# Patient Record
Sex: MALE | Race: WHITE | NOT HISPANIC OR LATINO | Employment: OTHER | URBAN - METROPOLITAN AREA
[De-identification: names, ages, dates, MRNs, and addresses within clinical notes are randomized per-mention and may not be internally consistent; named-entity substitution may affect disease eponyms.]

---

## 2017-01-01 ENCOUNTER — HOSPITAL ENCOUNTER (OUTPATIENT)
Facility: MEDICAL CENTER | Age: 77
End: 2017-11-29
Payer: MEDICARE

## 2017-01-01 ENCOUNTER — NON-PROVIDER VISIT (OUTPATIENT)
Dept: PULMONOLOGY | Facility: HOSPICE | Age: 77
End: 2017-01-01
Payer: MEDICARE

## 2017-01-01 ENCOUNTER — APPOINTMENT (OUTPATIENT)
Dept: RADIOLOGY | Facility: MEDICAL CENTER | Age: 77
DRG: 308 | End: 2017-01-01
Attending: INTERNAL MEDICINE
Payer: MEDICARE

## 2017-01-01 ENCOUNTER — OFFICE VISIT (OUTPATIENT)
Dept: CARDIOLOGY | Facility: MEDICAL CENTER | Age: 77
End: 2017-01-01
Payer: MEDICARE

## 2017-01-01 ENCOUNTER — PATIENT OUTREACH (OUTPATIENT)
Dept: HEALTH INFORMATION MANAGEMENT | Facility: OTHER | Age: 77
End: 2017-01-01

## 2017-01-01 ENCOUNTER — HOSPITAL ENCOUNTER (INPATIENT)
Facility: MEDICAL CENTER | Age: 77
LOS: 1 days | DRG: 308 | End: 2017-11-30
Attending: INTERNAL MEDICINE | Admitting: INTERNAL MEDICINE
Payer: MEDICARE

## 2017-01-01 ENCOUNTER — TELEPHONE (OUTPATIENT)
Dept: CARDIOLOGY | Facility: MEDICAL CENTER | Age: 77
End: 2017-01-01

## 2017-01-01 VITALS
BODY MASS INDEX: 29.07 KG/M2 | OXYGEN SATURATION: 93 % | TEMPERATURE: 97.5 F | SYSTOLIC BLOOD PRESSURE: 116 MMHG | RESPIRATION RATE: 18 BRPM | HEART RATE: 96 BPM | DIASTOLIC BLOOD PRESSURE: 81 MMHG | HEIGHT: 70 IN | WEIGHT: 203.04 LBS

## 2017-01-01 VITALS
HEART RATE: 102 BPM | OXYGEN SATURATION: 97 % | BODY MASS INDEX: 28.15 KG/M2 | WEIGHT: 196.6 LBS | DIASTOLIC BLOOD PRESSURE: 90 MMHG | HEIGHT: 70 IN | SYSTOLIC BLOOD PRESSURE: 130 MMHG

## 2017-01-01 VITALS
OXYGEN SATURATION: 97 % | DIASTOLIC BLOOD PRESSURE: 70 MMHG | SYSTOLIC BLOOD PRESSURE: 130 MMHG | WEIGHT: 189 LBS | BODY MASS INDEX: 27.06 KG/M2 | HEIGHT: 70 IN | HEART RATE: 103 BPM

## 2017-01-01 VITALS
DIASTOLIC BLOOD PRESSURE: 78 MMHG | HEART RATE: 96 BPM | BODY MASS INDEX: 27.2 KG/M2 | SYSTOLIC BLOOD PRESSURE: 120 MMHG | HEIGHT: 70 IN | WEIGHT: 190 LBS | OXYGEN SATURATION: 96 %

## 2017-01-01 DIAGNOSIS — I50.21 SYSTOLIC CHF, ACUTE (HCC): ICD-10-CM

## 2017-01-01 DIAGNOSIS — R06.02 SOB (SHORTNESS OF BREATH): ICD-10-CM

## 2017-01-01 DIAGNOSIS — E78.00 HYPERCHOLESTEREMIA: ICD-10-CM

## 2017-01-01 DIAGNOSIS — I10 ESSENTIAL HYPERTENSION, BENIGN: ICD-10-CM

## 2017-01-01 DIAGNOSIS — I49.9 IRREGULAR HEART BEATS: ICD-10-CM

## 2017-01-01 DIAGNOSIS — I48.91 ATRIAL FIBRILLATION, UNSPECIFIED TYPE (HCC): ICD-10-CM

## 2017-01-01 DIAGNOSIS — R60.0 LOCALIZED EDEMA: ICD-10-CM

## 2017-01-01 DIAGNOSIS — E78.2 MIXED HYPERLIPIDEMIA: ICD-10-CM

## 2017-01-01 DIAGNOSIS — I48.0 PAROXYSMAL ATRIAL FIBRILLATION (HCC): ICD-10-CM

## 2017-01-01 LAB
ALBUMIN SERPL BCP-MCNC: 3.3 G/DL (ref 3.2–4.9)
ALBUMIN SERPL BCP-MCNC: 3.6 G/DL (ref 3.2–4.9)
ALBUMIN/GLOB SERPL: 1.5 G/DL
ALBUMIN/GLOB SERPL: 1.6 G/DL
ALP SERPL-CCNC: 207 U/L (ref 30–99)
ALP SERPL-CCNC: 216 U/L (ref 30–99)
ALT SERPL-CCNC: 10 U/L (ref 2–50)
ALT SERPL-CCNC: 13 U/L (ref 2–50)
ANION GAP SERPL CALC-SCNC: 12 MMOL/L (ref 0–11.9)
ANION GAP SERPL CALC-SCNC: 9 MMOL/L (ref 0–11.9)
AST SERPL-CCNC: 17 U/L (ref 12–45)
AST SERPL-CCNC: 25 U/L (ref 12–45)
BASOPHILS # BLD AUTO: 0.8 % (ref 0–1.8)
BASOPHILS # BLD: 0.06 K/UL (ref 0–0.12)
BILIRUB SERPL-MCNC: 1.2 MG/DL (ref 0.1–1.5)
BILIRUB SERPL-MCNC: 1.2 MG/DL (ref 0.1–1.5)
BNP SERPL-MCNC: 969 PG/ML (ref 0–100)
BUN SERPL-MCNC: 25 MG/DL (ref 8–22)
BUN SERPL-MCNC: 27 MG/DL (ref 8–22)
CALCIUM SERPL-MCNC: 8.2 MG/DL (ref 8.5–10.5)
CALCIUM SERPL-MCNC: 8.5 MG/DL (ref 8.5–10.5)
CHLORIDE SERPL-SCNC: 108 MMOL/L (ref 96–112)
CHLORIDE SERPL-SCNC: 110 MMOL/L (ref 96–112)
CO2 SERPL-SCNC: 20 MMOL/L (ref 20–33)
CO2 SERPL-SCNC: 23 MMOL/L (ref 20–33)
CREAT SERPL-MCNC: 1.16 MG/DL (ref 0.5–1.4)
CREAT SERPL-MCNC: 1.22 MG/DL (ref 0.5–1.4)
EKG IMPRESSION: NORMAL
EKG IMPRESSION: NORMAL
EOSINOPHIL # BLD AUTO: 0.1 K/UL (ref 0–0.51)
EOSINOPHIL NFR BLD: 1.4 % (ref 0–6.9)
ERYTHROCYTE [DISTWIDTH] IN BLOOD BY AUTOMATED COUNT: 59.7 FL (ref 35.9–50)
ERYTHROCYTE [DISTWIDTH] IN BLOOD BY AUTOMATED COUNT: 61 FL (ref 35.9–50)
GFR SERPL CREATININE-BSD FRML MDRD: 58 ML/MIN/1.73 M 2
GFR SERPL CREATININE-BSD FRML MDRD: >60 ML/MIN/1.73 M 2
GLOBULIN SER CALC-MCNC: 2.1 G/DL (ref 1.9–3.5)
GLOBULIN SER CALC-MCNC: 2.4 G/DL (ref 1.9–3.5)
GLUCOSE SERPL-MCNC: 113 MG/DL (ref 65–99)
GLUCOSE SERPL-MCNC: 150 MG/DL (ref 65–99)
HCT VFR BLD AUTO: 35.6 % (ref 42–52)
HCT VFR BLD AUTO: 38.1 % (ref 42–52)
HGB BLD-MCNC: 11.4 G/DL (ref 14–18)
HGB BLD-MCNC: 12.3 G/DL (ref 14–18)
IMM GRANULOCYTES # BLD AUTO: 0.01 K/UL (ref 0–0.11)
IMM GRANULOCYTES NFR BLD AUTO: 0.1 % (ref 0–0.9)
INR PPP: 1.19 (ref 0.87–1.13)
LV EJECT FRACT  99904: 55
LV EJECT FRACT MOD 2C 99903: 55.02
LV EJECT FRACT MOD 4C 99902: 44.21
LV EJECT FRACT MOD BP 99901: 48.25
LYMPHOCYTES # BLD AUTO: 1.04 K/UL (ref 1–4.8)
LYMPHOCYTES NFR BLD: 14.4 % (ref 22–41)
MCH RBC QN AUTO: 33.7 PG (ref 27–33)
MCH RBC QN AUTO: 33.9 PG (ref 27–33)
MCHC RBC AUTO-ENTMCNC: 32 G/DL (ref 33.7–35.3)
MCHC RBC AUTO-ENTMCNC: 32.3 G/DL (ref 33.7–35.3)
MCV RBC AUTO: 105 FL (ref 81.4–97.8)
MCV RBC AUTO: 105.3 FL (ref 81.4–97.8)
MONOCYTES # BLD AUTO: 0.7 K/UL (ref 0–0.85)
MONOCYTES NFR BLD AUTO: 9.7 % (ref 0–13.4)
NEUTROPHILS # BLD AUTO: 5.31 K/UL (ref 1.82–7.42)
NEUTROPHILS NFR BLD: 73.6 % (ref 44–72)
NRBC # BLD AUTO: 0 K/UL
NRBC BLD AUTO-RTO: 0 /100 WBC
PLATELET # BLD AUTO: 185 K/UL (ref 164–446)
PLATELET # BLD AUTO: 226 K/UL (ref 164–446)
PMV BLD AUTO: 11.2 FL (ref 9–12.9)
PMV BLD AUTO: 11.2 FL (ref 9–12.9)
POTASSIUM SERPL-SCNC: 4.1 MMOL/L (ref 3.6–5.5)
POTASSIUM SERPL-SCNC: 4.2 MMOL/L (ref 3.6–5.5)
PROT SERPL-MCNC: 5.4 G/DL (ref 6–8.2)
PROT SERPL-MCNC: 6 G/DL (ref 6–8.2)
PROTHROMBIN TIME: 14.8 SEC (ref 12–14.6)
RBC # BLD AUTO: 3.38 M/UL (ref 4.7–6.1)
RBC # BLD AUTO: 3.63 M/UL (ref 4.7–6.1)
SODIUM SERPL-SCNC: 140 MMOL/L (ref 135–145)
SODIUM SERPL-SCNC: 142 MMOL/L (ref 135–145)
WBC # BLD AUTO: 5.1 K/UL (ref 4.8–10.8)
WBC # BLD AUTO: 7.2 K/UL (ref 4.8–10.8)

## 2017-01-01 PROCEDURE — A9270 NON-COVERED ITEM OR SERVICE: HCPCS | Performed by: INTERNAL MEDICINE

## 2017-01-01 PROCEDURE — 36415 COLL VENOUS BLD VENIPUNCTURE: CPT

## 2017-01-01 PROCEDURE — 93306 TTE W/DOPPLER COMPLETE: CPT

## 2017-01-01 PROCEDURE — 80053 COMPREHEN METABOLIC PANEL: CPT

## 2017-01-01 PROCEDURE — 700102 HCHG RX REV CODE 250 W/ 637 OVERRIDE(OP): Performed by: INTERNAL MEDICINE

## 2017-01-01 PROCEDURE — 85027 COMPLETE CBC AUTOMATED: CPT

## 2017-01-01 PROCEDURE — 700111 HCHG RX REV CODE 636 W/ 250 OVERRIDE (IP): Performed by: INTERNAL MEDICINE

## 2017-01-01 PROCEDURE — 85025 COMPLETE CBC W/AUTO DIFF WBC: CPT

## 2017-01-01 PROCEDURE — 93005 ELECTROCARDIOGRAM TRACING: CPT | Performed by: NURSE PRACTITIONER

## 2017-01-01 PROCEDURE — 700111 HCHG RX REV CODE 636 W/ 250 OVERRIDE (IP): Performed by: NURSE PRACTITIONER

## 2017-01-01 PROCEDURE — 93010 ELECTROCARDIOGRAM REPORT: CPT | Performed by: INTERNAL MEDICINE

## 2017-01-01 PROCEDURE — 93000 ELECTROCARDIOGRAM COMPLETE: CPT | Performed by: INTERNAL MEDICINE

## 2017-01-01 PROCEDURE — 85610 PROTHROMBIN TIME: CPT

## 2017-01-01 PROCEDURE — 94060 EVALUATION OF WHEEZING: CPT | Performed by: INTERNAL MEDICINE

## 2017-01-01 PROCEDURE — 99214 OFFICE O/P EST MOD 30 MIN: CPT | Performed by: NURSE PRACTITIONER

## 2017-01-01 PROCEDURE — 93306 TTE W/DOPPLER COMPLETE: CPT | Mod: 26 | Performed by: INTERNAL MEDICINE

## 2017-01-01 PROCEDURE — 770020 HCHG ROOM/CARE - TELE (206)

## 2017-01-01 PROCEDURE — 83880 ASSAY OF NATRIURETIC PEPTIDE: CPT

## 2017-01-01 PROCEDURE — 99205 OFFICE O/P NEW HI 60 MIN: CPT | Mod: 25 | Performed by: INTERNAL MEDICINE

## 2017-01-01 PROCEDURE — 94726 PLETHYSMOGRAPHY LUNG VOLUMES: CPT | Performed by: INTERNAL MEDICINE

## 2017-01-01 PROCEDURE — 94729 DIFFUSING CAPACITY: CPT | Performed by: INTERNAL MEDICINE

## 2017-01-01 PROCEDURE — 71020 DX-CHEST-2 VIEWS: CPT

## 2017-01-01 RX ORDER — LORAZEPAM 2 MG/ML
1.5 INJECTION INTRAMUSCULAR
Status: DISCONTINUED | OUTPATIENT
Start: 2017-01-01 | End: 2017-01-01 | Stop reason: HOSPADM

## 2017-01-01 RX ORDER — LISINOPRIL 5 MG/1
5 TABLET ORAL DAILY
Qty: 30 TAB | Refills: 4 | Status: SHIPPED | OUTPATIENT
Start: 2017-01-01 | End: 2017-01-01 | Stop reason: SDUPTHER

## 2017-01-01 RX ORDER — METOPROLOL SUCCINATE 50 MG/1
75 TABLET, EXTENDED RELEASE ORAL DAILY
Qty: 135 TAB | Refills: 3 | Status: SHIPPED | OUTPATIENT
Start: 2017-01-01 | End: 2017-01-01 | Stop reason: SDUPTHER

## 2017-01-01 RX ORDER — LORAZEPAM 1 MG/1
3 TABLET ORAL
Status: DISCONTINUED | OUTPATIENT
Start: 2017-01-01 | End: 2017-01-01 | Stop reason: HOSPADM

## 2017-01-01 RX ORDER — LORAZEPAM 1 MG/1
1 TABLET ORAL EVERY 4 HOURS PRN
Status: DISCONTINUED | OUTPATIENT
Start: 2017-01-01 | End: 2017-01-01 | Stop reason: HOSPADM

## 2017-01-01 RX ORDER — LORAZEPAM 0.5 MG/1
0.5 TABLET ORAL EVERY 4 HOURS PRN
Status: DISCONTINUED | OUTPATIENT
Start: 2017-01-01 | End: 2017-01-01 | Stop reason: HOSPADM

## 2017-01-01 RX ORDER — FUROSEMIDE 40 MG/1
40 TABLET ORAL DAILY
Qty: 30 TAB | Refills: 4 | Status: SHIPPED | OUTPATIENT
Start: 2017-01-01 | End: 2017-01-01 | Stop reason: SDUPTHER

## 2017-01-01 RX ORDER — POTASSIUM CHLORIDE 20 MEQ/1
10 TABLET, EXTENDED RELEASE ORAL DAILY
Qty: 100 TAB | Refills: 3
Start: 2017-01-01 | End: 2018-01-01 | Stop reason: SDUPTHER

## 2017-01-01 RX ORDER — POTASSIUM CHLORIDE 20 MEQ/1
20 TABLET, EXTENDED RELEASE ORAL DAILY
Status: DISCONTINUED | OUTPATIENT
Start: 2017-01-01 | End: 2017-01-01 | Stop reason: HOSPADM

## 2017-01-01 RX ORDER — METOPROLOL SUCCINATE 50 MG/1
50 TABLET, EXTENDED RELEASE ORAL
Status: DISCONTINUED | OUTPATIENT
Start: 2017-01-01 | End: 2017-01-01 | Stop reason: HOSPADM

## 2017-01-01 RX ORDER — LORAZEPAM 2 MG/ML
2 INJECTION INTRAMUSCULAR
Status: DISCONTINUED | OUTPATIENT
Start: 2017-01-01 | End: 2017-01-01 | Stop reason: HOSPADM

## 2017-01-01 RX ORDER — POTASSIUM CHLORIDE 20 MEQ/1
20 TABLET, EXTENDED RELEASE ORAL DAILY
Qty: 60 TAB | Refills: 2 | Status: SHIPPED | OUTPATIENT
Start: 2017-01-01 | End: 2017-01-01 | Stop reason: SDUPTHER

## 2017-01-01 RX ORDER — DOXYCYCLINE HYCLATE 50 MG/1
50 CAPSULE ORAL 2 TIMES DAILY
COMMUNITY
Start: 2017-01-01 | End: 2017-01-01

## 2017-01-01 RX ORDER — POTASSIUM CHLORIDE 20 MEQ/1
20 TABLET, EXTENDED RELEASE ORAL DAILY
Qty: 100 TAB | Refills: 3 | Status: SHIPPED | OUTPATIENT
Start: 2017-01-01 | End: 2017-01-01 | Stop reason: SDUPTHER

## 2017-01-01 RX ORDER — ATORVASTATIN CALCIUM 20 MG/1
20 TABLET, FILM COATED ORAL EVERY EVENING
Qty: 90 TAB | Refills: 3 | Status: SHIPPED | OUTPATIENT
Start: 2017-01-01

## 2017-01-01 RX ORDER — LORAZEPAM 2 MG/ML
0.5 INJECTION INTRAMUSCULAR EVERY 4 HOURS PRN
Status: DISCONTINUED | OUTPATIENT
Start: 2017-01-01 | End: 2017-01-01 | Stop reason: HOSPADM

## 2017-01-01 RX ORDER — LORAZEPAM 1 MG/1
4 TABLET ORAL
Status: DISCONTINUED | OUTPATIENT
Start: 2017-01-01 | End: 2017-01-01 | Stop reason: HOSPADM

## 2017-01-01 RX ORDER — FUROSEMIDE 10 MG/ML
20 INJECTION INTRAMUSCULAR; INTRAVENOUS
Status: DISCONTINUED | OUTPATIENT
Start: 2017-01-01 | End: 2017-01-01 | Stop reason: HOSPADM

## 2017-01-01 RX ORDER — LISINOPRIL 5 MG/1
5 TABLET ORAL DAILY
Qty: 90 TAB | Refills: 3 | Status: SHIPPED | OUTPATIENT
Start: 2017-01-01 | End: 2018-01-01

## 2017-01-01 RX ORDER — FUROSEMIDE 40 MG/1
20 TABLET ORAL DAILY
Qty: 100 TAB | Refills: 3
Start: 2017-01-01 | End: 2018-01-01

## 2017-01-01 RX ORDER — LORAZEPAM 1 MG/1
2 TABLET ORAL
Status: DISCONTINUED | OUTPATIENT
Start: 2017-01-01 | End: 2017-01-01 | Stop reason: HOSPADM

## 2017-01-01 RX ORDER — LORAZEPAM 2 MG/ML
1 INJECTION INTRAMUSCULAR
Status: DISCONTINUED | OUTPATIENT
Start: 2017-01-01 | End: 2017-01-01 | Stop reason: HOSPADM

## 2017-01-01 RX ORDER — METOPROLOL SUCCINATE 50 MG/1
50 TABLET, EXTENDED RELEASE ORAL 2 TIMES DAILY
Qty: 180 TAB | Refills: 3 | Status: SHIPPED | OUTPATIENT
Start: 2017-01-01 | End: 2018-01-01 | Stop reason: SDUPTHER

## 2017-01-01 RX ORDER — LISINOPRIL 5 MG/1
5 TABLET ORAL DAILY
Status: DISCONTINUED | OUTPATIENT
Start: 2017-01-01 | End: 2017-01-01 | Stop reason: HOSPADM

## 2017-01-01 RX ORDER — METOPROLOL SUCCINATE 50 MG/1
50 TABLET, EXTENDED RELEASE ORAL DAILY
Qty: 30 TAB | Refills: 4 | Status: SHIPPED | OUTPATIENT
Start: 2017-01-01 | End: 2017-01-01 | Stop reason: SDUPTHER

## 2017-01-01 RX ORDER — FUROSEMIDE 40 MG/1
40 TABLET ORAL DAILY
Qty: 100 TAB | Refills: 3 | Status: SHIPPED | OUTPATIENT
Start: 2017-01-01 | End: 2017-01-01 | Stop reason: SDUPTHER

## 2017-01-01 RX ORDER — FUROSEMIDE 10 MG/ML
40 INJECTION INTRAMUSCULAR; INTRAVENOUS ONCE
Status: COMPLETED | OUTPATIENT
Start: 2017-01-01 | End: 2017-01-01

## 2017-01-01 RX ADMIN — POTASSIUM CHLORIDE 20 MEQ: 1500 TABLET, EXTENDED RELEASE ORAL at 18:07

## 2017-01-01 RX ADMIN — FUROSEMIDE 20 MG: 10 INJECTION, SOLUTION INTRAMUSCULAR; INTRAVENOUS at 05:26

## 2017-01-01 RX ADMIN — LISINOPRIL 5 MG: 5 TABLET ORAL at 18:07

## 2017-01-01 RX ADMIN — FUROSEMIDE 40 MG: 10 INJECTION, SOLUTION INTRAMUSCULAR; INTRAVENOUS at 09:48

## 2017-01-01 RX ADMIN — METOPROLOL SUCCINATE 50 MG: 50 TABLET, EXTENDED RELEASE ORAL at 18:07

## 2017-01-01 RX ADMIN — METOPROLOL SUCCINATE 50 MG: 50 TABLET, EXTENDED RELEASE ORAL at 09:48

## 2017-01-01 RX ADMIN — POTASSIUM CHLORIDE 20 MEQ: 1500 TABLET, EXTENDED RELEASE ORAL at 09:48

## 2017-01-01 RX ADMIN — LISINOPRIL 5 MG: 5 TABLET ORAL at 09:48

## 2017-01-01 RX ADMIN — FUROSEMIDE 20 MG: 10 INJECTION, SOLUTION INTRAMUSCULAR; INTRAVENOUS at 18:06

## 2017-01-01 ASSESSMENT — ENCOUNTER SYMPTOMS
COUGH: 0
ORTHOPNEA: 0
DIZZINESS: 0
PALPITATIONS: 0
PALPITATIONS: 0
PND: 0
SHORTNESS OF BREATH: 1
ORTHOPNEA: 0
WEIGHT LOSS: 0
SHORTNESS OF BREATH: 1
PND: 0
VOMITING: 0
MYALGIAS: 0
ABDOMINAL PAIN: 0
CLAUDICATION: 0
WEAKNESS: 0
MYALGIAS: 0
NAUSEA: 0
PALPITATIONS: 0
CLAUDICATION: 0
ABDOMINAL PAIN: 0
SHORTNESS OF BREATH: 0
DIARRHEA: 0
COUGH: 0
DIZZINESS: 0
ORTHOPNEA: 0
CLAUDICATION: 0
WEAKNESS: 0
CONSTIPATION: 0
ABDOMINAL PAIN: 0
WEAKNESS: 0
PND: 0
DIZZINESS: 0

## 2017-01-01 ASSESSMENT — LIFESTYLE VARIABLES
ORIENTATION AND CLOUDING OF SENSORIUM: ORIENTED AND CAN DO SERIAL ADDITIONS
ALCOHOL_USE: NO
TOTAL SCORE: 0
TREMOR: NO TREMOR
TREMOR: NO TREMOR
AUDITORY DISTURBANCES: NOT PRESENT
ANXIETY: NO ANXIETY (AT EASE)
DO YOU DRINK ALCOHOL: NO
HEADACHE, FULLNESS IN HEAD: NOT PRESENT
TOTAL SCORE: 0
NAUSEA AND VOMITING: NO NAUSEA AND NO VOMITING
AGITATION: NORMAL ACTIVITY
AUDITORY DISTURBANCES: NOT PRESENT
VISUAL DISTURBANCES: NOT PRESENT
ORIENTATION AND CLOUDING OF SENSORIUM: ORIENTED AND CAN DO SERIAL ADDITIONS
NAUSEA AND VOMITING: NO NAUSEA AND NO VOMITING
EVER_SMOKED: YES
HEADACHE, FULLNESS IN HEAD: NOT PRESENT
PAROXYSMAL SWEATS: NO SWEAT VISIBLE
AGITATION: NORMAL ACTIVITY
VISUAL DISTURBANCES: NOT PRESENT
PAROXYSMAL SWEATS: NO SWEAT VISIBLE
ANXIETY: NO ANXIETY (AT EASE)

## 2017-01-01 ASSESSMENT — PULMONARY FUNCTION TESTS
FVC_PERCENT_PREDICTED: 59
FEV1: 1.87
FEV1/FVC_PERCENT_PREDICTED: 72
FVC_PREDICTED: 3.99
FEV1/FVC: 76.74
FEV1_PERCENT_CHANGE: 5
FEV1: 1.98
FEV1_PERCENT_PREDICTED: 65
FEV1/FVC: 79
FEV1/FVC_PERCENT_PREDICTED: 106
FEV1/FVC_PERCENT_CHANGE: 62
FEV1_PERCENT_PREDICTED: 68
FEV1_PREDICTED: 2.87
FEV1/FVC_PERCENT_PREDICTED: 110
FVC: 2.38
FVC: 2.58
FVC_PERCENT_PREDICTED: 64
FEV1_PERCENT_CHANGE: 8

## 2017-01-01 ASSESSMENT — PAIN SCALES - GENERAL
PAINLEVEL_OUTOF10: 0

## 2017-01-01 ASSESSMENT — PATIENT HEALTH QUESTIONNAIRE - PHQ9
1. LITTLE INTEREST OR PLEASURE IN DOING THINGS: NOT AT ALL
SUM OF ALL RESPONSES TO PHQ QUESTIONS 1-9: 0
2. FEELING DOWN, DEPRESSED, IRRITABLE, OR HOPELESS: NOT AT ALL
SUM OF ALL RESPONSES TO PHQ9 QUESTIONS 1 AND 2: 0

## 2017-11-08 ENCOUNTER — OFFICE VISIT (OUTPATIENT)
Dept: PULMONOLOGY | Facility: HOSPICE | Age: 77
End: 2017-11-08
Payer: MEDICARE

## 2017-11-08 VITALS
OXYGEN SATURATION: 97 % | BODY MASS INDEX: 28.2 KG/M2 | DIASTOLIC BLOOD PRESSURE: 82 MMHG | HEIGHT: 70 IN | SYSTOLIC BLOOD PRESSURE: 124 MMHG | RESPIRATION RATE: 16 BRPM | WEIGHT: 197 LBS | TEMPERATURE: 97.2 F | HEART RATE: 115 BPM

## 2017-11-08 DIAGNOSIS — R06.02 SOB (SHORTNESS OF BREATH): ICD-10-CM

## 2017-11-08 DIAGNOSIS — Z87.891 FORMER SMOKER, STOPPED SMOKING IN DISTANT PAST: ICD-10-CM

## 2017-11-08 PROCEDURE — 99204 OFFICE O/P NEW MOD 45 MIN: CPT | Performed by: INTERNAL MEDICINE

## 2017-11-08 RX ORDER — ALBUTEROL SULFATE 90 UG/1
1 AEROSOL, METERED RESPIRATORY (INHALATION) EVERY 4 HOURS PRN
COMMUNITY
Start: 2017-10-20 | End: 2018-01-01

## 2017-11-08 RX ORDER — VIT A/VIT C/VIT E/ZINC/COPPER 2148-113
2 TABLET ORAL DAILY
COMMUNITY

## 2017-11-08 RX ORDER — MELOXICAM 15 MG/1
15 TABLET ORAL DAILY
Status: ON HOLD | COMMUNITY
End: 2017-01-01

## 2017-11-08 NOTE — PROGRESS NOTES
Chief Complaint   Patient presents with   • Establish Care     Referred by  for Restrictive and Obstructive Lung Disease       HPI: This patient is a 77 y.o. Male who is referred for shortness of breath and cough. Symptoms started following a URI in September 2017. He has no significant past smoking history, with less than 5 pack years estimated remotely. He was treated empirically with Rocephin, Z-Eamon and albuterol inhaler, without significant subjective benefit. Cough is mildly productive and accompanied by periodic wheezing. He feels more short of breath with moderate exertion. Denies palpitations, chest pain, hemoptysis, or edema. He had a chest x-ray performed for results were not forwarded. Spirometry at his PCP was of suboptimal effort. Denies fevers, chills, weight loss.  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    Past Medical History:   Diagnosis Date   • Arthritis    • CATARACT     bilateral IOL   • Chickenpox    • Turkish measles    • Gout    • Gout    • Hypertension    • Indigestion    • Influenza    • Mumps    • Pain     left hip   • Pain     left knee   • Tremor     intermittent       Social History     Social History   • Marital status:      Spouse name: N/A   • Number of children: N/A   • Years of education: N/A     Occupational History   • Not on file.     Social History Main Topics   • Smoking status: Former Smoker     Packs/day: 0.50     Years: 5.00     Types: Cigarettes     Quit date: 1/1/1969   • Smokeless tobacco: Never Used   • Alcohol use Yes      Comment: 8 drinks a week   • Drug use: No   • Sexual activity: Not on file     Other Topics Concern   • Not on file     Social History Narrative   • No narrative on file       Family History   Problem Relation Age of Onset   • Cancer Mother    • Non-contributory Neg Hx        Current Outpatient Prescriptions on File Prior  "to Visit   Medication Sig Dispense Refill   • allopurinol (ZYLOPRIM) 300 MG Tab TAKE 1 TABLET DAILY 90 Tab 2   • omeprazole (PRILOSEC) 20 MG CPDR Take 20 mg by mouth every day.       • atorvastatin (LIPITOR) 20 MG TABS Take 20 mg by mouth every evening.       • ATENOLOL 50 MG PO TABS Take 50 mg by mouth every day.     • aspirin (ASA) 325 MG Tab Take 325 mg by mouth every day.     • celecoxib (CELEBREX) 200 MG Cap Take 200 mg by mouth 2 times a day.     • mupirocin calcium (BACTROBAN) 2 % Cream Apply  to affected area(s).       No current facility-administered medications on file prior to visit.        Allergies: Percocet [oxycodone-acetaminophen] and Other environmental    ROS:   Constitutional: Denies fevers, chills, night sweats, fatigue or weight loss  Eyes: Denies vision loss, pain, drainage, double vision  Ears, Nose, Throat: Denies earache, difficulty hearing, tinnitus, nasal congestion, hoarseness  Cardiovascular: Denies chest pain, tightness, palpitations, orthopnea or edema  Respiratory: As in history of present illness  Sleep: Denies insomnia, morning headaches  GI: Denies heartburn, dysphagia, nausea, abdominal pain, diarrhea or constipation  : Denies frequent urination, hematuria, discharge or painful urination  Musculoskeletal: + back pain, + painful joints, denies sore muscles  Neurological: Denies weakness or headaches  Skin: No rashes    Blood pressure 124/82, pulse (!) 115, temperature 36.2 °C (97.2 °F), resp. rate 16, height 1.778 m (5' 10\"), weight 89.4 kg (197 lb), SpO2 97 %.    Physical Exam:  Appearance: Well-nourished, well-developed, in no acute distress  HEENT: Normocephalic, atraumatic, white sclera, PERRLA, oropharynx clear  Neck: No adenopathy or masses  Respiratory: no intercostal retractions or accessory muscle use  Lungs auscultation: Clear to auscultation bilaterally  Cardiovascular: Irregularly irregular rhythm. No murmurs, rubs or gallops.  No LE edema  Abdomen: soft, " nondistended  Gait: Uses cane  Digits: No clubbing, cyanosis  Motor: No focal deficits  Orientation: Oriented to time, person and place    Diagnosis:  1. SOB (shortness of breath)  AMB PULMONARY FUNCTION TEST/LAB   2. Former smoker, stopped smoking in distant past         Plan:  The patient describes cough and exertional dyspnea over the past 2 months. Spirometry is to be interpreted with caution as it was of suboptimal technique. He was treated empirically for pneumonia without subjective benefit. Symptoms may be secondary to reactive airways disease, pulmonary parenchymal disease, however suspect potential cardiovascular etiology, eg. atrial fibrillation, based on examination.  Recommend full pulmonary function testing with DLCO for clarification.  Request chest x-ray from C.  Return for after PFT.

## 2017-11-27 ENCOUNTER — APPOINTMENT (RX ONLY)
Dept: URBAN - METROPOLITAN AREA CLINIC 4 | Facility: CLINIC | Age: 77
Setting detail: DERMATOLOGY
End: 2017-11-27

## 2017-11-27 DIAGNOSIS — L57.0 ACTINIC KERATOSIS: ICD-10-CM

## 2017-11-27 DIAGNOSIS — L82.1 OTHER SEBORRHEIC KERATOSIS: ICD-10-CM

## 2017-11-27 DIAGNOSIS — L81.4 OTHER MELANIN HYPERPIGMENTATION: ICD-10-CM

## 2017-11-27 DIAGNOSIS — D18.0 HEMANGIOMA: ICD-10-CM

## 2017-11-27 PROBLEM — D48.5 NEOPLASM OF UNCERTAIN BEHAVIOR OF SKIN: Status: ACTIVE | Noted: 2017-11-27

## 2017-11-27 PROBLEM — D18.01 HEMANGIOMA OF SKIN AND SUBCUTANEOUS TISSUE: Status: ACTIVE | Noted: 2017-11-27

## 2017-11-27 PROCEDURE — 99212 OFFICE O/P EST SF 10 MIN: CPT | Mod: 25

## 2017-11-27 PROCEDURE — 11100: CPT | Mod: 59

## 2017-11-27 PROCEDURE — ? BIOPSY BY SHAVE METHOD

## 2017-11-27 PROCEDURE — ? COUNSELING

## 2017-11-27 PROCEDURE — 17000 DESTRUCT PREMALG LESION: CPT

## 2017-11-27 PROCEDURE — 17003 DESTRUCT PREMALG LES 2-14: CPT

## 2017-11-27 PROCEDURE — ? LIQUID NITROGEN

## 2017-11-27 ASSESSMENT — LOCATION ZONE DERM
LOCATION ZONE: FACE
LOCATION ZONE: TRUNK
LOCATION ZONE: EAR
LOCATION ZONE: SCALP
LOCATION ZONE: ARM

## 2017-11-27 ASSESSMENT — LOCATION DETAILED DESCRIPTION DERM
LOCATION DETAILED: RIGHT INFERIOR MEDIAL MIDBACK
LOCATION DETAILED: LEFT SUPERIOR MEDIAL FOREHEAD
LOCATION DETAILED: LEFT SUPERIOR PARIETAL SCALP
LOCATION DETAILED: LEFT DISTAL POSTERIOR UPPER ARM
LOCATION DETAILED: LEFT SUPERIOR HELIX
LOCATION DETAILED: LEFT FOREHEAD
LOCATION DETAILED: LEFT PROXIMAL POSTERIOR UPPER ARM
LOCATION DETAILED: RIGHT MID-UPPER BACK
LOCATION DETAILED: RIGHT SUPERIOR HELIX
LOCATION DETAILED: RIGHT DISTAL POSTERIOR UPPER ARM
LOCATION DETAILED: LEFT PROXIMAL DORSAL FOREARM
LOCATION DETAILED: RIGHT INFERIOR MEDIAL UPPER BACK

## 2017-11-27 ASSESSMENT — LOCATION SIMPLE DESCRIPTION DERM
LOCATION SIMPLE: RIGHT UPPER BACK
LOCATION SIMPLE: LEFT UPPER ARM
LOCATION SIMPLE: LEFT FOREARM
LOCATION SIMPLE: LEFT EAR
LOCATION SIMPLE: RIGHT LOWER BACK
LOCATION SIMPLE: SCALP
LOCATION SIMPLE: LEFT FOREHEAD
LOCATION SIMPLE: RIGHT EAR
LOCATION SIMPLE: RIGHT UPPER ARM

## 2017-11-27 NOTE — PROCEDURE: BIOPSY BY SHAVE METHOD
Anticipated Plan (Based On Presumed Biopsy Results): Recheck in 2 months if +
Anesthesia Volume In Cc: 3
Bill For Surgical Tray: no
Render Post-Care Instructions In Note?: yes
Hemostasis: Drysol and Electrocautery
Anesthesia Type: 1% lidocaine with epinephrine and a 1:10 solution of 8.4% sodium bicarbonate
Type Of Destruction Used: Curettage
Detail Level: Detailed
Post-Care Instructions: I reviewed with the patient in detail post-care instructions. Patient is to keep the biopsy site dry overnight, and then apply vasaline twice daily until healed.
Size Of Lesion In Cm: 0
Lab Facility: 
Notification Instructions: Patient will be notified of biopsy results. However, patient instructed to call the office if not contacted within 2 weeks.
Wound Care: Vaseline
Consent: Written consent was obtained and risks were reviewed including but not limited to scarring, infection, bleeding, scabbing, incomplete removal, nerve damage and allergy to anesthesia.
Dressing: Band-Aid
Lab: [id number]
Curettage Text: The wound bed was treated with curettage after the biopsy was performed.
Electrodesiccation Text: The wound bed was treated with electrodesiccation after the biopsy was performed.
Billing Type: Third-Party Bill
Electrodesiccation And Curettage Text: The wound bed was treated with electrodesiccation and curettage after the biopsy was performed.
Biopsy Type: H and E
Biopsy Method: Personna blade

## 2017-11-27 NOTE — PROCEDURE: LIQUID NITROGEN
Consent: The patient's consent was obtained including but not limited to risks of crusting, scabbing, blistering, scarring, darker or lighter pigmentary change, recurrence, incomplete removal and infection.
Number Of Freeze-Thaw Cycles: 1 freeze-thaw cycle
Detail Level: Detailed
Duration Of Freeze Thaw-Cycle (Seconds): 5
Post-Care Instructions: I reviewed with the patient in detail post-care instructions. Patient is to wear sunprotection, and avoid picking at any of the treated lesions. Pt may apply Vaseline to crusted or scabbing areas.
Render Post-Care Instructions In Note?: no

## 2017-11-29 PROBLEM — I50.21 SYSTOLIC CHF, ACUTE (HCC): Status: ACTIVE | Noted: 2017-01-01

## 2017-11-29 PROBLEM — I48.91 ATRIAL FIBRILLATION (HCC): Status: ACTIVE | Noted: 2017-01-01

## 2017-11-29 NOTE — PROGRESS NOTES
Direct admit from MD office. Dr. Melvin accepting for A-Fib and CHF. ADT to be signed and held by Dr. Melvin and will need to be released upon patient arrival to unit. Patient arriving via POV.

## 2017-11-30 PROBLEM — F10.10 ETOH ABUSE: Status: ACTIVE | Noted: 2017-01-01

## 2017-11-30 NOTE — DISCHARGE PLANNING
Upon utilization review, patient noted to be on the following medications that could potentially require prior authorization if prescribed at discharge: Xarelto.  If it is anticipated that patient will require these medications at discharge, beginning the prescription prior auth process in advance to anticipated discharge could assist in preventing delays when patient is medically cleared to be discharged from the hospital.

## 2017-11-30 NOTE — DISCHARGE SUMMARY
CHIEF COMPLAINT ON ADMISSION  Progressive exertional dyspnea and cough.     CODE STATUS  Prior    HPI & HOSPITAL COURSE  This is a 77 y.o. male direct admit with new onset atrial fibrillation with rapid ventricular response and acute systolic congestive heart failure. Patient was experiencing progressive exertional dyspnea and nonproductive cough for the last seven weeks. He was seen and evaluated by his pulmonary group, who in turn referred him to cardiology. BNP on admission was 969.     The duration of the patient's atrial fibrillation is unknown. Patient was last seen by cardiology in February 2013 for preoperative evaluation because of an abnormal EKG.  At that time, he was documented to be in sinus rhythm.  He had an echocardiogram performed that demonstrated normal segmental wall motion with EF of 60%-65%.      Echo performed (11/30/2017) showed severe TR, moderate to severe MR, severely enlarged right ventricle with reduced right ventricular systolic function, LVEF 55%. RVSP 45 mmHg.     The etiology of his systolic heart failure is uncertain, possibly secondary to alcoholic cardiomyopathy or untreated atrial fibrillation with RVR. Patient does have a significant history of ETOH abuse.     Patient was diuresed with IV lasix with a net loss of -1,400 at discharge and placed on Metoprolol and Lisinopril for rate control. [id number] score is 4. Xarelto anticoagulation has also been prescribed.     Patient reports feeling well and significant improvement in symptoms after medical management of his conditions. He was ambulating in the hallway without any dyspnea. On room air. Patient will be discharged on po lasix 40mg qd.     Therefore, he is discharged in good and stable condition with close outpatient follow-up.    SPECIFIC OUTPATIENT FOLLOW-UP  Patient will follow up with cardiology next week on 12/6/17 with Raymond GOLDEN.     DISCHARGE PROBLEM LIST  Atrial Fibrillation   Acute Systolic Congestive  Heart Failure  Hypertension  ETOH abuse     FOLLOW UP  Future Appointments  Date Time Provider Department Center   12/6/2017 2:40 PM SANDRA Coley [facility] None   12/7/2017 10:00 AM [facility] None   1/2/2018 10:40 AM Courtney Quintero M.D. [facility] None     No follow-up provider specified.    MEDICATIONS ON DISCHARGE   Jaime Tamayo   Home Medication Instructions LUCITA:[id number]    Printed on:11/30/17 0190   Medication Information                      allopurinol (ZYLOPRIM) 300 MG Tab  TAKE 1 TABLET DAILY             atorvastatin (LIPITOR) 20 MG TABS  Take 20 mg by mouth every evening.               furosemide (LASIX) 40 MG Tab  Take 1 Tab by mouth every day.             lisinopril (PRINIVIL) 5 MG Tab  Take 1 Tab by mouth every day.             metoprolol SR (TOPROL XL) 50 MG TABLET SR 24 HR  Take 1 Tab by mouth every day.             Multiple Vitamins-Minerals (PRESERVISION AREDS) Tab  Take 2 tablet by mouth every day.             omeprazole (PRILOSEC) 20 MG CPDR  Take 20 mg by mouth every day.               potassium chloride SA (KDUR) 20 MEQ Tab CR  Take 1 Tab by mouth every day.             rivaroxaban (XARELTO) 20 MG Tab tablet  Take 1 Tab by mouth with dinner.             VENTOLIN  (90 Base) MCG/ACT Aero Soln inhalation aerosol  Inhale 1 Puff by mouth every four hours as needed for Shortness of Breath.                 DIET  Orders Placed This Encounter   Procedures   • DIET ORDER     Standing Status:   Standing     Number of Occurrences:   1     Order Specific Question:   Diet:     Answer:   Cardiac [6]       ACTIVITY  As tolerated.  Weight bearing as tolerated      CONSULTATIONS  None    PROCEDURES  None    LABORATORY  Lab Results   Component Value Date/Time    SODIUM 140 11/30/2017 09:47 AM    POTASSIUM 4.1 11/30/2017 09:47 AM    CHLORIDE 108 11/30/2017 09:47 AM    CO2 23 11/30/2017 09:47 AM    GLUCOSE 150 (H) 11/30/2017 09:47 AM    BUN 25 (H) 11/30/2017 09:47 AM    CREATININE 1.22  11/30/2017 09:47 AM        Lab Results   Component Value Date/Time    WBC 5.1 11/30/2017 09:47 AM    HEMOGLOBIN 11.4 (L) 11/30/2017 09:47 AM    HEMATOCRIT 35.6 (L) 11/30/2017 09:47 AM    PLATELETCT 185 11/30/2017 09:47 AM

## 2017-11-30 NOTE — DISCHARGE INSTRUCTIONS
Discharge Instructions    Discharged to home by car with friend. Discharged via wheelchair, hospital escort: Yes.  Special equipment needed: Not Applicable    Be sure to schedule a follow-up appointment with your primary care doctor or any specialists as instructed.     Discharge Plan:   Diet Plan: Discussed  Confirmed Follow up Appointment: Appointment Scheduled  Confirmed Symptoms Management: Discussed  Medication Reconciliation Updated: Yes  Influenza Vaccine Indication: Not indicated: Previously immunized this influenza season and > 8 years of age    I understand that a diet low in cholesterol, fat, and sodium is recommended for good health. Unless I have been given specific instructions below for another diet, I accept this instruction as my diet prescription.   Other diet: Cardiac/heart healthy    Special Instructions:   HF Patient Discharge Instructions  · Monitor your weight daily, and maintain a weight chart, to track your weight changes.   · Activity as tolerated, unless your Doctor has ordered otherwise.  · Follow a low fat, low cholesterol, low salt diet unless instructed otherwise by your Doctor. Read the labels on the back of food products and track your intake of fat, cholesterol and salt.   · Fluid Restriction No. If a Fluid Restriction has been ordered by your Doctor, measure fluids with a measuring cup to ensure that you are not exceeding the restriction.   · No smoking.  · Oxygen No. If your Doctor has ordered that you wear Oxygen at home, it is important to wear it as ordered.  · Did you receive an explanation from staff on the importance of taking each of your medications and why it is necessary to keep taking them unless your doctor says to stop? Yes  · Were all of your questions answered about how to manage your heart failure and what to do if you have increased signs and symptoms after you go home? Yes  · Do you feel like your heart failure care team involved you in the care treatment plan  and allowed you to make decisions regarding your care while in the hospital and addressed any discharge needs you might have? Yes    See the educational handout provided at discharge for more information on monitoring your daily weight, activity and diet. This also explains more about Heart Failure, symptoms of a flare-up and some of the tests that you have undergone.     Warning Signs of a Flare-Up include:  · Swelling in the ankles or lower legs.  · Shortness of breath, while at rest, or while doing normal activities.   · Shortness of breath at night when in bed, or coughing in bed.   · Requiring more pillows to sleep at night, or needing to sit up at night to sleep.  · Feeling weak, dizzy or fatigued.     When to call your Doctor:  · Call [facility] seven days a week from 8:00 a.m. to 8:00 p.m. for medical questions [phone].  · Call your Primary Care Physician or Cardiologist if:   1. You experience any pain radiating to your jaw or neck.  2. You have any difficulty breathing.  3. You experience weight gain of 3 lbs in a day or 5 lbs in a week.   4. You feel any palpitations or irregular heartbeats.  5. You become dizzy or lose consciousness.   If you have had an angiogram or had a pacemaker or AICD placed, and experience:  1. Bleeding, drainage or swelling at the surgical / puncture site.  2. Fever greater than 100.0 F  3. Shock from internal defibrillator.  4. Cool and / or numb extremities.      · Is patient discharged on Warfarin / Coumadin?   No     · Is patient Post Blood Transfusion?  No              Atrial Fibrillation  Atrial fibrillation is a condition that causes your heart to beat irregularly. It may also cause your heart to beat faster than normal. Atrial fibrillation can prevent your heart from pumping blood normally. It increases your risk of stroke and heart problems.  HOME CARE  · Take medications as told by your doctor.  · Only take medications that your doctor says are safe.  Some medications can make the condition worse or happen again.  · If blood thinners were prescribed by your doctor, take them exactly as told. Too much can cause bleeding. Too little and you will not have the needed protection against stroke and other problems.  · Perform blood tests at home if told by your doctor.  · Perform blood tests exactly as told by your doctor.  · Do not drink alcohol.  · Do not drink beverages with caffeine such as coffee, soda, and some teas.  · Maintain a healthy weight.  · Do not use diet pills unless your doctor says they are safe. They may make heart problems worse.  · Follow diet instructions as told by your doctor.  · Exercise regularly as told by your doctor.  · Keep all follow-up appointments.  GET HELP IF:  · You notice a change in the speed, rhythm, or strength of your heartbeat.  · You suddenly begin peeing (urinating) more often.  · You get tired more easily when moving or exercising.  GET HELP RIGHT AWAY IF:   · You have chest or belly (abdominal) pain.  · You feel sick to your stomach (nauseous).  · You are short of breath.  · You suddenly have swollen feet and ankles.  · You feel dizzy.  · You face, arms, or legs feel numb or weak.  · There is a change in your vision or speech.  MAKE SURE YOU:   · Understand these instructions.  · Will watch your condition.  · Will get help right away if you are not doing well or get worse.     This information is not intended to replace advice given to you by your health care provider. Make sure you discuss any questions you have with your health care provider.     Document Released: 09/26/2009 Document Revised: 01/08/2016 Document Reviewed: 04/13/2016  [employer] Interactive Patient Education ©2016 [employer]    Depression / Suicide Risk    As you are discharged from this [facility] facility, it is important to learn how to keep safe from harming yourself.    Recognize the warning signs:  · Abrupt changes in personality, positive or  negative- including increase in energy   · Giving away possessions  · Change in eating patterns- significant weight changes-  positive or negative  · Change in sleeping patterns- unable to sleep or sleeping all the time   · Unwillingness or inability to communicate  · Depression  · Unusual sadness, discouragement and loneliness  · Talk of wanting to die  · Neglect of personal appearance   · Rebelliousness- reckless behavior  · Withdrawal from people/activities they love  · Confusion- inability to concentrate     If you or a loved one observes any of these behaviors or has concerns about self-harm, here's what you can do:  · Talk about it- your feelings and reasons for harming yourself  · Remove any means that you might use to hurt yourself (examples: pills, rope, extension cords, firearm)  · Get professional help from the community (Mental Health, Substance Abuse, psychological counseling)  · Do not be alone:Call your Safe Contact- someone whom you trust who will be there for you.  · Call your local CRISIS HOTLINE 982-6905 or 427-636-5781  · Call your local Children's Mobile Crisis Response Team [place] (278) 533-1377 or www.[employer]  · Call the toll free National Suicide Prevention Hotlines   · National Suicide Prevention Lifeline 509-676-EGBK (2858)  · National Hope Line Network 800-SUICIDE (475-3996)

## 2017-11-30 NOTE — H&P
Per DR MARROQUIN YESTERDAY - this direct admit:      H&P  Unsigned   Date of Service: 11/29/2017 3:26 PM  Eduardo Marroquin M.D.   Interventional Cardiology      []Hide copied text  []Moisés for attribution information  DATE:  11/29/2017     CHIEF COMPLAINT:  Exertional dyspnea.     HISTORY OF PRESENT ILLNESS:  The patient is a 77-year-old gentleman seen and   examined in our office today.  He is a direct admit from the office because of   heart failure and recent-onset atrial fibrillation.     I initially saw this gentleman in February 2013 for preoperative evaluation of   an abnormal EKG.  At that time, he was documented to be in sinus rhythm.  He   had an echocardiogram performed that demonstrated normal segmental wall motion   with EF of 60%-65%.     For the past several weeks, the patient has noted progressive exertional   dyspnea and nonproductive cough.  He was seen and evaluated by pulmonary   group, who in turn referred him here.     There is no history of known myocardial infarction or chest pain.  He has had   no sense of palpitations.  The patient has also feet swelling and progressive   exertional dyspnea even walking around the house.  Again, he denies chest pain   or sense of palpitations.  Denies any TIA symptoms.  Cardiac risk factor   profile positive for history of hypertension and hyperlipidemia.  He is not   diabetic.  Denies cigarette smoking.  There is no family history of premature   coronary artery disease.     MEDICATIONS ON ADMISSION:  Atenolol 50 mg a day, atorvastatin 20 mg a day,   omeprazole 20 mg a day, Celebrex 200 mg twice daily as needed, aspirin daily,   multiple vitamins, Meloxicam 15 mg daily as needed, allopurinol 300 mg a day,   Ventolin inhaler.     ILLNESS:  Gout, osteoarthritis, hypertension, hyperlipidemia.     SOCIAL HISTORY:  The patient is .  He has not smoked for 49 years.  He   is evasive about his alcohol intake, but has a history of alcohol abuse in the   past and  is currently drinking several drinks per night.     PAST SURGERIES:  Achilles tendon repair on the left, tonsillectomy, right hip   arthroplasty, knee replacement.     REVIEW OF SYSTEMS:  NEUROLOGIC:  No stroke or TIA.  PULMONARY:  Denies asthma or emphysema.  He has seen pulmonary recently as   outlined above and PFTs have been ordered.  GASTROINTESTINAL:  No melena, no peptic ulcer disease.  He notes some   abdominal swelling.  RENAL:  No history of kidney failure or kidney stones.  MUSCULOSKELETAL:  No recent trauma.  He has history of osteoarthritis.  ENDOCRINE:  He has had no perceptible weight loss.  He notes some edema.  No   cold intolerance.  No history of diabetes.  All others are negative.     PHYSICAL EXAMINATION:  GENERAL:  Reveals elderly gentleman, in no distress.  VITAL SIGNS:  Heart rate is 100 and irregularly irregular, blood pressure is   130/70.  HEENT:  There is significant JVD present with patient less than 30 degrees   inclined.  Carotid upstroke intact.  No bruit.  LUNGS:  Reveal no wheezes.  There are few rales at the left base.  HEART:  Irregularly irregular rhythm.  Tones are quite distant.  There is no   audible murmur.  ABDOMEN:  Obese, distended, and appears to be fluid within the abdomen.  EXTREMITIES:  There is edema to just above the knees.  Posterior tibial pulses   cannot be palpated nor can dorsalis pedis pulses.  Femoral pulses are 1+ and   without bruit.  SKIN:  Warm and dry.  NEUROLOGIC:  Patient is alert and cooperative.     DATA:  EKG in office demonstrates atrial fibrillation with ventricular   response rate of 103 per minute and nonspecific ST-T and T changes.     IMPRESSION:  1.  Atrial fibrillation:  The duration of the patient's atrial fibrillation is   unknown.  He does have a rapid ventricular response.  This is mitigated   slightly by the fact that he has been on a chronic beta-blocker for   hypertension.  2.  Acute systolic congestive heart failure:  The patient has  been   progressively sick over the last 7 weeks with progressive dyspnea and now has   significant edema as well as JVD.  The etiology of his systolic heart failure   is uncertain, maybe secondary to alcoholic cardiomyopathy or untreated atrial   fibrillation.  Ischemic etiology also will be considered.  I discussed the   situation with the patient and his wife in the office today.  Although, it   would be feasible to treat him on an outpatient basis, it would be difficult   and the degree of volume overload warrants hospital admission.  He was   admitted to the hospital for treatment of his heart failure and for treatment   of his atrial fibrillation.  I also informed them that he absolutely has to   quit drinking altogether.  He will also be evaluated for ischemia while in the   hospital.  3.  History of hypertension.  5.  History of hyperlipidemia.  6.  History of alcohol abuse.        ____________________________________     MD GYPSY PERALTA / [facility]     DD:  11/29/2017 15:26:58  DT:  11/29/2017 15:59:59     D#:  [id number]  Job#:  [id number]

## 2017-11-30 NOTE — H&P
DATE:  11/29/2017    CHIEF COMPLAINT:  Exertional dyspnea.    HISTORY OF PRESENT ILLNESS:  The patient is a 77-year-old gentleman seen and   examined in our office today.  He is a direct admit from the office because of   heart failure and recent-onset atrial fibrillation.    I initially saw this gentleman in February 2013 for preoperative evaluation of   an abnormal EKG.  At that time, he was documented to be in sinus rhythm.  He   had an echocardiogram performed that demonstrated normal segmental wall motion   with EF of 60%-65%.    For the past several weeks, the patient has noted progressive exertional   dyspnea and nonproductive cough.  He was seen and evaluated by pulmonary   group, who in turn referred him here.    There is no history of known myocardial infarction or chest pain.  He has had   no sense of palpitations.  The patient has also feet swelling and progressive   exertional dyspnea even walking around the house.  Again, he denies chest pain   or sense of palpitations.  Denies any TIA symptoms.  Cardiac risk factor   profile positive for history of hypertension and hyperlipidemia.  He is not   diabetic.  Denies cigarette smoking.  There is no family history of premature   coronary artery disease.    MEDICATIONS ON ADMISSION:  Atenolol 50 mg a day, atorvastatin 20 mg a day,   omeprazole 20 mg a day, Celebrex 200 mg twice daily as needed, aspirin daily,   multiple vitamins, Meloxicam 15 mg daily as needed, allopurinol 300 mg a day,   Ventolin inhaler.    ILLNESS:  Gout, osteoarthritis, hypertension, hyperlipidemia.    SOCIAL HISTORY:  The patient is .  He has not smoked for 49 years.  He   is evasive about his alcohol intake, but has a history of alcohol abuse in the   past and is currently drinking several drinks per night.    PAST SURGERIES:  Achilles tendon repair on the left, tonsillectomy, right hip   arthroplasty, knee replacement.    REVIEW OF SYSTEMS:  NEUROLOGIC:  No stroke or  TIA.  PULMONARY:  Denies asthma or emphysema.  He has seen pulmonary recently as   outlined above and PFTs have been ordered.  GASTROINTESTINAL:  No melena, no peptic ulcer disease.  He notes some   abdominal swelling.  RENAL:  No history of kidney failure or kidney stones.  MUSCULOSKELETAL:  No recent trauma.  He has history of osteoarthritis.  ENDOCRINE:  He has had no perceptible weight loss.  He notes some edema.  No   cold intolerance.  No history of diabetes.  All others are negative.    PHYSICAL EXAMINATION:  GENERAL:  Reveals elderly gentleman, in no distress.  VITAL SIGNS:  Heart rate is 100 and irregularly irregular, blood pressure is   130/70.  HEENT:  There is significant JVD present with patient less than 30 degrees   inclined.  Carotid upstroke intact.  No bruit.  LUNGS:  Reveal no wheezes.  There are few rales at the left base.  HEART:  Irregularly irregular rhythm.  Tones are quite distant.  There is no   audible murmur.  ABDOMEN:  Obese, distended, and appears to be fluid within the abdomen.  EXTREMITIES:  There is edema to just above the knees.  Posterior tibial pulses   cannot be palpated nor can dorsalis pedis pulses.  Femoral pulses are 1+ and   without bruit.  SKIN:  Warm and dry.  NEUROLOGIC:  Patient is alert and cooperative.    DATA:  EKG in office demonstrates atrial fibrillation with ventricular   response rate of 103 per minute and nonspecific ST-T and T changes.    IMPRESSION:  1.  Atrial fibrillation:  The duration of the patient's atrial fibrillation is   unknown.  He does have a rapid ventricular response.  This is mitigated   slightly by the fact that he has been on a chronic beta-blocker for   hypertension.  2.  Acute systolic congestive heart failure:  The patient has been   progressively sick over the last 7 weeks with progressive dyspnea and now has   significant edema as well as JVD.  The etiology of his systolic heart failure   is uncertain, maybe secondary to alcoholic  cardiomyopathy or untreated atrial   fibrillation.  Ischemic etiology also will be considered.  I discussed the   situation with the patient and his wife in the office today.  Although, it   would be feasible to treat him on an outpatient basis, it would be difficult   and the degree of volume overload warrants hospital admission.  He was   admitted to the hospital for treatment of his heart failure and for treatment   of his atrial fibrillation.  I also informed them that he absolutely has to   quit drinking altogether.  He will also be evaluated for ischemia while in the   hospital.  3.  History of hypertension.  5.  History of hyperlipidemia.  6.  History of alcohol abuse.       ____________________________________     MD GYPSY PERALTA / NTS    DD:  11/29/2017 15:26:58  DT:  11/29/2017 15:59:59    D#:  [id number]  Job#:  [id number]

## 2017-11-30 NOTE — PROGRESS NOTES
Med rec complete per pt and a call to pt's wife @ [phone]  Allergies reviewed  No ABX in last month

## 2017-11-30 NOTE — PROGRESS NOTES
Cardiology Progress Note               Author: Chris Arana Date & Time created: 2017  8:24 AM     Interval History:  77 year old male directly admitted by cardiology for atrial fibrillation and congestive heart failure.     Past medical history significant for hypertension, ETOH abuse and gout.     2017   Monitor: Atrial Fibrillation  with rare bigeminy and triplets.     2017        Review of Systems   Constitutional: Negative for malaise/fatigue and weight loss.   Respiratory: Negative for shortness of breath.    Cardiovascular: Negative for chest pain, palpitations, orthopnea, claudication, leg swelling and PND.   Gastrointestinal: Negative for abdominal pain, constipation, diarrhea, nausea and vomiting.   Neurological: Negative for dizziness and weakness.   All other systems reviewed and are negative.      Physical Exam   Constitutional: He is oriented to person, place, and time. He appears well-developed and well-nourished.   HENT:   Head: Normocephalic.   Eyes: EOM are normal.   Neck: JVD present.   Cardiovascular: Normal rate, regular rhythm, normal heart sounds and intact distal pulses.    Pulmonary/Chest: Effort normal. He has rhonchi in the right lower field and the left lower field. He has rales.   Musculoskeletal: Normal range of motion. He exhibits edema (1+ pitting BLE ).   Neurological: He is alert and oriented to person, place, and time.   Skin: Skin is warm and dry.   Dusky lower extremities.    Psychiatric: He has a normal mood and affect. His behavior is normal.   Nursing note and vitals reviewed.      Hemodynamics:  Temp (24hrs), Av.2 °C (97.2 °F), Min:36.1 °C (96.9 °F), Max:36.5 °C (97.7 °F)  Temperature: 36.2 °C (97.2 °F)  Pulse  Av.8  Min: 57  Max: 100   Blood Pressure : 115/88     Respiratory:    Respiration: 19, Pulse Oximetry: 92 %     Work Of Breathing / Effort: Mild  RUL Breath Sounds: Clear, RML Breath Sounds: Diminished, RLL Breath Sounds:  Diminished, HERNANDEZ Breath Sounds: Clear, LLL Breath Sounds: Diminished  Fluids:  Date 11/30/17 0700 - 12/01/17 0659   Shift 0461-3427 9231-2013 8783-8327 24 Hour Total   I  N  T  A  K  E   Shift Total       O  U  T  P  U  T   Urine 300   300    Shift Total 300   300   Weight (kg) 92.1 92.1 92.1 92.1       Weight: 92.1 kg (203 lb 0.7 oz)  GI/Nutrition:  Orders Placed This Encounter   Procedures   • DIET ORDER     Standing Status:   Standing     Number of Occurrences:   1     Order Specific Question:   Diet:     Answer:   Cardiac [6]     Lab Results:  Recent Labs      11/29/17   1727   WBC  7.2   RBC  3.63*   HEMOGLOBIN  12.3*   HEMATOCRIT  38.1*   MCV  105.0*   MCH  33.9*   MCHC  32.3*   RDW  61.0*   PLATELETCT  226   MPV  11.2     Recent Labs      11/29/17   1727   SODIUM  142   POTASSIUM  4.2   CHLORIDE  110   CO2  20   GLUCOSE  113*   BUN  27*   CREATININE  1.16   CALCIUM  8.5     Recent Labs      11/29/17   1727   INR  1.19*     Recent Labs      11/29/17   1727   BNPBTYPENAT  969*     Recent Labs      11/29/17   1727   BNPBTYPENAT  969*             Medical Decision Making, by Problem:  There are no active hospital problems to display for this patient.      Assessment/Plan:  Atrial Fibrillation:  - Duration unknown  - Rate improved with Metoprolol 50 mg Daily.   - Initiate Xarelto 20 mg Daily for anticoagulation   - No significant rhythm issues overnight.     Acute Congestive Heart Failure:  - Etiology unknown: significant history of ETOH use and new onset atrial fibrillation.   - Lasix 20 mg BID  - Net loss -950cc since admit last night.   - Metoprolol initiated despite decompensation for rate control and for possible symptom improvement.  - Lisinopril 5 mg Daily  - Initiate aldactone if echo shows diminished LV function.     Patient states that he is feeling better. Denies shortness of breath, chest pain or palpitations. Additional dose of Lasix 40 mg IV X 1 now. Possibly home today after echo is completed.  Appt  made for follow in clinic next week.     Future Appointments  Date Time Provider Department Center   12/6/2017 2:40 PM SANDRA Coley [facility] None   12/7/2017 10:00 AM PFT-[facility] None   1/2/2018 10:40 AM Courtney Quintero M.D. [facility] None           Reviewed items::  EKG reviewed, Labs reviewed, Medications reviewed and Radiology images reviewed

## 2017-11-30 NOTE — PROGRESS NOTES
Direct admit from Dr. Melvin arrived. Pharm tech called, MD notified. Arm band on, IV in place. Tele monitor on and monitor room notified. Pt asssessed. A&Ox4, No SOB noted. VSS. Call light within reach, bed in lowest position. Will continue to monitor. Hourly rounding in place.

## 2017-11-30 NOTE — PROGRESS NOTES
2 RN skin check: Healing scab on head, and scattered scabbing on feet. Swelling in legs and abdomen. No other areas of concern.

## 2017-12-01 NOTE — DISCHARGE PLANNING
Care Transition Team Assessment    IHD met with patient bedside. He stated he lives with his wife and he is normally able to drive himself for appointments. He uses a cane, and has other DME at home from previous surgeries. Patient does not use any O2 or HHC and does not expect to discharge with any new services.     Information Source  Orientation : Oriented x 4  Information Given By: Patient  Informant's Name: Jaime  Who is responsible for making decisions for patient? : Patient    Readmission Evaluation  Is this a readmission?: No    Elopement Risk  Legal Hold: No  Ambulatory or Self Mobile in Wheelchair: Yes  Disoriented: No  Psychiatric Symptoms: None  History of Wandering: No  Elopement this Admit: No  Vocalizing Wanting to Leave: No  Displays Behaviors, Body Language Wanting to Leave: No-Not at Risk for Elopement  Elopement Risk: Not at Risk for Elopement    Interdisciplinary Discharge Planning  Does Admitting Nurse Feel This Could be a Complex Discharge?: No  Primary Care Physician: Dr. Dat Dominguez  Lives with - Patient's Self Care Capacity: Spouse  Patient or legal guardian wants to designate a caregiver (see row info): No  Support Systems: Spouse / Significant Other  Housing / Facility: [address] [place] [address]  Do You Take your Prescribed Medications Regularly: No  Reasons Why Not Taking Medications : Memory Issues  Able to Return to Previous ADL's: Yes  Mobility Issues: No  Prior Services: None  Patient Expects to be Discharged to:: home  Assistance Needed: Unknown at this Time  Durable Medical Equipment: Walker, Other - Specify (cane, shower chair)    Discharge Preparedness  What is your plan after discharge?: Home with help  What are your discharge supports?: Spouse  Prior Functional Level: Ambulatory, Drives Self, Independent with Activities of Daily Living, Independent with Medication Management, Uses Cane  Difficulity with ADLs: None  Difficulity with IADLs: None    Functional Assesment  Prior Functional  Level: Ambulatory, Drives Self, Independent with Activities of Daily Living, Independent with Medication Management, Uses Cane    Finances  Financial Barriers to Discharge: No  Prescription Coverage: Yes ([facility] [address])    Vision / Hearing Impairment  Vision Impairment : Yes  Right Eye Vision: Impaired, Wears Glasses  Left Eye Vision: Impaired, Wears Glasses  Hearing Impairment : No    Values / Beliefs / Concerns  Values / Beliefs Concerns : No    Advance Directive  Advance Directive?: None    Domestic Abuse  Have you ever been the victim of abuse or violence?: No  Physical Abuse or Sexual Abuse: No  Verbal Abuse or Emotional Abuse: No  Possible Abuse Reported to:: Not Applicable    Psychological Assessment  History of Substance Abuse: None  History of Psychiatric Problems: No  Non-compliant with Treatment: No  Newly Diagnosed Illness: No    Discharge Risks or Barriers  Discharge risks or barriers?: No    Anticipated Discharge Information  Anticipated discharge disposition: Discharge needs currently unknown  Discharge Address: [address] [place] [address]  Discharge Contact Phone Number: [phone]

## 2017-12-01 NOTE — DISCHARGE PLANNING
Medical Social Work    Faxed pt's Xarelto rx to [facility] pharmacy to verify coverage.     Followed up with pharmacy. Pt's co pay is $24.

## 2017-12-06 PROBLEM — R60.0 LOCALIZED EDEMA: Status: ACTIVE | Noted: 2017-01-01

## 2017-12-06 PROBLEM — I48.0 PAROXYSMAL ATRIAL FIBRILLATION (HCC): Status: ACTIVE | Noted: 2017-01-01

## 2017-12-06 NOTE — LETTER
[facility]   [address] [place] [address] [address]  [place] [address]  Phone: [phone]  Fax: [phone]              Jaime Tamayo  1940    Encounter Date: 12/6/2017    SANDRA Coley          PROGRESS NOTE:  Subjective:   Jaime Tamayo is a 77 y.o. male who presents today with his wife, Maribell, for hospital follow up.    He was referred to our office from Dr Quintero, who detected in irregular and rapid heartbeat when she was seeing him in her office for shortness of breath. He was evaluated by Dr. Melvin and sent for direct admit due to new onset rapid atrial fibrillation and congestive heart failure.    He was admitted to the hospital and diuresed. Echocardiogram showed normal ejection fraction of 55% but tricuspid regurgitation and elevated pulmonary pressures with dilated right atrium and ventricle.    He has a history of significant alcohol use in the past. He has reduced to a couple drinks a day. Currently he has a flare of gout in his left elbow.    Since being on the hospital, he continues to complain of shortness of breath with walking on a flat surface. However he does not have any orthopnea or PND. He does continue to have ankle edema which is slightly improved since before his hospitalization.    He denies any chest tightness, heaviness or pressure. No palpitations.      Past Medical History:   Diagnosis Date   • Arthritis    • CATARACT     bilateral IOL   • Chickenpox    • Khmer measles    • Gout    • Gout    • Hypertension    • Indigestion    • Influenza    • Mumps    • Pain     left hip   • Pain     left knee   • Paroxysmal atrial fibrillation (CMS-HCC)    • Tremor     intermittent     Past Surgical History:   Procedure Laterality Date   • KNEE ARTHROPLASTY TOTAL Left 10/17/2016    Procedure: KNEE ARTHROPLASTY TOTAL;  Surgeon: Jaime Carmona M.D.;  Location: SURGERY [facility];  Service:    • IRRIGATION & DEBRIDEMENT ORTHO   3/19/2013    Performed by Jaime Carmona M.D. at SURGERY [facility]   • EVACUATION OF HEMATOMA  3/19/2013    Performed by Jaime Carmona M.D. at SURGERY [facility]   • KNEE ARTHROPLASTY TOTAL  3/18/2013    Performed by Jaime Carmona M.D. at SURGERY [facility]   • HIP ARTHROPLASTY TOTAL  3/5/2012    Performed by JAIME CARMONA at SURGERY [facility]   • LUMBAR FUSION POSTERIOR  2012   • CATARACT EXTRACTION WITH IOL Bilateral 2010   • HIP ARTHROPLASTY TOTAL  6/8/2009    right; Performed by JAIME CARMONA at SURGERY [facility]   • OTHER ORTHOPEDIC SURGERY  2006    right thumb   • ACHILLES TENDON REP Left 1999   • TONSILLECTOMY  1947   • HIP REPLACEMENT, TOTAL     • LAMINOTOMY       Family History   Problem Relation Age of Onset   • Cancer Mother    • Non-contributory Neg Hx      History   Smoking Status   • Former Smoker   • Packs/day: 0.50   • Years: 5.00   • Types: Cigarettes   • Quit date: 1/1/1969   Smokeless Tobacco   • Never Used     Allergies   Allergen Reactions   • Percocet [Oxycodone-Acetaminophen] Itching and Anxiety   • Other Environmental      hayfever     Outpatient Encounter Prescriptions as of 12/6/2017   Medication Sig Dispense Refill   • rivaroxaban (XARELTO) 20 MG Tab tablet Take 1 Tab by mouth with dinner. 90 Tab 3   • lisinopril (PRINIVIL) 5 MG Tab Take 1 Tab by mouth every day. 90 Tab 3   • metoprolol SR (TOPROL XL) 50 MG TABLET SR 24 HR Take 1.5 Tabs by mouth every day. 135 Tab 3   • potassium chloride SA (KDUR) 20 MEQ Tab CR Take 1 Tab by mouth every day. Take additional tablet when increasing  furosemide. 100 Tab 3   • furosemide (LASIX) 40 MG Tab Take 1 Tab by mouth every day. Take additional tablet as needed for ankle swelling. 100 Tab 3   • atorvastatin (LIPITOR) 20 MG Tab Take 1 Tab by mouth every evening. 90 Tab 3   • Multiple Vitamins-Minerals (PRESERVISION AREDS) Tab Take 2 tablet by mouth every day.     • VENTOLIN  (90 Base) MCG/ACT  "Aero Soln inhalation aerosol Inhale 1 Puff by mouth every four hours as needed for Shortness of Breath.     • allopurinol (ZYLOPRIM) 300 MG Tab TAKE 1 TABLET DAILY 90 Tab 2   • omeprazole (PRILOSEC) 20 MG CPDR Take 20 mg by mouth every day.       • [DISCONTINUED] rivaroxaban (XARELTO) 20 MG Tab tablet Take 1 Tab by mouth with dinner. 30 Tab 4   • [DISCONTINUED] lisinopril (PRINIVIL) 5 MG Tab Take 1 Tab by mouth every day. 30 Tab 4   • [DISCONTINUED] metoprolol SR (TOPROL XL) 50 MG TABLET SR 24 HR Take 1 Tab by mouth every day. 30 Tab 4   • [DISCONTINUED] potassium chloride SA (KDUR) 20 MEQ Tab CR Take 1 Tab by mouth every day. 60 Tab 2   • [DISCONTINUED] furosemide (LASIX) 40 MG Tab Take 1 Tab by mouth every day. 30 Tab 4   • [DISCONTINUED] atorvastatin (LIPITOR) 20 MG TABS Take 20 mg by mouth every evening.         No facility-administered encounter medications on file as of 12/6/2017.      Review of Systems   Constitutional: Negative for malaise/fatigue.   Respiratory: Positive for shortness of breath (walking on flat surface.). Negative for cough.    Cardiovascular: Positive for leg swelling. Negative for chest pain, palpitations, orthopnea, claudication and PND.   Gastrointestinal: Negative for abdominal pain.   Musculoskeletal: Negative for myalgias.   Neurological: Negative for dizziness and weakness.        Objective:   /90   Pulse (!) 102   Ht 1.778 m (5' 10\")   Wt 89.2 kg (196 lb 9.6 oz)   SpO2 97%   BMI 28.21 kg/m²      Physical Exam   Constitutional: He is oriented to person, place, and time. He appears well-developed and well-nourished.   HENT:   Head: Normocephalic.   Eyes: Conjunctivae are normal.   Neck: No JVD present. No thyromegaly present.   Cardiovascular: Normal heart sounds.  An irregularly irregular rhythm present. Tachycardia present.    Pulmonary/Chest: Effort normal and breath sounds normal. He has no wheezes. He has no rales.   Abdominal: Soft. Bowel sounds are normal. He " exhibits no distension. There is no tenderness.   Musculoskeletal: He exhibits edema (2+ bilateral pretibial edema.).   Neurological: He is alert and oriented to person, place, and time.   Skin: Skin is warm and dry.   Psychiatric: He has a normal mood and affect.     Results for JOSSE IBRAHIM (MRN [id number]) as of 12/6/2017 16:20   Ref. Range 11/30/2017 09:47   WBC Latest Ref Range: 4.8 - 10.8 K/uL 5.1   RBC Latest Ref Range: 4.70 - 6.10 M/uL 3.38 (L)   Hemoglobin Latest Ref Range: 14.0 - 18.0 g/dL 11.4 (L)   Hematocrit Latest Ref Range: 42.0 - 52.0 % 35.6 (L)   MCV Latest Ref Range: 81.4 - 97.8 fL 105.3 (H)   MCH Latest Ref Range: 27.0 - 33.0 pg 33.7 (H)   MCHC Latest Ref Range: 33.7 - 35.3 g/dL 32.0 (L)   RDW Latest Ref Range: 35.9 - 50.0 fL 59.7 (H)   Platelet Count Latest Ref Range: 164 - 446 K/uL 185   MPV Latest Ref Range: 9.0 - 12.9 fL 11.2   Sodium Latest Ref Range: 135 - 145 mmol/L 140   Potassium Latest Ref Range: 3.6 - 5.5 mmol/L 4.1   Chloride Latest Ref Range: 96 - 112 mmol/L 108   Co2 Latest Ref Range: 20 - 33 mmol/L 23   Anion Gap Latest Ref Range: 0.0 - 11.9  9.0   Glucose Latest Ref Range: 65 - 99 mg/dL 150 (H)   Bun Latest Ref Range: 8 - 22 mg/dL 25 (H)   Creatinine Latest Ref Range: 0.50 - 1.40 mg/dL 1.22   GFR If  Latest Ref Range: >60 mL/min/1.73 m 2 >60   GFR If Non  Latest Ref Range: >60 mL/min/1.73 m 2 58 (A)   Calcium Latest Ref Range: 8.5 - 10.5 mg/dL 8.2 (L)   AST(SGOT) Latest Ref Range: 12 - 45 U/L 25   ALT(SGPT) Latest Ref Range: 2 - 50 U/L 13   Alkaline Phosphatase Latest Ref Range: 30 - 99 U/L 207 (H)   Total Bilirubin Latest Ref Range: 0.1 - 1.5 mg/dL 1.2   Albumin Latest Ref Range: 3.2 - 4.9 g/dL 3.3   Total Protein Latest Ref Range: 6.0 - 8.2 g/dL 5.4 (L)   Globulin Latest Ref Range: 1.9 - 3.5 g/dL 2.1   A-G Ratio Latest Units: g/dL 1.6     November 30, 2017: Transthoracic Echo Report  Prior echo 03/17/13; compared to the report of the study  done - there has been progression to severe tricuspid regurgitation with severely enlarged right ventricle.  Normal left ventricular systolic function.  Left ventricular ejection fraction is visually estimated to be 55%.  Severely dilated right ventricle. Reduced right ventricular systolic function.  Moderate to severe mitral regurgitation.  Mild aortic insufficiency.  Severe tricuspid regurgitation.  Estimated right ventricular systolic pressure is 45 mmHg.      Assessment:     1. Paroxysmal atrial fibrillation (CMS-[facility])     2. Systolic CHF, acute (CMS-[facility])     3. Essential hypertension, benign     4. Hypercholesteremia     5. Localized edema         Medical Decision Making:  Today's Assessment / Status / Plan:     Atrial fibrillation: Patient remains in nature fibrillation with a rapid response. I will have him increase metoprolol succinate to 75 mg daily. I'm hoping this will control his heart rate. If not we will consider upping the dose of metoprolol or adding digoxin.    Systolic heart failure: Patient's lungs are clear but he has ankle edema. It appears as though he has mostly right-sided failure. He has been taking a fair amount of fluid and eating salty foods therefore will have taken extra dose of Lasix and potassium tonight. He will continue on his current dose of diuretic and thereafter but may take additional doses as needed for ankle swelling.    Hypertension: Blood pressure is good in the office today. I'm hoping that his blood pressure does not become too low with the additional metoprolol. They will monitor at home.    Hyperlipidemia: He is on atorvastatin. We will need to get some updated lipids in the chart.    Edema: Probably secondary to right-sided heart failure as well as dependency. Patient taken extra dose of Lasix and potassium tonight and will reduce sodium and fluids in his diet.    I will have the patient follow-up with myself in 2 weeks to monitor his blood pressure, heart rate and  edema. He will follow-up in 3 months with Dr. Melvin. He will follow-up sooner or contact us using my chart if needed.    Collaborating Provider: Dr. Bran.        No Recipients

## 2017-12-06 NOTE — PROGRESS NOTES
Subjective:   Jaime Tamayo is a 77 y.o. male who presents today with his wife, Maribell, for hospital follow up.    He was referred to our office from Dr Quintero, who detected in irregular and rapid heartbeat when she was seeing him in her office for shortness of breath. He was evaluated by Dr. Melvin and sent for direct admit due to new onset rapid atrial fibrillation and congestive heart failure.    He was admitted to the hospital and diuresed. Echocardiogram showed normal ejection fraction of 55% but tricuspid regurgitation and elevated pulmonary pressures with dilated right atrium and ventricle.    He has a history of significant alcohol use in the past. He has reduced to a couple drinks a day. Currently he has a flare of gout in his left elbow.    Since being on the hospital, he continues to complain of shortness of breath with walking on a flat surface. However he does not have any orthopnea or PND. He does continue to have ankle edema which is slightly improved since before his hospitalization.    He denies any chest tightness, heaviness or pressure. No palpitations.      Past Medical History:   Diagnosis Date   • Arthritis    • CATARACT     bilateral IOL   • Chickenpox    • Upper sorbian measles    • Gout    • Gout    • Hypertension    • Indigestion    • Influenza    • Mumps    • Pain     left hip   • Pain     left knee   • Paroxysmal atrial fibrillation (CMS-HCC)    • Tremor     intermittent     Past Surgical History:   Procedure Laterality Date   • KNEE ARTHROPLASTY TOTAL Left 10/17/2016    Procedure: KNEE ARTHROPLASTY TOTAL;  Surgeon: Jaime Carmona M.D.;  Location: [facility];  Service:    • IRRIGATION & DEBRIDEMENT ORTHO  3/19/2013    Performed by Jaime Carmona M.D. at [facility]   • EVACUATION OF HEMATOMA  3/19/2013    Performed by Jaime Carmona M.D. at [facility]   • KNEE ARTHROPLASTY TOTAL  3/18/2013    Performed by Jaime Carmona M.D. at [facility] ORS   • HIP ARTHROPLASTY TOTAL  3/5/2012    Performed by JOSSE KRAUS at SUR[facility]ERY [facility] ORS   • LUMBAR FUSION POSTERIOR  2012   • CATARACT EXTRACTION WITH IOL Bilateral 2010   • HIP ARTHROPLASTY TOTAL  6/8/2009    right; Performed by JOSSE KRAUS at SURGERY [facility] ORS   • OTHER ORTHOPEDIC SURGERY  2006    right thumb   • ACHILLES TENDON REP Left 1999   • TONSILLECTOMY  1947   • HIP REPLACEMENT, TOTAL     • LAMINOTOMY       Family History   Problem Relation Age of Onset   • Cancer Mother    • Non-contributory Neg Hx      History   Smoking Status   • Former Smoker   • Packs/day: 0.50   • Years: 5.00   • Types: Cigarettes   • Quit date: 1/1/1969   Smokeless Tobacco   • Never Used     Allergies   Allergen Reactions   • Percocet [Oxycodone-Acetaminophen] Itching and Anxiety   • Other Environmental      hayfever     Outpatient Encounter Prescriptions as of 12/6/2017   Medication Sig Dispense Refill   • rivaroxaban (XARELTO) 20 MG Tab tablet Take 1 Tab by mouth with dinner. 90 Tab 3   • lisinopril (PRINIVIL) 5 MG Tab Take 1 Tab by mouth every day. 90 Tab 3   • metoprolol SR (TOPROL XL) 50 MG TABLET SR 24 HR Take 1.5 Tabs by mouth every day. 135 Tab 3   • potassium chloride SA (KDUR) 20 MEQ Tab CR Take 1 Tab by mouth every day. Take additional tablet when increasing  furosemide. 100 Tab 3   • furosemide (LASIX) 40 MG Tab Take 1 Tab by mouth every day. Take additional tablet as needed for ankle swelling. 100 Tab 3   • atorvastatin (LIPITOR) 20 MG Tab Take 1 Tab by mouth every evening. 90 Tab 3   • Multiple Vitamins-Minerals (PRESERVISION AREDS) Tab Take 2 tablet by mouth every day.     • VENTOLIN  (90 Base) MCG/ACT Aero Soln inhalation aerosol Inhale 1 Puff by mouth every four hours as needed for Shortness of Breath.     • allopurinol (ZYLOPRIM) 300 MG Tab TAKE 1 TABLET DAILY 90 Tab 2   • omeprazole (PRILOSEC) 20 MG CPDR Take 20 mg by mouth every day.       • [DISCONTINUED] rivaroxaban  "(XARELTO) 20 MG Tab tablet Take 1 Tab by mouth with dinner. 30 Tab 4   • [DISCONTINUED] lisinopril (PRINIVIL) 5 MG Tab Take 1 Tab by mouth every day. 30 Tab 4   • [DISCONTINUED] metoprolol SR (TOPROL XL) 50 MG TABLET SR 24 HR Take 1 Tab by mouth every day. 30 Tab 4   • [DISCONTINUED] potassium chloride SA (KDUR) 20 MEQ Tab CR Take 1 Tab by mouth every day. 60 Tab 2   • [DISCONTINUED] furosemide (LASIX) 40 MG Tab Take 1 Tab by mouth every day. 30 Tab 4   • [DISCONTINUED] atorvastatin (LIPITOR) 20 MG TABS Take 20 mg by mouth every evening.         No facility-administered encounter medications on file as of 12/6/2017.      Review of Systems   Constitutional: Negative for malaise/fatigue.   Respiratory: Positive for shortness of breath (walking on flat surface.). Negative for cough.    Cardiovascular: Positive for leg swelling. Negative for chest pain, palpitations, orthopnea, claudication and PND.   Gastrointestinal: Negative for abdominal pain.   Musculoskeletal: Negative for myalgias.   Neurological: Negative for dizziness and weakness.        Objective:   /90   Pulse (!) 102   Ht 1.778 m (5' 10\")   Wt 89.2 kg (196 lb 9.6 oz)   SpO2 97%   BMI 28.21 kg/m²     Physical Exam   Constitutional: He is oriented to person, place, and time. He appears well-developed and well-nourished.   HENT:   Head: Normocephalic.   Eyes: Conjunctivae are normal.   Neck: No JVD present. No thyromegaly present.   Cardiovascular: Normal heart sounds.  An irregularly irregular rhythm present. Tachycardia present.    Pulmonary/Chest: Effort normal and breath sounds normal. He has no wheezes. He has no rales.   Abdominal: Soft. Bowel sounds are normal. He exhibits no distension. There is no tenderness.   Musculoskeletal: He exhibits edema (2+ bilateral pretibial edema.).   Neurological: He is alert and oriented to person, place, and time.   Skin: Skin is warm and dry.   Psychiatric: He has a normal mood and affect.     Results for " JOSSE IBRAHIM (MRN [id number]) as of 12/6/2017 16:20   Ref. Range 11/30/2017 09:47   WBC Latest Ref Range: 4.8 - 10.8 K/uL 5.1   RBC Latest Ref Range: 4.70 - 6.10 M/uL 3.38 (L)   Hemoglobin Latest Ref Range: 14.0 - 18.0 g/dL 11.4 (L)   Hematocrit Latest Ref Range: 42.0 - 52.0 % 35.6 (L)   MCV Latest Ref Range: 81.4 - 97.8 fL 105.3 (H)   MCH Latest Ref Range: 27.0 - 33.0 pg 33.7 (H)   MCHC Latest Ref Range: 33.7 - 35.3 g/dL 32.0 (L)   RDW Latest Ref Range: 35.9 - 50.0 fL 59.7 (H)   Platelet Count Latest Ref Range: 164 - 446 K/uL 185   MPV Latest Ref Range: 9.0 - 12.9 fL 11.2   Sodium Latest Ref Range: 135 - 145 mmol/L 140   Potassium Latest Ref Range: 3.6 - 5.5 mmol/L 4.1   Chloride Latest Ref Range: 96 - 112 mmol/L 108   Co2 Latest Ref Range: 20 - 33 mmol/L 23   Anion Gap Latest Ref Range: 0.0 - 11.9  9.0   Glucose Latest Ref Range: 65 - 99 mg/dL 150 (H)   Bun Latest Ref Range: 8 - 22 mg/dL 25 (H)   Creatinine Latest Ref Range: 0.50 - 1.40 mg/dL 1.22   GFR If  Latest Ref Range: >60 mL/min/1.73 m 2 >60   GFR If Non  Latest Ref Range: >60 mL/min/1.73 m 2 58 (A)   Calcium Latest Ref Range: 8.5 - 10.5 mg/dL 8.2 (L)   AST(SGOT) Latest Ref Range: 12 - 45 U/L 25   ALT(SGPT) Latest Ref Range: 2 - 50 U/L 13   Alkaline Phosphatase Latest Ref Range: 30 - 99 U/L 207 (H)   Total Bilirubin Latest Ref Range: 0.1 - 1.5 mg/dL 1.2   Albumin Latest Ref Range: 3.2 - 4.9 g/dL 3.3   Total Protein Latest Ref Range: 6.0 - 8.2 g/dL 5.4 (L)   Globulin Latest Ref Range: 1.9 - 3.5 g/dL 2.1   A-G Ratio Latest Units: g/dL 1.6     November 30, 2017: Transthoracic Echo Report  Prior echo 03/17/13; compared to the report of the study done - there has been progression to severe tricuspid regurgitation with severely enlarged right ventricle.  Normal left ventricular systolic function.  Left ventricular ejection fraction is visually estimated to be 55%.  Severely dilated right ventricle. Reduced right ventricular  systolic function.  Moderate to severe mitral regurgitation.  Mild aortic insufficiency.  Severe tricuspid regurgitation.  Estimated right ventricular systolic pressure is 45 mmHg.      Assessment:     1. Paroxysmal atrial fibrillation (CMS-HCC)     2. Systolic CHF, acute (CMS-HCC)     3. Essential hypertension, benign     4. Hypercholesteremia     5. Localized edema         Medical Decision Making:  Today's Assessment / Status / Plan:     Atrial fibrillation: Patient remains in nature fibrillation with a rapid response. I will have him increase metoprolol succinate to 75 mg daily. I'm hoping this will control his heart rate. If not we will consider upping the dose of metoprolol or adding digoxin.    Systolic heart failure: Patient's lungs are clear but he has ankle edema. It appears as though he has mostly right-sided failure. He has been taking a fair amount of fluid and eating salty foods therefore will have taken extra dose of Lasix and potassium tonight. He will continue on his current dose of diuretic and thereafter but may take additional doses as needed for ankle swelling.    Hypertension: Blood pressure is good in the office today. I'm hoping that his blood pressure does not become too low with the additional metoprolol. They will monitor at home.    Hyperlipidemia: He is on atorvastatin. We will need to get some updated lipids in the chart.    Edema: Probably secondary to right-sided heart failure as well as dependency. Patient taken extra dose of Lasix and potassium tonight and will reduce sodium and fluids in his diet.    I will have the patient follow-up with myself in 2 weeks to monitor his blood pressure, heart rate and edema. He will follow-up in 3 months with Dr. Melvin. He will follow-up sooner or contact us using my chart if needed.    Collaborating Provider: Dr. Bran.

## 2017-12-07 NOTE — PROCEDURES
Technician: Jori Plascencia RRT/CPFT  Good patient effort & cooperation.  The results of this test meet the ATS standards for acceptability and repeatability.  The DLCO was uncorrected for Hgb.  A bronchodilator of Ventolin HFA- 2 puffs via spacer  Administered.    The FVC is 2.58 L or 64%, FEV1 is 1.98 L or 68%, FEV1/FVC 77%. TLC 73%, normal RV to TLC ratio. DLCO 97%. No bronchodilator response.    Interpretation:  Mild restrictive ventilatory defect which may be secondary to obesity; the normal diffusion capacity and normal RV/TLC rule against conditions such as interstitial lung disease.

## 2017-12-20 PROBLEM — E78.2 MIXED HYPERLIPIDEMIA: Status: ACTIVE | Noted: 2017-01-01

## 2017-12-20 NOTE — LETTER
[facility]   [address] [place] [address]  [place] [address]  Phone: [phone]  Fax: [phone]              Jaime Tamayo  1940    Encounter Date: 12/20/2017    SANDRA Coley          PROGRESS NOTE:  Subjective:   Jaime Tamayo is a 77 y.o. male who presents today With his wife, Maribell, to follow-up on 8 to fibrillation and congestive heart failure.    At his last visit on December 6, 2017, he was in rapid atrial fibrillation. We increased metoprolol succinate to 75 mg a day for rate control. Patient continues to complain of shortness of breath with walking but feels that it is improved. He does recover rapidly when sitting to rest. He denies any palpitations.    He denies orthopnea or PND. He states his ankle edema is much improved as he has reduced fluid and sodium in his diet. He denies any dizziness or lightheadedness.    Past Medical History:   Diagnosis Date   • Arthritis    • CATARACT     bilateral IOL   • Chickenpox    • Chinese measles    • Gout    • Gout    • Hypertension    • Indigestion    • Influenza    • Mumps    • Pain     left hip   • Pain     left knee   • Paroxysmal atrial fibrillation (CMS-HCC)    • Tremor     intermittent     Past Surgical History:   Procedure Laterality Date   • KNEE ARTHROPLASTY TOTAL Left 10/17/2016    Procedure: KNEE ARTHROPLASTY TOTAL;  Surgeon: Jaime Kraus M.D.;  Location: [facility];  Service:    • IRRIGATION & DEBRIDEMENT ORTHO  3/19/2013    Performed by Jaime Kraus M.D. at [facility]   • EVACUATION OF HEMATOMA  3/19/2013    Performed by Jaime Kraus M.D. at [facility]   • KNEE ARTHROPLASTY TOTAL  3/18/2013    Performed by Jaime Kraus M.D. at [facility]   • HIP ARTHROPLASTY TOTAL  3/5/2012    Performed by JAIME KRAUS at [facility]   • LUMBAR FUSION POSTERIOR  2012   • CATARACT EXTRACTION WITH IOL  Bilateral 2010   • HIP ARTHROPLASTY TOTAL  6/8/2009    right; Performed by JOSSE KRAUS at SUR[facility]ERY [facility] ORS   • OTHER ORTHOPEDIC SURGERY  2006    right thumb   • ACHILLES TENDON REP Left 1999   • TONSILLECTOMY  1947   • HIP REPLACEMENT, TOTAL     • LAMINOTOMY       Family History   Problem Relation Age of Onset   • Cancer Mother    • Non-contributory Neg Hx      History   Smoking Status   • Former Smoker   • Packs/day: 0.50   • Years: 5.00   • Types: Cigarettes   • Quit date: 1/1/1969   Smokeless Tobacco   • Never Used     Allergies   Allergen Reactions   • Percocet [Oxycodone-Acetaminophen] Itching and Anxiety   • Other Environmental      hayfever     Outpatient Encounter Prescriptions as of 12/20/2017   Medication Sig Dispense Refill   • doxycycline (VIBRAMYCIN) 50 MG capsule Take 50 mg by mouth 2 times a day. UNTIL FINISHED     • furosemide (LASIX) 40 MG Tab Take 0.5 Tabs by mouth every day. Take additional tablet as needed for ankle swelling. 100 Tab 3   • metoprolol SR (TOPROL XL) 50 MG TABLET SR 24 HR Take 1 Tab by mouth 2 Times a Day. 180 Tab 3   • potassium chloride SA (KDUR) 20 MEQ Tab CR Take 0.5 Tabs by mouth every day. Take additional tablet when increasing  furosemide. 100 Tab 3   • rivaroxaban (XARELTO) 20 MG Tab tablet Take 1 Tab by mouth with dinner. 90 Tab 3   • lisinopril (PRINIVIL) 5 MG Tab Take 1 Tab by mouth every day. 90 Tab 3   • atorvastatin (LIPITOR) 20 MG Tab Take 1 Tab by mouth every evening. 90 Tab 3   • Multiple Vitamins-Minerals (PRESERVISION AREDS) Tab Take 2 tablet by mouth every day.     • VENTOLIN  (90 Base) MCG/ACT Aero Soln inhalation aerosol Inhale 1 Puff by mouth every four hours as needed for Shortness of Breath.     • allopurinol (ZYLOPRIM) 300 MG Tab TAKE 1 TABLET DAILY 90 Tab 2   • omeprazole (PRILOSEC) 20 MG CPDR Take 20 mg by mouth every day.       • [DISCONTINUED] furosemide (LASIX) 40 MG Tab Take 1 Tab by mouth every day. Take additional tablet as  "needed for ankle swelling. 100 Tab 3   • [DISCONTINUED] metoprolol SR (TOPROL XL) 50 MG TABLET SR 24 HR Take 1.5 Tabs by mouth every day. 135 Tab 3   • [DISCONTINUED] potassium chloride SA (KDUR) 20 MEQ Tab CR Take 1 Tab by mouth every day. Take additional tablet when increasing  furosemide. 100 Tab 3     No facility-administered encounter medications on file as of 12/20/2017.      Review of Systems   Constitutional: Negative for malaise/fatigue.   Respiratory: Positive for shortness of breath (walking on flat surface.). Negative for cough.    Cardiovascular: Positive for leg swelling (improved.). Negative for chest pain, palpitations, orthopnea, claudication and PND.   Gastrointestinal: Negative for abdominal pain.   Musculoskeletal: Negative for myalgias.   Neurological: Negative for dizziness and weakness.        Objective:   /78   Pulse 96   Ht 1.778 m (5' 10\")   Wt 86.2 kg (190 lb)   SpO2 96%   BMI 27.26 kg/m²      Physical Exam   Constitutional: He is oriented to person, place, and time. He appears well-developed and well-nourished.   HENT:   Head: Normocephalic.   Eyes: Conjunctivae are normal.   Neck: No JVD present. No thyromegaly present.   Cardiovascular: Normal rate and normal heart sounds.  An irregularly irregular rhythm present.   Pulmonary/Chest: Effort normal and breath sounds normal. He has no wheezes. He has no rales.   Abdominal: Soft. Bowel sounds are normal. He exhibits no distension. There is no tenderness.   Musculoskeletal: He exhibits edema (1+ bilateral pitting ankle edema.).   Neurological: He is alert and oriented to person, place, and time.   Skin: Skin is warm and dry.   Psychiatric: He has a normal mood and affect.     December 12, 2017: CBC is within normal limits with exception of RBCs 3.9, hemoglobin 12.7, . Comprehensive metabolic panel is within normal limits with the exception of glucose 123, BUN 28, creatinine 1.42, GFR 48, calcium 7.9, alkaline phosphatase " 249.    Lipids: Cholesterol 98, triglycerides 88, HDL 40, LDL 40.    Assessment:     1. Paroxysmal atrial fibrillation (CMS-HCC)     2. Systolic CHF, acute (CMS-HCC)     3. Localized edema     4. Mixed hyperlipidemia         Medical Decision Making:  Today's Assessment / Status / Plan:     Paroxysmal atrial fibrillation: He remains in atrial fibrillation with improved rate control. However he is still short of breath with exertion. We are hoping to get even better rate control and hopefully reduce his shortness of breath therefore we will have him increase metoprolol succinate to 50 mg twice a day.    Congestive heart failure: His lungs are clear. No JVD. We will try a reduce dose of furosemide at 20 mg and potassium 10 mEq daily. Written instructions are given for additional doses of Lasix and potassium if needed.    Edema: Improved. Probably due to reduction in sodium and fluids. If he has a worsening of his edema he can't increase the dose of the Lasix.    Hyperlipidemia: Lipids are excellent. Continue on current statin.    He will follow-up as scheduled on March 6, 2018 with Dr. Melvin. He will follow-up sooner if problems.    Collaborating Provider: Dr. Schofield.        No Recipients

## 2017-12-20 NOTE — PROGRESS NOTES
Subjective:   Jaime Tamayo is a 77 y.o. male who presents today With his wife, Maribell, to follow-up on 8 to fibrillation and congestive heart failure.    At his last visit on December 6, 2017, he was in rapid atrial fibrillation. We increased metoprolol succinate to 75 mg a day for rate control. Patient continues to complain of shortness of breath with walking but feels that it is improved. He does recover rapidly when sitting to rest. He denies any palpitations.    He denies orthopnea or PND. He states his ankle edema is much improved as he has reduced fluid and sodium in his diet. He denies any dizziness or lightheadedness.    Past Medical History:   Diagnosis Date   • Arthritis    • CATARACT     bilateral IOL   • Chickenpox    • Lithuanian measles    • Gout    • Gout    • Hypertension    • Indigestion    • Influenza    • Mumps    • Pain     left hip   • Pain     left knee   • Paroxysmal atrial fibrillation (CMS-HCC)    • Tremor     intermittent     Past Surgical History:   Procedure Laterality Date   • KNEE ARTHROPLASTY TOTAL Left 10/17/2016    Procedure: KNEE ARTHROPLASTY TOTAL;  Surgeon: Jaime Carmona M.D.;  Location: [facility];  Service:    • IRRIGATION & DEBRIDEMENT ORTHO  3/19/2013    Performed by Jaime Carmona M.D. at [facility]   • EVACUATION OF HEMATOMA  3/19/2013    Performed by Jaime Carmona M.D. at [facility]   • KNEE ARTHROPLASTY TOTAL  3/18/2013    Performed by Jaime Carmona M.D. at [facility]   • HIP ARTHROPLASTY TOTAL  3/5/2012    Performed by JAIME CARMONA at [facility]   • LUMBAR FUSION POSTERIOR  2012   • CATARACT EXTRACTION WITH IOL Bilateral 2010   • HIP ARTHROPLASTY TOTAL  6/8/2009    right; Performed by JAIME CARMONA at [facility]   • OTHER ORTHOPEDIC SURGERY  2006    right thumb   • ACHILLES TENDON REP Left 1999   • TONSILLECTOMY  1947   • HIP REPLACEMENT, TOTAL     • LAMINOTOMY        Family History   Problem Relation Age of Onset   • Cancer Mother    • Non-contributory Neg Hx      History   Smoking Status   • Former Smoker   • Packs/day: 0.50   • Years: 5.00   • Types: Cigarettes   • Quit date: 1/1/1969   Smokeless Tobacco   • Never Used     Allergies   Allergen Reactions   • Percocet [Oxycodone-Acetaminophen] Itching and Anxiety   • Other Environmental      hayfever     Outpatient Encounter Prescriptions as of 12/20/2017   Medication Sig Dispense Refill   • doxycycline (VIBRAMYCIN) 50 MG capsule Take 50 mg by mouth 2 times a day. UNTIL FINISHED     • furosemide (LASIX) 40 MG Tab Take 0.5 Tabs by mouth every day. Take additional tablet as needed for ankle swelling. 100 Tab 3   • metoprolol SR (TOPROL XL) 50 MG TABLET SR 24 HR Take 1 Tab by mouth 2 Times a Day. 180 Tab 3   • potassium chloride SA (KDUR) 20 MEQ Tab CR Take 0.5 Tabs by mouth every day. Take additional tablet when increasing  furosemide. 100 Tab 3   • rivaroxaban (XARELTO) 20 MG Tab tablet Take 1 Tab by mouth with dinner. 90 Tab 3   • lisinopril (PRINIVIL) 5 MG Tab Take 1 Tab by mouth every day. 90 Tab 3   • atorvastatin (LIPITOR) 20 MG Tab Take 1 Tab by mouth every evening. 90 Tab 3   • Multiple Vitamins-Minerals (PRESERVISION AREDS) Tab Take 2 tablet by mouth every day.     • VENTOLIN  (90 Base) MCG/ACT Aero Soln inhalation aerosol Inhale 1 Puff by mouth every four hours as needed for Shortness of Breath.     • allopurinol (ZYLOPRIM) 300 MG Tab TAKE 1 TABLET DAILY 90 Tab 2   • omeprazole (PRILOSEC) 20 MG CPDR Take 20 mg by mouth every day.       • [DISCONTINUED] furosemide (LASIX) 40 MG Tab Take 1 Tab by mouth every day. Take additional tablet as needed for ankle swelling. 100 Tab 3   • [DISCONTINUED] metoprolol SR (TOPROL XL) 50 MG TABLET SR 24 HR Take 1.5 Tabs by mouth every day. 135 Tab 3   • [DISCONTINUED] potassium chloride SA (KDUR) 20 MEQ Tab CR Take 1 Tab by mouth every day. Take additional tablet when  "increasing  furosemide. 100 Tab 3     No facility-administered encounter medications on file as of 12/20/2017.      Review of Systems   Constitutional: Negative for malaise/fatigue.   Respiratory: Positive for shortness of breath (walking on flat surface.). Negative for cough.    Cardiovascular: Positive for leg swelling (improved.). Negative for chest pain, palpitations, orthopnea, claudication and PND.   Gastrointestinal: Negative for abdominal pain.   Musculoskeletal: Negative for myalgias.   Neurological: Negative for dizziness and weakness.        Objective:   /78   Pulse 96   Ht 1.778 m (5' 10\")   Wt 86.2 kg (190 lb)   SpO2 96%   BMI 27.26 kg/m²     Physical Exam   Constitutional: He is oriented to person, place, and time. He appears well-developed and well-nourished.   HENT:   Head: Normocephalic.   Eyes: Conjunctivae are normal.   Neck: No JVD present. No thyromegaly present.   Cardiovascular: Normal rate and normal heart sounds.  An irregularly irregular rhythm present.   Pulmonary/Chest: Effort normal and breath sounds normal. He has no wheezes. He has no rales.   Abdominal: Soft. Bowel sounds are normal. He exhibits no distension. There is no tenderness.   Musculoskeletal: He exhibits edema (1+ bilateral pitting ankle edema.).   Neurological: He is alert and oriented to person, place, and time.   Skin: Skin is warm and dry.   Psychiatric: He has a normal mood and affect.     December 12, 2017: CBC is within normal limits with exception of RBCs 3.9, hemoglobin 12.7, . Comprehensive metabolic panel is within normal limits with the exception of glucose 123, BUN 28, creatinine 1.42, GFR 48, calcium 7.9, alkaline phosphatase 249.    Lipids: Cholesterol 98, triglycerides 88, HDL 40, LDL 40.    Assessment:     1. Paroxysmal atrial fibrillation (CMS-HCC)     2. Systolic CHF, acute (CMS-HCC)     3. Localized edema     4. Mixed hyperlipidemia         Medical Decision Making:  Today's Assessment / " Status / Plan:     Paroxysmal atrial fibrillation: He remains in atrial fibrillation with improved rate control. However he is still short of breath with exertion. We are hoping to get even better rate control and hopefully reduce his shortness of breath therefore we will have him increase metoprolol succinate to 50 mg twice a day.    Congestive heart failure: His lungs are clear. No JVD. We will try a reduce dose of furosemide at 20 mg and potassium 10 mEq daily. Written instructions are given for additional doses of Lasix and potassium if needed.    Edema: Improved. Probably due to reduction in sodium and fluids. If he has a worsening of his edema he can't increase the dose of the Lasix.    Hyperlipidemia: Lipids are excellent. Continue on current statin.    He will follow-up as scheduled on March 6, 2018 with Dr. Melvin. He will follow-up sooner if problems.    Collaborating Provider: Dr. Schofield.

## 2018-01-01 ENCOUNTER — APPOINTMENT (OUTPATIENT)
Dept: RADIOLOGY | Facility: MEDICAL CENTER | Age: 78
DRG: 219 | End: 2018-01-01
Attending: HOSPITALIST
Payer: MEDICARE

## 2018-01-01 ENCOUNTER — APPOINTMENT (OUTPATIENT)
Dept: RADIOLOGY | Facility: MEDICAL CENTER | Age: 78
DRG: 219 | End: 2018-01-01
Attending: NEUROLOGICAL SURGERY
Payer: MEDICARE

## 2018-01-01 ENCOUNTER — HOSPITAL ENCOUNTER (OUTPATIENT)
Facility: MEDICAL CENTER | Age: 78
End: 2018-08-27
Attending: INTERNAL MEDICINE | Admitting: INTERNAL MEDICINE
Payer: MEDICARE

## 2018-01-01 ENCOUNTER — APPOINTMENT (OUTPATIENT)
Dept: RADIOLOGY | Facility: IMAGING CENTER | Age: 78
End: 2018-01-01
Attending: INTERNAL MEDICINE
Payer: MEDICARE

## 2018-01-01 ENCOUNTER — OFFICE VISIT (OUTPATIENT)
Dept: CARDIOLOGY | Facility: MEDICAL CENTER | Age: 78
End: 2018-01-01
Payer: MEDICARE

## 2018-01-01 ENCOUNTER — HOSPITAL ENCOUNTER (OUTPATIENT)
Dept: RADIOLOGY | Facility: MEDICAL CENTER | Age: 78
End: 2018-02-20
Attending: INTERNAL MEDICINE | Admitting: INTERNAL MEDICINE
Payer: MEDICARE

## 2018-01-01 ENCOUNTER — APPOINTMENT (OUTPATIENT)
Dept: RADIOLOGY | Facility: MEDICAL CENTER | Age: 78
DRG: 219 | End: 2018-01-01
Attending: INTERNAL MEDICINE
Payer: MEDICARE

## 2018-01-01 ENCOUNTER — APPOINTMENT (OUTPATIENT)
Dept: RADIOLOGY | Facility: MEDICAL CENTER | Age: 78
DRG: 219 | End: 2018-01-01
Attending: NURSE PRACTITIONER
Payer: MEDICARE

## 2018-01-01 ENCOUNTER — HOSPITAL ENCOUNTER (EMERGENCY)
Facility: MEDICAL CENTER | Age: 78
End: 2018-04-21
Attending: EMERGENCY MEDICINE
Payer: MEDICARE

## 2018-01-01 ENCOUNTER — HOSPITAL ENCOUNTER (INPATIENT)
Dept: CARDIOLOGY | Facility: MEDICAL CENTER | Age: 78
DRG: 219 | End: 2018-10-15
Attending: INTERNAL MEDICINE
Payer: MEDICARE

## 2018-01-01 ENCOUNTER — TELEPHONE (OUTPATIENT)
Dept: CARDIOLOGY | Facility: MEDICAL CENTER | Age: 78
End: 2018-01-01

## 2018-01-01 ENCOUNTER — APPOINTMENT (OUTPATIENT)
Dept: RADIOLOGY | Facility: MEDICAL CENTER | Age: 78
DRG: 219 | End: 2018-01-01
Attending: CLINICAL NURSE SPECIALIST
Payer: MEDICARE

## 2018-01-01 ENCOUNTER — PATIENT OUTREACH (OUTPATIENT)
Dept: HEALTH INFORMATION MANAGEMENT | Facility: OTHER | Age: 78
End: 2018-01-01

## 2018-01-01 ENCOUNTER — APPOINTMENT (OUTPATIENT)
Dept: CARDIOLOGY | Facility: MEDICAL CENTER | Age: 78
DRG: 219 | End: 2018-01-01
Attending: HOSPITALIST
Payer: MEDICARE

## 2018-01-01 ENCOUNTER — TELEPHONE (OUTPATIENT)
Dept: PULMONOLOGY | Facility: HOSPICE | Age: 78
End: 2018-01-01

## 2018-01-01 ENCOUNTER — HOSPITAL ENCOUNTER (OUTPATIENT)
Dept: LAB | Facility: MEDICAL CENTER | Age: 78
End: 2018-09-13
Attending: THORACIC SURGERY (CARDIOTHORACIC VASCULAR SURGERY)
Payer: MEDICARE

## 2018-01-01 ENCOUNTER — HOSPITAL ENCOUNTER (OUTPATIENT)
Facility: MEDICAL CENTER | Age: 78
End: 2018-02-21
Attending: INTERNAL MEDICINE | Admitting: INTERNAL MEDICINE
Payer: MEDICARE

## 2018-01-01 ENCOUNTER — OFFICE VISIT (OUTPATIENT)
Dept: PULMONOLOGY | Facility: HOSPICE | Age: 78
End: 2018-01-01
Payer: MEDICARE

## 2018-01-01 ENCOUNTER — APPOINTMENT (OUTPATIENT)
Dept: CARDIOLOGY | Facility: MEDICAL CENTER | Age: 78
DRG: 219 | End: 2018-01-01
Attending: INTERNAL MEDICINE
Payer: MEDICARE

## 2018-01-01 ENCOUNTER — HOSPITAL ENCOUNTER (OUTPATIENT)
Dept: RADIOLOGY | Facility: MEDICAL CENTER | Age: 78
DRG: 219 | End: 2018-10-15
Attending: THORACIC SURGERY (CARDIOTHORACIC VASCULAR SURGERY) | Admitting: THORACIC SURGERY (CARDIOTHORACIC VASCULAR SURGERY)
Payer: MEDICARE

## 2018-01-01 ENCOUNTER — HOSPITAL ENCOUNTER (EMERGENCY)
Facility: MEDICAL CENTER | Age: 78
End: 2018-04-22
Attending: EMERGENCY MEDICINE
Payer: MEDICARE

## 2018-01-01 ENCOUNTER — HOSPITAL ENCOUNTER (INPATIENT)
Facility: MEDICAL CENTER | Age: 78
LOS: 27 days | DRG: 219 | End: 2018-11-12
Attending: THORACIC SURGERY (CARDIOTHORACIC VASCULAR SURGERY) | Admitting: THORACIC SURGERY (CARDIOTHORACIC VASCULAR SURGERY)
Payer: MEDICARE

## 2018-01-01 ENCOUNTER — APPOINTMENT (OUTPATIENT)
Dept: PULMONOLOGY | Facility: HOSPICE | Age: 78
End: 2018-01-01
Payer: MEDICARE

## 2018-01-01 ENCOUNTER — HOSPITAL ENCOUNTER (OUTPATIENT)
Dept: RADIOLOGY | Facility: MEDICAL CENTER | Age: 78
End: 2018-08-24
Attending: INTERNAL MEDICINE | Admitting: INTERNAL MEDICINE
Payer: MEDICARE

## 2018-01-01 ENCOUNTER — APPOINTMENT (OUTPATIENT)
Dept: RADIOLOGY | Facility: IMAGING CENTER | Age: 78
End: 2018-01-01
Attending: NURSE PRACTITIONER
Payer: MEDICARE

## 2018-01-01 ENCOUNTER — APPOINTMENT (OUTPATIENT)
Dept: CARDIOLOGY | Facility: MEDICAL CENTER | Age: 78
DRG: 219 | End: 2018-01-01
Attending: THORACIC SURGERY (CARDIOTHORACIC VASCULAR SURGERY)
Payer: MEDICARE

## 2018-01-01 ENCOUNTER — NON-PROVIDER VISIT (OUTPATIENT)
Dept: PULMONOLOGY | Facility: HOSPICE | Age: 78
End: 2018-01-01
Attending: NURSE PRACTITIONER
Payer: MEDICARE

## 2018-01-01 VITALS
TEMPERATURE: 97.7 F | OXYGEN SATURATION: 74 % | RESPIRATION RATE: 20 BRPM | SYSTOLIC BLOOD PRESSURE: 47 MMHG | BODY MASS INDEX: 29.45 KG/M2 | DIASTOLIC BLOOD PRESSURE: 35 MMHG | WEIGHT: 187.61 LBS | HEART RATE: 49 BPM | HEIGHT: 67 IN

## 2018-01-01 VITALS
HEIGHT: 69 IN | TEMPERATURE: 96.8 F | DIASTOLIC BLOOD PRESSURE: 72 MMHG | RESPIRATION RATE: 16 BRPM | HEART RATE: 98 BPM | WEIGHT: 195.6 LBS | SYSTOLIC BLOOD PRESSURE: 116 MMHG | OXYGEN SATURATION: 99 % | BODY MASS INDEX: 28.97 KG/M2

## 2018-01-01 VITALS
DIASTOLIC BLOOD PRESSURE: 78 MMHG | BODY MASS INDEX: 25.44 KG/M2 | SYSTOLIC BLOOD PRESSURE: 109 MMHG | OXYGEN SATURATION: 98 % | TEMPERATURE: 97.3 F | HEIGHT: 69 IN | HEART RATE: 93 BPM | WEIGHT: 171.74 LBS | RESPIRATION RATE: 15 BRPM

## 2018-01-01 VITALS
DIASTOLIC BLOOD PRESSURE: 70 MMHG | HEIGHT: 69 IN | HEART RATE: 90 BPM | SYSTOLIC BLOOD PRESSURE: 120 MMHG | BODY MASS INDEX: 28.88 KG/M2 | WEIGHT: 195 LBS

## 2018-01-01 VITALS
HEART RATE: 86 BPM | WEIGHT: 180.78 LBS | TEMPERATURE: 98.2 F | OXYGEN SATURATION: 94 % | BODY MASS INDEX: 26.78 KG/M2 | RESPIRATION RATE: 16 BRPM | HEIGHT: 69 IN | SYSTOLIC BLOOD PRESSURE: 98 MMHG | DIASTOLIC BLOOD PRESSURE: 62 MMHG

## 2018-01-01 VITALS
WEIGHT: 178.57 LBS | SYSTOLIC BLOOD PRESSURE: 119 MMHG | RESPIRATION RATE: 18 BRPM | DIASTOLIC BLOOD PRESSURE: 76 MMHG | TEMPERATURE: 98.4 F | OXYGEN SATURATION: 94 % | BODY MASS INDEX: 26.45 KG/M2 | HEART RATE: 85 BPM | HEIGHT: 69 IN

## 2018-01-01 VITALS
BODY MASS INDEX: 26.76 KG/M2 | WEIGHT: 186.95 LBS | SYSTOLIC BLOOD PRESSURE: 127 MMHG | OXYGEN SATURATION: 91 % | HEIGHT: 70 IN | HEART RATE: 90 BPM | RESPIRATION RATE: 16 BRPM | DIASTOLIC BLOOD PRESSURE: 85 MMHG | TEMPERATURE: 98.6 F

## 2018-01-01 VITALS
WEIGHT: 195 LBS | HEART RATE: 96 BPM | HEIGHT: 69 IN | SYSTOLIC BLOOD PRESSURE: 120 MMHG | OXYGEN SATURATION: 98 % | BODY MASS INDEX: 28.88 KG/M2 | DIASTOLIC BLOOD PRESSURE: 70 MMHG

## 2018-01-01 VITALS
TEMPERATURE: 97.9 F | WEIGHT: 181 LBS | SYSTOLIC BLOOD PRESSURE: 104 MMHG | OXYGEN SATURATION: 92 % | BODY MASS INDEX: 26.81 KG/M2 | HEIGHT: 69 IN | HEART RATE: 91 BPM | DIASTOLIC BLOOD PRESSURE: 70 MMHG | RESPIRATION RATE: 16 BRPM

## 2018-01-01 VITALS
OXYGEN SATURATION: 99 % | BODY MASS INDEX: 26.36 KG/M2 | HEART RATE: 88 BPM | SYSTOLIC BLOOD PRESSURE: 102 MMHG | HEIGHT: 69 IN | DIASTOLIC BLOOD PRESSURE: 60 MMHG | TEMPERATURE: 97.9 F | WEIGHT: 178 LBS | RESPIRATION RATE: 16 BRPM

## 2018-01-01 VITALS
WEIGHT: 186 LBS | SYSTOLIC BLOOD PRESSURE: 92 MMHG | HEIGHT: 69 IN | DIASTOLIC BLOOD PRESSURE: 56 MMHG | BODY MASS INDEX: 27.55 KG/M2 | HEART RATE: 80 BPM | OXYGEN SATURATION: 96 %

## 2018-01-01 VITALS
HEIGHT: 69 IN | DIASTOLIC BLOOD PRESSURE: 64 MMHG | OXYGEN SATURATION: 94 % | HEART RATE: 82 BPM | SYSTOLIC BLOOD PRESSURE: 106 MMHG | RESPIRATION RATE: 12 BRPM | WEIGHT: 181 LBS | BODY MASS INDEX: 26.81 KG/M2

## 2018-01-01 VITALS
HEIGHT: 69 IN | HEART RATE: 90 BPM | DIASTOLIC BLOOD PRESSURE: 68 MMHG | WEIGHT: 187.2 LBS | OXYGEN SATURATION: 98 % | BODY MASS INDEX: 27.73 KG/M2 | SYSTOLIC BLOOD PRESSURE: 108 MMHG

## 2018-01-01 DIAGNOSIS — I50.21 SYSTOLIC CHF, ACUTE (HCC): ICD-10-CM

## 2018-01-01 DIAGNOSIS — Z01.812 PRE-PROCEDURAL LABORATORY EXAMINATIONS: ICD-10-CM

## 2018-01-01 DIAGNOSIS — I34.0 NON-RHEUMATIC MITRAL REGURGITATION: ICD-10-CM

## 2018-01-01 DIAGNOSIS — J96.01 ACUTE RESPIRATORY FAILURE WITH HYPOXIA (HCC): ICD-10-CM

## 2018-01-01 DIAGNOSIS — I10 ESSENTIAL HYPERTENSION, BENIGN: ICD-10-CM

## 2018-01-01 DIAGNOSIS — I36.1 NON-RHEUMATIC TRICUSPID VALVE INSUFFICIENCY: ICD-10-CM

## 2018-01-01 DIAGNOSIS — I50.812 CHRONIC RIGHT-SIDED CONGESTIVE HEART FAILURE (HCC): ICD-10-CM

## 2018-01-01 DIAGNOSIS — I48.20 CHRONIC ATRIAL FIBRILLATION (HCC): ICD-10-CM

## 2018-01-01 DIAGNOSIS — S51.812A SKIN TEAR OF LEFT FOREARM WITHOUT COMPLICATION, INITIAL ENCOUNTER: ICD-10-CM

## 2018-01-01 DIAGNOSIS — T14.8XXA BLEEDING FROM WOUND: ICD-10-CM

## 2018-01-01 DIAGNOSIS — R06.02 SOB (SHORTNESS OF BREATH): ICD-10-CM

## 2018-01-01 DIAGNOSIS — I48.0 PAROXYSMAL ATRIAL FIBRILLATION (HCC): ICD-10-CM

## 2018-01-01 DIAGNOSIS — J90 PLEURAL EFFUSION: ICD-10-CM

## 2018-01-01 DIAGNOSIS — I48.0 PAF (PAROXYSMAL ATRIAL FIBRILLATION) (HCC): ICD-10-CM

## 2018-01-01 DIAGNOSIS — I46.9 PEA (PULSELESS ELECTRICAL ACTIVITY) (HCC): ICD-10-CM

## 2018-01-01 DIAGNOSIS — Z01.811 PRE-PROCEDURAL RESPIRATORY EXAMINATION: ICD-10-CM

## 2018-01-01 DIAGNOSIS — R60.0 LOCALIZED EDEMA: ICD-10-CM

## 2018-01-01 DIAGNOSIS — I46.9 CARDIAC ARREST (HCC): ICD-10-CM

## 2018-01-01 DIAGNOSIS — I27.20 PULMONARY HYPERTENSION (HCC): ICD-10-CM

## 2018-01-01 DIAGNOSIS — Z01.811 PRE-OPERATIVE RESPIRATORY EXAMINATION: ICD-10-CM

## 2018-01-01 DIAGNOSIS — Z01.810 PRE-OPERATIVE CARDIOVASCULAR EXAMINATION: ICD-10-CM

## 2018-01-01 DIAGNOSIS — N18.30 CKD (CHRONIC KIDNEY DISEASE) STAGE 3, GFR 30-59 ML/MIN (HCC): ICD-10-CM

## 2018-01-01 DIAGNOSIS — Z01.812 PRE-OPERATIVE LABORATORY EXAMINATION: ICD-10-CM

## 2018-01-01 DIAGNOSIS — I50.812 CHRONIC RIGHT-SIDED CONGESTIVE HEART FAILURE (HCC): Primary | ICD-10-CM

## 2018-01-01 LAB
ABO GROUP BLD: NORMAL
ACT BLD: 125 SEC (ref 74–137)
ACT BLD: 142 SEC (ref 74–137)
ACT BLD: 433 SEC (ref 74–137)
ACT BLD: 461 SEC (ref 74–137)
ACT BLD: 566 SEC (ref 74–137)
ACT BLD: 692 SEC (ref 74–137)
ACT BLD: 819 SEC (ref 74–137)
ACTION RANGE TRIGGERED IACRT: NO
ACTION RANGE TRIGGERED IACRT: YES
ACTION RANGE TRIGGERED IACRT: YES
ALBUMIN SERPL BCP-MCNC: 3.1 G/DL (ref 3.2–4.9)
ALBUMIN SERPL BCP-MCNC: 3.2 G/DL (ref 3.2–4.9)
ALBUMIN SERPL BCP-MCNC: 3.3 G/DL (ref 3.2–4.9)
ALBUMIN SERPL BCP-MCNC: 3.4 G/DL (ref 3.2–4.9)
ALBUMIN SERPL BCP-MCNC: 3.5 G/DL (ref 3.2–4.9)
ALBUMIN SERPL BCP-MCNC: 3.5 G/DL (ref 3.2–4.9)
ALBUMIN SERPL BCP-MCNC: 3.6 G/DL (ref 3.2–4.9)
ALBUMIN SERPL BCP-MCNC: 3.8 G/DL (ref 3.2–4.9)
ALBUMIN SERPL BCP-MCNC: 3.8 G/DL (ref 3.2–4.9)
ALBUMIN SERPL BCP-MCNC: 4.1 G/DL (ref 3.2–4.9)
ALBUMIN/GLOB SERPL: 1.4 G/DL
ALBUMIN/GLOB SERPL: 1.5 G/DL
ALBUMIN/GLOB SERPL: 1.5 G/DL
ALBUMIN/GLOB SERPL: 1.9 G/DL
ALP SERPL-CCNC: 102 U/L (ref 30–99)
ALP SERPL-CCNC: 205 U/L (ref 30–99)
ALP SERPL-CCNC: 253 U/L (ref 30–99)
ALP SERPL-CCNC: 391 U/L (ref 30–99)
ALT SERPL-CCNC: 15 U/L (ref 2–50)
ALT SERPL-CCNC: 22 U/L (ref 2–50)
ALT SERPL-CCNC: 28 U/L (ref 2–50)
ALT SERPL-CCNC: 6 U/L (ref 2–50)
ANION GAP SERPL CALC-SCNC: 10 MMOL/L (ref 0–11.9)
ANION GAP SERPL CALC-SCNC: 10 MMOL/L (ref 0–11.9)
ANION GAP SERPL CALC-SCNC: 11 MMOL/L (ref 0–11.9)
ANION GAP SERPL CALC-SCNC: 12 MMOL/L (ref 0–11.9)
ANION GAP SERPL CALC-SCNC: 13 MMOL/L (ref 0–11.9)
ANION GAP SERPL CALC-SCNC: 14 MMOL/L (ref 0–11.9)
ANION GAP SERPL CALC-SCNC: 15 MMOL/L (ref 0–11.9)
ANION GAP SERPL CALC-SCNC: 17 MMOL/L (ref 0–11.9)
ANION GAP SERPL CALC-SCNC: 18 MMOL/L (ref 0–11.9)
ANION GAP SERPL CALC-SCNC: 7 MMOL/L (ref 0–11.9)
ANION GAP SERPL CALC-SCNC: 7 MMOL/L (ref 0–11.9)
ANION GAP SERPL CALC-SCNC: 9 MMOL/L (ref 0–11.9)
ANISOCYTOSIS BLD QL SMEAR: ABNORMAL
ANISOCYTOSIS BLD QL SMEAR: ABNORMAL
APPEARANCE UR: CLEAR
APTT PPP: 33.8 SEC (ref 24.7–36)
APTT PPP: 35.5 SEC (ref 24.7–36)
APTT PPP: 37.3 SEC (ref 24.7–36)
APTT PPP: 37.8 SEC (ref 24.7–36)
AST SERPL-CCNC: 11 U/L (ref 12–45)
AST SERPL-CCNC: 19 U/L (ref 12–45)
AST SERPL-CCNC: 25 U/L (ref 12–45)
AST SERPL-CCNC: 50 U/L (ref 12–45)
BARCODED ABORH UBTYP: 5100
BARCODED ABORH UBTYP: 7300
BARCODED ABORH UBTYP: 9500
BARCODED ABORH UBTYP: 9500
BARCODED PRD CODE UBPRD: NORMAL
BARCODED UNIT NUM UBUNT: NORMAL
BASE EXCESS BLDA CALC-SCNC: -1 MMOL/L (ref -4–3)
BASE EXCESS BLDA CALC-SCNC: -2 MMOL/L (ref -4–3)
BASE EXCESS BLDA CALC-SCNC: -3 MMOL/L (ref -4–3)
BASE EXCESS BLDA CALC-SCNC: -3 MMOL/L (ref -4–3)
BASE EXCESS BLDA CALC-SCNC: -6 MMOL/L (ref -4–3)
BASE EXCESS BLDA CALC-SCNC: 0 MMOL/L (ref -4–3)
BASE EXCESS BLDA CALC-SCNC: 0 MMOL/L (ref -4–3)
BASE EXCESS BLDA CALC-SCNC: 1 MMOL/L (ref -4–3)
BASE EXCESS BLDA CALC-SCNC: 1 MMOL/L (ref -4–3)
BASE EXCESS BLDA CALC-SCNC: 2 MMOL/L (ref -4–3)
BASE EXCESS BLDA CALC-SCNC: 3 MMOL/L (ref -4–3)
BASE EXCESS BLDA CALC-SCNC: 4 MMOL/L (ref -4–3)
BASE EXCESS BLDV CALC-SCNC: 1 MMOL/L (ref -4–3)
BASOPHILS # BLD AUTO: 0 % (ref 0–1.8)
BASOPHILS # BLD AUTO: 0 X10E3/UL (ref 0–0.2)
BASOPHILS # BLD AUTO: 0.3 % (ref 0–1.8)
BASOPHILS # BLD AUTO: 0.4 % (ref 0–1.8)
BASOPHILS # BLD AUTO: 0.5 % (ref 0–1.8)
BASOPHILS # BLD AUTO: 0.6 % (ref 0–1.8)
BASOPHILS # BLD AUTO: 0.6 % (ref 0–1.8)
BASOPHILS # BLD AUTO: 0.7 % (ref 0–1.8)
BASOPHILS # BLD AUTO: 0.9 % (ref 0–1.8)
BASOPHILS # BLD: 0 K/UL (ref 0–0.12)
BASOPHILS # BLD: 0.02 K/UL (ref 0–0.12)
BASOPHILS # BLD: 0.02 K/UL (ref 0–0.12)
BASOPHILS # BLD: 0.03 K/UL (ref 0–0.12)
BASOPHILS # BLD: 0.04 K/UL (ref 0–0.12)
BASOPHILS # BLD: 0.04 K/UL (ref 0–0.12)
BASOPHILS # BLD: 0.05 K/UL (ref 0–0.12)
BASOPHILS # BLD: 0.08 K/UL (ref 0–0.12)
BASOPHILS NFR BLD AUTO: 1 %
BILIRUB SERPL-MCNC: 1.3 MG/DL (ref 0.1–1.5)
BILIRUB SERPL-MCNC: 1.6 MG/DL (ref 0.1–1.5)
BILIRUB SERPL-MCNC: 1.6 MG/DL (ref 0.1–1.5)
BILIRUB SERPL-MCNC: 1.7 MG/DL (ref 0.1–1.5)
BILIRUB UR QL STRIP.AUTO: NEGATIVE
BLD GP AB SCN SERPL QL: NORMAL
BLD GP AB SCN SERPL QL: NORMAL
BNP SERPL-MCNC: 450.6 PG/ML (ref 0–100)
BNP SERPL-MCNC: 547.8 PG/ML (ref 0–100)
BNP SERPL-MCNC: 566.3 PG/ML (ref 0–100)
BODY TEMPERATURE: ABNORMAL DEGREES
BODY TEMPERATURE: NORMAL DEGREES
BUN SERPL-MCNC: 103 MG/DL (ref 8–22)
BUN SERPL-MCNC: 106 MG/DL (ref 8–22)
BUN SERPL-MCNC: 109 MG/DL (ref 8–22)
BUN SERPL-MCNC: 110 MG/DL (ref 8–22)
BUN SERPL-MCNC: 113 MG/DL (ref 8–22)
BUN SERPL-MCNC: 115 MG/DL (ref 8–22)
BUN SERPL-MCNC: 118 MG/DL (ref 8–22)
BUN SERPL-MCNC: 119 MG/DL (ref 8–22)
BUN SERPL-MCNC: 121 MG/DL (ref 8–22)
BUN SERPL-MCNC: 122 MG/DL (ref 8–22)
BUN SERPL-MCNC: 128 MG/DL (ref 8–22)
BUN SERPL-MCNC: 20 MG/DL (ref 8–22)
BUN SERPL-MCNC: 27 MG/DL (ref 8–22)
BUN SERPL-MCNC: 28 MG/DL (ref 8–22)
BUN SERPL-MCNC: 32 MG/DL (ref 8–22)
BUN SERPL-MCNC: 35 MG/DL (ref 8–22)
BUN SERPL-MCNC: 38 MG/DL (ref 8–22)
BUN SERPL-MCNC: 39 MG/DL (ref 8–22)
BUN SERPL-MCNC: 39 MG/DL (ref 8–22)
BUN SERPL-MCNC: 41 MG/DL (ref 8–27)
BUN SERPL-MCNC: 43 MG/DL (ref 8–22)
BUN SERPL-MCNC: 48 MG/DL (ref 8–22)
BUN SERPL-MCNC: 54 MG/DL (ref 8–22)
BUN SERPL-MCNC: 56 MG/DL (ref 8–27)
BUN SERPL-MCNC: 59 MG/DL (ref 8–27)
BUN SERPL-MCNC: 64 MG/DL (ref 8–22)
BUN SERPL-MCNC: 76 MG/DL (ref 8–22)
BUN SERPL-MCNC: 76 MG/DL (ref 8–22)
BUN SERPL-MCNC: 78 MG/DL (ref 8–22)
BUN SERPL-MCNC: 80 MG/DL (ref 8–22)
BUN SERPL-MCNC: 91 MG/DL (ref 8–22)
BUN SERPL-MCNC: 91 MG/DL (ref 8–22)
BUN SERPL-MCNC: 92 MG/DL (ref 8–22)
BUN SERPL-MCNC: 95 MG/DL (ref 8–22)
BUN SERPL-MCNC: 99 MG/DL (ref 8–22)
BUN/CREAT SERPL: 28 (ref 10–24)
BUN/CREAT SERPL: 31 (ref 10–24)
BUN/CREAT SERPL: 32 (ref 10–24)
BURR CELLS BLD QL SMEAR: NORMAL
CA-I BLD ISE-SCNC: 0.94 MMOL/L (ref 1.1–1.3)
CA-I BLD ISE-SCNC: 0.96 MMOL/L (ref 1.1–1.3)
CA-I BLD ISE-SCNC: 0.99 MMOL/L (ref 1.1–1.3)
CA-I BLD ISE-SCNC: 0.99 MMOL/L (ref 1.1–1.3)
CA-I BLD ISE-SCNC: 1.02 MMOL/L (ref 1.1–1.3)
CA-I BLD ISE-SCNC: 1.06 MMOL/L (ref 1.1–1.3)
CA-I BLD ISE-SCNC: 1.12 MMOL/L (ref 1.1–1.3)
CALCIUM SERPL-MCNC: 7.3 MG/DL (ref 8.5–10.5)
CALCIUM SERPL-MCNC: 7.9 MG/DL (ref 8.5–10.5)
CALCIUM SERPL-MCNC: 7.9 MG/DL (ref 8.5–10.5)
CALCIUM SERPL-MCNC: 8.1 MG/DL (ref 8.5–10.5)
CALCIUM SERPL-MCNC: 8.2 MG/DL (ref 8.6–10.2)
CALCIUM SERPL-MCNC: 8.6 MG/DL (ref 8.4–10.2)
CALCIUM SERPL-MCNC: 8.7 MG/DL (ref 8.5–10.5)
CALCIUM SERPL-MCNC: 8.8 MG/DL (ref 8.5–10.5)
CALCIUM SERPL-MCNC: 8.9 MG/DL (ref 8.5–10.5)
CALCIUM SERPL-MCNC: 8.9 MG/DL (ref 8.5–10.5)
CALCIUM SERPL-MCNC: 9 MG/DL (ref 8.5–10.5)
CALCIUM SERPL-MCNC: 9.1 MG/DL (ref 8.5–10.5)
CALCIUM SERPL-MCNC: 9.1 MG/DL (ref 8.5–10.5)
CALCIUM SERPL-MCNC: 9.2 MG/DL (ref 8.5–10.5)
CALCIUM SERPL-MCNC: 9.3 MG/DL (ref 8.5–10.5)
CALCIUM SERPL-MCNC: 9.4 MG/DL (ref 8.5–10.5)
CALCIUM SERPL-MCNC: 9.5 MG/DL (ref 8.5–10.5)
CALCIUM SERPL-MCNC: 9.5 MG/DL (ref 8.5–10.5)
CALCIUM SERPL-MCNC: 9.7 MG/DL (ref 8.5–10.5)
CHLORIDE SERPL-SCNC: 100 MMOL/L (ref 96–112)
CHLORIDE SERPL-SCNC: 100 MMOL/L (ref 96–112)
CHLORIDE SERPL-SCNC: 101 MMOL/L (ref 96–106)
CHLORIDE SERPL-SCNC: 101 MMOL/L (ref 96–106)
CHLORIDE SERPL-SCNC: 102 MMOL/L (ref 96–106)
CHLORIDE SERPL-SCNC: 102 MMOL/L (ref 96–112)
CHLORIDE SERPL-SCNC: 103 MMOL/L (ref 96–112)
CHLORIDE SERPL-SCNC: 103 MMOL/L (ref 96–112)
CHLORIDE SERPL-SCNC: 104 MMOL/L (ref 96–112)
CHLORIDE SERPL-SCNC: 104 MMOL/L (ref 96–112)
CHLORIDE SERPL-SCNC: 106 MMOL/L (ref 96–112)
CHLORIDE SERPL-SCNC: 108 MMOL/L (ref 96–112)
CHLORIDE SERPL-SCNC: 109 MMOL/L (ref 96–112)
CHLORIDE SERPL-SCNC: 110 MMOL/L (ref 96–112)
CHLORIDE SERPL-SCNC: 94 MMOL/L (ref 96–112)
CHLORIDE SERPL-SCNC: 95 MMOL/L (ref 96–112)
CHLORIDE SERPL-SCNC: 96 MMOL/L (ref 96–112)
CHLORIDE SERPL-SCNC: 97 MMOL/L (ref 96–112)
CHLORIDE SERPL-SCNC: 98 MMOL/L (ref 96–112)
CHLORIDE SERPL-SCNC: 99 MMOL/L (ref 96–112)
CO2 BLDA-SCNC: 20 MMOL/L (ref 20–33)
CO2 BLDA-SCNC: 21 MMOL/L (ref 20–33)
CO2 BLDA-SCNC: 22 MMOL/L (ref 20–33)
CO2 BLDA-SCNC: 23 MMOL/L (ref 20–33)
CO2 BLDA-SCNC: 24 MMOL/L (ref 20–33)
CO2 BLDA-SCNC: 26 MMOL/L (ref 20–33)
CO2 BLDA-SCNC: 26 MMOL/L (ref 20–33)
CO2 BLDA-SCNC: 27 MMOL/L (ref 20–33)
CO2 BLDA-SCNC: 29 MMOL/L (ref 20–33)
CO2 BLDA-SCNC: 29 MMOL/L (ref 20–33)
CO2 BLDA-SCNC: 31 MMOL/L (ref 20–33)
CO2 BLDV-SCNC: 27 MMOL/L (ref 20–33)
CO2 SERPL-SCNC: 17 MMOL/L (ref 20–33)
CO2 SERPL-SCNC: 18 MMOL/L (ref 20–33)
CO2 SERPL-SCNC: 20 MMOL/L (ref 20–29)
CO2 SERPL-SCNC: 20 MMOL/L (ref 20–33)
CO2 SERPL-SCNC: 20 MMOL/L (ref 20–33)
CO2 SERPL-SCNC: 21 MMOL/L (ref 20–33)
CO2 SERPL-SCNC: 22 MMOL/L (ref 20–29)
CO2 SERPL-SCNC: 22 MMOL/L (ref 20–33)
CO2 SERPL-SCNC: 23 MMOL/L (ref 18–29)
CO2 SERPL-SCNC: 23 MMOL/L (ref 20–33)
CO2 SERPL-SCNC: 24 MMOL/L (ref 20–33)
CO2 SERPL-SCNC: 25 MMOL/L (ref 20–33)
CO2 SERPL-SCNC: 26 MMOL/L (ref 20–33)
CO2 SERPL-SCNC: 27 MMOL/L (ref 20–33)
CO2 SERPL-SCNC: 28 MMOL/L (ref 20–33)
CO2 SERPL-SCNC: 28 MMOL/L (ref 20–33)
CO2 SERPL-SCNC: 29 MMOL/L (ref 20–33)
CO2 SERPL-SCNC: 29 MMOL/L (ref 20–33)
CO2 SERPL-SCNC: 30 MMOL/L (ref 20–33)
CO2 SERPL-SCNC: 31 MMOL/L (ref 20–33)
COLOR UR: YELLOW
COMMENT 1642: NORMAL
COMMENT 1642: NORMAL
COMPONENT CT 8504CT: NORMAL
COMPONENT P 8504P: NORMAL
COMPONENT R 8504R: NORMAL
CORTIS SERPL-MCNC: 11.3 UG/DL (ref 0–23)
CREAT 24H UR-MSRATE: 386 MG/24 HR (ref 1000–2000)
CREAT SERPL-MCNC: 1.26 MG/DL (ref 0.5–1.4)
CREAT SERPL-MCNC: 1.45 MG/DL (ref 0.76–1.27)
CREAT SERPL-MCNC: 1.8 MG/DL (ref 0.5–1.4)
CREAT SERPL-MCNC: 1.81 MG/DL (ref 0.5–1.4)
CREAT SERPL-MCNC: 1.82 MG/DL (ref 0.76–1.27)
CREAT SERPL-MCNC: 1.84 MG/DL (ref 0.76–1.27)
CREAT SERPL-MCNC: 1.89 MG/DL (ref 0.5–1.4)
CREAT SERPL-MCNC: 1.99 MG/DL (ref 0.5–1.4)
CREAT SERPL-MCNC: 2.17 MG/DL (ref 0.5–1.4)
CREAT SERPL-MCNC: 2.25 MG/DL (ref 0.5–1.4)
CREAT SERPL-MCNC: 2.28 MG/DL (ref 0.5–1.4)
CREAT SERPL-MCNC: 2.29 MG/DL (ref 0.5–1.4)
CREAT SERPL-MCNC: 2.29 MG/DL (ref 0.5–1.4)
CREAT SERPL-MCNC: 2.33 MG/DL (ref 0.5–1.4)
CREAT SERPL-MCNC: 2.36 MG/DL (ref 0.5–1.4)
CREAT SERPL-MCNC: 2.37 MG/DL (ref 0.5–1.4)
CREAT SERPL-MCNC: 2.38 MG/DL (ref 0.5–1.4)
CREAT SERPL-MCNC: 2.65 MG/DL (ref 0.5–1.4)
CREAT SERPL-MCNC: 2.67 MG/DL (ref 0.5–1.4)
CREAT SERPL-MCNC: 2.76 MG/DL (ref 0.5–1.4)
CREAT SERPL-MCNC: 2.77 MG/DL (ref 0.5–1.4)
CREAT SERPL-MCNC: 2.77 MG/DL (ref 0.5–1.4)
CREAT SERPL-MCNC: 2.78 MG/DL (ref 0.5–1.4)
CREAT SERPL-MCNC: 2.84 MG/DL (ref 0.5–1.4)
CREAT SERPL-MCNC: 2.87 MG/DL (ref 0.5–1.4)
CREAT SERPL-MCNC: 2.87 MG/DL (ref 0.5–1.4)
CREAT SERPL-MCNC: 2.92 MG/DL (ref 0.5–1.4)
CREAT SERPL-MCNC: 2.98 MG/DL (ref 0.5–1.4)
CREAT SERPL-MCNC: 2.98 MG/DL (ref 0.5–1.4)
CREAT SERPL-MCNC: 3.02 MG/DL (ref 0.5–1.4)
CREAT SERPL-MCNC: 3.13 MG/DL (ref 0.5–1.4)
CREAT SERPL-MCNC: 3.14 MG/DL (ref 0.5–1.4)
CREAT SERPL-MCNC: 3.2 MG/DL (ref 0.5–1.4)
CREAT SERPL-MCNC: 3.22 MG/DL (ref 0.5–1.4)
CREAT SERPL-MCNC: 3.35 MG/DL (ref 0.5–1.4)
CREAT UR-MCNC: 38.6 MG/DL
DACRYOCYTES BLD QL SMEAR: NORMAL
EKG IMPRESSION: NORMAL
EOSINOPHIL # BLD AUTO: 0 K/UL (ref 0–0.51)
EOSINOPHIL # BLD AUTO: 0 K/UL (ref 0–0.51)
EOSINOPHIL # BLD AUTO: 0.05 K/UL (ref 0–0.51)
EOSINOPHIL # BLD AUTO: 0.09 K/UL (ref 0–0.51)
EOSINOPHIL # BLD AUTO: 0.09 K/UL (ref 0–0.51)
EOSINOPHIL # BLD AUTO: 0.1 X10E3/UL (ref 0–0.4)
EOSINOPHIL # BLD AUTO: 0.14 K/UL (ref 0–0.51)
EOSINOPHIL NFR BLD AUTO: 1 %
EOSINOPHIL NFR BLD: 0 % (ref 0–6.9)
EOSINOPHIL NFR BLD: 0 % (ref 0–6.9)
EOSINOPHIL NFR BLD: 0.9 % (ref 0–6.9)
EOSINOPHIL NFR BLD: 1.3 % (ref 0–6.9)
EOSINOPHIL NFR BLD: 1.4 % (ref 0–6.9)
EOSINOPHIL NFR BLD: 1.8 % (ref 0–6.9)
EOSINOPHIL NFR BLD: 2 % (ref 0–6.9)
EOSINOPHIL NFR BLD: 2.2 % (ref 0–6.9)
ERYTHROCYTE [DISTWIDTH] IN BLOOD BY AUTOMATED COUNT: 15.7 % (ref 12.3–15.4)
ERYTHROCYTE [DISTWIDTH] IN BLOOD BY AUTOMATED COUNT: 55.8 FL (ref 35.9–50)
ERYTHROCYTE [DISTWIDTH] IN BLOOD BY AUTOMATED COUNT: 61.1 FL (ref 35.9–50)
ERYTHROCYTE [DISTWIDTH] IN BLOOD BY AUTOMATED COUNT: 63.9 FL (ref 35.9–50)
ERYTHROCYTE [DISTWIDTH] IN BLOOD BY AUTOMATED COUNT: 64 FL (ref 35.9–50)
ERYTHROCYTE [DISTWIDTH] IN BLOOD BY AUTOMATED COUNT: 64.4 FL (ref 35.9–50)
ERYTHROCYTE [DISTWIDTH] IN BLOOD BY AUTOMATED COUNT: 64.5 FL (ref 35.9–50)
ERYTHROCYTE [DISTWIDTH] IN BLOOD BY AUTOMATED COUNT: 64.7 FL (ref 35.9–50)
ERYTHROCYTE [DISTWIDTH] IN BLOOD BY AUTOMATED COUNT: 65.6 FL (ref 35.9–50)
ERYTHROCYTE [DISTWIDTH] IN BLOOD BY AUTOMATED COUNT: 65.8 FL (ref 35.9–50)
ERYTHROCYTE [DISTWIDTH] IN BLOOD BY AUTOMATED COUNT: 67.1 FL (ref 35.9–50)
ERYTHROCYTE [DISTWIDTH] IN BLOOD BY AUTOMATED COUNT: 67.2 FL (ref 35.9–50)
ERYTHROCYTE [DISTWIDTH] IN BLOOD BY AUTOMATED COUNT: 67.7 FL (ref 35.9–50)
ERYTHROCYTE [DISTWIDTH] IN BLOOD BY AUTOMATED COUNT: 67.7 FL (ref 35.9–50)
ERYTHROCYTE [DISTWIDTH] IN BLOOD BY AUTOMATED COUNT: 67.9 FL (ref 35.9–50)
ERYTHROCYTE [DISTWIDTH] IN BLOOD BY AUTOMATED COUNT: 69 FL (ref 35.9–50)
ERYTHROCYTE [DISTWIDTH] IN BLOOD BY AUTOMATED COUNT: 69.6 FL (ref 35.9–50)
ERYTHROCYTE [DISTWIDTH] IN BLOOD BY AUTOMATED COUNT: 70.8 FL (ref 35.9–50)
ERYTHROCYTE [DISTWIDTH] IN BLOOD BY AUTOMATED COUNT: 70.8 FL (ref 35.9–50)
ERYTHROCYTE [DISTWIDTH] IN BLOOD BY AUTOMATED COUNT: 70.9 FL (ref 35.9–50)
ERYTHROCYTE [DISTWIDTH] IN BLOOD BY AUTOMATED COUNT: 71.1 FL (ref 35.9–50)
ERYTHROCYTE [DISTWIDTH] IN BLOOD BY AUTOMATED COUNT: 71.5 FL (ref 35.9–50)
ERYTHROCYTE [DISTWIDTH] IN BLOOD BY AUTOMATED COUNT: 71.5 FL (ref 35.9–50)
ERYTHROCYTE [DISTWIDTH] IN BLOOD BY AUTOMATED COUNT: 71.7 FL (ref 35.9–50)
ERYTHROCYTE [DISTWIDTH] IN BLOOD BY AUTOMATED COUNT: 72 FL (ref 35.9–50)
ERYTHROCYTE [DISTWIDTH] IN BLOOD BY AUTOMATED COUNT: 72.2 FL (ref 35.9–50)
ERYTHROCYTE [DISTWIDTH] IN BLOOD BY AUTOMATED COUNT: 72.3 FL (ref 35.9–50)
ERYTHROCYTE [DISTWIDTH] IN BLOOD BY AUTOMATED COUNT: 72.5 FL (ref 35.9–50)
ERYTHROCYTE [DISTWIDTH] IN BLOOD BY AUTOMATED COUNT: 74.8 FL (ref 35.9–50)
ERYTHROCYTE [DISTWIDTH] IN BLOOD BY AUTOMATED COUNT: 74.9 FL (ref 35.9–50)
ERYTHROCYTE [DISTWIDTH] IN BLOOD BY AUTOMATED COUNT: 74.9 FL (ref 35.9–50)
ERYTHROCYTE [DISTWIDTH] IN BLOOD BY AUTOMATED COUNT: 75.4 FL (ref 35.9–50)
ERYTHROCYTE [DISTWIDTH] IN BLOOD BY AUTOMATED COUNT: 75.7 FL (ref 35.9–50)
EST. AVERAGE GLUCOSE BLD GHB EST-MCNC: 143 MG/DL
FERRITIN SERPL-MCNC: 624.8 NG/ML (ref 22–322)
GFR SERPLBLD CREATININE-BSD FMLA CKD-EPI: 35 ML/MIN/1.73
GFR SERPLBLD CREATININE-BSD FMLA CKD-EPI: 40 ML/MIN/1.73
GFR SERPLBLD CREATININE-BSD FMLA CKD-EPI: 46 ML/MIN/1.73
GFR SERPLBLD CREATININE-BSD FMLA CKD-EPI: 53 ML/MIN/1.73
GLOBULIN SER CALC-MCNC: 1.6 G/DL (ref 1.9–3.5)
GLOBULIN SER CALC-MCNC: 2.5 G/DL (ref 1.9–3.5)
GLOBULIN SER CALC-MCNC: 2.6 G/DL (ref 1.9–3.5)
GLOBULIN SER CALC-MCNC: 2.7 G/DL (ref 1.9–3.5)
GLUCOSE BLD-MCNC: 100 MG/DL (ref 65–99)
GLUCOSE BLD-MCNC: 109 MG/DL (ref 65–99)
GLUCOSE BLD-MCNC: 118 MG/DL (ref 65–99)
GLUCOSE BLD-MCNC: 121 MG/DL (ref 65–99)
GLUCOSE BLD-MCNC: 126 MG/DL (ref 65–99)
GLUCOSE BLD-MCNC: 127 MG/DL (ref 65–99)
GLUCOSE BLD-MCNC: 127 MG/DL (ref 65–99)
GLUCOSE BLD-MCNC: 128 MG/DL (ref 65–99)
GLUCOSE BLD-MCNC: 129 MG/DL (ref 65–99)
GLUCOSE BLD-MCNC: 133 MG/DL (ref 65–99)
GLUCOSE BLD-MCNC: 136 MG/DL (ref 65–99)
GLUCOSE BLD-MCNC: 138 MG/DL (ref 65–99)
GLUCOSE BLD-MCNC: 141 MG/DL (ref 65–99)
GLUCOSE BLD-MCNC: 143 MG/DL (ref 65–99)
GLUCOSE BLD-MCNC: 144 MG/DL (ref 65–99)
GLUCOSE BLD-MCNC: 147 MG/DL (ref 65–99)
GLUCOSE BLD-MCNC: 148 MG/DL (ref 65–99)
GLUCOSE BLD-MCNC: 149 MG/DL (ref 65–99)
GLUCOSE BLD-MCNC: 152 MG/DL (ref 65–99)
GLUCOSE BLD-MCNC: 162 MG/DL (ref 65–99)
GLUCOSE BLD-MCNC: 172 MG/DL (ref 65–99)
GLUCOSE BLD-MCNC: 178 MG/DL (ref 65–99)
GLUCOSE BLD-MCNC: 72 MG/DL (ref 65–99)
GLUCOSE BLD-MCNC: 75 MG/DL (ref 65–99)
GLUCOSE BLD-MCNC: 77 MG/DL (ref 65–99)
GLUCOSE BLD-MCNC: 80 MG/DL (ref 65–99)
GLUCOSE BLD-MCNC: 84 MG/DL (ref 65–99)
GLUCOSE BLD-MCNC: 87 MG/DL (ref 65–99)
GLUCOSE BLD-MCNC: 88 MG/DL (ref 65–99)
GLUCOSE BLD-MCNC: 90 MG/DL (ref 65–99)
GLUCOSE BLD-MCNC: 93 MG/DL (ref 65–99)
GLUCOSE BLD-MCNC: 94 MG/DL (ref 65–99)
GLUCOSE BLD-MCNC: 96 MG/DL (ref 65–99)
GLUCOSE SERPL-MCNC: 100 MG/DL (ref 65–99)
GLUCOSE SERPL-MCNC: 103 MG/DL (ref 65–99)
GLUCOSE SERPL-MCNC: 107 MG/DL (ref 65–99)
GLUCOSE SERPL-MCNC: 116 MG/DL (ref 65–99)
GLUCOSE SERPL-MCNC: 117 MG/DL (ref 65–99)
GLUCOSE SERPL-MCNC: 117 MG/DL (ref 65–99)
GLUCOSE SERPL-MCNC: 118 MG/DL (ref 65–99)
GLUCOSE SERPL-MCNC: 118 MG/DL (ref 65–99)
GLUCOSE SERPL-MCNC: 119 MG/DL (ref 65–99)
GLUCOSE SERPL-MCNC: 120 MG/DL (ref 65–99)
GLUCOSE SERPL-MCNC: 120 MG/DL (ref 65–99)
GLUCOSE SERPL-MCNC: 122 MG/DL (ref 65–99)
GLUCOSE SERPL-MCNC: 122 MG/DL (ref 65–99)
GLUCOSE SERPL-MCNC: 124 MG/DL (ref 65–99)
GLUCOSE SERPL-MCNC: 125 MG/DL (ref 65–99)
GLUCOSE SERPL-MCNC: 126 MG/DL (ref 65–99)
GLUCOSE SERPL-MCNC: 127 MG/DL (ref 65–99)
GLUCOSE SERPL-MCNC: 129 MG/DL (ref 65–99)
GLUCOSE SERPL-MCNC: 131 MG/DL (ref 65–99)
GLUCOSE SERPL-MCNC: 132 MG/DL (ref 65–99)
GLUCOSE SERPL-MCNC: 134 MG/DL (ref 65–99)
GLUCOSE SERPL-MCNC: 137 MG/DL (ref 65–99)
GLUCOSE SERPL-MCNC: 137 MG/DL (ref 65–99)
GLUCOSE SERPL-MCNC: 145 MG/DL (ref 65–99)
GLUCOSE SERPL-MCNC: 146 MG/DL (ref 65–99)
GLUCOSE SERPL-MCNC: 148 MG/DL (ref 65–99)
GLUCOSE SERPL-MCNC: 153 MG/DL (ref 65–99)
GLUCOSE SERPL-MCNC: 153 MG/DL (ref 65–99)
GLUCOSE SERPL-MCNC: 156 MG/DL (ref 65–99)
GLUCOSE SERPL-MCNC: 163 MG/DL (ref 65–99)
GLUCOSE SERPL-MCNC: 165 MG/DL (ref 65–99)
GLUCOSE SERPL-MCNC: 169 MG/DL (ref 65–99)
GLUCOSE SERPL-MCNC: 94 MG/DL (ref 65–99)
GLUCOSE SERPL-MCNC: 94 MG/DL (ref 65–99)
GLUCOSE SERPL-MCNC: 98 MG/DL (ref 65–99)
GLUCOSE UR STRIP.AUTO-MCNC: NEGATIVE MG/DL
HAV IGM SERPL QL IA: NEGATIVE
HBA1C MFR BLD: 6.6 % (ref 0–5.6)
HBV CORE IGM SER QL: NEGATIVE
HBV SURFACE AB SERPL IA-ACNC: <3.1 MIU/ML (ref 0–10)
HBV SURFACE AG SER QL: NEGATIVE
HCO3 BLDA-SCNC: 19.3 MMOL/L (ref 17–25)
HCO3 BLDA-SCNC: 20 MMOL/L (ref 17–25)
HCO3 BLDA-SCNC: 21.1 MMOL/L (ref 17–25)
HCO3 BLDA-SCNC: 21.6 MMOL/L (ref 17–25)
HCO3 BLDA-SCNC: 21.7 MMOL/L (ref 17–25)
HCO3 BLDA-SCNC: 21.9 MMOL/L (ref 17–25)
HCO3 BLDA-SCNC: 22 MMOL/L (ref 17–25)
HCO3 BLDA-SCNC: 22 MMOL/L (ref 17–25)
HCO3 BLDA-SCNC: 22.6 MMOL/L (ref 17–25)
HCO3 BLDA-SCNC: 25 MMOL/L (ref 17–25)
HCO3 BLDA-SCNC: 25.2 MMOL/L (ref 17–25)
HCO3 BLDA-SCNC: 26.3 MMOL/L (ref 17–25)
HCO3 BLDA-SCNC: 27.1 MMOL/L (ref 17–25)
HCO3 BLDA-SCNC: 27.6 MMOL/L (ref 17–25)
HCO3 BLDA-SCNC: 29.6 MMOL/L (ref 17–25)
HCO3 BLDV-SCNC: 25.7 MMOL/L (ref 24–28)
HCT VFR BLD AUTO: 20.6 % (ref 42–52)
HCT VFR BLD AUTO: 22.2 % (ref 42–52)
HCT VFR BLD AUTO: 23 % (ref 42–52)
HCT VFR BLD AUTO: 24.8 % (ref 42–52)
HCT VFR BLD AUTO: 24.9 % (ref 42–52)
HCT VFR BLD AUTO: 25 % (ref 42–52)
HCT VFR BLD AUTO: 25.3 % (ref 42–52)
HCT VFR BLD AUTO: 25.5 % (ref 42–52)
HCT VFR BLD AUTO: 25.7 % (ref 42–52)
HCT VFR BLD AUTO: 25.7 % (ref 42–52)
HCT VFR BLD AUTO: 25.9 % (ref 42–52)
HCT VFR BLD AUTO: 26.1 % (ref 42–52)
HCT VFR BLD AUTO: 26.1 % (ref 42–52)
HCT VFR BLD AUTO: 26.6 % (ref 42–52)
HCT VFR BLD AUTO: 26.7 % (ref 42–52)
HCT VFR BLD AUTO: 27.2 % (ref 42–52)
HCT VFR BLD AUTO: 27.6 % (ref 42–52)
HCT VFR BLD AUTO: 27.7 % (ref 42–52)
HCT VFR BLD AUTO: 27.8 % (ref 42–52)
HCT VFR BLD AUTO: 27.8 % (ref 42–52)
HCT VFR BLD AUTO: 28.2 % (ref 42–52)
HCT VFR BLD AUTO: 28.2 % (ref 42–52)
HCT VFR BLD AUTO: 28.4 % (ref 42–52)
HCT VFR BLD AUTO: 28.4 % (ref 42–52)
HCT VFR BLD AUTO: 28.5 % (ref 42–52)
HCT VFR BLD AUTO: 28.7 % (ref 42–52)
HCT VFR BLD AUTO: 29.5 % (ref 42–52)
HCT VFR BLD AUTO: 30.3 % (ref 42–52)
HCT VFR BLD AUTO: 31.1 % (ref 42–52)
HCT VFR BLD AUTO: 31.7 % (ref 42–52)
HCT VFR BLD AUTO: 32.5 % (ref 42–52)
HCT VFR BLD AUTO: 34.5 % (ref 37.5–51)
HCT VFR BLD CALC: 20 % (ref 42–52)
HCT VFR BLD CALC: 21 % (ref 42–52)
HCT VFR BLD CALC: 25 % (ref 42–52)
HCT VFR BLD CALC: 28 % (ref 42–52)
HCT VFR BLD CALC: 30 % (ref 42–52)
HCT VFR BLD CALC: 30 % (ref 42–52)
HCT VFR BLD CALC: 35 % (ref 42–52)
HCV AB SER QL: NEGATIVE
HGB BLD-MCNC: 10.2 G/DL (ref 14–18)
HGB BLD-MCNC: 10.2 G/DL (ref 14–18)
HGB BLD-MCNC: 10.5 G/DL (ref 14–18)
HGB BLD-MCNC: 10.6 G/DL (ref 14–18)
HGB BLD-MCNC: 11.4 G/DL (ref 13–17.7)
HGB BLD-MCNC: 11.9 G/DL (ref 14–18)
HGB BLD-MCNC: 6.8 G/DL (ref 14–18)
HGB BLD-MCNC: 6.9 G/DL (ref 14–18)
HGB BLD-MCNC: 7.1 G/DL (ref 14–18)
HGB BLD-MCNC: 7.1 G/DL (ref 14–18)
HGB BLD-MCNC: 7.6 G/DL (ref 14–18)
HGB BLD-MCNC: 8 G/DL (ref 14–18)
HGB BLD-MCNC: 8.1 G/DL (ref 14–18)
HGB BLD-MCNC: 8.3 G/DL (ref 14–18)
HGB BLD-MCNC: 8.5 G/DL (ref 14–18)
HGB BLD-MCNC: 8.6 G/DL (ref 14–18)
HGB BLD-MCNC: 8.7 G/DL (ref 14–18)
HGB BLD-MCNC: 8.8 G/DL (ref 14–18)
HGB BLD-MCNC: 8.8 G/DL (ref 14–18)
HGB BLD-MCNC: 8.9 G/DL (ref 14–18)
HGB BLD-MCNC: 9 G/DL (ref 14–18)
HGB BLD-MCNC: 9.1 G/DL (ref 14–18)
HGB BLD-MCNC: 9.2 G/DL (ref 14–18)
HGB BLD-MCNC: 9.2 G/DL (ref 14–18)
HGB BLD-MCNC: 9.4 G/DL (ref 14–18)
HGB BLD-MCNC: 9.4 G/DL (ref 14–18)
HGB BLD-MCNC: 9.5 G/DL (ref 14–18)
HGB BLD-MCNC: 9.9 G/DL (ref 14–18)
HOROWITZ INDEX BLDA+IHG-RTO: 152 MM[HG]
HOROWITZ INDEX BLDA+IHG-RTO: 154 MM[HG]
HOROWITZ INDEX BLDA+IHG-RTO: 154 MM[HG]
HOROWITZ INDEX BLDA+IHG-RTO: 172 MM[HG]
HOROWITZ INDEX BLDA+IHG-RTO: 213 MM[HG]
HOROWITZ INDEX BLDA+IHG-RTO: 218 MM[HG]
HOROWITZ INDEX BLDA+IHG-RTO: 218 MM[HG]
HOROWITZ INDEX BLDA+IHG-RTO: 263 MM[HG]
HOROWITZ INDEX BLDA+IHG-RTO: 273 MM[HG]
HOROWITZ INDEX BLDA+IHG-RTO: 293 MM[HG]
HOROWITZ INDEX BLDA+IHG-RTO: 372 MM[HG]
HOROWITZ INDEX BLDA+IHG-RTO: 436 MM[HG]
HOROWITZ INDEX BLDA+IHG-RTO: 464 MM[HG]
HOROWITZ INDEX BLDA+IHG-RTO: 60 MM[HG]
HOROWITZ INDEX BLDV+IHG-RTO: 58 MM[HG]
IF AFRICAN AMERICAN  100797: 41 ML/MIN/1.73
IF NON AFRICAN AMER 100791: 35 ML/MIN/1.73
IMM GRANULOCYTES # BLD AUTO: 0.01 K/UL (ref 0–0.11)
IMM GRANULOCYTES # BLD AUTO: 0.02 K/UL (ref 0–0.11)
IMM GRANULOCYTES # BLD AUTO: 0.03 K/UL (ref 0–0.11)
IMM GRANULOCYTES # BLD AUTO: 0.04 K/UL (ref 0–0.11)
IMM GRANULOCYTES # BLD AUTO: 0.17 K/UL (ref 0–0.11)
IMM GRANULOCYTES # BLD: 0 X10E3/UL (ref 0–0.1)
IMM GRANULOCYTES NFR BLD AUTO: 0.2 % (ref 0–0.9)
IMM GRANULOCYTES NFR BLD AUTO: 0.3 % (ref 0–0.9)
IMM GRANULOCYTES NFR BLD AUTO: 0.6 % (ref 0–0.9)
IMM GRANULOCYTES NFR BLD AUTO: 1 % (ref 0–0.9)
IMM GRANULOCYTES NFR BLD AUTO: 2.2 % (ref 0–0.9)
IMM GRANULOCYTES NFR BLD: 0 %
IMMATURE CELLS  115398: ABNORMAL
INR PPP: 1.22 (ref 0.87–1.13)
INR PPP: 1.29 (ref 0.87–1.13)
INR PPP: 1.31 (ref 0.87–1.13)
INR PPP: 1.34 (ref 0.87–1.13)
INR PPP: 1.34 (ref 0.87–1.13)
INR PPP: 1.38 (ref 0.87–1.13)
INR PPP: 1.45 (ref 0.87–1.13)
INR PPP: 1.48 (ref 0.87–1.13)
INR PPP: 1.55 (ref 0.87–1.13)
INR PPP: 1.71 (ref 0.87–1.13)
INR PPP: 2.03 (ref 0.87–1.13)
INR PPP: 2.16 (ref 0.87–1.13)
INR PPP: 2.2 (ref 0.87–1.13)
INR PPP: 2.26 (ref 0.87–1.13)
INR PPP: 2.38 (ref 0.87–1.13)
INR PPP: 2.62 (ref 0.87–1.13)
INR PPP: 2.66 (ref 0.87–1.13)
INR PPP: 2.75 (ref 0.87–1.13)
INR PPP: 2.84 (ref 0.87–1.13)
INR PPP: 2.94 (ref 0.87–1.13)
INR PPP: 2.98 (ref 0.87–1.13)
INR PPP: 2.99 (ref 0.87–1.13)
INR PPP: 3.06 (ref 0.87–1.13)
INR PPP: 3.08 (ref 0.87–1.13)
INR PPP: 3.1 (ref 0.87–1.13)
INR PPP: 3.28 (ref 0.87–1.13)
INR PPP: 3.42 (ref 0.87–1.13)
INR PPP: 3.46 (ref 0.87–1.13)
INR PPP: 3.57 (ref 0.87–1.13)
INR PPP: 3.69 (ref 0.87–1.13)
INST. QUALIFIED PATIENT IIQPT: YES
IRON SATN MFR SERPL: 17 % (ref 15–55)
IRON SERPL-MCNC: 38 UG/DL (ref 50–180)
KETONES UR STRIP.AUTO-MCNC: NEGATIVE MG/DL
LACTATE BLD-SCNC: 3.4 MMOL/L (ref 0.5–2)
LEUKOCYTE ESTERASE UR QL STRIP.AUTO: NEGATIVE
LG PLATELETS BLD QL SMEAR: NORMAL
LV EJECT FRACT  99904: 45
LV EJECT FRACT  99904: 55
LV EJECT FRACT  99904: 60
LV EJECT FRACT  99904: 60
LV EJECT FRACT MOD 2C 99903: 62.61
LV EJECT FRACT MOD 2C 99903: 62.68
LV EJECT FRACT MOD 2C 99903: 63.74
LV EJECT FRACT MOD 4C 99902: 41.08
LV EJECT FRACT MOD 4C 99902: 63.57
LV EJECT FRACT MOD 4C 99902: 65.47
LV EJECT FRACT MOD BP 99901: 52.06
LV EJECT FRACT MOD BP 99901: 62.8
LV EJECT FRACT MOD BP 99901: 64.52
LYMPHOCYTES # BLD AUTO: 0.24 K/UL (ref 1–4.8)
LYMPHOCYTES # BLD AUTO: 0.44 K/UL (ref 1–4.8)
LYMPHOCYTES # BLD AUTO: 0.45 K/UL (ref 1–4.8)
LYMPHOCYTES # BLD AUTO: 0.53 K/UL (ref 1–4.8)
LYMPHOCYTES # BLD AUTO: 0.54 K/UL (ref 1–4.8)
LYMPHOCYTES # BLD AUTO: 0.55 K/UL (ref 1–4.8)
LYMPHOCYTES # BLD AUTO: 0.58 K/UL (ref 1–4.8)
LYMPHOCYTES # BLD AUTO: 0.7 X10E3/UL (ref 0.7–3.1)
LYMPHOCYTES # BLD AUTO: 1.6 K/UL (ref 1–4.8)
LYMPHOCYTES NFR BLD AUTO: 11 %
LYMPHOCYTES NFR BLD: 10.4 % (ref 22–41)
LYMPHOCYTES NFR BLD: 20.9 % (ref 22–41)
LYMPHOCYTES NFR BLD: 5.2 % (ref 22–41)
LYMPHOCYTES NFR BLD: 6.4 % (ref 22–41)
LYMPHOCYTES NFR BLD: 7.9 % (ref 22–41)
LYMPHOCYTES NFR BLD: 8.1 % (ref 22–41)
LYMPHOCYTES NFR BLD: 8.6 % (ref 22–41)
LYMPHOCYTES NFR BLD: 9.1 % (ref 22–41)
MACROCYTES BLD QL SMEAR: ABNORMAL
MACROCYTES BLD QL SMEAR: ABNORMAL
MAGNESIUM SERPL-MCNC: 1.9 MG/DL (ref 1.5–2.5)
MAGNESIUM SERPL-MCNC: 2 MG/DL (ref 1.5–2.5)
MAGNESIUM SERPL-MCNC: 2.1 MG/DL (ref 1.5–2.5)
MAGNESIUM SERPL-MCNC: 2.2 MG/DL (ref 1.5–2.5)
MAGNESIUM SERPL-MCNC: 2.8 MG/DL (ref 1.5–2.5)
MANUAL DIFF BLD: NORMAL
MCH RBC QN AUTO: 31.5 PG (ref 27–33)
MCH RBC QN AUTO: 31.7 PG (ref 27–33)
MCH RBC QN AUTO: 31.7 PG (ref 27–33)
MCH RBC QN AUTO: 31.9 PG (ref 27–33)
MCH RBC QN AUTO: 31.9 PG (ref 27–33)
MCH RBC QN AUTO: 32 PG (ref 27–33)
MCH RBC QN AUTO: 32.1 PG (ref 27–33)
MCH RBC QN AUTO: 32.2 PG (ref 27–33)
MCH RBC QN AUTO: 32.4 PG (ref 27–33)
MCH RBC QN AUTO: 32.4 PG (ref 27–33)
MCH RBC QN AUTO: 32.5 PG (ref 27–33)
MCH RBC QN AUTO: 32.6 PG (ref 27–33)
MCH RBC QN AUTO: 32.6 PG (ref 27–33)
MCH RBC QN AUTO: 32.7 PG (ref 27–33)
MCH RBC QN AUTO: 32.8 PG (ref 27–33)
MCH RBC QN AUTO: 32.8 PG (ref 27–33)
MCH RBC QN AUTO: 32.9 PG (ref 27–33)
MCH RBC QN AUTO: 33 PG (ref 27–33)
MCH RBC QN AUTO: 33 PG (ref 27–33)
MCH RBC QN AUTO: 33.2 PG (ref 27–33)
MCH RBC QN AUTO: 33.5 PG (ref 27–33)
MCH RBC QN AUTO: 33.5 PG (ref 27–33)
MCH RBC QN AUTO: 33.6 PG (ref 26.6–33)
MCH RBC QN AUTO: 33.9 PG (ref 27–33)
MCH RBC QN AUTO: 34.3 PG (ref 27–33)
MCH RBC QN AUTO: 34.8 PG (ref 27–33)
MCH RBC QN AUTO: 35.1 PG (ref 27–33)
MCH RBC QN AUTO: 35.4 PG (ref 27–33)
MCHC RBC AUTO-ENTMCNC: 30.6 G/DL (ref 33.7–35.3)
MCHC RBC AUTO-ENTMCNC: 30.8 G/DL (ref 33.7–35.3)
MCHC RBC AUTO-ENTMCNC: 31.2 G/DL (ref 33.7–35.3)
MCHC RBC AUTO-ENTMCNC: 31.2 G/DL (ref 33.7–35.3)
MCHC RBC AUTO-ENTMCNC: 31.3 G/DL (ref 33.7–35.3)
MCHC RBC AUTO-ENTMCNC: 31.4 G/DL (ref 33.7–35.3)
MCHC RBC AUTO-ENTMCNC: 31.4 G/DL (ref 33.7–35.3)
MCHC RBC AUTO-ENTMCNC: 31.5 G/DL (ref 33.7–35.3)
MCHC RBC AUTO-ENTMCNC: 32 G/DL (ref 33.7–35.3)
MCHC RBC AUTO-ENTMCNC: 32.1 G/DL (ref 33.7–35.3)
MCHC RBC AUTO-ENTMCNC: 32.1 G/DL (ref 33.7–35.3)
MCHC RBC AUTO-ENTMCNC: 32.3 G/DL (ref 33.7–35.3)
MCHC RBC AUTO-ENTMCNC: 32.5 G/DL (ref 33.7–35.3)
MCHC RBC AUTO-ENTMCNC: 32.5 G/DL (ref 33.7–35.3)
MCHC RBC AUTO-ENTMCNC: 32.6 G/DL (ref 33.7–35.3)
MCHC RBC AUTO-ENTMCNC: 33 G/DL (ref 31.5–35.7)
MCHC RBC AUTO-ENTMCNC: 33 G/DL (ref 33.7–35.3)
MCHC RBC AUTO-ENTMCNC: 33.1 G/DL (ref 33.7–35.3)
MCHC RBC AUTO-ENTMCNC: 33.1 G/DL (ref 33.7–35.3)
MCHC RBC AUTO-ENTMCNC: 33.3 G/DL (ref 33.7–35.3)
MCHC RBC AUTO-ENTMCNC: 33.5 G/DL (ref 33.7–35.3)
MCHC RBC AUTO-ENTMCNC: 33.6 G/DL (ref 33.7–35.3)
MCHC RBC AUTO-ENTMCNC: 33.7 G/DL (ref 33.7–35.3)
MCHC RBC AUTO-ENTMCNC: 33.7 G/DL (ref 33.7–35.3)
MCHC RBC AUTO-ENTMCNC: 33.8 G/DL (ref 33.7–35.3)
MCHC RBC AUTO-ENTMCNC: 33.9 G/DL (ref 33.7–35.3)
MCHC RBC AUTO-ENTMCNC: 34.1 G/DL (ref 33.7–35.3)
MCHC RBC AUTO-ENTMCNC: 34.5 G/DL (ref 33.7–35.3)
MCV RBC AUTO: 100 FL (ref 81.4–97.8)
MCV RBC AUTO: 100.3 FL (ref 81.4–97.8)
MCV RBC AUTO: 100.3 FL (ref 81.4–97.8)
MCV RBC AUTO: 100.4 FL (ref 81.4–97.8)
MCV RBC AUTO: 100.7 FL (ref 81.4–97.8)
MCV RBC AUTO: 101.2 FL (ref 81.4–97.8)
MCV RBC AUTO: 101.5 FL (ref 81.4–97.8)
MCV RBC AUTO: 101.6 FL (ref 81.4–97.8)
MCV RBC AUTO: 101.8 FL (ref 81.4–97.8)
MCV RBC AUTO: 102 FL (ref 79–97)
MCV RBC AUTO: 102 FL (ref 81.4–97.8)
MCV RBC AUTO: 102.5 FL (ref 81.4–97.8)
MCV RBC AUTO: 102.7 FL (ref 81.4–97.8)
MCV RBC AUTO: 102.9 FL (ref 81.4–97.8)
MCV RBC AUTO: 103.3 FL (ref 81.4–97.8)
MCV RBC AUTO: 103.3 FL (ref 81.4–97.8)
MCV RBC AUTO: 103.5 FL (ref 81.4–97.8)
MCV RBC AUTO: 103.8 FL (ref 81.4–97.8)
MCV RBC AUTO: 104 FL (ref 81.4–97.8)
MCV RBC AUTO: 104.4 FL (ref 81.4–97.8)
MCV RBC AUTO: 105.1 FL (ref 81.4–97.8)
MCV RBC AUTO: 106.6 FL (ref 81.4–97.8)
MCV RBC AUTO: 95.9 FL (ref 81.4–97.8)
MCV RBC AUTO: 96.9 FL (ref 81.4–97.8)
MCV RBC AUTO: 96.9 FL (ref 81.4–97.8)
MCV RBC AUTO: 97 FL (ref 81.4–97.8)
MCV RBC AUTO: 97 FL (ref 81.4–97.8)
MCV RBC AUTO: 97.3 FL (ref 81.4–97.8)
MCV RBC AUTO: 97.4 FL (ref 81.4–97.8)
MCV RBC AUTO: 97.5 FL (ref 81.4–97.8)
MCV RBC AUTO: 98.2 FL (ref 81.4–97.8)
MCV RBC AUTO: 98.5 FL (ref 81.4–97.8)
MCV RBC AUTO: 98.9 FL (ref 81.4–97.8)
METAMYELOCYTES NFR BLD MANUAL: 0.9 %
MICRO URNS: NORMAL
MICROCYTES BLD QL SMEAR: ABNORMAL
MICROCYTES BLD QL SMEAR: ABNORMAL
MONOCYTES # BLD AUTO: 0.33 K/UL (ref 0–0.85)
MONOCYTES # BLD AUTO: 0.36 K/UL (ref 0–0.85)
MONOCYTES # BLD AUTO: 0.66 K/UL (ref 0–0.85)
MONOCYTES # BLD AUTO: 0.8 X10E3/UL (ref 0.1–0.9)
MONOCYTES # BLD AUTO: 0.86 K/UL (ref 0–0.85)
MONOCYTES # BLD AUTO: 0.87 K/UL (ref 0–0.85)
MONOCYTES # BLD AUTO: 1.02 K/UL (ref 0–0.85)
MONOCYTES # BLD AUTO: 1.09 K/UL (ref 0–0.85)
MONOCYTES # BLD AUTO: 1.33 K/UL (ref 0–0.85)
MONOCYTES NFR BLD AUTO: 12.1 % (ref 0–13.4)
MONOCYTES NFR BLD AUTO: 12.3 % (ref 0–13.4)
MONOCYTES NFR BLD AUTO: 13 %
MONOCYTES NFR BLD AUTO: 13.7 % (ref 0–13.4)
MONOCYTES NFR BLD AUTO: 15.2 % (ref 0–13.4)
MONOCYTES NFR BLD AUTO: 17.2 % (ref 0–13.4)
MONOCYTES NFR BLD AUTO: 17.3 % (ref 0–13.4)
MONOCYTES NFR BLD AUTO: 6.2 % (ref 0–13.4)
MONOCYTES NFR BLD AUTO: 7.8 % (ref 0–13.4)
MORPHOLOGY BLD-IMP: ABNORMAL
MORPHOLOGY BLD-IMP: NORMAL
NEUTROPHILS # BLD AUTO: 2.35 K/UL (ref 1.82–7.42)
NEUTROPHILS # BLD AUTO: 4.33 K/UL (ref 1.82–7.42)
NEUTROPHILS # BLD AUTO: 4.38 K/UL (ref 1.82–7.42)
NEUTROPHILS # BLD AUTO: 4.5 K/UL (ref 1.82–7.42)
NEUTROPHILS # BLD AUTO: 4.6 X10E3/UL (ref 1.4–7)
NEUTROPHILS # BLD AUTO: 4.69 K/UL (ref 1.82–7.42)
NEUTROPHILS # BLD AUTO: 4.97 K/UL (ref 1.82–7.42)
NEUTROPHILS # BLD AUTO: 5.54 K/UL (ref 1.82–7.42)
NEUTROPHILS # BLD AUTO: 7.16 K/UL (ref 1.82–7.42)
NEUTROPHILS NFR BLD AUTO: 74 %
NEUTROPHILS NFR BLD: 57.1 % (ref 44–72)
NEUTROPHILS NFR BLD: 71.1 % (ref 44–72)
NEUTROPHILS NFR BLD: 74.4 % (ref 44–72)
NEUTROPHILS NFR BLD: 74.7 % (ref 44–72)
NEUTROPHILS NFR BLD: 78.4 % (ref 44–72)
NEUTROPHILS NFR BLD: 78.8 % (ref 44–72)
NEUTROPHILS NFR BLD: 81.9 % (ref 44–72)
NEUTROPHILS NFR BLD: 84.3 % (ref 44–72)
NEUTS BAND NFR BLD MANUAL: 0.9 % (ref 0–10)
NEUTS HYPERSEG BLD QL SMEAR: NORMAL
NITRITE UR QL STRIP.AUTO: NEGATIVE
NRBC # BLD AUTO: 0 K/UL
NRBC BLD AUTO-RTO: ABNORMAL %
NRBC BLD-RTO: 0 /100 WBC
O2/TOTAL GAS SETTING VFR VENT: 100 %
O2/TOTAL GAS SETTING VFR VENT: 40 %
O2/TOTAL GAS SETTING VFR VENT: 50 %
O2/TOTAL GAS SETTING VFR VENT: 50 %
O2/TOTAL GAS SETTING VFR VENT: 70 %
O2/TOTAL GAS SETTING VFR VENT: 75 %
O2/TOTAL GAS SETTING VFR VENT: 75 %
O2/TOTAL GAS SETTING VFR VENT: 80 %
O2/TOTAL GAS SETTING VFR VENT: 85 %
OVALOCYTES BLD QL SMEAR: NORMAL
OVALOCYTES BLD QL SMEAR: NORMAL
PATHOLOGY CONSULT NOTE: NORMAL
PCO2 BLDA: 24.9 MMHG (ref 26–37)
PCO2 BLDA: 25.7 MMHG (ref 26–37)
PCO2 BLDA: 27.5 MMHG (ref 26–37)
PCO2 BLDA: 28.7 MMHG (ref 26–37)
PCO2 BLDA: 29.7 MMHG (ref 26–37)
PCO2 BLDA: 31.9 MMHG (ref 26–37)
PCO2 BLDA: 33.2 MMHG (ref 26–37)
PCO2 BLDA: 33.8 MMHG (ref 26–37)
PCO2 BLDA: 35.6 MMHG (ref 26–37)
PCO2 BLDA: 37.5 MMHG (ref 26–37)
PCO2 BLDA: 37.7 MMHG (ref 26–37)
PCO2 BLDA: 39.8 MMHG (ref 26–37)
PCO2 BLDA: 40.1 MMHG (ref 26–37)
PCO2 BLDA: 47.2 MMHG (ref 26–37)
PCO2 BLDA: 52.3 MMHG (ref 26–37)
PCO2 BLDV: 41 MMHG (ref 41–51)
PCO2 TEMP ADJ BLDA: 23.7 MMHG (ref 26–37)
PCO2 TEMP ADJ BLDA: 25.3 MMHG (ref 26–37)
PCO2 TEMP ADJ BLDA: 26 MMHG (ref 26–37)
PCO2 TEMP ADJ BLDA: 27.5 MMHG (ref 26–37)
PCO2 TEMP ADJ BLDA: 28.7 MMHG (ref 26–37)
PCO2 TEMP ADJ BLDA: 30.1 MMHG (ref 26–37)
PCO2 TEMP ADJ BLDA: 32.2 MMHG (ref 26–37)
PCO2 TEMP ADJ BLDA: 32.5 MMHG (ref 26–37)
PCO2 TEMP ADJ BLDA: 34.5 MMHG (ref 26–37)
PCO2 TEMP ADJ BLDA: 37.5 MMHG (ref 26–37)
PCO2 TEMP ADJ BLDA: 37.7 MMHG (ref 26–37)
PCO2 TEMP ADJ BLDA: 39.8 MMHG (ref 26–37)
PCO2 TEMP ADJ BLDA: 40.1 MMHG (ref 26–37)
PCO2 TEMP ADJ BLDA: 46.7 MMHG (ref 26–37)
PCO2 TEMP ADJ BLDA: 52.3 MMHG (ref 26–37)
PCO2 TEMP ADJ BLDV: 41 MMHG (ref 41–51)
PH BLDA: 7.32 [PH] (ref 7.4–7.5)
PH BLDA: 7.36 [PH] (ref 7.4–7.5)
PH BLDA: 7.4 [PH] (ref 7.4–7.5)
PH BLDA: 7.41 [PH] (ref 7.4–7.5)
PH BLDA: 7.41 [PH] (ref 7.4–7.5)
PH BLDA: 7.43 [PH] (ref 7.4–7.5)
PH BLDA: 7.44 [PH] (ref 7.4–7.5)
PH BLDA: 7.44 [PH] (ref 7.4–7.5)
PH BLDA: 7.45 [PH] (ref 7.4–7.5)
PH BLDA: 7.45 [PH] (ref 7.4–7.5)
PH BLDA: 7.48 [PH] (ref 7.4–7.5)
PH BLDA: 7.49 [PH] (ref 7.4–7.5)
PH BLDA: 7.51 [PH] (ref 7.4–7.5)
PH BLDA: 7.51 [PH] (ref 7.4–7.5)
PH BLDA: 7.52 [PH] (ref 7.4–7.5)
PH BLDV: 7.41 [PH] (ref 7.31–7.45)
PH TEMP ADJ BLDA: 7.32 [PH] (ref 7.4–7.5)
PH TEMP ADJ BLDA: 7.38 [PH] (ref 7.4–7.5)
PH TEMP ADJ BLDA: 7.41 [PH] (ref 7.4–7.5)
PH TEMP ADJ BLDA: 7.43 [PH] (ref 7.4–7.5)
PH TEMP ADJ BLDA: 7.45 [PH] (ref 7.4–7.5)
PH TEMP ADJ BLDA: 7.46 [PH] (ref 7.4–7.5)
PH TEMP ADJ BLDA: 7.49 [PH] (ref 7.4–7.5)
PH TEMP ADJ BLDA: 7.5 [PH] (ref 7.4–7.5)
PH TEMP ADJ BLDA: 7.53 [PH] (ref 7.4–7.5)
PH TEMP ADJ BLDV: 7.41 [PH] (ref 7.31–7.45)
PH UR STRIP.AUTO: 5.5 [PH]
PHOSPHATE SERPL-MCNC: 2.5 MG/DL (ref 2.5–4.5)
PHOSPHATE SERPL-MCNC: 3.7 MG/DL (ref 2.5–4.5)
PHOSPHATE SERPL-MCNC: 3.8 MG/DL (ref 2.5–4.5)
PHOSPHATE SERPL-MCNC: 4.6 MG/DL (ref 2.5–4.5)
PLATELET # BLD AUTO: 104 K/UL (ref 164–446)
PLATELET # BLD AUTO: 125 K/UL (ref 164–446)
PLATELET # BLD AUTO: 135 K/UL (ref 164–446)
PLATELET # BLD AUTO: 139 K/UL (ref 164–446)
PLATELET # BLD AUTO: 143 K/UL (ref 164–446)
PLATELET # BLD AUTO: 145 K/UL (ref 164–446)
PLATELET # BLD AUTO: 146 K/UL (ref 164–446)
PLATELET # BLD AUTO: 147 K/UL (ref 164–446)
PLATELET # BLD AUTO: 149 K/UL (ref 164–446)
PLATELET # BLD AUTO: 150 K/UL (ref 164–446)
PLATELET # BLD AUTO: 159 K/UL (ref 164–446)
PLATELET # BLD AUTO: 161 K/UL (ref 164–446)
PLATELET # BLD AUTO: 166 X10E3/UL (ref 150–379)
PLATELET # BLD AUTO: 178 K/UL (ref 164–446)
PLATELET # BLD AUTO: 183 K/UL (ref 164–446)
PLATELET # BLD AUTO: 184 K/UL (ref 164–446)
PLATELET # BLD AUTO: 187 K/UL (ref 164–446)
PLATELET # BLD AUTO: 188 K/UL (ref 164–446)
PLATELET # BLD AUTO: 193 K/UL (ref 164–446)
PLATELET # BLD AUTO: 196 K/UL (ref 164–446)
PLATELET # BLD AUTO: 197 K/UL (ref 164–446)
PLATELET # BLD AUTO: 200 K/UL (ref 164–446)
PLATELET # BLD AUTO: 204 K/UL (ref 164–446)
PLATELET # BLD AUTO: 205 K/UL (ref 164–446)
PLATELET # BLD AUTO: 205 K/UL (ref 164–446)
PLATELET # BLD AUTO: 52 K/UL (ref 164–446)
PLATELET # BLD AUTO: 76 K/UL (ref 164–446)
PLATELET # BLD AUTO: 79 K/UL (ref 164–446)
PLATELET # BLD AUTO: 79 K/UL (ref 164–446)
PLATELET # BLD AUTO: 81 K/UL (ref 164–446)
PLATELET # BLD AUTO: 82 K/UL (ref 164–446)
PLATELET # BLD AUTO: 99 K/UL (ref 164–446)
PLATELET BLD QL SMEAR: NORMAL
PLATELET BLD QL SMEAR: NORMAL
PMV BLD AUTO: 10 FL (ref 9–12.9)
PMV BLD AUTO: 10.1 FL (ref 9–12.9)
PMV BLD AUTO: 10.1 FL (ref 9–12.9)
PMV BLD AUTO: 10.2 FL (ref 9–12.9)
PMV BLD AUTO: 10.4 FL (ref 9–12.9)
PMV BLD AUTO: 10.5 FL (ref 9–12.9)
PMV BLD AUTO: 10.6 FL (ref 9–12.9)
PMV BLD AUTO: 10.7 FL (ref 9–12.9)
PMV BLD AUTO: 10.8 FL (ref 9–12.9)
PMV BLD AUTO: 10.9 FL (ref 9–12.9)
PMV BLD AUTO: 10.9 FL (ref 9–12.9)
PMV BLD AUTO: 11 FL (ref 9–12.9)
PMV BLD AUTO: 11.1 FL (ref 9–12.9)
PMV BLD AUTO: 11.2 FL (ref 9–12.9)
PMV BLD AUTO: 11.2 FL (ref 9–12.9)
PMV BLD AUTO: 11.9 FL (ref 9–12.9)
PMV BLD AUTO: 12.1 FL (ref 9–12.9)
PMV BLD AUTO: 9.8 FL (ref 9–12.9)
PMV BLD AUTO: 9.8 FL (ref 9–12.9)
PO2 BLDA: 105 MMHG (ref 64–87)
PO2 BLDA: 108 MMHG (ref 64–87)
PO2 BLDA: 154 MMHG (ref 64–87)
PO2 BLDA: 273 MMHG (ref 64–87)
PO2 BLDA: 293 MMHG (ref 64–87)
PO2 BLDA: 316 MMHG (ref 64–87)
PO2 BLDA: 327 MMHG (ref 64–87)
PO2 BLDA: 348 MMHG (ref 64–87)
PO2 BLDA: 392 MMHG (ref 64–87)
PO2 BLDA: 60 MMHG (ref 64–87)
PO2 BLDA: 76 MMHG (ref 64–87)
PO2 BLDA: 85 MMHG (ref 64–87)
PO2 BLDA: 86 MMHG (ref 64–87)
PO2 BLDA: 87 MMHG (ref 64–87)
PO2 BLDA: 87 MMHG (ref 64–87)
PO2 BLDV: 46 MMHG (ref 25–40)
PO2 TEMP ADJ BLDA: 101 MMHG (ref 64–87)
PO2 TEMP ADJ BLDA: 149 MMHG (ref 64–87)
PO2 TEMP ADJ BLDA: 273 MMHG (ref 64–87)
PO2 TEMP ADJ BLDA: 290 MMHG (ref 64–87)
PO2 TEMP ADJ BLDA: 316 MMHG (ref 64–87)
PO2 TEMP ADJ BLDA: 327 MMHG (ref 64–87)
PO2 TEMP ADJ BLDA: 348 MMHG (ref 64–87)
PO2 TEMP ADJ BLDA: 392 MMHG (ref 64–87)
PO2 TEMP ADJ BLDA: 59 MMHG (ref 64–87)
PO2 TEMP ADJ BLDA: 70 MMHG (ref 64–87)
PO2 TEMP ADJ BLDA: 80 MMHG (ref 64–87)
PO2 TEMP ADJ BLDA: 82 MMHG (ref 64–87)
PO2 TEMP ADJ BLDA: 83 MMHG (ref 64–87)
PO2 TEMP ADJ BLDA: 83 MMHG (ref 64–87)
PO2 TEMP ADJ BLDA: 99 MMHG (ref 64–87)
PO2 TEMP ADJ BLDV: 46 MMHG (ref 25–40)
POIKILOCYTOSIS BLD QL SMEAR: NORMAL
POTASSIUM BLD-SCNC: 3.5 MMOL/L (ref 3.6–5.5)
POTASSIUM BLD-SCNC: 3.5 MMOL/L (ref 3.6–5.5)
POTASSIUM BLD-SCNC: 3.6 MMOL/L (ref 3.6–5.5)
POTASSIUM BLD-SCNC: 3.8 MMOL/L (ref 3.6–5.5)
POTASSIUM BLD-SCNC: 3.8 MMOL/L (ref 3.6–5.5)
POTASSIUM BLD-SCNC: 3.9 MMOL/L (ref 3.6–5.5)
POTASSIUM BLD-SCNC: 3.9 MMOL/L (ref 3.6–5.5)
POTASSIUM BLD-SCNC: 4.1 MMOL/L (ref 3.6–5.5)
POTASSIUM BLD-SCNC: 4.6 MMOL/L (ref 3.6–5.5)
POTASSIUM SERPL-SCNC: 3.3 MMOL/L (ref 3.6–5.5)
POTASSIUM SERPL-SCNC: 3.5 MMOL/L (ref 3.6–5.5)
POTASSIUM SERPL-SCNC: 3.6 MMOL/L (ref 3.6–5.5)
POTASSIUM SERPL-SCNC: 3.7 MMOL/L (ref 3.6–5.5)
POTASSIUM SERPL-SCNC: 3.8 MMOL/L (ref 3.6–5.5)
POTASSIUM SERPL-SCNC: 3.9 MMOL/L (ref 3.6–5.5)
POTASSIUM SERPL-SCNC: 4 MMOL/L (ref 3.6–5.5)
POTASSIUM SERPL-SCNC: 4.1 MMOL/L (ref 3.5–5.2)
POTASSIUM SERPL-SCNC: 4.1 MMOL/L (ref 3.5–5.2)
POTASSIUM SERPL-SCNC: 4.1 MMOL/L (ref 3.6–5.5)
POTASSIUM SERPL-SCNC: 4.2 MMOL/L (ref 3.6–5.5)
POTASSIUM SERPL-SCNC: 4.3 MMOL/L (ref 3.6–5.5)
POTASSIUM SERPL-SCNC: 4.5 MMOL/L (ref 3.6–5.5)
POTASSIUM SERPL-SCNC: 4.6 MMOL/L (ref 3.6–5.5)
POTASSIUM SERPL-SCNC: 4.6 MMOL/L (ref 3.6–5.5)
POTASSIUM SERPL-SCNC: 4.9 MMOL/L (ref 3.5–5.2)
POTASSIUM SERPL-SCNC: 5 MMOL/L (ref 3.6–5.5)
POTASSIUM SERPL-SCNC: 5.2 MMOL/L (ref 3.6–5.5)
PRODUCT TYPE UPROD: NORMAL
PROT 24H UR-MCNC: 136 MG/24 HR (ref 30–150)
PROT 24H UR-MRATE: 13.6 MG/DL (ref 0–15)
PROT SERPL-MCNC: 4.7 G/DL (ref 6–8.2)
PROT SERPL-MCNC: 5.9 G/DL (ref 6–8.2)
PROT SERPL-MCNC: 6.4 G/DL (ref 6–8.2)
PROT SERPL-MCNC: 6.8 G/DL (ref 6–8.2)
PROT UR QL STRIP: NEGATIVE MG/DL
PROTHROMBIN TIME: 15.6 SEC (ref 12–14.6)
PROTHROMBIN TIME: 15.8 SEC (ref 12–14.6)
PROTHROMBIN TIME: 16.4 SEC (ref 12–14.6)
PROTHROMBIN TIME: 16.7 SEC (ref 12–14.6)
PROTHROMBIN TIME: 16.7 SEC (ref 12–14.6)
PROTHROMBIN TIME: 17.1 SEC (ref 12–14.6)
PROTHROMBIN TIME: 17.8 SEC (ref 12–14.6)
PROTHROMBIN TIME: 18 SEC (ref 12–14.6)
PROTHROMBIN TIME: 18.6 SEC (ref 12–14.6)
PROTHROMBIN TIME: 20.1 SEC (ref 12–14.6)
PROTHROMBIN TIME: 23 SEC (ref 12–14.6)
PROTHROMBIN TIME: 24.2 SEC (ref 12–14.6)
PROTHROMBIN TIME: 24.5 SEC (ref 12–14.6)
PROTHROMBIN TIME: 25 SEC (ref 12–14.6)
PROTHROMBIN TIME: 26 SEC (ref 12–14.6)
PROTHROMBIN TIME: 28.1 SEC (ref 12–14.6)
PROTHROMBIN TIME: 28.4 SEC (ref 12–14.6)
PROTHROMBIN TIME: 29.2 SEC (ref 12–14.6)
PROTHROMBIN TIME: 29.9 SEC (ref 12–14.6)
PROTHROMBIN TIME: 30.7 SEC (ref 12–14.6)
PROTHROMBIN TIME: 31 SEC (ref 12–14.6)
PROTHROMBIN TIME: 31.1 SEC (ref 12–14.6)
PROTHROMBIN TIME: 31.7 SEC (ref 12–14.6)
PROTHROMBIN TIME: 31.8 SEC (ref 12–14.6)
PROTHROMBIN TIME: 32 SEC (ref 12–14.6)
PROTHROMBIN TIME: 33.4 SEC (ref 12–14.6)
PROTHROMBIN TIME: 34.5 SEC (ref 12–14.6)
PROTHROMBIN TIME: 34.8 SEC (ref 12–14.6)
PROTHROMBIN TIME: 35.7 SEC (ref 12–14.6)
PROTHROMBIN TIME: 36.6 SEC (ref 12–14.6)
RBC # BLD AUTO: 2.01 M/UL (ref 4.7–6.1)
RBC # BLD AUTO: 2.15 M/UL (ref 4.7–6.1)
RBC # BLD AUTO: 2.24 M/UL (ref 4.7–6.1)
RBC # BLD AUTO: 2.37 M/UL (ref 4.7–6.1)
RBC # BLD AUTO: 2.44 M/UL (ref 4.7–6.1)
RBC # BLD AUTO: 2.46 M/UL (ref 4.7–6.1)
RBC # BLD AUTO: 2.54 M/UL (ref 4.7–6.1)
RBC # BLD AUTO: 2.62 M/UL (ref 4.7–6.1)
RBC # BLD AUTO: 2.65 M/UL (ref 4.7–6.1)
RBC # BLD AUTO: 2.67 M/UL (ref 4.7–6.1)
RBC # BLD AUTO: 2.68 M/UL (ref 4.7–6.1)
RBC # BLD AUTO: 2.69 M/UL (ref 4.7–6.1)
RBC # BLD AUTO: 2.7 M/UL (ref 4.7–6.1)
RBC # BLD AUTO: 2.72 M/UL (ref 4.7–6.1)
RBC # BLD AUTO: 2.73 M/UL (ref 4.7–6.1)
RBC # BLD AUTO: 2.73 M/UL (ref 4.7–6.1)
RBC # BLD AUTO: 2.75 M/UL (ref 4.7–6.1)
RBC # BLD AUTO: 2.77 M/UL (ref 4.7–6.1)
RBC # BLD AUTO: 2.8 M/UL (ref 4.7–6.1)
RBC # BLD AUTO: 2.81 M/UL (ref 4.7–6.1)
RBC # BLD AUTO: 2.84 M/UL (ref 4.7–6.1)
RBC # BLD AUTO: 2.85 M/UL (ref 4.7–6.1)
RBC # BLD AUTO: 2.86 M/UL (ref 4.7–6.1)
RBC # BLD AUTO: 2.9 M/UL (ref 4.7–6.1)
RBC # BLD AUTO: 2.91 M/UL (ref 4.7–6.1)
RBC # BLD AUTO: 2.93 M/UL (ref 4.7–6.1)
RBC # BLD AUTO: 2.99 M/UL (ref 4.7–6.1)
RBC # BLD AUTO: 3.02 M/UL (ref 4.7–6.1)
RBC # BLD AUTO: 3.05 M/UL (ref 4.7–6.1)
RBC # BLD AUTO: 3.08 M/UL (ref 4.7–6.1)
RBC # BLD AUTO: 3.39 X10E6/UL (ref 4.14–5.8)
RBC BLD AUTO: PRESENT
RBC BLD AUTO: PRESENT
RBC UR QL AUTO: NEGATIVE
RH BLD: NORMAL
SAO2 % BLDA: 100 % (ref 93–99)
SAO2 % BLDA: 91 % (ref 93–99)
SAO2 % BLDA: 97 % (ref 93–99)
SAO2 % BLDA: 98 % (ref 93–99)
SAO2 % BLDA: 98 % (ref 93–99)
SAO2 % BLDA: 99 % (ref 93–99)
SAO2 % BLDV: 82 %
SCCMEC + MECA PNL NOSE NAA+PROBE: NEGATIVE
SCCMEC + MECA PNL NOSE NAA+PROBE: POSITIVE
SCHISTOCYTES BLD QL SMEAR: NORMAL
SODIUM BLD-SCNC: 137 MMOL/L (ref 135–145)
SODIUM BLD-SCNC: 137 MMOL/L (ref 135–145)
SODIUM BLD-SCNC: 138 MMOL/L (ref 135–145)
SODIUM BLD-SCNC: 138 MMOL/L (ref 135–145)
SODIUM BLD-SCNC: 139 MMOL/L (ref 135–145)
SODIUM BLD-SCNC: 140 MMOL/L (ref 135–145)
SODIUM BLD-SCNC: 140 MMOL/L (ref 135–145)
SODIUM BLD-SCNC: 141 MMOL/L (ref 135–145)
SODIUM BLD-SCNC: 142 MMOL/L (ref 135–145)
SODIUM SERPL-SCNC: 132 MMOL/L (ref 135–145)
SODIUM SERPL-SCNC: 132 MMOL/L (ref 135–145)
SODIUM SERPL-SCNC: 133 MMOL/L (ref 135–145)
SODIUM SERPL-SCNC: 133 MMOL/L (ref 135–145)
SODIUM SERPL-SCNC: 134 MMOL/L (ref 135–145)
SODIUM SERPL-SCNC: 135 MMOL/L (ref 135–145)
SODIUM SERPL-SCNC: 135 MMOL/L (ref 135–145)
SODIUM SERPL-SCNC: 136 MMOL/L (ref 135–145)
SODIUM SERPL-SCNC: 137 MMOL/L (ref 135–145)
SODIUM SERPL-SCNC: 138 MMOL/L (ref 134–144)
SODIUM SERPL-SCNC: 138 MMOL/L (ref 135–145)
SODIUM SERPL-SCNC: 139 MMOL/L (ref 135–145)
SODIUM SERPL-SCNC: 139 MMOL/L (ref 135–145)
SODIUM SERPL-SCNC: 141 MMOL/L (ref 134–144)
SODIUM SERPL-SCNC: 141 MMOL/L (ref 135–145)
SODIUM SERPL-SCNC: 141 MMOL/L (ref 135–145)
SODIUM SERPL-SCNC: 142 MMOL/L (ref 134–144)
SODIUM SERPL-SCNC: 142 MMOL/L (ref 135–145)
SODIUM SERPL-SCNC: 144 MMOL/L (ref 135–145)
SODIUM SERPL-SCNC: 144 MMOL/L (ref 135–145)
SP GR UR STRIP.AUTO: 1.01
SPECIMEN DRAWN FROM PATIENT: ABNORMAL
SPECIMEN DRAWN FROM PATIENT: NORMAL
SPECIMEN VOL UR: 1000 ML
SPECIMEN VOL UR: 1000 ML
TARGETS BLD QL SMEAR: NORMAL
TIBC SERPL-MCNC: 230 UG/DL (ref 250–450)
TRIGL SERPL-MCNC: 93 MG/DL (ref 0–149)
TROPONIN I SERPL-MCNC: 0.11 NG/ML (ref 0–0.04)
TROPONIN I SERPL-MCNC: 0.14 NG/ML (ref 0–0.04)
TROPONIN I SERPL-MCNC: 0.17 NG/ML (ref 0–0.04)
UNIT STATUS USTAT: NORMAL
UROBILINOGEN UR STRIP.AUTO-MCNC: 0.2 MG/DL
VIT B12 SERPL-MCNC: 662 PG/ML (ref 211–911)
WBC # BLD AUTO: 11.7 K/UL (ref 4.8–10.8)
WBC # BLD AUTO: 2.5 K/UL (ref 4.8–10.8)
WBC # BLD AUTO: 2.6 K/UL (ref 4.8–10.8)
WBC # BLD AUTO: 3 K/UL (ref 4.8–10.8)
WBC # BLD AUTO: 3 K/UL (ref 4.8–10.8)
WBC # BLD AUTO: 3.4 K/UL (ref 4.8–10.8)
WBC # BLD AUTO: 3.9 K/UL (ref 4.8–10.8)
WBC # BLD AUTO: 4.5 K/UL (ref 4.8–10.8)
WBC # BLD AUTO: 5.3 K/UL (ref 4.8–10.8)
WBC # BLD AUTO: 5.4 K/UL (ref 4.8–10.8)
WBC # BLD AUTO: 5.8 K/UL (ref 4.8–10.8)
WBC # BLD AUTO: 5.9 K/UL (ref 4.8–10.8)
WBC # BLD AUTO: 6.1 K/UL (ref 4.8–10.8)
WBC # BLD AUTO: 6.1 X10E3/UL (ref 3.4–10.8)
WBC # BLD AUTO: 6.2 K/UL (ref 4.8–10.8)
WBC # BLD AUTO: 6.3 K/UL (ref 4.8–10.8)
WBC # BLD AUTO: 6.3 K/UL (ref 4.8–10.8)
WBC # BLD AUTO: 6.4 K/UL (ref 4.8–10.8)
WBC # BLD AUTO: 6.4 K/UL (ref 4.8–10.8)
WBC # BLD AUTO: 6.6 K/UL (ref 4.8–10.8)
WBC # BLD AUTO: 6.6 K/UL (ref 4.8–10.8)
WBC # BLD AUTO: 6.7 K/UL (ref 4.8–10.8)
WBC # BLD AUTO: 6.7 K/UL (ref 4.8–10.8)
WBC # BLD AUTO: 6.8 K/UL (ref 4.8–10.8)
WBC # BLD AUTO: 6.9 K/UL (ref 4.8–10.8)
WBC # BLD AUTO: 6.9 K/UL (ref 4.8–10.8)
WBC # BLD AUTO: 7 K/UL (ref 4.8–10.8)
WBC # BLD AUTO: 7 K/UL (ref 4.8–10.8)
WBC # BLD AUTO: 7.1 K/UL (ref 4.8–10.8)
WBC # BLD AUTO: 7.1 K/UL (ref 4.8–10.8)
WBC # BLD AUTO: 7.2 K/UL (ref 4.8–10.8)
WBC # BLD AUTO: 7.7 K/UL (ref 4.8–10.8)
WBC # BLD AUTO: 8.4 K/UL (ref 4.8–10.8)

## 2018-01-01 PROCEDURE — 80048 BASIC METABOLIC PNL TOTAL CA: CPT

## 2018-01-01 PROCEDURE — 92610 EVALUATE SWALLOWING FUNCTION: CPT

## 2018-01-01 PROCEDURE — 85027 COMPLETE CBC AUTOMATED: CPT

## 2018-01-01 PROCEDURE — 82040 ASSAY OF SERUM ALBUMIN: CPT

## 2018-01-01 PROCEDURE — 160031 HCHG SURGERY MINUTES - 1ST 30 MINS LEVEL 5: Performed by: THORACIC SURGERY (CARDIOTHORACIC VASCULAR SURGERY)

## 2018-01-01 PROCEDURE — 700102 HCHG RX REV CODE 250 W/ 637 OVERRIDE(OP): Performed by: THORACIC SURGERY (CARDIOTHORACIC VASCULAR SURGERY)

## 2018-01-01 PROCEDURE — 770020 HCHG ROOM/CARE - TELE (206)

## 2018-01-01 PROCEDURE — 99214 OFFICE O/P EST MOD 30 MIN: CPT | Performed by: NURSE PRACTITIONER

## 2018-01-01 PROCEDURE — 5A1D70Z PERFORMANCE OF URINARY FILTRATION, INTERMITTENT, LESS THAN 6 HOURS PER DAY: ICD-10-PCS | Performed by: INTERNAL MEDICINE

## 2018-01-01 PROCEDURE — C1769 GUIDE WIRE: HCPCS

## 2018-01-01 PROCEDURE — 85018 HEMOGLOBIN: CPT

## 2018-01-01 PROCEDURE — 74018 RADEX ABDOMEN 1 VIEW: CPT

## 2018-01-01 PROCEDURE — 700102 HCHG RX REV CODE 250 W/ 637 OVERRIDE(OP): Performed by: HOSPITALIST

## 2018-01-01 PROCEDURE — 93005 ELECTROCARDIOGRAM TRACING: CPT | Performed by: INTERNAL MEDICINE

## 2018-01-01 PROCEDURE — 700101 HCHG RX REV CODE 250: Performed by: HOSPITALIST

## 2018-01-01 PROCEDURE — C1894 INTRO/SHEATH, NON-LASER: HCPCS

## 2018-01-01 PROCEDURE — 36556 INSERT NON-TUNNEL CV CATH: CPT

## 2018-01-01 PROCEDURE — 51798 US URINE CAPACITY MEASURE: CPT

## 2018-01-01 PROCEDURE — 700102 HCHG RX REV CODE 250 W/ 637 OVERRIDE(OP): Performed by: NURSE PRACTITIONER

## 2018-01-01 PROCEDURE — 86901 BLOOD TYPING SEROLOGIC RH(D): CPT

## 2018-01-01 PROCEDURE — 5A12012 PERFORMANCE OF CARDIAC OUTPUT, SINGLE, MANUAL: ICD-10-PCS | Performed by: INTERNAL MEDICINE

## 2018-01-01 PROCEDURE — 85347 COAGULATION TIME ACTIVATED: CPT | Mod: 91

## 2018-01-01 PROCEDURE — 500385 HCHG DRAIN, PLEUROVAC ADUL: Performed by: THORACIC SURGERY (CARDIOTHORACIC VASCULAR SURGERY)

## 2018-01-01 PROCEDURE — B548ZZA ULTRASONOGRAPHY OF SUPERIOR VENA CAVA, GUIDANCE: ICD-10-PCS | Performed by: INTERNAL MEDICINE

## 2018-01-01 PROCEDURE — 04HY32Z INSERTION OF MONITORING DEVICE INTO LOWER ARTERY, PERCUTANEOUS APPROACH: ICD-10-PCS | Performed by: INTERNAL MEDICINE

## 2018-01-01 PROCEDURE — 99153 MOD SED SAME PHYS/QHP EA: CPT

## 2018-01-01 PROCEDURE — 37799 UNLISTED PX VASCULAR SURGERY: CPT

## 2018-01-01 PROCEDURE — P9047 ALBUMIN (HUMAN), 25%, 50ML: HCPCS | Mod: JG

## 2018-01-01 PROCEDURE — 83735 ASSAY OF MAGNESIUM: CPT

## 2018-01-01 PROCEDURE — 700105 HCHG RX REV CODE 258: Performed by: NURSE PRACTITIONER

## 2018-01-01 PROCEDURE — 700111 HCHG RX REV CODE 636 W/ 250 OVERRIDE (IP): Performed by: NURSE PRACTITIONER

## 2018-01-01 PROCEDURE — 700102 HCHG RX REV CODE 250 W/ 637 OVERRIDE(OP): Performed by: INTERNAL MEDICINE

## 2018-01-01 PROCEDURE — A9270 NON-COVERED ITEM OR SERVICE: HCPCS | Performed by: FAMILY MEDICINE

## 2018-01-01 PROCEDURE — 99291 CRITICAL CARE FIRST HOUR: CPT | Mod: 25 | Performed by: INTERNAL MEDICINE

## 2018-01-01 PROCEDURE — 99233 SBSQ HOSP IP/OBS HIGH 50: CPT | Performed by: INTERNAL MEDICINE

## 2018-01-01 PROCEDURE — 83550 IRON BINDING TEST: CPT

## 2018-01-01 PROCEDURE — A9270 NON-COVERED ITEM OR SERVICE: HCPCS | Performed by: THORACIC SURGERY (CARDIOTHORACIC VASCULAR SURGERY)

## 2018-01-01 PROCEDURE — G8979 MOBILITY GOAL STATUS: HCPCS | Mod: CI

## 2018-01-01 PROCEDURE — 80074 ACUTE HEPATITIS PANEL: CPT

## 2018-01-01 PROCEDURE — 83605 ASSAY OF LACTIC ACID: CPT

## 2018-01-01 PROCEDURE — 700102 HCHG RX REV CODE 250 W/ 637 OVERRIDE(OP): Performed by: FAMILY MEDICINE

## 2018-01-01 PROCEDURE — 85610 PROTHROMBIN TIME: CPT

## 2018-01-01 PROCEDURE — 93971 EXTREMITY STUDY: CPT | Mod: RT

## 2018-01-01 PROCEDURE — 31624 DX BRONCHOSCOPE/LAVAGE: CPT | Performed by: INTERNAL MEDICINE

## 2018-01-01 PROCEDURE — 700105 HCHG RX REV CODE 258

## 2018-01-01 PROCEDURE — 87206 SMEAR FLUORESCENT/ACID STAI: CPT

## 2018-01-01 PROCEDURE — 02HV33Z INSERTION OF INFUSION DEVICE INTO SUPERIOR VENA CAVA, PERCUTANEOUS APPROACH: ICD-10-PCS | Performed by: RADIOLOGY

## 2018-01-01 PROCEDURE — A9270 NON-COVERED ITEM OR SERVICE: HCPCS | Performed by: HOSPITALIST

## 2018-01-01 PROCEDURE — 36430 TRANSFUSION BLD/BLD COMPNT: CPT

## 2018-01-01 PROCEDURE — 700111 HCHG RX REV CODE 636 W/ 250 OVERRIDE (IP): Mod: JG | Performed by: INTERNAL MEDICINE

## 2018-01-01 PROCEDURE — 84295 ASSAY OF SERUM SODIUM: CPT

## 2018-01-01 PROCEDURE — A9270 NON-COVERED ITEM OR SERVICE: HCPCS | Performed by: NURSE PRACTITIONER

## 2018-01-01 PROCEDURE — 92526 ORAL FUNCTION THERAPY: CPT

## 2018-01-01 PROCEDURE — 700101 HCHG RX REV CODE 250: Performed by: INTERNAL MEDICINE

## 2018-01-01 PROCEDURE — 93306 TTE W/DOPPLER COMPLETE: CPT | Mod: 26 | Performed by: INTERNAL MEDICINE

## 2018-01-01 PROCEDURE — 94003 VENT MGMT INPAT SUBQ DAY: CPT

## 2018-01-01 PROCEDURE — 71046 X-RAY EXAM CHEST 2 VIEWS: CPT | Mod: TC,FY | Performed by: INTERNAL MEDICINE

## 2018-01-01 PROCEDURE — 94760 N-INVAS EAR/PLS OXIMETRY 1: CPT

## 2018-01-01 PROCEDURE — 36415 COLL VENOUS BLD VENIPUNCTURE: CPT

## 2018-01-01 PROCEDURE — 501879 HCHG BONE PUTTY HEMOSTATIC (59): Performed by: THORACIC SURGERY (CARDIOTHORACIC VASCULAR SURGERY)

## 2018-01-01 PROCEDURE — 93503 INSERT/PLACE HEART CATHETER: CPT | Mod: 52 | Performed by: INTERNAL MEDICINE

## 2018-01-01 PROCEDURE — 71045 X-RAY EXAM CHEST 1 VIEW: CPT

## 2018-01-01 PROCEDURE — 30233N1 TRANSFUSION OF NONAUTOLOGOUS RED BLOOD CELLS INTO PERIPHERAL VEIN, PERCUTANEOUS APPROACH: ICD-10-PCS | Performed by: THORACIC SURGERY (CARDIOTHORACIC VASCULAR SURGERY)

## 2018-01-01 PROCEDURE — 86923 COMPATIBILITY TEST ELECTRIC: CPT

## 2018-01-01 PROCEDURE — 501745 HCHG WIRE, SURGICAL STEEL: Performed by: THORACIC SURGERY (CARDIOTHORACIC VASCULAR SURGERY)

## 2018-01-01 PROCEDURE — 80053 COMPREHEN METABOLIC PANEL: CPT

## 2018-01-01 PROCEDURE — 85014 HEMATOCRIT: CPT | Mod: 91

## 2018-01-01 PROCEDURE — 303746 HCHG EXTERNAL PACEMAKER PAD: Performed by: INTERNAL MEDICINE

## 2018-01-01 PROCEDURE — P9016 RBC LEUKOCYTES REDUCED: HCPCS | Mod: 91

## 2018-01-01 PROCEDURE — 94668 MNPJ CHEST WALL SBSQ: CPT

## 2018-01-01 PROCEDURE — 160009 HCHG ANES TIME/MIN: Performed by: THORACIC SURGERY (CARDIOTHORACIC VASCULAR SURGERY)

## 2018-01-01 PROCEDURE — C1751 CATH, INF, PER/CENT/MIDLINE: HCPCS | Performed by: THORACIC SURGERY (CARDIOTHORACIC VASCULAR SURGERY)

## 2018-01-01 PROCEDURE — 99221 1ST HOSP IP/OBS SF/LOW 40: CPT | Performed by: HOSPITALIST

## 2018-01-01 PROCEDURE — 84484 ASSAY OF TROPONIN QUANT: CPT | Mod: 91

## 2018-01-01 PROCEDURE — 99152 MOD SED SAME PHYS/QHP 5/>YRS: CPT

## 2018-01-01 PROCEDURE — 85025 COMPLETE CBC W/AUTO DIFF WBC: CPT

## 2018-01-01 PROCEDURE — 700111 HCHG RX REV CODE 636 W/ 250 OVERRIDE (IP): Performed by: INTERNAL MEDICINE

## 2018-01-01 PROCEDURE — 500257: Performed by: THORACIC SURGERY (CARDIOTHORACIC VASCULAR SURGERY)

## 2018-01-01 PROCEDURE — 97530 THERAPEUTIC ACTIVITIES: CPT

## 2018-01-01 PROCEDURE — 306311 ANTI-EMBOLISM STOCKINGS XXLRG LNG: Performed by: THORACIC SURGERY (CARDIOTHORACIC VASCULAR SURGERY)

## 2018-01-01 PROCEDURE — 700111 HCHG RX REV CODE 636 W/ 250 OVERRIDE (IP)

## 2018-01-01 PROCEDURE — 302978 HCHG BRONCHOSCOPY-DIAGNOSTIC

## 2018-01-01 PROCEDURE — 36620 INSERTION CATHETER ARTERY: CPT

## 2018-01-01 PROCEDURE — 02L70ZK OCCLUSION OF LEFT ATRIAL APPENDAGE, OPEN APPROACH: ICD-10-PCS | Performed by: THORACIC SURGERY (CARDIOTHORACIC VASCULAR SURGERY)

## 2018-01-01 PROCEDURE — 770022 HCHG ROOM/CARE - ICU (200)

## 2018-01-01 PROCEDURE — 93325 DOPPLER ECHO COLOR FLOW MAPG: CPT

## 2018-01-01 PROCEDURE — A9270 NON-COVERED ITEM OR SERVICE: HCPCS | Performed by: INTERNAL MEDICINE

## 2018-01-01 PROCEDURE — 99233 SBSQ HOSP IP/OBS HIGH 50: CPT | Performed by: HOSPITALIST

## 2018-01-01 PROCEDURE — 86900 BLOOD TYPING SEROLOGIC ABO: CPT

## 2018-01-01 PROCEDURE — 99291 CRITICAL CARE FIRST HOUR: CPT | Performed by: INTERNAL MEDICINE

## 2018-01-01 PROCEDURE — 87205 SMEAR GRAM STAIN: CPT

## 2018-01-01 PROCEDURE — 97535 SELF CARE MNGMENT TRAINING: CPT

## 2018-01-01 PROCEDURE — 93010 ELECTROCARDIOGRAM REPORT: CPT | Performed by: INTERNAL MEDICINE

## 2018-01-01 PROCEDURE — 84132 ASSAY OF SERUM POTASSIUM: CPT

## 2018-01-01 PROCEDURE — 99232 SBSQ HOSP IP/OBS MODERATE 35: CPT | Performed by: HOSPITALIST

## 2018-01-01 PROCEDURE — 93312 ECHO TRANSESOPHAGEAL: CPT

## 2018-01-01 PROCEDURE — 0BH17EZ INSERTION OF ENDOTRACHEAL AIRWAY INTO TRACHEA, VIA NATURAL OR ARTIFICIAL OPENING: ICD-10-PCS | Performed by: INTERNAL MEDICINE

## 2018-01-01 PROCEDURE — 31500 INSERT EMERGENCY AIRWAY: CPT | Performed by: INTERNAL MEDICINE

## 2018-01-01 PROCEDURE — 93460 R&L HRT ART/VENTRICLE ANGIO: CPT

## 2018-01-01 PROCEDURE — 360979 HCHG DIAGNOSTIC CATH

## 2018-01-01 PROCEDURE — 99214 OFFICE O/P EST MOD 30 MIN: CPT | Performed by: INTERNAL MEDICINE

## 2018-01-01 PROCEDURE — 303746 HCHG EXTERNAL PACEMAKER PAD

## 2018-01-01 PROCEDURE — 93306 TTE W/DOPPLER COMPLETE: CPT

## 2018-01-01 PROCEDURE — 94002 VENT MGMT INPAT INIT DAY: CPT

## 2018-01-01 PROCEDURE — P9047 ALBUMIN (HUMAN), 25%, 50ML: HCPCS | Mod: JG | Performed by: INTERNAL MEDICINE

## 2018-01-01 PROCEDURE — 93010 ELECTROCARDIOGRAM REPORT: CPT | Mod: 77 | Performed by: INTERNAL MEDICINE

## 2018-01-01 PROCEDURE — 84100 ASSAY OF PHOSPHORUS: CPT

## 2018-01-01 PROCEDURE — 700111 HCHG RX REV CODE 636 W/ 250 OVERRIDE (IP): Performed by: HOSPITALIST

## 2018-01-01 PROCEDURE — 93320 DOPPLER ECHO COMPLETE: CPT

## 2018-01-01 PROCEDURE — 94669 MECHANICAL CHEST WALL OSCILL: CPT

## 2018-01-01 PROCEDURE — 110372 HCHG SHELL REV 278: Performed by: THORACIC SURGERY (CARDIOTHORACIC VASCULAR SURGERY)

## 2018-01-01 PROCEDURE — 160042 HCHG SURGERY MINUTES - EA ADDL 1 MIN LEVEL 5: Performed by: THORACIC SURGERY (CARDIOTHORACIC VASCULAR SURGERY)

## 2018-01-01 PROCEDURE — 304999 HCHG REPAIR-SIMPLE/INTERMED LEVEL 1

## 2018-01-01 PROCEDURE — 500890 HCHG PACK, OPEN HEART: Performed by: THORACIC SURGERY (CARDIOTHORACIC VASCULAR SURGERY)

## 2018-01-01 PROCEDURE — 02PAX2Z REMOVAL OF MONITORING DEVICE FROM HEART, EXTERNAL APPROACH: ICD-10-PCS | Performed by: INTERNAL MEDICINE

## 2018-01-01 PROCEDURE — 94150 VITAL CAPACITY TEST: CPT

## 2018-01-01 PROCEDURE — 02H633Z INSERTION OF INFUSION DEVICE INTO RIGHT ATRIUM, PERCUTANEOUS APPROACH: ICD-10-PCS | Performed by: INTERNAL MEDICINE

## 2018-01-01 PROCEDURE — 700102 HCHG RX REV CODE 250 W/ 637 OVERRIDE(OP): Performed by: EMERGENCY MEDICINE

## 2018-01-01 PROCEDURE — A6402 STERILE GAUZE <= 16 SQ IN: HCPCS | Performed by: THORACIC SURGERY (CARDIOTHORACIC VASCULAR SURGERY)

## 2018-01-01 PROCEDURE — 87641 MR-STAPH DNA AMP PROBE: CPT

## 2018-01-01 PROCEDURE — G8978 MOBILITY CURRENT STATUS: HCPCS | Mod: CM

## 2018-01-01 PROCEDURE — 700111 HCHG RX REV CODE 636 W/ 250 OVERRIDE (IP): Mod: JG

## 2018-01-01 PROCEDURE — 92950 HEART/LUNG RESUSCITATION CPR: CPT | Performed by: INTERNAL MEDICINE

## 2018-01-01 PROCEDURE — 82570 ASSAY OF URINE CREATININE: CPT

## 2018-01-01 PROCEDURE — 700105 HCHG RX REV CODE 258: Performed by: INTERNAL MEDICINE

## 2018-01-01 PROCEDURE — 93005 ELECTROCARDIOGRAM TRACING: CPT | Performed by: NURSE PRACTITIONER

## 2018-01-01 PROCEDURE — 86706 HEP B SURFACE ANTIBODY: CPT

## 2018-01-01 PROCEDURE — 87015 SPECIMEN INFECT AGNT CONCNTJ: CPT

## 2018-01-01 PROCEDURE — 97162 PT EVAL MOD COMPLEX 30 MIN: CPT

## 2018-01-01 PROCEDURE — 93880 EXTRACRANIAL BILAT STUDY: CPT

## 2018-01-01 PROCEDURE — 87640 STAPH A DNA AMP PROBE: CPT | Mod: XU

## 2018-01-01 PROCEDURE — 36556 INSERT NON-TUNNEL CV CATH: CPT | Mod: 59 | Performed by: INTERNAL MEDICINE

## 2018-01-01 PROCEDURE — A6250 SKIN SEAL PROTECT MOISTURIZR: HCPCS | Performed by: HOSPITALIST

## 2018-01-01 PROCEDURE — 82803 BLOOD GASES ANY COMBINATION: CPT | Mod: 91

## 2018-01-01 PROCEDURE — 85730 THROMBOPLASTIN TIME PARTIAL: CPT

## 2018-01-01 PROCEDURE — 99283 EMERGENCY DEPT VISIT LOW MDM: CPT

## 2018-01-01 PROCEDURE — 86923 COMPATIBILITY TEST ELECTRIC: CPT | Mod: 91

## 2018-01-01 PROCEDURE — 83540 ASSAY OF IRON: CPT

## 2018-01-01 PROCEDURE — C1729 CATH, DRAINAGE: HCPCS | Performed by: THORACIC SURGERY (CARDIOTHORACIC VASCULAR SURGERY)

## 2018-01-01 PROCEDURE — 85014 HEMATOCRIT: CPT

## 2018-01-01 PROCEDURE — 87102 FUNGUS ISOLATION CULTURE: CPT

## 2018-01-01 PROCEDURE — 0B9G8ZX DRAINAGE OF LEFT UPPER LUNG LOBE, VIA NATURAL OR ARTIFICIAL OPENING ENDOSCOPIC, DIAGNOSTIC: ICD-10-PCS | Performed by: INTERNAL MEDICINE

## 2018-01-01 PROCEDURE — 307093 HCHG TR BAND RADIAL

## 2018-01-01 PROCEDURE — 02UJ0JZ SUPPLEMENT TRICUSPID VALVE WITH SYNTHETIC SUBSTITUTE, OPEN APPROACH: ICD-10-PCS | Performed by: THORACIC SURGERY (CARDIOTHORACIC VASCULAR SURGERY)

## 2018-01-01 PROCEDURE — 94618 PULMONARY STRESS TESTING: CPT | Performed by: INTERNAL MEDICINE

## 2018-01-01 PROCEDURE — 82330 ASSAY OF CALCIUM: CPT

## 2018-01-01 PROCEDURE — 4A033JC MEASUREMENT OF ARTERIAL PULSE, CORONARY, PERCUTANEOUS APPROACH: ICD-10-PCS | Performed by: INTERNAL MEDICINE

## 2018-01-01 PROCEDURE — 81050 URINALYSIS VOLUME MEASURE: CPT

## 2018-01-01 PROCEDURE — 99292 CRITICAL CARE ADDL 30 MIN: CPT | Performed by: INTERNAL MEDICINE

## 2018-01-01 PROCEDURE — 700101 HCHG RX REV CODE 250

## 2018-01-01 PROCEDURE — 503033 HCHG COR-KNOT TITANIUM QUICKLOADS: Performed by: THORACIC SURGERY (CARDIOTHORACIC VASCULAR SURGERY)

## 2018-01-01 PROCEDURE — 93308 TTE F-UP OR LMTD: CPT | Mod: 26 | Performed by: INTERNAL MEDICINE

## 2018-01-01 PROCEDURE — 82728 ASSAY OF FERRITIN: CPT

## 2018-01-01 PROCEDURE — 500424 HCHG DRESSING, AIRSTRIP: Performed by: THORACIC SURGERY (CARDIOTHORACIC VASCULAR SURGERY)

## 2018-01-01 PROCEDURE — 86850 RBC ANTIBODY SCREEN: CPT

## 2018-01-01 PROCEDURE — P9034 PLATELETS, PHERESIS: HCPCS

## 2018-01-01 PROCEDURE — 90935 HEMODIALYSIS ONE EVALUATION: CPT

## 2018-01-01 PROCEDURE — 90471 IMMUNIZATION ADMIN: CPT

## 2018-01-01 PROCEDURE — 02UG0JZ SUPPLEMENT MITRAL VALVE WITH SYNTHETIC SUBSTITUTE, OPEN APPROACH: ICD-10-PCS | Performed by: THORACIC SURGERY (CARDIOTHORACIC VASCULAR SURGERY)

## 2018-01-01 PROCEDURE — 503050 HCHG COR-KNOT QUICK LOADS 6PACK: Performed by: THORACIC SURGERY (CARDIOTHORACIC VASCULAR SURGERY)

## 2018-01-01 PROCEDURE — 36620 INSERTION CATHETER ARTERY: CPT | Performed by: INTERNAL MEDICINE

## 2018-01-01 PROCEDURE — G8988 SELF CARE GOAL STATUS: HCPCS | Mod: CI

## 2018-01-01 PROCEDURE — 700105 HCHG RX REV CODE 258: Performed by: THORACIC SURGERY (CARDIOTHORACIC VASCULAR SURGERY)

## 2018-01-01 PROCEDURE — A6403 STERILE GAUZE>16 <= 48 SQ IN: HCPCS | Performed by: THORACIC SURGERY (CARDIOTHORACIC VASCULAR SURGERY)

## 2018-01-01 PROCEDURE — 82803 BLOOD GASES ANY COMBINATION: CPT

## 2018-01-01 PROCEDURE — 503001 HCHG PERFUSION: Performed by: THORACIC SURGERY (CARDIOTHORACIC VASCULAR SURGERY)

## 2018-01-01 PROCEDURE — 02HV33Z INSERTION OF INFUSION DEVICE INTO SUPERIOR VENA CAVA, PERCUTANEOUS APPROACH: ICD-10-PCS | Performed by: INTERNAL MEDICINE

## 2018-01-01 PROCEDURE — P9045 ALBUMIN (HUMAN), 5%, 250 ML: HCPCS | Mod: JG | Performed by: NURSE PRACTITIONER

## 2018-01-01 PROCEDURE — 71046 X-RAY EXAM CHEST 2 VIEWS: CPT

## 2018-01-01 PROCEDURE — C1894 INTRO/SHEATH, NON-LASER: HCPCS | Performed by: THORACIC SURGERY (CARDIOTHORACIC VASCULAR SURGERY)

## 2018-01-01 PROCEDURE — 31645 BRNCHSC W/THER ASPIR 1ST: CPT | Mod: 59 | Performed by: INTERNAL MEDICINE

## 2018-01-01 PROCEDURE — 94640 AIRWAY INHALATION TREATMENT: CPT

## 2018-01-01 PROCEDURE — 84156 ASSAY OF PROTEIN URINE: CPT

## 2018-01-01 PROCEDURE — 5A1221Z PERFORMANCE OF CARDIAC OUTPUT, CONTINUOUS: ICD-10-PCS | Performed by: THORACIC SURGERY (CARDIOTHORACIC VASCULAR SURGERY)

## 2018-01-01 PROCEDURE — 87070 CULTURE OTHR SPECIMN AEROBIC: CPT

## 2018-01-01 PROCEDURE — 99284 EMERGENCY DEPT VISIT MOD MDM: CPT

## 2018-01-01 PROCEDURE — 85049 AUTOMATED PLATELET COUNT: CPT

## 2018-01-01 PROCEDURE — 83036 HEMOGLOBIN GLYCOSYLATED A1C: CPT

## 2018-01-01 PROCEDURE — 82962 GLUCOSE BLOOD TEST: CPT | Mod: 91

## 2018-01-01 PROCEDURE — 93005 ELECTROCARDIOGRAM TRACING: CPT | Performed by: THORACIC SURGERY (CARDIOTHORACIC VASCULAR SURGERY)

## 2018-01-01 PROCEDURE — 304217 HCHG IRRIGATION SYSTEM

## 2018-01-01 PROCEDURE — 90715 TDAP VACCINE 7 YRS/> IM: CPT | Performed by: EMERGENCY MEDICINE

## 2018-01-01 PROCEDURE — C1898 LEAD, PMKR, OTHER THAN TRANS: HCPCS | Performed by: THORACIC SURGERY (CARDIOTHORACIC VASCULAR SURGERY)

## 2018-01-01 PROCEDURE — B24BZZ4 ULTRASONOGRAPHY OF HEART WITH AORTA, TRANSESOPHAGEAL: ICD-10-PCS | Performed by: ANESTHESIOLOGY

## 2018-01-01 PROCEDURE — 71046 X-RAY EXAM CHEST 2 VIEWS: CPT | Mod: TC,FY | Performed by: NURSE PRACTITIONER

## 2018-01-01 PROCEDURE — 82533 TOTAL CORTISOL: CPT

## 2018-01-01 PROCEDURE — P9012 CRYOPRECIPITATE EACH UNIT: HCPCS

## 2018-01-01 PROCEDURE — P9016 RBC LEUKOCYTES REDUCED: HCPCS

## 2018-01-01 PROCEDURE — 500734 HCHG INSERT, STEALTH: Performed by: THORACIC SURGERY (CARDIOTHORACIC VASCULAR SURGERY)

## 2018-01-01 PROCEDURE — 97116 GAIT TRAINING THERAPY: CPT

## 2018-01-01 PROCEDURE — 303747 HCHG EXTRA SUTURE

## 2018-01-01 PROCEDURE — 501519 HCHG SUTURE, E PACK: Performed by: THORACIC SURGERY (CARDIOTHORACIC VASCULAR SURGERY)

## 2018-01-01 PROCEDURE — 81003 URINALYSIS AUTO W/O SCOPE: CPT

## 2018-01-01 PROCEDURE — 700101 HCHG RX REV CODE 250: Performed by: THORACIC SURGERY (CARDIOTHORACIC VASCULAR SURGERY)

## 2018-01-01 PROCEDURE — 700117 HCHG RX CONTRAST REV CODE 255: Performed by: INTERNAL MEDICINE

## 2018-01-01 PROCEDURE — 88112 CYTOPATH CELL ENHANCE TECH: CPT

## 2018-01-01 PROCEDURE — 94667 MNPJ CHEST WALL 1ST: CPT

## 2018-01-01 PROCEDURE — 02CF0ZZ EXTIRPATION OF MATTER FROM AORTIC VALVE, OPEN APPROACH: ICD-10-PCS | Performed by: THORACIC SURGERY (CARDIOTHORACIC VASCULAR SURGERY)

## 2018-01-01 PROCEDURE — 82607 VITAMIN B-12: CPT

## 2018-01-01 PROCEDURE — G8987 SELF CARE CURRENT STATUS: HCPCS | Mod: CK

## 2018-01-01 PROCEDURE — 82947 ASSAY GLUCOSE BLOOD QUANT: CPT | Mod: 91

## 2018-01-01 PROCEDURE — 700111 HCHG RX REV CODE 636 W/ 250 OVERRIDE (IP): Performed by: THORACIC SURGERY (CARDIOTHORACIC VASCULAR SURGERY)

## 2018-01-01 PROCEDURE — 88304 TISSUE EXAM BY PATHOLOGIST: CPT

## 2018-01-01 PROCEDURE — 700111 HCHG RX REV CODE 636 W/ 250 OVERRIDE (IP): Performed by: EMERGENCY MEDICINE

## 2018-01-01 PROCEDURE — 93005 ELECTROCARDIOGRAM TRACING: CPT | Performed by: HOSPITALIST

## 2018-01-01 PROCEDURE — 302874 HCHG BANDAGE ACE 2 OR 3""

## 2018-01-01 PROCEDURE — 82962 GLUCOSE BLOOD TEST: CPT

## 2018-01-01 PROCEDURE — G8997 SWALLOW GOAL STATUS: HCPCS | Mod: CH

## 2018-01-01 PROCEDURE — C1892 INTRO/SHEATH,FIXED,PEEL-AWAY: HCPCS

## 2018-01-01 PROCEDURE — 93005 ELECTROCARDIOGRAM TRACING: CPT

## 2018-01-01 PROCEDURE — 160048 HCHG OR STATISTICAL LEVEL 1-5: Performed by: THORACIC SURGERY (CARDIOTHORACIC VASCULAR SURGERY)

## 2018-01-01 PROCEDURE — 160002 HCHG RECOVERY MINUTES (STAT)

## 2018-01-01 PROCEDURE — 87116 MYCOBACTERIA CULTURE: CPT

## 2018-01-01 PROCEDURE — 84100 ASSAY OF PHOSPHORUS: CPT | Mod: 91

## 2018-01-01 PROCEDURE — G8996 SWALLOW CURRENT STATUS: HCPCS | Mod: CI

## 2018-01-01 PROCEDURE — 361014 HCHG 6FR ARROW SWAN/THERMO

## 2018-01-01 PROCEDURE — C1725 CATH, TRANSLUMIN NON-LASER: HCPCS | Performed by: THORACIC SURGERY (CARDIOTHORACIC VASCULAR SURGERY)

## 2018-01-01 PROCEDURE — 85007 BL SMEAR W/DIFF WBC COUNT: CPT

## 2018-01-01 PROCEDURE — 0B957ZZ DRAINAGE OF RIGHT MIDDLE LOBE BRONCHUS, VIA NATURAL OR ARTIFICIAL OPENING: ICD-10-PCS | Performed by: INTERNAL MEDICINE

## 2018-01-01 PROCEDURE — 369999 HCHG MISC LAB CHARGE

## 2018-01-01 PROCEDURE — 5A1935Z RESPIRATORY VENTILATION, LESS THAN 24 CONSECUTIVE HOURS: ICD-10-PCS | Performed by: INTERNAL MEDICINE

## 2018-01-01 PROCEDURE — 84132 ASSAY OF SERUM POTASSIUM: CPT | Mod: 91

## 2018-01-01 PROCEDURE — 0B967ZZ DRAINAGE OF RIGHT LOWER LOBE BRONCHUS, VIA NATURAL OR ARTIFICIAL OPENING: ICD-10-PCS | Performed by: INTERNAL MEDICINE

## 2018-01-01 PROCEDURE — 84295 ASSAY OF SERUM SODIUM: CPT | Mod: 91

## 2018-01-01 PROCEDURE — 93971 EXTREMITY STUDY: CPT | Mod: 26 | Performed by: SURGERY

## 2018-01-01 PROCEDURE — 92950 HEART/LUNG RESUSCITATION CPR: CPT

## 2018-01-01 PROCEDURE — A9270 NON-COVERED ITEM OR SERVICE: HCPCS | Performed by: EMERGENCY MEDICINE

## 2018-01-01 PROCEDURE — 87299 ANTIBODY DETECTION NOS IF: CPT

## 2018-01-01 PROCEDURE — 02H633Z INSERTION OF INFUSION DEVICE INTO RIGHT ATRIUM, PERCUTANEOUS APPROACH: ICD-10-PCS | Performed by: RADIOLOGY

## 2018-01-01 PROCEDURE — 82330 ASSAY OF CALCIUM: CPT | Mod: 91

## 2018-01-01 PROCEDURE — C1751 CATH, INF, PER/CENT/MIDLINE: HCPCS

## 2018-01-01 PROCEDURE — 84478 ASSAY OF TRIGLYCERIDES: CPT

## 2018-01-01 PROCEDURE — 700101 HCHG RX REV CODE 250: Performed by: NURSE PRACTITIONER

## 2018-01-01 PROCEDURE — 500002 HCHG ADHESIVE, DERMABOND: Performed by: THORACIC SURGERY (CARDIOTHORACIC VASCULAR SURGERY)

## 2018-01-01 PROCEDURE — 97166 OT EVAL MOD COMPLEX 45 MIN: CPT

## 2018-01-01 PROCEDURE — C9248 INJ, CLEVIDIPINE BUTYRATE: HCPCS

## 2018-01-01 PROCEDURE — 93503 INSERT/PLACE HEART CATHETER: CPT

## 2018-01-01 PROCEDURE — 503000 HCHG SUTURE, OHS: Performed by: THORACIC SURGERY (CARDIOTHORACIC VASCULAR SURGERY)

## 2018-01-01 PROCEDURE — 88305 TISSUE EXAM BY PATHOLOGIST: CPT

## 2018-01-01 PROCEDURE — A6403 STERILE GAUZE>16 <= 48 SQ IN: HCPCS

## 2018-01-01 PROCEDURE — 4A033B3 MEASUREMENT OF ARTERIAL PRESSURE, PULMONARY, PERCUTANEOUS APPROACH: ICD-10-PCS | Performed by: INTERNAL MEDICINE

## 2018-01-01 PROCEDURE — B548ZZA ULTRASONOGRAPHY OF SUPERIOR VENA CAVA, GUIDANCE: ICD-10-PCS | Performed by: RADIOLOGY

## 2018-01-01 PROCEDURE — 700111 HCHG RX REV CODE 636 W/ 250 OVERRIDE (IP): Mod: JG | Performed by: NURSE PRACTITIONER

## 2018-01-01 PROCEDURE — 76705 ECHO EXAM OF ABDOMEN: CPT

## 2018-01-01 PROCEDURE — 303485 HCHG DRESSING MEDIUM

## 2018-01-01 PROCEDURE — 501506 HCHG SUTURE GUIDE, VALVE REPLACEMENT: Performed by: THORACIC SURGERY (CARDIOTHORACIC VASCULAR SURGERY)

## 2018-01-01 DEVICE — BONE PUTTY HEMOSTATIC ABSORBABLE (12/BX): Type: IMPLANTABLE DEVICE | Status: FUNCTIONAL

## 2018-01-01 DEVICE — ANNULO. RING MITRAL 4600-38 ---ALWAYS SHIP OVERNIGHT---: Type: IMPLANTABLE DEVICE | Site: HEART | Status: FUNCTIONAL

## 2018-01-01 DEVICE — ANNULO. RING MITRAL 4600-34 ---ALWAYS SHIP OVERNIGHT---: Type: IMPLANTABLE DEVICE | Site: HEART | Status: FUNCTIONAL

## 2018-01-01 RX ORDER — ETOMIDATE 2 MG/ML
20 INJECTION INTRAVENOUS ONCE
Status: COMPLETED | OUTPATIENT
Start: 2018-01-01 | End: 2018-01-01

## 2018-01-01 RX ORDER — METOLAZONE 5 MG/1
TABLET ORAL
Qty: 36 TAB | Refills: 3 | Status: SHIPPED | OUTPATIENT
Start: 2018-01-01

## 2018-01-01 RX ORDER — MIDAZOLAM HYDROCHLORIDE 1 MG/ML
INJECTION INTRAMUSCULAR; INTRAVENOUS
Status: DISPENSED
Start: 2018-01-01 | End: 2018-01-01

## 2018-01-01 RX ORDER — TRAZODONE HYDROCHLORIDE 50 MG/1
25 TABLET ORAL ONCE
Status: COMPLETED | OUTPATIENT
Start: 2018-01-01 | End: 2018-01-01

## 2018-01-01 RX ORDER — AMIODARONE HYDROCHLORIDE 200 MG/1
200 TABLET ORAL TWICE DAILY
Status: DISCONTINUED | OUTPATIENT
Start: 2018-01-01 | End: 2018-01-01 | Stop reason: HOSPADM

## 2018-01-01 RX ORDER — SODIUM CHLORIDE, SODIUM GLUCONATE, SODIUM ACETATE, POTASSIUM CHLORIDE, AND MAGNESIUM CHLORIDE 526; 502; 368; 37; 30 MG/100ML; MG/100ML; MG/100ML; MG/100ML; MG/100ML
1000 INJECTION, SOLUTION INTRAVENOUS
Status: DISCONTINUED | OUTPATIENT
Start: 2018-01-01 | End: 2018-01-01

## 2018-01-01 RX ORDER — AMIODARONE HYDROCHLORIDE 150 MG/3ML
INJECTION, SOLUTION INTRAVENOUS
Status: COMPLETED | OUTPATIENT
Start: 2018-01-01 | End: 2018-01-01

## 2018-01-01 RX ORDER — ACETAMINOPHEN 325 MG/1
650 TABLET ORAL EVERY 4 HOURS PRN
Status: DISCONTINUED | OUTPATIENT
Start: 2018-01-01 | End: 2018-01-01 | Stop reason: HOSPADM

## 2018-01-01 RX ORDER — FAMOTIDINE 20 MG/1
20 TABLET, FILM COATED ORAL 2 TIMES DAILY
Status: DISCONTINUED | OUTPATIENT
Start: 2018-01-01 | End: 2018-01-01

## 2018-01-01 RX ORDER — DEXTROSE MONOHYDRATE 50 MG/ML
INJECTION, SOLUTION INTRAVENOUS
Status: DISCONTINUED
Start: 2018-01-01 | End: 2018-01-01 | Stop reason: HOSPADM

## 2018-01-01 RX ORDER — FUROSEMIDE 40 MG/1
120 TABLET ORAL
Status: DISCONTINUED | OUTPATIENT
Start: 2018-01-01 | End: 2018-01-01

## 2018-01-01 RX ORDER — BISACODYL 10 MG
10 SUPPOSITORY, RECTAL RECTAL
Status: DISCONTINUED | OUTPATIENT
Start: 2018-01-01 | End: 2018-01-01 | Stop reason: HOSPADM

## 2018-01-01 RX ORDER — CEFAZOLIN SODIUM 2 G/100ML
2 INJECTION, SOLUTION INTRAVENOUS ONCE
Status: COMPLETED | OUTPATIENT
Start: 2018-01-01 | End: 2018-01-01

## 2018-01-01 RX ORDER — SODIUM CHLORIDE 9 MG/ML
INJECTION, SOLUTION INTRAVENOUS
Status: COMPLETED
Start: 2018-01-01 | End: 2018-01-01

## 2018-01-01 RX ORDER — HEPARIN SODIUM 1000 [USP'U]/ML
INJECTION, SOLUTION INTRAVENOUS; SUBCUTANEOUS
Status: COMPLETED
Start: 2018-01-01 | End: 2018-01-01

## 2018-01-01 RX ORDER — AMIODARONE HYDROCHLORIDE 200 MG/1
400 TABLET ORAL TWICE DAILY
Status: DISCONTINUED | OUTPATIENT
Start: 2018-01-01 | End: 2018-01-01

## 2018-01-01 RX ORDER — FUROSEMIDE 10 MG/ML
80 INJECTION INTRAMUSCULAR; INTRAVENOUS ONCE
Status: COMPLETED | OUTPATIENT
Start: 2018-01-01 | End: 2018-01-01

## 2018-01-01 RX ORDER — LIDOCAINE 50 MG/G
1 PATCH TOPICAL EVERY 24 HOURS
Status: DISCONTINUED | OUTPATIENT
Start: 2018-01-01 | End: 2018-01-01 | Stop reason: HOSPADM

## 2018-01-01 RX ORDER — SODIUM CHLORIDE 9 MG/ML
INJECTION, SOLUTION INTRAVENOUS CONTINUOUS
Status: ACTIVE | OUTPATIENT
Start: 2018-01-01 | End: 2018-01-01

## 2018-01-01 RX ORDER — ALBUMIN (HUMAN) 12.5 G/50ML
12.5 SOLUTION INTRAVENOUS 2 TIMES DAILY
Status: DISCONTINUED | OUTPATIENT
Start: 2018-01-01 | End: 2018-01-01

## 2018-01-01 RX ORDER — FUROSEMIDE 40 MG/1
40 TABLET ORAL EVERY MORNING
COMMUNITY
End: 2018-01-01 | Stop reason: SDUPTHER

## 2018-01-01 RX ORDER — CALCIUM CHLORIDE 100 MG/ML
1 INJECTION INTRAVENOUS; INTRAVENTRICULAR ONCE
Status: COMPLETED | OUTPATIENT
Start: 2018-01-01 | End: 2018-01-01

## 2018-01-01 RX ORDER — WARFARIN SODIUM 2.5 MG/1
2.5 TABLET ORAL
Status: DISCONTINUED | OUTPATIENT
Start: 2018-01-01 | End: 2018-01-01

## 2018-01-01 RX ORDER — FUROSEMIDE 10 MG/ML
100 INJECTION INTRAMUSCULAR; INTRAVENOUS ONCE
Status: COMPLETED | OUTPATIENT
Start: 2018-01-01 | End: 2018-01-01

## 2018-01-01 RX ORDER — MIDODRINE HYDROCHLORIDE 5 MG/1
10 TABLET ORAL
Status: DISCONTINUED | OUTPATIENT
Start: 2018-01-01 | End: 2018-01-01

## 2018-01-01 RX ORDER — ZOLPIDEM TARTRATE 5 MG/1
5 TABLET ORAL NIGHTLY PRN
Status: DISCONTINUED | OUTPATIENT
Start: 2018-01-01 | End: 2018-01-01

## 2018-01-01 RX ORDER — AMOXICILLIN 250 MG
2 CAPSULE ORAL 2 TIMES DAILY
Status: DISCONTINUED | OUTPATIENT
Start: 2018-01-01 | End: 2018-01-01 | Stop reason: HOSPADM

## 2018-01-01 RX ORDER — SODIUM CHLORIDE 9 MG/ML
INJECTION, SOLUTION INTRAVENOUS
Status: DISCONTINUED
Start: 2018-01-01 | End: 2018-01-01 | Stop reason: HOSPADM

## 2018-01-01 RX ORDER — MIDAZOLAM HYDROCHLORIDE 1 MG/ML
INJECTION INTRAMUSCULAR; INTRAVENOUS
Status: COMPLETED | OUTPATIENT
Start: 2018-01-01 | End: 2018-01-01

## 2018-01-01 RX ORDER — MIDAZOLAM HYDROCHLORIDE 1 MG/ML
INJECTION INTRAMUSCULAR; INTRAVENOUS
Status: COMPLETED
Start: 2018-01-01 | End: 2018-01-01

## 2018-01-01 RX ORDER — DOPAMINE HYDROCHLORIDE 160 MG/100ML
0-20 INJECTION, SOLUTION INTRAVENOUS CONTINUOUS
Status: DISCONTINUED | OUTPATIENT
Start: 2018-01-01 | End: 2018-01-01 | Stop reason: HOSPADM

## 2018-01-01 RX ORDER — WARFARIN SODIUM 2 MG/1
2 TABLET ORAL
Status: DISCONTINUED | OUTPATIENT
Start: 2018-01-01 | End: 2018-01-01

## 2018-01-01 RX ORDER — POTASSIUM CHLORIDE 7.45 MG/ML
10 INJECTION INTRAVENOUS ONCE
Status: COMPLETED | OUTPATIENT
Start: 2018-01-01 | End: 2018-01-01

## 2018-01-01 RX ORDER — FUROSEMIDE 10 MG/ML
40 INJECTION INTRAMUSCULAR; INTRAVENOUS 2 TIMES DAILY
Status: DISCONTINUED | OUTPATIENT
Start: 2018-01-01 | End: 2018-01-01

## 2018-01-01 RX ORDER — POTASSIUM CHLORIDE 20 MEQ/1
TABLET, EXTENDED RELEASE ORAL
Qty: 120 TAB | Refills: 3 | Status: SHIPPED | OUTPATIENT
Start: 2018-01-01

## 2018-01-01 RX ORDER — ASPIRIN 81 MG/1
81 TABLET, CHEWABLE ORAL DAILY
Status: DISCONTINUED | OUTPATIENT
Start: 2018-01-01 | End: 2018-01-01

## 2018-01-01 RX ORDER — ATORVASTATIN CALCIUM 20 MG/1
20 TABLET, FILM COATED ORAL EVERY EVENING
Status: DISCONTINUED | OUTPATIENT
Start: 2018-01-01 | End: 2018-01-01 | Stop reason: HOSPADM

## 2018-01-01 RX ORDER — SODIUM CHLORIDE 9 MG/ML
INJECTION, SOLUTION INTRAVENOUS
Status: ACTIVE
Start: 2018-01-01 | End: 2018-01-01

## 2018-01-01 RX ORDER — MIDAZOLAM HYDROCHLORIDE 1 MG/ML
.5-2 INJECTION INTRAMUSCULAR; INTRAVENOUS PRN
Status: ACTIVE | OUTPATIENT
Start: 2018-01-01 | End: 2018-01-01

## 2018-01-01 RX ORDER — FUROSEMIDE 10 MG/ML
80 INJECTION INTRAMUSCULAR; INTRAVENOUS
Status: DISCONTINUED | OUTPATIENT
Start: 2018-01-01 | End: 2018-01-01

## 2018-01-01 RX ORDER — POTASSIUM CHLORIDE 20 MEQ/1
TABLET, EXTENDED RELEASE ORAL
Qty: 50 TAB | Refills: 3
Start: 2018-01-01 | End: 2018-01-01 | Stop reason: SDUPTHER

## 2018-01-01 RX ORDER — POTASSIUM CHLORIDE 20 MEQ/1
20 TABLET, EXTENDED RELEASE ORAL ONCE
Status: COMPLETED | OUTPATIENT
Start: 2018-01-01 | End: 2018-01-01

## 2018-01-01 RX ORDER — FUROSEMIDE 40 MG/1
TABLET ORAL
Qty: 120 TAB | Refills: 3 | Status: SHIPPED | OUTPATIENT
Start: 2018-01-01

## 2018-01-01 RX ORDER — POLYETHYLENE GLYCOL 3350 17 G/17G
1 POWDER, FOR SOLUTION ORAL 2 TIMES DAILY
Status: DISCONTINUED | OUTPATIENT
Start: 2018-01-01 | End: 2018-01-01 | Stop reason: HOSPADM

## 2018-01-01 RX ORDER — FUROSEMIDE 10 MG/ML
20 INJECTION INTRAMUSCULAR; INTRAVENOUS ONCE
Status: COMPLETED | OUTPATIENT
Start: 2018-01-01 | End: 2018-01-01

## 2018-01-01 RX ORDER — POLYETHYLENE GLYCOL 3350 17 G/17G
1 POWDER, FOR SOLUTION ORAL
Status: DISCONTINUED | OUTPATIENT
Start: 2018-01-01 | End: 2018-01-01 | Stop reason: HOSPADM

## 2018-01-01 RX ORDER — FUROSEMIDE 10 MG/ML
INJECTION INTRAMUSCULAR; INTRAVENOUS
Status: COMPLETED
Start: 2018-01-01 | End: 2018-01-01

## 2018-01-01 RX ORDER — METOLAZONE 2.5 MG/1
2.5 TABLET ORAL
Status: DISCONTINUED | OUTPATIENT
Start: 2018-01-01 | End: 2018-01-01

## 2018-01-01 RX ORDER — FUROSEMIDE 80 MG
80 TABLET ORAL DAILY
COMMUNITY
End: 2018-01-01 | Stop reason: CLARIF

## 2018-01-01 RX ORDER — POTASSIUM CHLORIDE 14.9 MG/ML
20 INJECTION INTRAVENOUS ONCE
Status: COMPLETED | OUTPATIENT
Start: 2018-01-01 | End: 2018-01-01

## 2018-01-01 RX ORDER — MAGNESIUM SULFATE 1 G/100ML
1 INJECTION INTRAVENOUS
Status: DISPENSED | OUTPATIENT
Start: 2018-01-01 | End: 2018-01-01

## 2018-01-01 RX ORDER — DEXMEDETOMIDINE HYDROCHLORIDE 4 UG/ML
0-1.5 INJECTION, SOLUTION INTRAVENOUS CONTINUOUS
Status: DISCONTINUED | OUTPATIENT
Start: 2018-01-01 | End: 2018-01-01

## 2018-01-01 RX ORDER — ONDANSETRON 2 MG/ML
4 INJECTION INTRAMUSCULAR; INTRAVENOUS EVERY 6 HOURS PRN
Status: DISCONTINUED | OUTPATIENT
Start: 2018-01-01 | End: 2018-01-01 | Stop reason: HOSPADM

## 2018-01-01 RX ORDER — SODIUM CHLORIDE 9 MG/ML
500 INJECTION, SOLUTION INTRAVENOUS
Status: ACTIVE | OUTPATIENT
Start: 2018-01-01 | End: 2018-01-01

## 2018-01-01 RX ORDER — PROMETHAZINE HYDROCHLORIDE 25 MG/1
25 SUPPOSITORY RECTAL EVERY 6 HOURS PRN
Status: DISCONTINUED | OUTPATIENT
Start: 2018-01-01 | End: 2018-01-01

## 2018-01-01 RX ORDER — FUROSEMIDE 20 MG/1
20 TABLET ORAL DAILY
COMMUNITY
End: 2018-01-01

## 2018-01-01 RX ORDER — TRAMADOL HYDROCHLORIDE 50 MG/1
50 TABLET ORAL EVERY 4 HOURS PRN
Status: DISCONTINUED | OUTPATIENT
Start: 2018-01-01 | End: 2018-01-01

## 2018-01-01 RX ORDER — PROPOFOL 10 MG/ML
INJECTION, EMULSION INTRAVENOUS
Status: COMPLETED | OUTPATIENT
Start: 2018-01-01 | End: 2018-01-01

## 2018-01-01 RX ORDER — HEPARIN SODIUM,PORCINE 1000/ML
VIAL (ML) INJECTION
Status: COMPLETED
Start: 2018-01-01 | End: 2018-01-01

## 2018-01-01 RX ORDER — ALUMINA, MAGNESIA, AND SIMETHICONE 2400; 2400; 240 MG/30ML; MG/30ML; MG/30ML
30 SUSPENSION ORAL EVERY 4 HOURS PRN
Status: DISCONTINUED | OUTPATIENT
Start: 2018-01-01 | End: 2018-01-01 | Stop reason: HOSPADM

## 2018-01-01 RX ORDER — ONDANSETRON 2 MG/ML
4 INJECTION INTRAMUSCULAR; INTRAVENOUS PRN
Status: ACTIVE | OUTPATIENT
Start: 2018-01-01 | End: 2018-01-01

## 2018-01-01 RX ORDER — LIDOCAINE HYDROCHLORIDE 10 MG/ML
INJECTION, SOLUTION INFILTRATION; PERINEURAL
Status: COMPLETED
Start: 2018-01-01 | End: 2018-01-01

## 2018-01-01 RX ORDER — DIPHENHYDRAMINE HCL 25 MG
25 TABLET ORAL
Status: DISCONTINUED | OUTPATIENT
Start: 2018-01-01 | End: 2018-01-01

## 2018-01-01 RX ORDER — DIPHENHYDRAMINE HYDROCHLORIDE 50 MG/ML
25 INJECTION INTRAMUSCULAR; INTRAVENOUS EVERY 6 HOURS PRN
Status: DISCONTINUED | OUTPATIENT
Start: 2018-01-01 | End: 2018-01-01 | Stop reason: HOSPADM

## 2018-01-01 RX ORDER — NITROGLYCERIN 20 MG/100ML
0-100 INJECTION INTRAVENOUS CONTINUOUS
Status: DISCONTINUED | OUTPATIENT
Start: 2018-01-01 | End: 2018-01-01

## 2018-01-01 RX ORDER — TRAMADOL HYDROCHLORIDE 50 MG/1
50 TABLET ORAL EVERY 12 HOURS PRN
Status: DISCONTINUED | OUTPATIENT
Start: 2018-01-01 | End: 2018-01-01 | Stop reason: HOSPADM

## 2018-01-01 RX ORDER — WARFARIN SODIUM 5 MG/1
5 TABLET ORAL
Status: DISCONTINUED | OUTPATIENT
Start: 2018-01-01 | End: 2018-01-01

## 2018-01-01 RX ORDER — ALBUMIN, HUMAN INJ 5% 5 %
25 SOLUTION INTRAVENOUS ONCE
Status: COMPLETED | OUTPATIENT
Start: 2018-01-01 | End: 2018-01-01

## 2018-01-01 RX ORDER — DEXTROSE MONOHYDRATE 25 G/50ML
25 INJECTION, SOLUTION INTRAVENOUS PRN
Status: ACTIVE | OUTPATIENT
Start: 2018-01-01 | End: 2018-01-01

## 2018-01-01 RX ORDER — ONDANSETRON 2 MG/ML
4 INJECTION INTRAMUSCULAR; INTRAVENOUS EVERY 4 HOURS PRN
Status: DISCONTINUED | OUTPATIENT
Start: 2018-01-01 | End: 2018-01-01 | Stop reason: HOSPADM

## 2018-01-01 RX ORDER — POTASSIUM CHLORIDE 20 MEQ/1
40 TABLET, EXTENDED RELEASE ORAL ONCE
Status: COMPLETED | OUTPATIENT
Start: 2018-01-01 | End: 2018-01-01

## 2018-01-01 RX ORDER — WARFARIN SODIUM 2 MG/1
2 TABLET ORAL
Status: COMPLETED | OUTPATIENT
Start: 2018-01-01 | End: 2018-01-01

## 2018-01-01 RX ORDER — MIDAZOLAM HYDROCHLORIDE 1 MG/ML
1-5 INJECTION INTRAMUSCULAR; INTRAVENOUS
Status: DISCONTINUED | OUTPATIENT
Start: 2018-01-01 | End: 2018-01-01 | Stop reason: HOSPADM

## 2018-01-01 RX ORDER — FAMOTIDINE 20 MG/1
20 TABLET, FILM COATED ORAL DAILY
Status: DISCONTINUED | OUTPATIENT
Start: 2018-01-01 | End: 2018-01-01

## 2018-01-01 RX ORDER — POLYETHYLENE GLYCOL 3350 17 G/17G
1 POWDER, FOR SOLUTION ORAL 2 TIMES DAILY
Status: DISCONTINUED | OUTPATIENT
Start: 2018-01-01 | End: 2018-01-01

## 2018-01-01 RX ORDER — CEFAZOLIN SODIUM 2 G/100ML
2 INJECTION, SOLUTION INTRAVENOUS ONCE
Status: DISCONTINUED | OUTPATIENT
Start: 2018-01-01 | End: 2018-01-01 | Stop reason: HOSPADM

## 2018-01-01 RX ORDER — PNV NO.95/FERROUS FUM/FOLIC AC 28MG-0.8MG
1 TABLET ORAL DAILY
COMMUNITY

## 2018-01-01 RX ORDER — TAMSULOSIN HYDROCHLORIDE 0.4 MG/1
0.4 CAPSULE ORAL
Status: DISCONTINUED | OUTPATIENT
Start: 2018-01-01 | End: 2018-01-01

## 2018-01-01 RX ORDER — ALBUMIN (HUMAN) 12.5 G/50ML
SOLUTION INTRAVENOUS
Status: COMPLETED
Start: 2018-01-01 | End: 2018-01-01

## 2018-01-01 RX ORDER — WARFARIN SODIUM 1 MG/1
0.5 TABLET ORAL
Status: DISCONTINUED | OUTPATIENT
Start: 2018-01-01 | End: 2018-01-01

## 2018-01-01 RX ORDER — MIDAZOLAM HYDROCHLORIDE 1 MG/ML
2 INJECTION INTRAMUSCULAR; INTRAVENOUS
Status: DISCONTINUED | OUTPATIENT
Start: 2018-01-01 | End: 2018-01-01

## 2018-01-01 RX ORDER — ALBUMIN (HUMAN) 12.5 G/50ML
12.5 SOLUTION INTRAVENOUS
Status: DISCONTINUED | OUTPATIENT
Start: 2018-01-01 | End: 2018-01-01 | Stop reason: HOSPADM

## 2018-01-01 RX ORDER — METOPROLOL SUCCINATE 50 MG/1
50 TABLET, EXTENDED RELEASE ORAL EVERY EVENING
Qty: 90 TAB | Refills: 3 | Status: SHIPPED | OUTPATIENT
Start: 2018-01-01

## 2018-01-01 RX ORDER — VERAPAMIL HYDROCHLORIDE 2.5 MG/ML
INJECTION, SOLUTION INTRAVENOUS
Status: COMPLETED
Start: 2018-01-01 | End: 2018-01-01

## 2018-01-01 RX ORDER — ACETAMINOPHEN 650 MG/1
650 SUPPOSITORY RECTAL EVERY 4 HOURS PRN
Status: DISCONTINUED | OUTPATIENT
Start: 2018-01-01 | End: 2018-01-01 | Stop reason: HOSPADM

## 2018-01-01 RX ORDER — HYDROCODONE BITARTRATE AND ACETAMINOPHEN 5; 325 MG/1; MG/1
1-2 TABLET ORAL EVERY 4 HOURS PRN
Status: DISCONTINUED | OUTPATIENT
Start: 2018-01-01 | End: 2018-01-01 | Stop reason: HOSPADM

## 2018-01-01 RX ORDER — MIDODRINE HYDROCHLORIDE 5 MG/1
5 TABLET ORAL
Status: DISCONTINUED | OUTPATIENT
Start: 2018-01-01 | End: 2018-01-01

## 2018-01-01 RX ORDER — WARFARIN SODIUM 1 MG/1
1 TABLET ORAL
Status: COMPLETED | OUTPATIENT
Start: 2018-01-01 | End: 2018-01-01

## 2018-01-01 RX ORDER — MULTIVIT-MIN/IRON/FOLIC ACID/K 18-600-40
2000 CAPSULE ORAL DAILY
Status: ON HOLD | COMMUNITY
End: 2018-01-01

## 2018-01-01 RX ORDER — MAGNESIUM SULFATE HEPTAHYDRATE 500 MG/ML
INJECTION, SOLUTION INTRAMUSCULAR; INTRAVENOUS
Status: COMPLETED | OUTPATIENT
Start: 2018-01-01 | End: 2018-01-01

## 2018-01-01 RX ORDER — HEPARIN SODIUM 1000 [USP'U]/ML
2800 INJECTION, SOLUTION INTRAVENOUS; SUBCUTANEOUS
Status: DISCONTINUED | OUTPATIENT
Start: 2018-01-01 | End: 2018-01-01 | Stop reason: HOSPADM

## 2018-01-01 RX ORDER — FUROSEMIDE 10 MG/ML
20 INJECTION INTRAMUSCULAR; INTRAVENOUS 2 TIMES DAILY
Status: DISCONTINUED | OUTPATIENT
Start: 2018-01-01 | End: 2018-01-01

## 2018-01-01 RX ORDER — SODIUM CHLORIDE, SODIUM GLUCONATE, SODIUM ACETATE, POTASSIUM CHLORIDE AND MAGNESIUM CHLORIDE 526; 502; 368; 37; 30 MG/100ML; MG/100ML; MG/100ML; MG/100ML; MG/100ML
INJECTION, SOLUTION INTRAVENOUS PRN
Status: DISCONTINUED | OUTPATIENT
Start: 2018-01-01 | End: 2018-01-01

## 2018-01-01 RX ORDER — FUROSEMIDE 10 MG/ML
40 INJECTION INTRAMUSCULAR; INTRAVENOUS ONCE
Status: COMPLETED | OUTPATIENT
Start: 2018-01-01 | End: 2018-01-01

## 2018-01-01 RX ORDER — FUROSEMIDE 40 MG/1
80 TABLET ORAL
Status: DISCONTINUED | OUTPATIENT
Start: 2018-01-01 | End: 2018-01-01

## 2018-01-01 RX ORDER — SODIUM CHLORIDE 9 MG/ML
INJECTION, SOLUTION INTRAVENOUS
Status: DISCONTINUED | OUTPATIENT
Start: 2018-01-01 | End: 2018-01-01 | Stop reason: HOSPADM

## 2018-01-01 RX ORDER — OXYCODONE HYDROCHLORIDE 10 MG/1
10 TABLET ORAL
Status: DISCONTINUED | OUTPATIENT
Start: 2018-01-01 | End: 2018-01-01

## 2018-01-01 RX ORDER — HEPARIN SODIUM 1000 [USP'U]/ML
1500 INJECTION, SOLUTION INTRAVENOUS; SUBCUTANEOUS ONCE
Status: DISCONTINUED | OUTPATIENT
Start: 2018-01-01 | End: 2018-01-01 | Stop reason: ALTCHOICE

## 2018-01-01 RX ORDER — MORPHINE SULFATE 4 MG/ML
4 INJECTION, SOLUTION INTRAMUSCULAR; INTRAVENOUS
Status: DISCONTINUED | OUTPATIENT
Start: 2018-01-01 | End: 2018-01-01

## 2018-01-01 RX ORDER — SODIUM CHLORIDE 9 MG/ML
INJECTION, SOLUTION INTRAVENOUS CONTINUOUS
Status: DISCONTINUED | OUTPATIENT
Start: 2018-01-01 | End: 2018-01-01

## 2018-01-01 RX ORDER — NITROFURANTOIN 25; 75 MG/1; MG/1
100 CAPSULE ORAL 2 TIMES DAILY WITH MEALS
Status: DISCONTINUED | OUTPATIENT
Start: 2018-01-01 | End: 2018-01-01

## 2018-01-01 RX ORDER — MIDODRINE HYDROCHLORIDE 5 MG/1
10 TABLET ORAL
Status: DISCONTINUED | OUTPATIENT
Start: 2018-01-01 | End: 2018-01-01 | Stop reason: HOSPADM

## 2018-01-01 RX ORDER — OXYCODONE HYDROCHLORIDE 5 MG/1
5 TABLET ORAL
Status: DISCONTINUED | OUTPATIENT
Start: 2018-01-01 | End: 2018-01-01

## 2018-01-01 RX ORDER — METOLAZONE 2.5 MG/1
2.5 TABLET ORAL
Qty: 15 TAB | Refills: 1 | Status: SHIPPED | OUTPATIENT
Start: 2018-01-01 | End: 2018-01-01

## 2018-01-01 RX ORDER — AMIODARONE HYDROCHLORIDE 150 MG/3ML
150 INJECTION, SOLUTION INTRAVENOUS ONCE
Status: DISCONTINUED | OUTPATIENT
Start: 2018-01-01 | End: 2018-01-01

## 2018-01-01 RX ORDER — BISACODYL 10 MG
10 SUPPOSITORY, RECTAL RECTAL DAILY
Status: DISCONTINUED | OUTPATIENT
Start: 2018-01-01 | End: 2018-01-01

## 2018-01-01 RX ADMIN — ATROPINE SULFATE 1 MG: 0.1 INJECTION, SOLUTION INTRAVENOUS at 14:12

## 2018-01-01 RX ADMIN — Medication 81 MG: at 06:00

## 2018-01-01 RX ADMIN — FENTANYL CITRATE 25 MCG: 50 INJECTION, SOLUTION INTRAMUSCULAR; INTRAVENOUS at 18:00

## 2018-01-01 RX ADMIN — MILRINONE LACTATE 0.5 MCG/KG/MIN: 1 INJECTION, SOLUTION INTRAVENOUS at 15:45

## 2018-01-01 RX ADMIN — TRAMADOL HYDROCHLORIDE 50 MG: 50 TABLET, FILM COATED ORAL at 00:17

## 2018-01-01 RX ADMIN — FENTANYL CITRATE 100 MCG: 50 INJECTION, SOLUTION INTRAMUSCULAR; INTRAVENOUS at 15:00

## 2018-01-01 RX ADMIN — AMIODARONE HYDROCHLORIDE 200 MG: 200 TABLET ORAL at 05:11

## 2018-01-01 RX ADMIN — AMIODARONE HYDROCHLORIDE 200 MG: 200 TABLET ORAL at 17:37

## 2018-01-01 RX ADMIN — IOHEXOL 69 ML: 350 INJECTION, SOLUTION INTRAVENOUS at 11:10

## 2018-01-01 RX ADMIN — AMIODARONE HYDROCHLORIDE 200 MG: 200 TABLET ORAL at 17:01

## 2018-01-01 RX ADMIN — LIDOCAINE 1 PATCH: 50 PATCH TOPICAL at 17:13

## 2018-01-01 RX ADMIN — HYDROCODONE BITARTRATE AND ACETAMINOPHEN 1 TABLET: 5; 325 TABLET ORAL at 10:22

## 2018-01-01 RX ADMIN — MIDAZOLAM 1 MG: 1 INJECTION INTRAMUSCULAR; INTRAVENOUS at 15:51

## 2018-01-01 RX ADMIN — ATORVASTATIN CALCIUM 20 MG: 20 TABLET, FILM COATED ORAL at 17:52

## 2018-01-01 RX ADMIN — AMIODARONE HYDROCHLORIDE 400 MG: 200 TABLET ORAL at 17:57

## 2018-01-01 RX ADMIN — METOLAZONE 2.5 MG: 2.5 TABLET ORAL at 06:03

## 2018-01-01 RX ADMIN — OXYCODONE HYDROCHLORIDE 10 MG: 10 TABLET ORAL at 18:45

## 2018-01-01 RX ADMIN — TAMSULOSIN HYDROCHLORIDE 0.4 MG: 0.4 CAPSULE ORAL at 08:44

## 2018-01-01 RX ADMIN — FUROSEMIDE 120 MG: 40 TABLET ORAL at 05:12

## 2018-01-01 RX ADMIN — FUROSEMIDE 120 MG: 40 TABLET ORAL at 05:14

## 2018-01-01 RX ADMIN — LIDOCAINE 1 PATCH: 50 PATCH TOPICAL at 17:47

## 2018-01-01 RX ADMIN — AMIODARONE HYDROCHLORIDE 200 MG: 200 TABLET ORAL at 17:54

## 2018-01-01 RX ADMIN — ALBUMIN (HUMAN) 12.5 G: 0.25 INJECTION, SOLUTION INTRAVENOUS at 05:53

## 2018-01-01 RX ADMIN — FUROSEMIDE 20 MG: 10 INJECTION, SOLUTION INTRAMUSCULAR; INTRAVENOUS at 05:30

## 2018-01-01 RX ADMIN — ACETAMINOPHEN 650 MG: 325 TABLET, FILM COATED ORAL at 12:16

## 2018-01-01 RX ADMIN — POLYETHYLENE GLYCOL 3350 1 PACKET: 17 POWDER, FOR SOLUTION ORAL at 17:01

## 2018-01-01 RX ADMIN — AMIODARONE HYDROCHLORIDE 200 MG: 200 TABLET ORAL at 16:48

## 2018-01-01 RX ADMIN — FUROSEMIDE 120 MG: 40 TABLET ORAL at 05:49

## 2018-01-01 RX ADMIN — NITROGLYCERIN 10 ML: 20 INJECTION INTRAVENOUS at 09:59

## 2018-01-01 RX ADMIN — HYDROCODONE BITARTRATE AND ACETAMINOPHEN 1 TABLET: 5; 325 TABLET ORAL at 01:58

## 2018-01-01 RX ADMIN — STANDARDIZED SENNA CONCENTRATE AND DOCUSATE SODIUM 2 TABLET: 8.6; 5 TABLET ORAL at 05:11

## 2018-01-01 RX ADMIN — ZOLPIDEM TARTRATE 5 MG: 5 TABLET ORAL at 01:51

## 2018-01-01 RX ADMIN — Medication 81 MG: at 05:08

## 2018-01-01 RX ADMIN — ATORVASTATIN CALCIUM 20 MG: 20 TABLET, FILM COATED ORAL at 17:25

## 2018-01-01 RX ADMIN — FAMOTIDINE 20 MG: 10 INJECTION, SOLUTION INTRAVENOUS at 09:04

## 2018-01-01 RX ADMIN — MIDODRINE HYDROCHLORIDE 5 MG: 5 TABLET ORAL at 11:53

## 2018-01-01 RX ADMIN — BISACODYL 10 MG: 10 SUPPOSITORY RECTAL at 14:58

## 2018-01-01 RX ADMIN — AMIODARONE HYDROCHLORIDE 200 MG: 200 TABLET ORAL at 04:58

## 2018-01-01 RX ADMIN — CALCIUM CHLORIDE 1 G: 100 INJECTION, SOLUTION INTRAVENOUS at 10:56

## 2018-01-01 RX ADMIN — MAGNESIUM HYDROXIDE 30 ML: 400 SUSPENSION ORAL at 04:48

## 2018-01-01 RX ADMIN — SODIUM CHLORIDE: 9 INJECTION, SOLUTION INTRAVENOUS at 14:00

## 2018-01-01 RX ADMIN — HEPARIN SODIUM 2800 UNITS: 1000 INJECTION, SOLUTION INTRAVENOUS; SUBCUTANEOUS at 11:30

## 2018-01-01 RX ADMIN — AMIODARONE HYDROCHLORIDE 200 MG: 200 TABLET ORAL at 05:14

## 2018-01-01 RX ADMIN — STANDARDIZED SENNA CONCENTRATE AND DOCUSATE SODIUM 2 TABLET: 8.6; 5 TABLET ORAL at 05:54

## 2018-01-01 RX ADMIN — ZOLPIDEM TARTRATE 5 MG: 5 TABLET ORAL at 20:47

## 2018-01-01 RX ADMIN — POLYETHYLENE GLYCOL 3350 1 PACKET: 17 POWDER, FOR SOLUTION ORAL at 17:55

## 2018-01-01 RX ADMIN — Medication 81 MG: at 04:46

## 2018-01-01 RX ADMIN — AMIODARONE HYDROCHLORIDE 200 MG: 200 TABLET ORAL at 05:15

## 2018-01-01 RX ADMIN — POLYETHYLENE GLYCOL 3350 1 PACKET: 17 POWDER, FOR SOLUTION ORAL at 17:27

## 2018-01-01 RX ADMIN — HYDROCODONE BITARTRATE AND ACETAMINOPHEN 2 TABLET: 5; 325 TABLET ORAL at 11:49

## 2018-01-01 RX ADMIN — MIDODRINE HYDROCHLORIDE 10 MG: 5 TABLET ORAL at 06:17

## 2018-01-01 RX ADMIN — STANDARDIZED SENNA CONCENTRATE AND DOCUSATE SODIUM 2 TABLET: 8.6; 5 TABLET ORAL at 05:32

## 2018-01-01 RX ADMIN — STANDARDIZED SENNA CONCENTRATE AND DOCUSATE SODIUM 2 TABLET: 8.6; 5 TABLET ORAL at 05:02

## 2018-01-01 RX ADMIN — HEPARIN SODIUM 2800 UNITS: 1000 INJECTION, SOLUTION INTRAVENOUS; SUBCUTANEOUS at 17:15

## 2018-01-01 RX ADMIN — AMIODARONE HYDROCHLORIDE 150 MG: 1.5 INJECTION, SOLUTION INTRAVENOUS at 15:01

## 2018-01-01 RX ADMIN — CEFAZOLIN SODIUM 2 G: 2 INJECTION, SOLUTION INTRAVENOUS at 16:45

## 2018-01-01 RX ADMIN — MAGNESIUM SULFATE HEPTAHYDRATE 2 G: 500 INJECTION, SOLUTION INTRAMUSCULAR; INTRAVENOUS at 16:08

## 2018-01-01 RX ADMIN — STANDARDIZED SENNA CONCENTRATE AND DOCUSATE SODIUM 2 TABLET: 8.6; 5 TABLET ORAL at 17:57

## 2018-01-01 RX ADMIN — POLYETHYLENE GLYCOL 3350 1 PACKET: 17 POWDER, FOR SOLUTION ORAL at 17:10

## 2018-01-01 RX ADMIN — WARFARIN SODIUM 2 MG: 2 TABLET ORAL at 17:25

## 2018-01-01 RX ADMIN — ALBUMIN (HUMAN) 12.5 G: 12.5 SOLUTION INTRAVENOUS at 11:50

## 2018-01-01 RX ADMIN — AMIODARONE HYDROCHLORIDE 200 MG: 200 TABLET ORAL at 17:25

## 2018-01-01 RX ADMIN — CLOSTRIDIUM TETANI TOXOID ANTIGEN (FORMALDEHYDE INACTIVATED), CORYNEBACTERIUM DIPHTHERIAE TOXOID ANTIGEN (FORMALDEHYDE INACTIVATED), BORDETELLA PERTUSSIS TOXOID ANTIGEN (GLUTARALDEHYDE INACTIVATED), BORDETELLA PERTUSSIS FILAMENTOUS HEMAGGLUTININ ANTIGEN (FORMALDEHYDE INACTIVATED), BORDETELLA PERTUSSIS PERTACTIN ANTIGEN, AND BORDETELLA PERTUSSIS FIMBRIAE 2/3 ANTIGEN 0.5 ML: 5; 2; 2.5; 5; 3; 5 INJECTION, SUSPENSION INTRAMUSCULAR at 19:11

## 2018-01-01 RX ADMIN — Medication 81 MG: at 05:11

## 2018-01-01 RX ADMIN — MIDODRINE HYDROCHLORIDE 5 MG: 5 TABLET ORAL at 12:08

## 2018-01-01 RX ADMIN — TAMSULOSIN HYDROCHLORIDE 0.4 MG: 0.4 CAPSULE ORAL at 09:26

## 2018-01-01 RX ADMIN — STANDARDIZED SENNA CONCENTRATE AND DOCUSATE SODIUM 2 TABLET: 8.6; 5 TABLET ORAL at 17:27

## 2018-01-01 RX ADMIN — FUROSEMIDE 80 MG: 10 INJECTION, SOLUTION INTRAMUSCULAR; INTRAVENOUS at 17:10

## 2018-01-01 RX ADMIN — VERAPAMIL HYDROCHLORIDE 5 MG: 2.5 INJECTION, SOLUTION INTRAVENOUS at 09:59

## 2018-01-01 RX ADMIN — POLYETHYLENE GLYCOL 3350 1 PACKET: 17 POWDER, FOR SOLUTION ORAL at 05:15

## 2018-01-01 RX ADMIN — AMIODARONE HYDROCHLORIDE 200 MG: 200 TABLET ORAL at 05:49

## 2018-01-01 RX ADMIN — AMIODARONE HYDROCHLORIDE 200 MG: 200 TABLET ORAL at 17:55

## 2018-01-01 RX ADMIN — ATORVASTATIN CALCIUM 20 MG: 20 TABLET, FILM COATED ORAL at 17:27

## 2018-01-01 RX ADMIN — HYDROCODONE BITARTRATE AND ACETAMINOPHEN 2 TABLET: 5; 325 TABLET ORAL at 04:46

## 2018-01-01 RX ADMIN — OXYCODONE HYDROCHLORIDE 10 MG: 10 TABLET ORAL at 05:08

## 2018-01-01 RX ADMIN — STANDARDIZED SENNA CONCENTRATE AND DOCUSATE SODIUM 2 TABLET: 8.6; 5 TABLET ORAL at 05:43

## 2018-01-01 RX ADMIN — STANDARDIZED SENNA CONCENTRATE AND DOCUSATE SODIUM 2 TABLET: 8.6; 5 TABLET ORAL at 04:46

## 2018-01-01 RX ADMIN — ATORVASTATIN CALCIUM 20 MG: 20 TABLET, FILM COATED ORAL at 17:39

## 2018-01-01 RX ADMIN — POLYETHYLENE GLYCOL 3350 1 PACKET: 17 POWDER, FOR SOLUTION ORAL at 06:17

## 2018-01-01 RX ADMIN — ZOLPIDEM TARTRATE 5 MG: 5 TABLET ORAL at 18:40

## 2018-01-01 RX ADMIN — MIDODRINE HYDROCHLORIDE 5 MG: 5 TABLET ORAL at 06:21

## 2018-01-01 RX ADMIN — AMIODARONE HYDROCHLORIDE 200 MG: 200 TABLET ORAL at 06:24

## 2018-01-01 RX ADMIN — FUROSEMIDE 80 MG: 10 INJECTION, SOLUTION INTRAMUSCULAR; INTRAVENOUS at 11:45

## 2018-01-01 RX ADMIN — EPINEPHRINE 10 MCG/MIN: 1 INJECTION INTRAMUSCULAR; INTRAVENOUS; SUBCUTANEOUS at 14:38

## 2018-01-01 RX ADMIN — LIDOCAINE 1 PATCH: 50 PATCH TOPICAL at 16:58

## 2018-01-01 RX ADMIN — LIDOCAINE HYDROCHLORIDE 0.5 ML: 10 INJECTION, SOLUTION EPIDURAL; INFILTRATION; INTRACAUDAL; PERINEURAL at 06:32

## 2018-01-01 RX ADMIN — ATORVASTATIN CALCIUM 20 MG: 20 TABLET, FILM COATED ORAL at 17:12

## 2018-01-01 RX ADMIN — ATORVASTATIN CALCIUM 20 MG: 20 TABLET, FILM COATED ORAL at 17:37

## 2018-01-01 RX ADMIN — FUROSEMIDE 80 MG: 10 INJECTION, SOLUTION INTRAMUSCULAR; INTRAVENOUS at 17:35

## 2018-01-01 RX ADMIN — FUROSEMIDE 100 MG: 10 INJECTION, SOLUTION INTRAVENOUS at 17:25

## 2018-01-01 RX ADMIN — STANDARDIZED SENNA CONCENTRATE AND DOCUSATE SODIUM 2 TABLET: 8.6; 5 TABLET ORAL at 06:00

## 2018-01-01 RX ADMIN — MIDODRINE HYDROCHLORIDE 5 MG: 5 TABLET ORAL at 12:15

## 2018-01-01 RX ADMIN — AMIODARONE HYDROCHLORIDE 200 MG: 200 TABLET ORAL at 18:08

## 2018-01-01 RX ADMIN — AMIODARONE HYDROCHLORIDE 200 MG: 200 TABLET ORAL at 17:43

## 2018-01-01 RX ADMIN — HYDROCODONE BITARTRATE AND ACETAMINOPHEN 1 TABLET: 5; 325 TABLET ORAL at 01:51

## 2018-01-01 RX ADMIN — HEPARIN SODIUM 2800 UNITS: 1000 INJECTION, SOLUTION INTRAVENOUS; SUBCUTANEOUS at 14:00

## 2018-01-01 RX ADMIN — MIDODRINE HYDROCHLORIDE 5 MG: 5 TABLET ORAL at 17:01

## 2018-01-01 RX ADMIN — FUROSEMIDE 120 MG: 40 TABLET ORAL at 06:18

## 2018-01-01 RX ADMIN — ALBUMIN (HUMAN) 12.5 G: 0.25 INJECTION, SOLUTION INTRAVENOUS at 04:58

## 2018-01-01 RX ADMIN — TRAZODONE HYDROCHLORIDE 25 MG: 50 TABLET ORAL at 22:28

## 2018-01-01 RX ADMIN — AMIODARONE HYDROCHLORIDE 150 MG: 1.5 INJECTION, SOLUTION INTRAVENOUS at 16:03

## 2018-01-01 RX ADMIN — Medication 81 MG: at 04:48

## 2018-01-01 RX ADMIN — LIDOCAINE HYDROCHLORIDE: 10 INJECTION, SOLUTION INFILTRATION; PERINEURAL at 09:59

## 2018-01-01 RX ADMIN — MIDODRINE HYDROCHLORIDE 5 MG: 5 TABLET ORAL at 10:20

## 2018-01-01 RX ADMIN — MIDODRINE HYDROCHLORIDE 10 MG: 5 TABLET ORAL at 12:21

## 2018-01-01 RX ADMIN — AMIODARONE HYDROCHLORIDE 200 MG: 200 TABLET ORAL at 05:43

## 2018-01-01 RX ADMIN — FUROSEMIDE 20 MG: 10 INJECTION, SOLUTION INTRAMUSCULAR; INTRAVENOUS at 14:47

## 2018-01-01 RX ADMIN — MIDAZOLAM 1 MG: 1 INJECTION INTRAMUSCULAR; INTRAVENOUS at 18:00

## 2018-01-01 RX ADMIN — ATORVASTATIN CALCIUM 20 MG: 20 TABLET, FILM COATED ORAL at 17:35

## 2018-01-01 RX ADMIN — MIDODRINE HYDROCHLORIDE 10 MG: 5 TABLET ORAL at 17:00

## 2018-01-01 RX ADMIN — DEXMEDETOMIDINE HYDROCHLORIDE 0.6 MCG/KG/HR: 100 INJECTION, SOLUTION INTRAVENOUS at 22:44

## 2018-01-01 RX ADMIN — ACETAMINOPHEN 650 MG: 325 TABLET, FILM COATED ORAL at 17:17

## 2018-01-01 RX ADMIN — FUROSEMIDE 20 MG: 10 INJECTION, SOLUTION INTRAMUSCULAR; INTRAVENOUS at 22:29

## 2018-01-01 RX ADMIN — SODIUM CHLORIDE: 9 INJECTION, SOLUTION INTRAVENOUS at 01:46

## 2018-01-01 RX ADMIN — LIDOCAINE 1 PATCH: 50 PATCH TOPICAL at 15:12

## 2018-01-01 RX ADMIN — TRAMADOL HYDROCHLORIDE 50 MG: 50 TABLET, FILM COATED ORAL at 18:22

## 2018-01-01 RX ADMIN — ATROPINE SULFATE 1 MG: 0.1 INJECTION, SOLUTION INTRAVENOUS at 15:15

## 2018-01-01 RX ADMIN — HEPARIN SODIUM 2800 UNITS: 1000 INJECTION, SOLUTION INTRAVENOUS; SUBCUTANEOUS at 09:55

## 2018-01-01 RX ADMIN — FUROSEMIDE 120 MG: 40 TABLET ORAL at 05:36

## 2018-01-01 RX ADMIN — ALBUMIN (HUMAN) 12.5 G: 12.5 SOLUTION INTRAVENOUS at 10:55

## 2018-01-01 RX ADMIN — AMIODARONE HYDROCHLORIDE 200 MG: 200 TABLET ORAL at 05:35

## 2018-01-01 RX ADMIN — TRAMADOL HYDROCHLORIDE 50 MG: 50 TABLET, FILM COATED ORAL at 21:03

## 2018-01-01 RX ADMIN — SODIUM CHLORIDE, SODIUM GLUCONATE, SODIUM ACETATE, POTASSIUM CHLORIDE AND MAGNESIUM CHLORIDE 1000 ML: 526; 502; 368; 37; 30 INJECTION, SOLUTION INTRAVENOUS at 13:30

## 2018-01-01 RX ADMIN — MORPHINE SULFATE 4 MG: 4 INJECTION INTRAVENOUS at 17:26

## 2018-01-01 RX ADMIN — STANDARDIZED SENNA CONCENTRATE AND DOCUSATE SODIUM 2 TABLET: 8.6; 5 TABLET ORAL at 17:35

## 2018-01-01 RX ADMIN — ATORVASTATIN CALCIUM 20 MG: 20 TABLET, FILM COATED ORAL at 21:49

## 2018-01-01 RX ADMIN — SODIUM CHLORIDE, SODIUM GLUCONATE, SODIUM ACETATE, POTASSIUM CHLORIDE AND MAGNESIUM CHLORIDE 1000 ML: 526; 502; 368; 37; 30 INJECTION, SOLUTION INTRAVENOUS at 22:52

## 2018-01-01 RX ADMIN — AMIODARONE HYDROCHLORIDE 200 MG: 200 TABLET ORAL at 17:39

## 2018-01-01 RX ADMIN — TAMSULOSIN HYDROCHLORIDE 0.4 MG: 0.4 CAPSULE ORAL at 08:59

## 2018-01-01 RX ADMIN — ACETAMINOPHEN 650 MG: 325 TABLET, FILM COATED ORAL at 17:55

## 2018-01-01 RX ADMIN — STANDARDIZED SENNA CONCENTRATE AND DOCUSATE SODIUM 2 TABLET: 8.6; 5 TABLET ORAL at 17:37

## 2018-01-01 RX ADMIN — HYDROCODONE BITARTRATE AND ACETAMINOPHEN 1 TABLET: 5; 325 TABLET ORAL at 13:01

## 2018-01-01 RX ADMIN — ALBUMIN (HUMAN) 12.5 G: 0.25 INJECTION, SOLUTION INTRAVENOUS at 16:22

## 2018-01-01 RX ADMIN — WARFARIN SODIUM 2 MG: 2 TABLET ORAL at 17:52

## 2018-01-01 RX ADMIN — ATORVASTATIN CALCIUM 20 MG: 20 TABLET, FILM COATED ORAL at 18:25

## 2018-01-01 RX ADMIN — FUROSEMIDE 80 MG: 10 INJECTION, SOLUTION INTRAMUSCULAR; INTRAVENOUS at 05:15

## 2018-01-01 RX ADMIN — MORPHINE SULFATE 4 MG: 4 INJECTION INTRAVENOUS at 02:47

## 2018-01-01 RX ADMIN — HYDROCODONE BITARTRATE AND ACETAMINOPHEN 2 TABLET: 5; 325 TABLET ORAL at 19:49

## 2018-01-01 RX ADMIN — AMIODARONE HYDROCHLORIDE 200 MG: 200 TABLET ORAL at 17:12

## 2018-01-01 RX ADMIN — FUROSEMIDE 120 MG: 40 TABLET ORAL at 17:43

## 2018-01-01 RX ADMIN — EPINEPHRINE 0.04 MCG/KG/MIN: 1 INJECTION INTRAMUSCULAR; INTRAVENOUS; SUBCUTANEOUS at 13:32

## 2018-01-01 RX ADMIN — HYDROCODONE BITARTRATE AND ACETAMINOPHEN 2 TABLET: 5; 325 TABLET ORAL at 05:14

## 2018-01-01 RX ADMIN — ALBUMIN (HUMAN) 12.5 G: 0.25 INJECTION, SOLUTION INTRAVENOUS at 16:34

## 2018-01-01 RX ADMIN — WARFARIN SODIUM 2 MG: 2 TABLET ORAL at 21:49

## 2018-01-01 RX ADMIN — ACETAMINOPHEN 650 MG: 325 TABLET, FILM COATED ORAL at 16:34

## 2018-01-01 RX ADMIN — MIDODRINE HYDROCHLORIDE 10 MG: 5 TABLET ORAL at 13:40

## 2018-01-01 RX ADMIN — ALBUMIN (HUMAN) 12.5 G: 0.25 INJECTION, SOLUTION INTRAVENOUS at 11:50

## 2018-01-01 RX ADMIN — MORPHINE SULFATE 4 MG: 4 INJECTION INTRAVENOUS at 04:16

## 2018-01-01 RX ADMIN — MIDODRINE HYDROCHLORIDE 5 MG: 5 TABLET ORAL at 17:22

## 2018-01-01 RX ADMIN — WARFARIN SODIUM 5 MG: 5 TABLET ORAL at 18:05

## 2018-01-01 RX ADMIN — EPINEPHRINE 0.5 MG: 0.1 INJECTION, SOLUTION ENDOTRACHEAL; INTRACARDIAC; INTRAVENOUS at 19:06

## 2018-01-01 RX ADMIN — FENTANYL CITRATE 50 MCG: 50 INJECTION, SOLUTION INTRAMUSCULAR; INTRAVENOUS at 15:54

## 2018-01-01 RX ADMIN — STANDARDIZED SENNA CONCENTRATE AND DOCUSATE SODIUM 2 TABLET: 8.6; 5 TABLET ORAL at 17:38

## 2018-01-01 RX ADMIN — ACETAMINOPHEN 650 MG: 325 TABLET, FILM COATED ORAL at 05:15

## 2018-01-01 RX ADMIN — POTASSIUM CHLORIDE 20 MEQ: 1500 TABLET, EXTENDED RELEASE ORAL at 13:25

## 2018-01-01 RX ADMIN — ATORVASTATIN CALCIUM 20 MG: 20 TABLET, FILM COATED ORAL at 16:59

## 2018-01-01 RX ADMIN — FENTANYL CITRATE 100 MCG: 50 INJECTION INTRAMUSCULAR; INTRAVENOUS at 10:59

## 2018-01-01 RX ADMIN — ATORVASTATIN CALCIUM 20 MG: 20 TABLET, FILM COATED ORAL at 17:54

## 2018-01-01 RX ADMIN — FUROSEMIDE 20 MG: 10 INJECTION, SOLUTION INTRAMUSCULAR; INTRAVENOUS at 09:48

## 2018-01-01 RX ADMIN — SODIUM CHLORIDE: 9 INJECTION, SOLUTION INTRAVENOUS at 07:43

## 2018-01-01 RX ADMIN — OXYCODONE HYDROCHLORIDE 10 MG: 10 TABLET ORAL at 18:12

## 2018-01-01 RX ADMIN — POLYETHYLENE GLYCOL 3350 1 PACKET: 17 POWDER, FOR SOLUTION ORAL at 05:08

## 2018-01-01 RX ADMIN — ATORVASTATIN CALCIUM 20 MG: 20 TABLET, FILM COATED ORAL at 17:57

## 2018-01-01 RX ADMIN — TAMSULOSIN HYDROCHLORIDE 0.4 MG: 0.4 CAPSULE ORAL at 11:51

## 2018-01-01 RX ADMIN — HEPARIN SODIUM 2800 UNITS: 1000 INJECTION, SOLUTION INTRAVENOUS; SUBCUTANEOUS at 20:37

## 2018-01-01 RX ADMIN — WARFARIN SODIUM 2 MG: 2 TABLET ORAL at 18:48

## 2018-01-01 RX ADMIN — ATORVASTATIN CALCIUM 20 MG: 20 TABLET, FILM COATED ORAL at 18:40

## 2018-01-01 RX ADMIN — AMIODARONE HYDROCHLORIDE 200 MG: 200 TABLET ORAL at 04:46

## 2018-01-01 RX ADMIN — MIDODRINE HYDROCHLORIDE 10 MG: 5 TABLET ORAL at 03:45

## 2018-01-01 RX ADMIN — MIDODRINE HYDROCHLORIDE 10 MG: 5 TABLET ORAL at 07:48

## 2018-01-01 RX ADMIN — HYDROCODONE BITARTRATE AND ACETAMINOPHEN 2 TABLET: 5; 325 TABLET ORAL at 15:16

## 2018-01-01 RX ADMIN — STANDARDIZED SENNA CONCENTRATE AND DOCUSATE SODIUM 2 TABLET: 8.6; 5 TABLET ORAL at 05:49

## 2018-01-01 RX ADMIN — WARFARIN SODIUM 5 MG: 5 TABLET ORAL at 17:38

## 2018-01-01 RX ADMIN — STANDARDIZED SENNA CONCENTRATE AND DOCUSATE SODIUM 2 TABLET: 8.6; 5 TABLET ORAL at 17:10

## 2018-01-01 RX ADMIN — HYDROCODONE BITARTRATE AND ACETAMINOPHEN 1 TABLET: 5; 325 TABLET ORAL at 16:49

## 2018-01-01 RX ADMIN — EPINEPHRINE 1 MG: 0.1 INJECTION, SOLUTION ENDOTRACHEAL; INTRACARDIAC; INTRAVENOUS at 14:09

## 2018-01-01 RX ADMIN — SODIUM CHLORIDE 500 ML: 9 INJECTION, SOLUTION INTRAVENOUS at 05:35

## 2018-01-01 RX ADMIN — STANDARDIZED SENNA CONCENTRATE AND DOCUSATE SODIUM 2 TABLET: 8.6; 5 TABLET ORAL at 17:54

## 2018-01-01 RX ADMIN — MIDAZOLAM HYDROCHLORIDE 2 MG: 1 INJECTION, SOLUTION INTRAMUSCULAR; INTRAVENOUS at 22:40

## 2018-01-01 RX ADMIN — TAMSULOSIN HYDROCHLORIDE 0.4 MG: 0.4 CAPSULE ORAL at 10:16

## 2018-01-01 RX ADMIN — ACETAMINOPHEN 650 MG: 325 TABLET, FILM COATED ORAL at 22:26

## 2018-01-01 RX ADMIN — MIDAZOLAM 1 MG: 1 INJECTION INTRAMUSCULAR; INTRAVENOUS at 15:53

## 2018-01-01 RX ADMIN — POLYETHYLENE GLYCOL 3350 1 PACKET: 17 POWDER, FOR SOLUTION ORAL at 04:46

## 2018-01-01 RX ADMIN — TAMSULOSIN HYDROCHLORIDE 0.4 MG: 0.4 CAPSULE ORAL at 11:48

## 2018-01-01 RX ADMIN — Medication 81 MG: at 05:49

## 2018-01-01 RX ADMIN — DOBUTAMINE HYDROCHLORIDE 2 MCG/KG/MIN: 100 INJECTION INTRAVENOUS at 15:15

## 2018-01-01 RX ADMIN — AMIODARONE HYDROCHLORIDE 200 MG: 200 TABLET ORAL at 05:03

## 2018-01-01 RX ADMIN — DEXMEDETOMIDINE HYDROCHLORIDE 0.6 MCG/KG/HR: 100 INJECTION, SOLUTION INTRAVENOUS at 17:29

## 2018-01-01 RX ADMIN — ALBUMIN (HUMAN) 25 G: 12.5 INJECTION, SOLUTION INTRAVENOUS at 19:35

## 2018-01-01 RX ADMIN — MIDODRINE HYDROCHLORIDE 10 MG: 5 TABLET ORAL at 10:32

## 2018-01-01 RX ADMIN — FENTANYL CITRATE 50 MCG: 50 INJECTION, SOLUTION INTRAMUSCULAR; INTRAVENOUS at 03:46

## 2018-01-01 RX ADMIN — TAMSULOSIN HYDROCHLORIDE 0.4 MG: 0.4 CAPSULE ORAL at 09:38

## 2018-01-01 RX ADMIN — PROPOFOL 5 MCG/KG/MIN: 10 INJECTION, EMULSION INTRAVENOUS at 15:00

## 2018-01-01 RX ADMIN — MIDODRINE HYDROCHLORIDE 5 MG: 5 TABLET ORAL at 08:29

## 2018-01-01 RX ADMIN — Medication 81 MG: at 05:32

## 2018-01-01 RX ADMIN — POLYETHYLENE GLYCOL 3350 1 PACKET: 17 POWDER, FOR SOLUTION ORAL at 14:08

## 2018-01-01 RX ADMIN — HYDROCODONE BITARTRATE AND ACETAMINOPHEN 2 TABLET: 5; 325 TABLET ORAL at 11:59

## 2018-01-01 RX ADMIN — POLYETHYLENE GLYCOL 3350 1 PACKET: 17 POWDER, FOR SOLUTION ORAL at 06:00

## 2018-01-01 RX ADMIN — METOPROLOL TARTRATE 12.5 MG: 25 TABLET, FILM COATED ORAL at 11:57

## 2018-01-01 RX ADMIN — AMIODARONE HYDROCHLORIDE 200 MG: 200 TABLET ORAL at 17:10

## 2018-01-01 RX ADMIN — Medication 81 MG: at 05:14

## 2018-01-01 RX ADMIN — Medication 81 MG: at 05:02

## 2018-01-01 RX ADMIN — MIDODRINE HYDROCHLORIDE 10 MG: 5 TABLET ORAL at 17:34

## 2018-01-01 RX ADMIN — ACETAMINOPHEN 650 MG: 325 TABLET, FILM COATED ORAL at 05:03

## 2018-01-01 RX ADMIN — MIDODRINE HYDROCHLORIDE 5 MG: 5 TABLET ORAL at 16:35

## 2018-01-01 RX ADMIN — ACETAMINOPHEN 650 MG: 325 TABLET, FILM COATED ORAL at 10:05

## 2018-01-01 RX ADMIN — AMIODARONE HYDROCHLORIDE 200 MG: 200 TABLET ORAL at 05:07

## 2018-01-01 RX ADMIN — MORPHINE SULFATE 4 MG: 4 INJECTION INTRAVENOUS at 23:14

## 2018-01-01 RX ADMIN — ETOMIDATE 20 MG: 2 INJECTION INTRAVENOUS at 15:00

## 2018-01-01 RX ADMIN — FENTANYL CITRATE 50 MCG: 50 INJECTION, SOLUTION INTRAMUSCULAR; INTRAVENOUS at 16:11

## 2018-01-01 RX ADMIN — LIDOCAINE 1 PATCH: 50 PATCH TOPICAL at 16:35

## 2018-01-01 RX ADMIN — STANDARDIZED SENNA CONCENTRATE AND DOCUSATE SODIUM 2 TABLET: 8.6; 5 TABLET ORAL at 05:59

## 2018-01-01 RX ADMIN — STANDARDIZED SENNA CONCENTRATE AND DOCUSATE SODIUM 2 TABLET: 8.6; 5 TABLET ORAL at 05:15

## 2018-01-01 RX ADMIN — POLYETHYLENE GLYCOL 3350 1 PACKET: 17 POWDER, FOR SOLUTION ORAL at 05:02

## 2018-01-01 RX ADMIN — FUROSEMIDE 80 MG: 10 INJECTION, SOLUTION INTRAMUSCULAR; INTRAVENOUS at 05:10

## 2018-01-01 RX ADMIN — FUROSEMIDE 80 MG: 10 INJECTION, SOLUTION INTRAMUSCULAR; INTRAVENOUS at 08:14

## 2018-01-01 RX ADMIN — MIDODRINE HYDROCHLORIDE 5 MG: 5 TABLET ORAL at 08:59

## 2018-01-01 RX ADMIN — POLYETHYLENE GLYCOL 3350 1 PACKET: 17 POWDER, FOR SOLUTION ORAL at 18:00

## 2018-01-01 RX ADMIN — POLYETHYLENE GLYCOL 3350 1 PACKET: 17 POWDER, FOR SOLUTION ORAL at 05:14

## 2018-01-01 RX ADMIN — MIDAZOLAM HYDROCHLORIDE 2 MG: 1 INJECTION, SOLUTION INTRAMUSCULAR; INTRAVENOUS at 10:57

## 2018-01-01 RX ADMIN — FUROSEMIDE 80 MG: 10 INJECTION, SOLUTION INTRAMUSCULAR; INTRAVENOUS at 16:34

## 2018-01-01 RX ADMIN — EPINEPHRINE 0.02 MCG/KG/MIN: 1 INJECTION INTRAMUSCULAR; INTRAVENOUS; SUBCUTANEOUS at 20:45

## 2018-01-01 RX ADMIN — ATORVASTATIN CALCIUM 20 MG: 20 TABLET, FILM COATED ORAL at 17:01

## 2018-01-01 RX ADMIN — Medication 81 MG: at 04:58

## 2018-01-01 RX ADMIN — STANDARDIZED SENNA CONCENTRATE AND DOCUSATE SODIUM 2 TABLET: 8.6; 5 TABLET ORAL at 17:55

## 2018-01-01 RX ADMIN — TRAMADOL HYDROCHLORIDE 50 MG: 50 TABLET, FILM COATED ORAL at 18:39

## 2018-01-01 RX ADMIN — FUROSEMIDE 40 MG: 10 INJECTION, SOLUTION INTRAMUSCULAR; INTRAVENOUS at 11:21

## 2018-01-01 RX ADMIN — Medication 81 MG: at 06:01

## 2018-01-01 RX ADMIN — AMIODARONE HYDROCHLORIDE 200 MG: 200 TABLET ORAL at 05:58

## 2018-01-01 RX ADMIN — POLYETHYLENE GLYCOL 3350 1 PACKET: 17 POWDER, FOR SOLUTION ORAL at 05:33

## 2018-01-01 RX ADMIN — STANDARDIZED SENNA CONCENTRATE AND DOCUSATE SODIUM 2 TABLET: 8.6; 5 TABLET ORAL at 06:01

## 2018-01-01 RX ADMIN — EPINEPHRINE 1 MG: 0.1 INJECTION, SOLUTION ENDOTRACHEAL; INTRACARDIAC; INTRAVENOUS at 14:07

## 2018-01-01 RX ADMIN — SILVER SULFADIAZINE 1 G: 10 CREAM TOPICAL at 19:15

## 2018-01-01 RX ADMIN — AMIODARONE HYDROCHLORIDE 200 MG: 200 TABLET ORAL at 17:27

## 2018-01-01 RX ADMIN — HYDROCODONE BITARTRATE AND ACETAMINOPHEN 1 TABLET: 5; 325 TABLET ORAL at 09:45

## 2018-01-01 RX ADMIN — HEPARIN SODIUM: 1000 INJECTION, SOLUTION INTRAVENOUS; SUBCUTANEOUS at 09:59

## 2018-01-01 RX ADMIN — SODIUM CHLORIDE, SODIUM GLUCONATE, SODIUM ACETATE, POTASSIUM CHLORIDE AND MAGNESIUM CHLORIDE 1000 ML: 526; 502; 368; 37; 30 INJECTION, SOLUTION INTRAVENOUS at 15:54

## 2018-01-01 RX ADMIN — HEPARIN SODIUM 2000 UNITS: 200 INJECTION, SOLUTION INTRAVENOUS at 09:59

## 2018-01-01 RX ADMIN — MIDAZOLAM HYDROCHLORIDE 1 MG: 1 INJECTION INTRAMUSCULAR; INTRAVENOUS at 18:00

## 2018-01-01 RX ADMIN — MIDODRINE HYDROCHLORIDE 10 MG: 5 TABLET ORAL at 18:25

## 2018-01-01 RX ADMIN — FUROSEMIDE 120 MG: 40 TABLET ORAL at 22:41

## 2018-01-01 RX ADMIN — METOLAZONE 2.5 MG: 2.5 TABLET ORAL at 11:21

## 2018-01-01 RX ADMIN — DIPHENHYDRAMINE HCL 25 MG: 25 TABLET ORAL at 23:47

## 2018-01-01 RX ADMIN — MIDODRINE HYDROCHLORIDE 10 MG: 5 TABLET ORAL at 08:34

## 2018-01-01 RX ADMIN — CALCIUM CHLORIDE 1 G: 100 INJECTION INTRAVENOUS; INTRAVENTRICULAR at 05:42

## 2018-01-01 RX ADMIN — HYDROCODONE BITARTRATE AND ACETAMINOPHEN 2 TABLET: 5; 325 TABLET ORAL at 23:00

## 2018-01-01 RX ADMIN — EPINEPHRINE 1 MG: 0.1 INJECTION, SOLUTION ENDOTRACHEAL; INTRACARDIAC; INTRAVENOUS at 16:40

## 2018-01-01 RX ADMIN — WARFARIN SODIUM 2 MG: 2 TABLET ORAL at 16:49

## 2018-01-01 RX ADMIN — TAMSULOSIN HYDROCHLORIDE 0.4 MG: 0.4 CAPSULE ORAL at 10:32

## 2018-01-01 RX ADMIN — AMIODARONE HYDROCHLORIDE 200 MG: 200 TABLET ORAL at 05:08

## 2018-01-01 RX ADMIN — HYDROCODONE BITARTRATE AND ACETAMINOPHEN 2 TABLET: 5; 325 TABLET ORAL at 05:08

## 2018-01-01 RX ADMIN — MIDODRINE HYDROCHLORIDE 5 MG: 5 TABLET ORAL at 08:46

## 2018-01-01 RX ADMIN — MIDODRINE HYDROCHLORIDE 5 MG: 5 TABLET ORAL at 16:49

## 2018-01-01 RX ADMIN — SODIUM CHLORIDE: 9 INJECTION, SOLUTION INTRAVENOUS at 16:00

## 2018-01-01 RX ADMIN — SODIUM CHLORIDE: 9 INJECTION, SOLUTION INTRAVENOUS at 08:17

## 2018-01-01 RX ADMIN — FAMOTIDINE 20 MG: 10 INJECTION, SOLUTION INTRAVENOUS at 05:08

## 2018-01-01 RX ADMIN — ATORVASTATIN CALCIUM 20 MG: 20 TABLET, FILM COATED ORAL at 17:34

## 2018-01-01 RX ADMIN — ATORVASTATIN CALCIUM 20 MG: 20 TABLET, FILM COATED ORAL at 18:07

## 2018-01-01 RX ADMIN — HYDROCODONE BITARTRATE AND ACETAMINOPHEN 1 TABLET: 5; 325 TABLET ORAL at 19:30

## 2018-01-01 RX ADMIN — TAMSULOSIN HYDROCHLORIDE 0.4 MG: 0.4 CAPSULE ORAL at 13:25

## 2018-01-01 RX ADMIN — WARFARIN SODIUM 2 MG: 2 TABLET ORAL at 17:27

## 2018-01-01 RX ADMIN — FUROSEMIDE 80 MG: 10 INJECTION, SOLUTION INTRAMUSCULAR; INTRAVENOUS at 17:55

## 2018-01-01 RX ADMIN — ATORVASTATIN CALCIUM 20 MG: 20 TABLET, FILM COATED ORAL at 18:44

## 2018-01-01 RX ADMIN — FUROSEMIDE 80 MG: 10 INJECTION, SOLUTION INTRAMUSCULAR; INTRAVENOUS at 08:48

## 2018-01-01 RX ADMIN — STANDARDIZED SENNA CONCENTRATE AND DOCUSATE SODIUM 2 TABLET: 8.6; 5 TABLET ORAL at 18:44

## 2018-01-01 RX ADMIN — SODIUM CHLORIDE 500 ML: 9 INJECTION, SOLUTION INTRAVENOUS at 16:05

## 2018-01-01 RX ADMIN — AMIODARONE HYDROCHLORIDE 200 MG: 200 TABLET ORAL at 18:25

## 2018-01-01 RX ADMIN — EPOPROSTENOL SODIUM 0.05 MCG/KG/MIN: 1.5 INJECTION, POWDER, LYOPHILIZED, FOR SOLUTION INTRAVENOUS at 15:58

## 2018-01-01 RX ADMIN — AMIODARONE HYDROCHLORIDE 200 MG: 200 TABLET ORAL at 06:18

## 2018-01-01 RX ADMIN — MORPHINE SULFATE 4 MG: 4 INJECTION INTRAVENOUS at 22:30

## 2018-01-01 RX ADMIN — MIDODRINE HYDROCHLORIDE 5 MG: 5 TABLET ORAL at 09:26

## 2018-01-01 RX ADMIN — POLYETHYLENE GLYCOL 3350 1 PACKET: 17 POWDER, FOR SOLUTION ORAL at 08:51

## 2018-01-01 RX ADMIN — MORPHINE SULFATE 4 MG: 4 INJECTION INTRAVENOUS at 20:30

## 2018-01-01 RX ADMIN — HYDROCODONE BITARTRATE AND ACETAMINOPHEN 1 TABLET: 5; 325 TABLET ORAL at 18:57

## 2018-01-01 RX ADMIN — WARFARIN SODIUM 2 MG: 2 TABLET ORAL at 17:01

## 2018-01-01 RX ADMIN — MIDODRINE HYDROCHLORIDE 5 MG: 5 TABLET ORAL at 18:40

## 2018-01-01 RX ADMIN — WARFARIN SODIUM 1 MG: 1 TABLET ORAL at 17:38

## 2018-01-01 RX ADMIN — FENTANYL CITRATE: 50 INJECTION, SOLUTION INTRAMUSCULAR; INTRAVENOUS at 14:30

## 2018-01-01 RX ADMIN — AMIODARONE HYDROCHLORIDE 200 MG: 200 TABLET ORAL at 05:54

## 2018-01-01 RX ADMIN — ALBUMIN (HUMAN) 25 G: 12.5 INJECTION, SOLUTION INTRAVENOUS at 17:05

## 2018-01-01 RX ADMIN — OXYCODONE HYDROCHLORIDE 5 MG: 5 TABLET ORAL at 13:45

## 2018-01-01 RX ADMIN — ACETAMINOPHEN 650 MG: 325 TABLET, FILM COATED ORAL at 20:15

## 2018-01-01 RX ADMIN — EPINEPHRINE 0.04 MCG/KG/MIN: 1 INJECTION INTRAMUSCULAR; INTRAVENOUS; SUBCUTANEOUS at 04:43

## 2018-01-01 RX ADMIN — STANDARDIZED SENNA CONCENTRATE AND DOCUSATE SODIUM 2 TABLET: 8.6; 5 TABLET ORAL at 05:08

## 2018-01-01 RX ADMIN — TRAMADOL HYDROCHLORIDE 50 MG: 50 TABLET, FILM COATED ORAL at 15:34

## 2018-01-01 RX ADMIN — STANDARDIZED SENNA CONCENTRATE AND DOCUSATE SODIUM 2 TABLET: 8.6; 5 TABLET ORAL at 04:48

## 2018-01-01 RX ADMIN — FUROSEMIDE 120 MG: 40 TABLET ORAL at 09:09

## 2018-01-01 RX ADMIN — WARFARIN SODIUM 1 MG: 2.5 TABLET ORAL at 17:17

## 2018-01-01 RX ADMIN — MIDAZOLAM HYDROCHLORIDE 2 MG: 1 INJECTION, SOLUTION INTRAMUSCULAR; INTRAVENOUS at 15:15

## 2018-01-01 RX ADMIN — FENTANYL CITRATE 25 MCG: 50 INJECTION INTRAMUSCULAR; INTRAVENOUS at 23:47

## 2018-01-01 RX ADMIN — ATORVASTATIN CALCIUM 20 MG: 20 TABLET, FILM COATED ORAL at 17:55

## 2018-01-01 RX ADMIN — PROPOFOL 50 MG: 10 INJECTION, EMULSION INTRAVENOUS at 19:12

## 2018-01-01 RX ADMIN — TAMSULOSIN HYDROCHLORIDE 0.4 MG: 0.4 CAPSULE ORAL at 08:46

## 2018-01-01 RX ADMIN — POTASSIUM CHLORIDE 10 MEQ: 7.46 INJECTION, SOLUTION INTRAVENOUS at 06:34

## 2018-01-01 RX ADMIN — OXYCODONE HYDROCHLORIDE 5 MG: 5 TABLET ORAL at 13:13

## 2018-01-01 RX ADMIN — Medication 81 MG: at 05:36

## 2018-01-01 RX ADMIN — TRAMADOL HYDROCHLORIDE 50 MG: 50 TABLET, FILM COATED ORAL at 21:57

## 2018-01-01 RX ADMIN — FUROSEMIDE 80 MG: 10 INJECTION, SOLUTION INTRAMUSCULAR; INTRAVENOUS at 04:58

## 2018-01-01 RX ADMIN — FUROSEMIDE 80 MG: 10 INJECTION, SOLUTION INTRAMUSCULAR; INTRAVENOUS at 05:32

## 2018-01-01 RX ADMIN — OXYCODONE HYDROCHLORIDE 5 MG: 5 TABLET ORAL at 23:47

## 2018-01-01 RX ADMIN — ATORVASTATIN CALCIUM 20 MG: 20 TABLET, FILM COATED ORAL at 17:43

## 2018-01-01 RX ADMIN — WARFARIN SODIUM 2.5 MG: 2.5 TABLET ORAL at 17:54

## 2018-01-01 RX ADMIN — MIDAZOLAM HYDROCHLORIDE 1 MG: 1 INJECTION, SOLUTION INTRAMUSCULAR; INTRAVENOUS at 11:10

## 2018-01-01 RX ADMIN — HEPARIN SODIUM 2800 UNITS: 1000 INJECTION, SOLUTION INTRAVENOUS; SUBCUTANEOUS at 11:10

## 2018-01-01 RX ADMIN — EPINEPHRINE 0.03 MCG/KG/MIN: 1 INJECTION INTRAMUSCULAR; INTRAVENOUS; SUBCUTANEOUS at 03:34

## 2018-01-01 RX ADMIN — FUROSEMIDE 120 MG: 40 TABLET ORAL at 05:08

## 2018-01-01 RX ADMIN — CALCIUM CHLORIDE 1 G: 100 INJECTION INTRAVENOUS; INTRAVENTRICULAR at 08:16

## 2018-01-01 RX ADMIN — FENTANYL CITRATE: 50 INJECTION, SOLUTION INTRAMUSCULAR; INTRAVENOUS at 15:00

## 2018-01-01 RX ADMIN — MIDODRINE HYDROCHLORIDE 5 MG: 5 TABLET ORAL at 11:17

## 2018-01-01 RX ADMIN — STANDARDIZED SENNA CONCENTRATE AND DOCUSATE SODIUM 2 TABLET: 8.6; 5 TABLET ORAL at 17:01

## 2018-01-01 RX ADMIN — HYDROCODONE BITARTRATE AND ACETAMINOPHEN 1 TABLET: 5; 325 TABLET ORAL at 23:59

## 2018-01-01 RX ADMIN — AMIODARONE HYDROCHLORIDE 200 MG: 200 TABLET ORAL at 17:34

## 2018-01-01 RX ADMIN — HYDROCODONE BITARTRATE AND ACETAMINOPHEN 2 TABLET: 5; 325 TABLET ORAL at 15:12

## 2018-01-01 RX ADMIN — MIDAZOLAM HYDROCHLORIDE 2 MG: 1 INJECTION, SOLUTION INTRAMUSCULAR; INTRAVENOUS at 18:49

## 2018-01-01 RX ADMIN — FUROSEMIDE 120 MG: 40 TABLET ORAL at 17:37

## 2018-01-01 RX ADMIN — POLYETHYLENE GLYCOL 3350 1 PACKET: 17 POWDER, FOR SOLUTION ORAL at 17:57

## 2018-01-01 RX ADMIN — TAMSULOSIN HYDROCHLORIDE 0.4 MG: 0.4 CAPSULE ORAL at 08:34

## 2018-01-01 RX ADMIN — PROPOFOL: 10 INJECTION, EMULSION INTRAVENOUS at 19:15

## 2018-01-01 RX ADMIN — DEXMEDETOMIDINE HYDROCHLORIDE 0.6 MCG/KG/HR: 100 INJECTION, SOLUTION INTRAVENOUS at 23:14

## 2018-01-01 RX ADMIN — WARFARIN SODIUM 2 MG: 2 TABLET ORAL at 18:21

## 2018-01-01 RX ADMIN — AMIODARONE HYDROCHLORIDE 400 MG: 200 TABLET ORAL at 11:20

## 2018-01-01 RX ADMIN — AMIODARONE HYDROCHLORIDE 200 MG: 200 TABLET ORAL at 16:59

## 2018-01-01 RX ADMIN — ATORVASTATIN CALCIUM 20 MG: 20 TABLET, FILM COATED ORAL at 17:10

## 2018-01-01 RX ADMIN — MIDODRINE HYDROCHLORIDE 5 MG: 5 TABLET ORAL at 17:43

## 2018-01-01 RX ADMIN — ZOLPIDEM TARTRATE 5 MG: 5 TABLET ORAL at 21:29

## 2018-01-01 RX ADMIN — ATROPINE SULFATE 0.5 MG: 0.1 INJECTION, SOLUTION INTRAVENOUS at 15:20

## 2018-01-01 RX ADMIN — SODIUM CHLORIDE 1 UNITS/HR: 9 INJECTION, SOLUTION INTRAVENOUS at 14:37

## 2018-01-01 RX ADMIN — HYDROCODONE BITARTRATE AND ACETAMINOPHEN 1 TABLET: 5; 325 TABLET ORAL at 14:31

## 2018-01-01 RX ADMIN — POTASSIUM CHLORIDE 10 MEQ: 7.46 INJECTION, SOLUTION INTRAVENOUS at 00:55

## 2018-01-01 RX ADMIN — METOPROLOL TARTRATE 12.5 MG: 25 TABLET, FILM COATED ORAL at 18:01

## 2018-01-01 RX ADMIN — AMIODARONE HYDROCHLORIDE 150 MG: 50 INJECTION, SOLUTION INTRAVENOUS at 15:59

## 2018-01-01 RX ADMIN — AMIODARONE HYDROCHLORIDE 200 MG: 200 TABLET ORAL at 17:51

## 2018-01-01 RX ADMIN — Medication 81 MG: at 05:58

## 2018-01-01 RX ADMIN — WARFARIN SODIUM 5 MG: 5 TABLET ORAL at 18:44

## 2018-01-01 RX ADMIN — DOBUTAMINE HYDROCHLORIDE 2 MCG/KG/MIN: 100 INJECTION INTRAVENOUS at 15:17

## 2018-01-01 RX ADMIN — STANDARDIZED SENNA CONCENTRATE AND DOCUSATE SODIUM 2 TABLET: 8.6; 5 TABLET ORAL at 05:14

## 2018-01-01 RX ADMIN — TAMSULOSIN HYDROCHLORIDE 0.4 MG: 0.4 CAPSULE ORAL at 10:20

## 2018-01-01 RX ADMIN — Medication 81 MG: at 05:43

## 2018-01-01 RX ADMIN — ATROPINE SULFATE 0.5 MG: 0.1 INJECTION, SOLUTION INTRAVENOUS at 15:17

## 2018-01-01 RX ADMIN — WARFARIN SODIUM 2.5 MG: 2.5 TABLET ORAL at 17:25

## 2018-01-01 RX ADMIN — HYDROCODONE BITARTRATE AND ACETAMINOPHEN 2 TABLET: 5; 325 TABLET ORAL at 03:37

## 2018-01-01 RX ADMIN — FENTANYL CITRATE 25 MCG: 50 INJECTION INTRAMUSCULAR; INTRAVENOUS at 16:16

## 2018-01-01 RX ADMIN — POTASSIUM CHLORIDE 20 MEQ: 1500 TABLET, EXTENDED RELEASE ORAL at 12:20

## 2018-01-01 RX ADMIN — FUROSEMIDE 120 MG: 40 TABLET ORAL at 17:27

## 2018-01-01 RX ADMIN — WARFARIN SODIUM 5 MG: 5 TABLET ORAL at 17:39

## 2018-01-01 RX ADMIN — POTASSIUM CHLORIDE 40 MEQ: 1500 TABLET, EXTENDED RELEASE ORAL at 09:19

## 2018-01-01 RX ADMIN — AMIODARONE HYDROCHLORIDE 0.5 MG/MIN: 50 INJECTION, SOLUTION INTRAVENOUS at 04:36

## 2018-01-01 RX ADMIN — Medication 81 MG: at 06:18

## 2018-01-01 RX ADMIN — FUROSEMIDE 20 MG: 10 INJECTION, SOLUTION INTRAMUSCULAR; INTRAVENOUS at 08:45

## 2018-01-01 RX ADMIN — AMIODARONE HYDROCHLORIDE 200 MG: 200 TABLET ORAL at 05:32

## 2018-01-01 RX ADMIN — ALBUMIN (HUMAN) 12.5 G: 0.25 INJECTION, SOLUTION INTRAVENOUS at 10:55

## 2018-01-01 RX ADMIN — WARFARIN SODIUM 2 MG: 2 TABLET ORAL at 20:17

## 2018-01-01 RX ADMIN — MAGNESIUM SULFATE IN DEXTROSE 1 G: 10 INJECTION, SOLUTION INTRAVENOUS at 17:23

## 2018-01-01 RX ADMIN — ATORVASTATIN CALCIUM 20 MG: 20 TABLET, FILM COATED ORAL at 16:48

## 2018-01-01 RX ADMIN — AMIODARONE HYDROCHLORIDE: 1.5 INJECTION, SOLUTION INTRAVENOUS at 16:15

## 2018-01-01 RX ADMIN — HYDROCODONE BITARTRATE AND ACETAMINOPHEN 2 TABLET: 5; 325 TABLET ORAL at 20:24

## 2018-01-01 RX ADMIN — AMIODARONE HYDROCHLORIDE 1 MG/MIN: 50 INJECTION, SOLUTION INTRAVENOUS at 15:47

## 2018-01-01 RX ADMIN — FUROSEMIDE 120 MG: 40 TABLET ORAL at 04:46

## 2018-01-01 RX ADMIN — POTASSIUM CHLORIDE 20 MEQ: 200 INJECTION, SOLUTION INTRAVENOUS at 15:13

## 2018-01-01 RX ADMIN — TRAMADOL HYDROCHLORIDE 50 MG: 50 TABLET, FILM COATED ORAL at 06:18

## 2018-01-01 RX ADMIN — STANDARDIZED SENNA CONCENTRATE AND DOCUSATE SODIUM 2 TABLET: 8.6; 5 TABLET ORAL at 17:25

## 2018-01-01 ASSESSMENT — ENCOUNTER SYMPTOMS
WHEEZING: 0
DEPRESSION: 0
PSYCHIATRIC NEGATIVE: 1
SORE THROAT: 0
PALPITATIONS: 0
LOSS OF CONSCIOUSNESS: 0
EYES NEGATIVE: 1
NEUROLOGICAL NEGATIVE: 1
WHEEZING: 0
ABDOMINAL PAIN: 0
CONSTIPATION: 1
DIARRHEA: 0
SHORTNESS OF BREATH: 0
DIZZINESS: 0
SHORTNESS OF BREATH: 0
PSYCHIATRIC NEGATIVE: 1
EYES NEGATIVE: 1
CLAUDICATION: 0
DEPRESSION: 0
BACK PAIN: 0
WHEEZING: 0
NAUSEA: 0
DIZZINESS: 0
FATIGUE: 0
COUGH: 0
PSYCHIATRIC NEGATIVE: 1
CARDIOVASCULAR NEGATIVE: 1
DIZZINESS: 0
SHORTNESS OF BREATH: 1
FOCAL WEAKNESS: 0
SHORTNESS OF BREATH: 0
PALPITATIONS: 0
EYES NEGATIVE: 1
NEUROLOGICAL NEGATIVE: 1
HEMOPTYSIS: 0
SORE THROAT: 0
FATIGUE: 0
HEMOPTYSIS: 0
DEPRESSION: 0
WEAKNESS: 1
SPUTUM PRODUCTION: 0
CARDIOVASCULAR NEGATIVE: 1
COUGH: 0
WEAKNESS: 1
HEMOPTYSIS: 0
WEAKNESS: 0
COUGH: 0
DIZZINESS: 0
WEAKNESS: 1
WEAKNESS: 0
CONSTIPATION: 0
NEUROLOGICAL NEGATIVE: 1
ABDOMINAL PAIN: 0
CONSTIPATION: 0
WEAKNESS: 0
NAUSEA: 0
DIARRHEA: 0
MUSCULOSKELETAL NEGATIVE: 1
SHORTNESS OF BREATH: 0
CHILLS: 0
PALPITATIONS: 0
CLAUDICATION: 0
MUSCULOSKELETAL NEGATIVE: 1
LOSS OF CONSCIOUSNESS: 0
PND: 0
GASTROINTESTINAL NEGATIVE: 1
NAUSEA: 0
COUGH: 0
DIZZINESS: 0
DIARRHEA: 0
WEIGHT LOSS: 0
CHILLS: 0
DEPRESSION: 0
PSYCHIATRIC NEGATIVE: 1
NECK PAIN: 0
NAUSEA: 0
WEAKNESS: 0
PSYCHIATRIC NEGATIVE: 1
SHORTNESS OF BREATH: 0
VOMITING: 0
MUSCULOSKELETAL NEGATIVE: 1
NAUSEA: 0
PSYCHIATRIC NEGATIVE: 1
LOSS OF CONSCIOUSNESS: 0
CONSTIPATION: 0
DIARRHEA: 0
FEVER: 0
WEIGHT LOSS: 0
PSYCHIATRIC NEGATIVE: 1
SORE THROAT: 0
FEVER: 0
ABDOMINAL PAIN: 0
CHEST TIGHTNESS: 1
BRUISES/BLEEDS EASILY: 0
NEUROLOGICAL NEGATIVE: 1
HEADACHES: 0
FOCAL WEAKNESS: 0
CONSTIPATION: 1
CHEST TIGHTNESS: 1
DIARRHEA: 0
FOCAL WEAKNESS: 0
DIARRHEA: 0
VOMITING: 0
PSYCHIATRIC NEGATIVE: 1
DIARRHEA: 0
CHILLS: 0
AGITATION: 0
CHEST TIGHTNESS: 0
CARDIOVASCULAR NEGATIVE: 1
SORE THROAT: 0
HEADACHES: 0
WHEEZING: 0
WEAKNESS: 0
NAUSEA: 0
PALPITATIONS: 0
NAUSEA: 0
EYES NEGATIVE: 1
WEIGHT LOSS: 0
FEVER: 0
SPEECH CHANGE: 0
MYALGIAS: 0
ABDOMINAL PAIN: 1
GASTROINTESTINAL NEGATIVE: 1
WHEEZING: 0
CONSTITUTIONAL NEGATIVE: 1
CONSTITUTIONAL NEGATIVE: 1
CHILLS: 0
NEUROLOGICAL NEGATIVE: 1
EYES NEGATIVE: 1
MUSCULOSKELETAL NEGATIVE: 1
MUSCULOSKELETAL NEGATIVE: 1
BACK PAIN: 0
WEAKNESS: 1
SPUTUM PRODUCTION: 0
LOSS OF CONSCIOUSNESS: 0
DIZZINESS: 0
EYE DISCHARGE: 0
CARDIOVASCULAR NEGATIVE: 1
CLAUDICATION: 0
CONSTITUTIONAL NEGATIVE: 1
FOCAL WEAKNESS: 0
WEAKNESS: 0
BACK PAIN: 0
DIARRHEA: 0
CHILLS: 0
PSYCHIATRIC NEGATIVE: 1
PND: 0
FOCAL WEAKNESS: 0
SHORTNESS OF BREATH: 0
RESPIRATORY NEGATIVE: 1
RESPIRATORY NEGATIVE: 1
SHORTNESS OF BREATH: 0
DIAPHORESIS: 0
CONSTIPATION: 0
HEADACHES: 0
SPUTUM PRODUCTION: 0
HEADACHES: 0
PSYCHIATRIC NEGATIVE: 1
NAUSEA: 0
WEAKNESS: 0
SHORTNESS OF BREATH: 0
COUGH: 0
SORE THROAT: 0
DIARRHEA: 0
MYALGIAS: 0
FEVER: 0
BRUISES/BLEEDS EASILY: 1
ABDOMINAL PAIN: 1
SPUTUM PRODUCTION: 0
FEVER: 0
EYES NEGATIVE: 1
SPUTUM PRODUCTION: 0
PSYCHIATRIC NEGATIVE: 1
BLURRED VISION: 0
VOMITING: 0
WEIGHT LOSS: 0
CLAUDICATION: 0
BRUISES/BLEEDS EASILY: 0
PALPITATIONS: 0
LOSS OF CONSCIOUSNESS: 0
SORE THROAT: 0
SHORTNESS OF BREATH: 1
HEADACHES: 0
PALPITATIONS: 0
DIZZINESS: 0
WEAKNESS: 0
HEMOPTYSIS: 0
CHILLS: 0
DIZZINESS: 0
WEAKNESS: 0
WEIGHT LOSS: 0
MUSCULOSKELETAL NEGATIVE: 1
LOSS OF CONSCIOUSNESS: 0
PALPITATIONS: 0
DEPRESSION: 0
GASTROINTESTINAL NEGATIVE: 1
WEAKNESS: 1
NAUSEA: 0
CHILLS: 0
CHILLS: 0
GASTROINTESTINAL NEGATIVE: 1
PSYCHIATRIC NEGATIVE: 1
SHORTNESS OF BREATH: 0
PALPITATIONS: 0
WHEEZING: 0
CHILLS: 0
SORE THROAT: 0
HEADACHES: 0
DIARRHEA: 0
PSYCHIATRIC NEGATIVE: 1
ABDOMINAL PAIN: 0
GASTROINTESTINAL NEGATIVE: 1
FOCAL WEAKNESS: 0
SPEECH CHANGE: 0
SPUTUM PRODUCTION: 0
FATIGUE: 0
CONSTITUTIONAL NEGATIVE: 1
VOMITING: 0
CARDIOVASCULAR NEGATIVE: 1
WEAKNESS: 1
CARDIOVASCULAR NEGATIVE: 1
DIZZINESS: 0
EYE DISCHARGE: 0
DEPRESSION: 0
BRUISES/BLEEDS EASILY: 0
CONSTITUTIONAL NEGATIVE: 1
LOSS OF CONSCIOUSNESS: 0
RESPIRATORY NEGATIVE: 1
PALPITATIONS: 0
BRUISES/BLEEDS EASILY: 0
NAUSEA: 0
ABDOMINAL PAIN: 0
HEADACHES: 0
DIARRHEA: 0
ABDOMINAL PAIN: 0
MUSCULOSKELETAL NEGATIVE: 1
WEAKNESS: 0
NEUROLOGICAL NEGATIVE: 1
RESPIRATORY NEGATIVE: 1
MUSCULOSKELETAL NEGATIVE: 1
HEMOPTYSIS: 0
COUGH: 1
FATIGUE: 1
GASTROINTESTINAL NEGATIVE: 1
NEUROLOGICAL NEGATIVE: 1
COUGH: 0
HEMOPTYSIS: 0
NAUSEA: 0
HEADACHES: 0
NAUSEA: 0
EYE DISCHARGE: 0
COUGH: 1
VOMITING: 0
ABDOMINAL PAIN: 0
BRUISES/BLEEDS EASILY: 0
VOMITING: 0
WEAKNESS: 1
COUGH: 0
ABDOMINAL PAIN: 0
HEMOPTYSIS: 0
ABDOMINAL PAIN: 0
DEPRESSION: 0
SHORTNESS OF BREATH: 0
NAUSEA: 0
VOMITING: 0
MYALGIAS: 0
DIZZINESS: 0
EYES NEGATIVE: 1
NEUROLOGICAL NEGATIVE: 1
PALPITATIONS: 0
DIZZINESS: 0
ABDOMINAL PAIN: 1
VOMITING: 0
CONSTITUTIONAL NEGATIVE: 1
HEMOPTYSIS: 0
ABDOMINAL PAIN: 0
SPEECH CHANGE: 0
CONSTITUTIONAL NEGATIVE: 1
PALPITATIONS: 0
SPUTUM PRODUCTION: 0
RESPIRATORY NEGATIVE: 1
NAUSEA: 0
SORE THROAT: 0
CARDIOVASCULAR NEGATIVE: 1
DIARRHEA: 0
BACK PAIN: 0
NECK PAIN: 0
SORE THROAT: 0
WEAKNESS: 1
DIAPHORESIS: 0
SPUTUM PRODUCTION: 0
DIARRHEA: 0
AGITATION: 0
PSYCHIATRIC NEGATIVE: 1
WEAKNESS: 0
LOSS OF CONSCIOUSNESS: 0
WHEEZING: 0
FOCAL WEAKNESS: 0
FEVER: 0
MUSCULOSKELETAL NEGATIVE: 1
GASTROINTESTINAL NEGATIVE: 1
NECK PAIN: 0
SHORTNESS OF BREATH: 1
WEAKNESS: 0
NEUROLOGICAL NEGATIVE: 1
NECK PAIN: 0
ABDOMINAL PAIN: 0
RESPIRATORY NEGATIVE: 1
BLURRED VISION: 0
DEPRESSION: 0
SHORTNESS OF BREATH: 0
FALLS: 0
SHORTNESS OF BREATH: 1
ABDOMINAL PAIN: 0
EYES NEGATIVE: 1
SENSORY CHANGE: 0
LOSS OF CONSCIOUSNESS: 0
RESPIRATORY NEGATIVE: 1
NAUSEA: 0
HEADACHES: 0
CONSTITUTIONAL NEGATIVE: 1
WEIGHT LOSS: 0
MUSCULOSKELETAL NEGATIVE: 1
CONSTIPATION: 0
COUGH: 0
SHORTNESS OF BREATH: 0
PALPITATIONS: 0
SORE THROAT: 0
EYES NEGATIVE: 1
NEUROLOGICAL NEGATIVE: 1
PSYCHIATRIC NEGATIVE: 1
EYES NEGATIVE: 1
HEMOPTYSIS: 0
MUSCULOSKELETAL NEGATIVE: 1
SPEECH CHANGE: 0
HEMOPTYSIS: 0
RESPIRATORY NEGATIVE: 1
COUGH: 0
EYES NEGATIVE: 1
DEPRESSION: 1
EYES NEGATIVE: 1
EYES NEGATIVE: 1
COUGH: 0
BACK PAIN: 1
BACK PAIN: 0
CARDIOVASCULAR NEGATIVE: 1
WEAKNESS: 1
GASTROINTESTINAL NEGATIVE: 1
BRUISES/BLEEDS EASILY: 0
PSYCHIATRIC NEGATIVE: 1
BACK PAIN: 0
MYALGIAS: 0
VOMITING: 0
DIZZINESS: 0
BLURRED VISION: 0
COUGH: 0
COUGH: 1
WEAKNESS: 0
DIZZINESS: 0
ABDOMINAL DISTENTION: 0
SPUTUM PRODUCTION: 0
ABDOMINAL DISTENTION: 0
MUSCULOSKELETAL NEGATIVE: 1
FATIGUE: 0
EYES NEGATIVE: 1
SORE THROAT: 0
FOCAL WEAKNESS: 0
PSYCHIATRIC NEGATIVE: 1
MYALGIAS: 0
CHILLS: 0
MUSCULOSKELETAL NEGATIVE: 1
SHORTNESS OF BREATH: 0
ABDOMINAL PAIN: 0
SHORTNESS OF BREATH: 0
CLAUDICATION: 0
FEVER: 0
HEADACHES: 0
WEAKNESS: 1
GASTROINTESTINAL NEGATIVE: 1
PALPITATIONS: 0
FOCAL WEAKNESS: 0
SORE THROAT: 0
HEADACHES: 0
ABDOMINAL DISTENTION: 0
EYES NEGATIVE: 1
FOCAL WEAKNESS: 0
FATIGUE: 0
FEVER: 1
PALPITATIONS: 0
ABDOMINAL PAIN: 1
FEVER: 0
NAUSEA: 0
MUSCULOSKELETAL NEGATIVE: 1
HEMOPTYSIS: 0
SPUTUM PRODUCTION: 0
EYES NEGATIVE: 1
DIAPHORESIS: 0
WEAKNESS: 0
EYES NEGATIVE: 1
ABDOMINAL PAIN: 0
MUSCULOSKELETAL NEGATIVE: 1
WEIGHT LOSS: 0
NEUROLOGICAL NEGATIVE: 1
NAUSEA: 0
GASTROINTESTINAL NEGATIVE: 1
AGITATION: 0
LOSS OF CONSCIOUSNESS: 0
NAUSEA: 0
LOSS OF CONSCIOUSNESS: 0
AGITATION: 0
EYE PAIN: 0
BRUISES/BLEEDS EASILY: 0
EYES NEGATIVE: 1
CARDIOVASCULAR NEGATIVE: 1
MYALGIAS: 0
NAUSEA: 0
ABDOMINAL PAIN: 0
CHILLS: 0
FEVER: 0
CLAUDICATION: 0
VOMITING: 0
FEVER: 0
VOMITING: 0
COUGH: 0
CONSTITUTIONAL NEGATIVE: 1
CHEST TIGHTNESS: 0
PSYCHIATRIC NEGATIVE: 1
BLURRED VISION: 0
DIZZINESS: 0
CONSTIPATION: 0
WHEEZING: 0
DEPRESSION: 0
EYES NEGATIVE: 1
WHEEZING: 0
HEADACHES: 0
VOMITING: 0
WHEEZING: 0
SORE THROAT: 0
ABDOMINAL PAIN: 0
SHORTNESS OF BREATH: 0
ABDOMINAL PAIN: 0
COUGH: 0
NAUSEA: 0
MUSCULOSKELETAL NEGATIVE: 1
LOSS OF CONSCIOUSNESS: 0
DIZZINESS: 0
VOMITING: 0
PALPITATIONS: 0
CARDIOVASCULAR NEGATIVE: 1
PSYCHIATRIC NEGATIVE: 1
WEAKNESS: 1
PSYCHIATRIC NEGATIVE: 1
WEAKNESS: 0
SORE THROAT: 0
RESPIRATORY NEGATIVE: 1
AGITATION: 0
DIARRHEA: 0
COUGH: 0
PSYCHIATRIC NEGATIVE: 1
SHORTNESS OF BREATH: 0
FEVER: 0
CHILLS: 0
DIZZINESS: 0
MYALGIAS: 0
SPEECH CHANGE: 0
ABDOMINAL PAIN: 1
DIAPHORESIS: 0
FEVER: 0
CHILLS: 0
FOCAL WEAKNESS: 0
MYALGIAS: 0
WEAKNESS: 1
SHORTNESS OF BREATH: 0
GASTROINTESTINAL NEGATIVE: 1
DIAPHORESIS: 0
SHORTNESS OF BREATH: 0
PALPITATIONS: 0
CONSTIPATION: 0
RESPIRATORY NEGATIVE: 1
FEVER: 0
NAUSEA: 0
EYE PAIN: 0
CARDIOVASCULAR NEGATIVE: 1
CONSTIPATION: 0
RESPIRATORY NEGATIVE: 1
EYES NEGATIVE: 1
CONSTITUTIONAL NEGATIVE: 1
LOSS OF CONSCIOUSNESS: 0
CHILLS: 0
LOSS OF CONSCIOUSNESS: 0
HEADACHES: 0
SPUTUM PRODUCTION: 0
CONSTITUTIONAL NEGATIVE: 1
CHILLS: 0
FEVER: 0
DEPRESSION: 0
ABDOMINAL DISTENTION: 1
SPUTUM PRODUCTION: 0
VOMITING: 0
CARDIOVASCULAR NEGATIVE: 1
PALPITATIONS: 0
SPUTUM PRODUCTION: 1
NECK PAIN: 0
DIARRHEA: 0
VOMITING: 0
HEADACHES: 0
MUSCULOSKELETAL NEGATIVE: 1
EYE PAIN: 0
NEUROLOGICAL NEGATIVE: 1
CONSTIPATION: 0
BRUISES/BLEEDS EASILY: 0
AGITATION: 0
WEAKNESS: 1
LOSS OF CONSCIOUSNESS: 0
CONSTIPATION: 0
DIZZINESS: 0
CHILLS: 0
SORE THROAT: 0
ABDOMINAL PAIN: 0
SPUTUM PRODUCTION: 0
LOSS OF CONSCIOUSNESS: 0
DIAPHORESIS: 0
RESPIRATORY NEGATIVE: 1
DIZZINESS: 0
CONSTITUTIONAL NEGATIVE: 1
NEUROLOGICAL NEGATIVE: 1
GASTROINTESTINAL NEGATIVE: 1
VOMITING: 0
CHILLS: 0
SHORTNESS OF BREATH: 0
CHILLS: 0
CONSTIPATION: 0
EYE DISCHARGE: 0
BLURRED VISION: 0
MYALGIAS: 0
HEADACHES: 0
BRUISES/BLEEDS EASILY: 0
SHORTNESS OF BREATH: 0
MYALGIAS: 0
PALPITATIONS: 0
WEAKNESS: 0
CONSTIPATION: 0
SORE THROAT: 0
AGITATION: 0
FATIGUE: 1
WEAKNESS: 1
FOCAL WEAKNESS: 0
HEMOPTYSIS: 0
SHORTNESS OF BREATH: 0
NECK PAIN: 0
NAUSEA: 0
DEPRESSION: 0
BACK PAIN: 0
MUSCULOSKELETAL NEGATIVE: 1
VOMITING: 0
NAUSEA: 0
CONSTITUTIONAL NEGATIVE: 1
SENSORY CHANGE: 0
MUSCULOSKELETAL NEGATIVE: 1
HEMOPTYSIS: 0
GASTROINTESTINAL NEGATIVE: 1
ORTHOPNEA: 0
ABDOMINAL PAIN: 0
NAUSEA: 0
CARDIOVASCULAR NEGATIVE: 1
DEPRESSION: 0
COUGH: 0
NEUROLOGICAL NEGATIVE: 1
WEAKNESS: 0
WEAKNESS: 0
VOMITING: 0
SORE THROAT: 0
MYALGIAS: 0
BRUISES/BLEEDS EASILY: 0
FEVER: 0
CONSTITUTIONAL NEGATIVE: 1
RESPIRATORY NEGATIVE: 1
CONSTIPATION: 0
EYE PAIN: 0
SORE THROAT: 0
WEAKNESS: 0
HEMOPTYSIS: 0
VOMITING: 0
MUSCULOSKELETAL NEGATIVE: 1
FEVER: 0
ABDOMINAL PAIN: 0
WEAKNESS: 1
MYALGIAS: 0
RESPIRATORY NEGATIVE: 1
DIARRHEA: 0
NEUROLOGICAL NEGATIVE: 1
BRUISES/BLEEDS EASILY: 0
EYES NEGATIVE: 1
DIAPHORESIS: 0
EYE PAIN: 0
WEIGHT LOSS: 0
PALPITATIONS: 0
DIZZINESS: 0
VOMITING: 0
FEVER: 0
CONSTIPATION: 1
CONSTITUTIONAL NEGATIVE: 1
CHILLS: 0
CLAUDICATION: 0
CHILLS: 0
FOCAL WEAKNESS: 0
DIAPHORESIS: 0
RESPIRATORY NEGATIVE: 1
EYE DISCHARGE: 0
SORE THROAT: 0
RESPIRATORY NEGATIVE: 1
WHEEZING: 0
WEAKNESS: 1
EYE DISCHARGE: 0
BRUISES/BLEEDS EASILY: 0
WEAKNESS: 1
SENSORY CHANGE: 0
WEIGHT LOSS: 0
WHEEZING: 0
CLAUDICATION: 0
CARDIOVASCULAR NEGATIVE: 1
CONSTITUTIONAL NEGATIVE: 1
WEAKNESS: 1
WEAKNESS: 0
SPUTUM PRODUCTION: 0
FEVER: 0
DIAPHORESIS: 0
DEPRESSION: 0
SENSORY CHANGE: 0
ABDOMINAL PAIN: 1
VOMITING: 0
VOMITING: 0
CHEST TIGHTNESS: 0
GASTROINTESTINAL NEGATIVE: 1
WEAKNESS: 1
FATIGUE: 0
WEIGHT LOSS: 0
CONSTITUTIONAL NEGATIVE: 1
CHILLS: 0
MUSCULOSKELETAL NEGATIVE: 1
MUSCULOSKELETAL NEGATIVE: 1
PSYCHIATRIC NEGATIVE: 1
CARDIOVASCULAR NEGATIVE: 1
EYE PAIN: 0
ORTHOPNEA: 0
SORE THROAT: 0
FOCAL WEAKNESS: 0
SHORTNESS OF BREATH: 0
HEADACHES: 0
CLAUDICATION: 0
HEADACHES: 0
GASTROINTESTINAL NEGATIVE: 1
CONSTITUTIONAL NEGATIVE: 1
DIARRHEA: 0
CARDIOVASCULAR NEGATIVE: 1
MUSCULOSKELETAL NEGATIVE: 1
WEAKNESS: 0
VOMITING: 0
VOMITING: 0
NEUROLOGICAL NEGATIVE: 1
BRUISES/BLEEDS EASILY: 0
SENSORY CHANGE: 0
RESPIRATORY NEGATIVE: 1
ABDOMINAL DISTENTION: 1
PSYCHIATRIC NEGATIVE: 1
DIAPHORESIS: 0
COUGH: 0
SHORTNESS OF BREATH: 1
EYE DISCHARGE: 0
CHILLS: 0
PSYCHIATRIC NEGATIVE: 1
SPEECH CHANGE: 0
NEUROLOGICAL NEGATIVE: 1
SPUTUM PRODUCTION: 0
HEADACHES: 0
WHEEZING: 0
FOCAL WEAKNESS: 0
FOCAL WEAKNESS: 0
MUSCULOSKELETAL NEGATIVE: 1
PND: 0
ABDOMINAL PAIN: 0
BRUISES/BLEEDS EASILY: 0
AGITATION: 0
ABDOMINAL PAIN: 0
SORE THROAT: 0
DIAPHORESIS: 0
CONSTIPATION: 0
SENSORY CHANGE: 0
EYES NEGATIVE: 1
EYE PAIN: 0
RESPIRATORY NEGATIVE: 1
WEAKNESS: 0
HEMOPTYSIS: 0
FEVER: 0
FEVER: 0
LOSS OF CONSCIOUSNESS: 0
FEVER: 0
HEMOPTYSIS: 0
SPEECH CHANGE: 0
DIAPHORESIS: 0
CONSTIPATION: 0
WEAKNESS: 1
COUGH: 0
RESPIRATORY NEGATIVE: 1
BRUISES/BLEEDS EASILY: 0
HEMOPTYSIS: 0
SORE THROAT: 0
PALPITATIONS: 0
SHORTNESS OF BREATH: 0
CARDIOVASCULAR NEGATIVE: 1
MYALGIAS: 0
PSYCHIATRIC NEGATIVE: 1
WEAKNESS: 1
EYES NEGATIVE: 1
GASTROINTESTINAL NEGATIVE: 1
CHILLS: 0
FEVER: 0
PALPITATIONS: 0
BLURRED VISION: 0
VOMITING: 0
SPUTUM PRODUCTION: 0
FATIGUE: 1
PALPITATIONS: 0
MYALGIAS: 0
ORTHOPNEA: 0
COUGH: 0
ABDOMINAL PAIN: 1
SHORTNESS OF BREATH: 0
BLURRED VISION: 0
FEVER: 0
EYES NEGATIVE: 1
CARDIOVASCULAR NEGATIVE: 1
COUGH: 0
GASTROINTESTINAL NEGATIVE: 1
NAUSEA: 0
NERVOUS/ANXIOUS: 0
MYALGIAS: 0
FEVER: 0
FOCAL WEAKNESS: 0
EYES NEGATIVE: 1
PALPITATIONS: 0
HEMOPTYSIS: 0
DIZZINESS: 0
PALPITATIONS: 0
NEUROLOGICAL NEGATIVE: 1
CHILLS: 0
NEUROLOGICAL NEGATIVE: 1
SHORTNESS OF BREATH: 1
PSYCHIATRIC NEGATIVE: 1
CLAUDICATION: 0
FEVER: 0
MYALGIAS: 0
DIAPHORESIS: 0
DIAPHORESIS: 0
EYES NEGATIVE: 1
HEADACHES: 0
BRUISES/BLEEDS EASILY: 0
COUGH: 0
NECK PAIN: 0
CONSTITUTIONAL NEGATIVE: 1
MUSCULOSKELETAL NEGATIVE: 1
SENSORY CHANGE: 0
SORE THROAT: 0
AGITATION: 0
CARDIOVASCULAR NEGATIVE: 1
SPUTUM PRODUCTION: 0
GASTROINTESTINAL NEGATIVE: 1
FEVER: 0
HEADACHES: 0
DEPRESSION: 0

## 2018-01-01 ASSESSMENT — PAIN SCALES - GENERAL
PAINLEVEL_OUTOF10: 5
PAINLEVEL_OUTOF10: 4
PAINLEVEL_OUTOF10: 2
PAINLEVEL_OUTOF10: 8
PAINLEVEL_OUTOF10: 8
PAINLEVEL_OUTOF10: 0
PAINLEVEL_OUTOF10: 8
PAINLEVEL_OUTOF10: 5
PAINLEVEL_OUTOF10: 0
PAINLEVEL_OUTOF10: 0
PAINLEVEL_OUTOF10: 2
PAINLEVEL_OUTOF10: 4
PAINLEVEL_OUTOF10: 6
PAINLEVEL_OUTOF10: 0
PAINLEVEL_OUTOF10: 4
PAINLEVEL_OUTOF10: 4
PAINLEVEL_OUTOF10: 0
PAINLEVEL_OUTOF10: 0
PAINLEVEL_OUTOF10: 5
PAINLEVEL_OUTOF10: 0
PAINLEVEL_OUTOF10: 0
PAINLEVEL_OUTOF10: 4
PAINLEVEL_OUTOF10: 8
PAINLEVEL_OUTOF10: 2
PAINLEVEL_OUTOF10: 0
PAINLEVEL_OUTOF10: 6
PAINLEVEL_OUTOF10: 5
PAINLEVEL_OUTOF10: 6
PAINLEVEL_OUTOF10: 0
PAINLEVEL_OUTOF10: 0
PAINLEVEL_OUTOF10: 5
PAINLEVEL_OUTOF10: 0
PAINLEVEL_OUTOF10: 4
PAINLEVEL_OUTOF10: 4
PAINLEVEL_OUTOF10: 0
PAINLEVEL_OUTOF10: 5
PAINLEVEL_OUTOF10: 7
PAINLEVEL_OUTOF10: 0
PAINLEVEL_OUTOF10: 4
PAINLEVEL_OUTOF10: 8
PAINLEVEL_OUTOF10: 7
PAINLEVEL_OUTOF10: 2
PAINLEVEL_OUTOF10: 4
PAINLEVEL_OUTOF10: 7
PAINLEVEL_OUTOF10: 0
PAINLEVEL_OUTOF10: 7
PAINLEVEL_OUTOF10: 6
PAINLEVEL_OUTOF10: 0
PAINLEVEL_OUTOF10: 4
PAINLEVEL_OUTOF10: 0
PAINLEVEL_OUTOF10: 5
PAINLEVEL_OUTOF10: 0
PAINLEVEL_OUTOF10: 4
PAINLEVEL_OUTOF10: 7
PAINLEVEL_OUTOF10: 0
PAINLEVEL_OUTOF10: 2
PAINLEVEL_OUTOF10: 0
PAINLEVEL_OUTOF10: 6
PAINLEVEL_OUTOF10: 8
PAINLEVEL_OUTOF10: 0
PAINLEVEL_OUTOF10: 8
PAINLEVEL_OUTOF10: 6
PAINLEVEL_OUTOF10: 2
PAINLEVEL_OUTOF10: 0
PAINLEVEL_OUTOF10: 0
PAINLEVEL_OUTOF10: 6
PAINLEVEL_OUTOF10: 8
PAINLEVEL_OUTOF10: 0
PAINLEVEL_OUTOF10: 2
PAINLEVEL_OUTOF10: 0
PAINLEVEL_OUTOF10: 8
PAINLEVEL_OUTOF10: 0
PAINLEVEL_OUTOF10: 7
PAINLEVEL_OUTOF10: 7
PAINLEVEL_OUTOF10: 5
PAINLEVEL_OUTOF10: 0
PAINLEVEL_OUTOF10: 7
PAINLEVEL_OUTOF10: 7
PAINLEVEL_OUTOF10: 4
PAINLEVEL_OUTOF10: 0
PAINLEVEL_OUTOF10: 0
PAINLEVEL_OUTOF10: 4
PAINLEVEL_OUTOF10: 0
PAINLEVEL_OUTOF10: 3
PAINLEVEL_OUTOF10: 0
PAINLEVEL_OUTOF10: 0
PAINLEVEL_OUTOF10: 5
PAINLEVEL_OUTOF10: 0
PAINLEVEL_OUTOF10: 2
PAINLEVEL_OUTOF10: 2
PAINLEVEL_OUTOF10: 5
PAINLEVEL_OUTOF10: 0
PAINLEVEL_OUTOF10: 6
PAINLEVEL_OUTOF10: 7
PAINLEVEL_OUTOF10: 8
PAINLEVEL_OUTOF10: 0
PAINLEVEL_OUTOF10: 8
PAINLEVEL_OUTOF10: 3
PAINLEVEL_OUTOF10: 0
PAINLEVEL_OUTOF10: 4
PAINLEVEL_OUTOF10: 6
PAINLEVEL_OUTOF10: 5
PAINLEVEL_OUTOF10: 7
PAINLEVEL_OUTOF10: 5
PAINLEVEL_OUTOF10: 0
PAINLEVEL_OUTOF10: 0
PAINLEVEL_OUTOF10: 7
PAINLEVEL_OUTOF10: 4
PAINLEVEL_OUTOF10: 0
PAINLEVEL_OUTOF10: 0
PAINLEVEL_OUTOF10: 4
PAINLEVEL_OUTOF10: 0
PAINLEVEL_OUTOF10: 5
PAINLEVEL_OUTOF10: 2
PAINLEVEL_OUTOF10: 6
PAINLEVEL_OUTOF10: 0
PAINLEVEL_OUTOF10: 2
PAINLEVEL_OUTOF10: 4
PAINLEVEL_OUTOF10: 5
PAINLEVEL_OUTOF10: 0
PAINLEVEL_OUTOF10: 1
PAINLEVEL_OUTOF10: 5
PAINLEVEL_OUTOF10: 1
PAINLEVEL_OUTOF10: 8
PAINLEVEL_OUTOF10: 4
PAINLEVEL_OUTOF10: 2
PAINLEVEL_OUTOF10: 0
PAINLEVEL_OUTOF10: 8
PAINLEVEL_OUTOF10: 0
PAINLEVEL_OUTOF10: 7
PAINLEVEL_OUTOF10: 0
PAINLEVEL_OUTOF10: 6
PAINLEVEL_OUTOF10: 2
PAINLEVEL_OUTOF10: 0
PAINLEVEL_OUTOF10: 8
PAINLEVEL_OUTOF10: 0
PAINLEVEL_OUTOF10: 0
PAINLEVEL_OUTOF10: 10
PAINLEVEL_OUTOF10: 2
PAINLEVEL_OUTOF10: 4
PAINLEVEL_OUTOF10: 0
PAINLEVEL_OUTOF10: 0
PAINLEVEL_OUTOF10: 8
PAINLEVEL_OUTOF10: 0
PAINLEVEL_OUTOF10: 0
PAINLEVEL_OUTOF10: 8
PAINLEVEL_OUTOF10: 0
PAINLEVEL_OUTOF10: 0
PAINLEVEL_OUTOF10: 4
PAINLEVEL_OUTOF10: 0
PAINLEVEL_OUTOF10: 7
PAINLEVEL_OUTOF10: 0
PAINLEVEL_OUTOF10: 0
PAINLEVEL_OUTOF10: 4
PAINLEVEL_OUTOF10: 0
PAINLEVEL_OUTOF10: 0
PAINLEVEL_OUTOF10: 3
PAINLEVEL_OUTOF10: 0
PAINLEVEL_OUTOF10: 8
PAINLEVEL_OUTOF10: 6
PAINLEVEL_OUTOF10: 0
PAINLEVEL_OUTOF10: 7
PAINLEVEL_OUTOF10: 2
PAINLEVEL_OUTOF10: 6
PAINLEVEL_OUTOF10: 7
PAINLEVEL_OUTOF10: 0
PAINLEVEL_OUTOF10: 8
PAINLEVEL_OUTOF10: 0
PAINLEVEL_OUTOF10: 4
PAINLEVEL_OUTOF10: 0
PAINLEVEL_OUTOF10: 8
PAINLEVEL_OUTOF10: 5
PAINLEVEL_OUTOF10: 5
PAINLEVEL_OUTOF10: 0
PAINLEVEL_OUTOF10: 2
PAINLEVEL_OUTOF10: 7
PAINLEVEL_OUTOF10: 4
PAINLEVEL_OUTOF10: 8
PAINLEVEL_OUTOF10: 0
PAINLEVEL_OUTOF10: 6
PAINLEVEL_OUTOF10: 8
PAINLEVEL_OUTOF10: 5
PAINLEVEL_OUTOF10: 6
PAINLEVEL_OUTOF10: 8
PAINLEVEL_OUTOF10: 7
PAINLEVEL_OUTOF10: 5
PAINLEVEL_OUTOF10: 2
PAINLEVEL_OUTOF10: 0

## 2018-01-01 ASSESSMENT — COGNITIVE AND FUNCTIONAL STATUS - GENERAL
SUGGESTED CMS G CODE MODIFIER DAILY ACTIVITY: CK
DRESSING REGULAR UPPER BODY CLOTHING: A LITTLE
MOVING TO AND FROM BED TO CHAIR: A LITTLE
PERSONAL GROOMING: A LITTLE
TURNING FROM BACK TO SIDE WHILE IN FLAT BAD: A LOT
TOILETING: A LOT
TURNING FROM BACK TO SIDE WHILE IN FLAT BAD: A LOT
SUGGESTED CMS G CODE MODIFIER DAILY ACTIVITY: CK
SUGGESTED CMS G CODE MODIFIER DAILY ACTIVITY: CK
PERSONAL GROOMING: A LITTLE
MOBILITY SCORE: 11
DAILY ACTIVITIY SCORE: 15
CLIMB 3 TO 5 STEPS WITH RAILING: TOTAL
SUGGESTED CMS G CODE MODIFIER MOBILITY: CL
DRESSING REGULAR LOWER BODY CLOTHING: A LITTLE
WALKING IN HOSPITAL ROOM: A LOT
TURNING FROM BACK TO SIDE WHILE IN FLAT BAD: A LOT
MOBILITY SCORE: 9
SUGGESTED CMS G CODE MODIFIER DAILY ACTIVITY: CK
CLIMB 3 TO 5 STEPS WITH RAILING: TOTAL
STANDING UP FROM CHAIR USING ARMS: A LOT
DRESSING REGULAR LOWER BODY CLOTHING: TOTAL
STANDING UP FROM CHAIR USING ARMS: A LITTLE
SUGGESTED CMS G CODE MODIFIER MOBILITY: CM
TOILETING: TOTAL
STANDING UP FROM CHAIR USING ARMS: A LOT
TOILETING: A LITTLE
MOBILITY SCORE: 9
STANDING UP FROM CHAIR USING ARMS: A LOT
DAILY ACTIVITIY SCORE: 15
MOVING FROM LYING ON BACK TO SITTING ON SIDE OF FLAT BED: UNABLE
MOVING FROM LYING ON BACK TO SITTING ON SIDE OF FLAT BED: UNABLE
PERSONAL GROOMING: A LITTLE
TURNING FROM BACK TO SIDE WHILE IN FLAT BAD: A LOT
MOBILITY SCORE: 11
TURNING FROM BACK TO SIDE WHILE IN FLAT BAD: A LOT
HELP NEEDED FOR BATHING: A LOT
DRESSING REGULAR UPPER BODY CLOTHING: A LITTLE
DRESSING REGULAR UPPER BODY CLOTHING: A LITTLE
WALKING IN HOSPITAL ROOM: A LOT
DRESSING REGULAR LOWER BODY CLOTHING: A LOT
CLIMB 3 TO 5 STEPS WITH RAILING: TOTAL
DRESSING REGULAR LOWER BODY CLOTHING: A LOT
PERSONAL GROOMING: A LITTLE
DRESSING REGULAR UPPER BODY CLOTHING: A LITTLE
SUGGESTED CMS G CODE MODIFIER MOBILITY: CM
DRESSING REGULAR UPPER BODY CLOTHING: A LITTLE
HELP NEEDED FOR BATHING: A LOT
SUGGESTED CMS G CODE MODIFIER MOBILITY: CK
STANDING UP FROM CHAIR USING ARMS: A LOT
MOVING TO AND FROM BED TO CHAIR: UNABLE
CLIMB 3 TO 5 STEPS WITH RAILING: TOTAL
MOVING TO AND FROM BED TO CHAIR: UNABLE
TOILETING: A LOT
SUGGESTED CMS G CODE MODIFIER DAILY ACTIVITY: CK
DAILY ACTIVITIY SCORE: 18
CLIMB 3 TO 5 STEPS WITH RAILING: TOTAL
MOBILITY SCORE: 9
HELP NEEDED FOR BATHING: A LITTLE
MOVING FROM LYING ON BACK TO SITTING ON SIDE OF FLAT BED: A LOT
TOILETING: A LOT
EATING MEALS: A LITTLE
TURNING FROM BACK TO SIDE WHILE IN FLAT BAD: A LITTLE
CLIMB 3 TO 5 STEPS WITH RAILING: A LITTLE
EATING MEALS: A LITTLE
HELP NEEDED FOR BATHING: A LOT
MOBILITY SCORE: 18
TOILETING: A LOT
MOVING FROM LYING ON BACK TO SITTING ON SIDE OF FLAT BED: A LITTLE
DRESSING REGULAR LOWER BODY CLOTHING: A LOT
DAILY ACTIVITIY SCORE: 15
HELP NEEDED FOR BATHING: A LOT
HELP NEEDED FOR BATHING: A LOT
WALKING IN HOSPITAL ROOM: A LITTLE
MOVING TO AND FROM BED TO CHAIR: UNABLE
DRESSING REGULAR UPPER BODY CLOTHING: A LITTLE
WALKING IN HOSPITAL ROOM: A LOT
DAILY ACTIVITIY SCORE: 17
SUGGESTED CMS G CODE MODIFIER DAILY ACTIVITY: CK
HELP NEEDED FOR BATHING: A LOT
WALKING IN HOSPITAL ROOM: A LOT
MOVING FROM LYING ON BACK TO SITTING ON SIDE OF FLAT BED: A LOT
WALKING IN HOSPITAL ROOM: A LOT
SUGGESTED CMS G CODE MODIFIER MOBILITY: CL
TOILETING: TOTAL
STANDING UP FROM CHAIR USING ARMS: A LOT
DRESSING REGULAR UPPER BODY CLOTHING: A LITTLE
MOVING FROM LYING ON BACK TO SITTING ON SIDE OF FLAT BED: UNABLE
DRESSING REGULAR LOWER BODY CLOTHING: A LOT
MOVING TO AND FROM BED TO CHAIR: A LOT
SUGGESTED CMS G CODE MODIFIER MOBILITY: CM
DAILY ACTIVITIY SCORE: 15
MOVING TO AND FROM BED TO CHAIR: A LOT
DAILY ACTIVITIY SCORE: 17
PERSONAL GROOMING: A LITTLE
DRESSING REGULAR LOWER BODY CLOTHING: A LOT
SUGGESTED CMS G CODE MODIFIER DAILY ACTIVITY: CK

## 2018-01-01 ASSESSMENT — PATIENT HEALTH QUESTIONNAIRE - PHQ9
2. FEELING DOWN, DEPRESSED, IRRITABLE, OR HOPELESS: NOT AT ALL
1. LITTLE INTEREST OR PLEASURE IN DOING THINGS: NOT AT ALL
1. LITTLE INTEREST OR PLEASURE IN DOING THINGS: NOT AT ALL
SUM OF ALL RESPONSES TO PHQ9 QUESTIONS 1 AND 2: 0
2. FEELING DOWN, DEPRESSED, IRRITABLE, OR HOPELESS: NOT AT ALL
SUM OF ALL RESPONSES TO PHQ9 QUESTIONS 1 AND 2: 0
1. LITTLE INTEREST OR PLEASURE IN DOING THINGS: NOT AT ALL
SUM OF ALL RESPONSES TO PHQ9 QUESTIONS 1 AND 2: 0
1. LITTLE INTEREST OR PLEASURE IN DOING THINGS: NOT AT ALL
SUM OF ALL RESPONSES TO PHQ9 QUESTIONS 1 AND 2: 0
SUM OF ALL RESPONSES TO PHQ9 QUESTIONS 1 AND 2: 0
2. FEELING DOWN, DEPRESSED, IRRITABLE, OR HOPELESS: NOT AT ALL
1. LITTLE INTEREST OR PLEASURE IN DOING THINGS: NOT AT ALL

## 2018-01-01 ASSESSMENT — LIFESTYLE VARIABLES
SUBSTANCE_ABUSE: 0
SUBSTANCE_ABUSE: 0
EVER_SMOKED: YES
EVER_SMOKED: YES
SUBSTANCE_ABUSE: 0

## 2018-01-01 ASSESSMENT — 6 MINUTE WALK TEST (6MWT)
SAO2 AT 3 MIN: 99
PERCEIVED FATIGUE AT 2 MIN: 3
SAO2 AT 6 MIN: 99
NUMBER OF RESTS: 3
PERCEIVED FATIGUE AT 1 MIN: 3
PERCEIVED BREATHLESSNESS AT 3 MIN: 0
HEART RATE: 88
SAO2 AT 1 MIN: 97
BLOOD PRESSURE: LEFT ARM
SAO2 AT 2 MIN: 93
HEART RATE AT 3 MIN: 101
TOTAL REST TIME: 90
PERCEIVED BREATHLESSNESS AT 2 MIN: 0
PERCEIVED BREATHLESSNESS AT 1 MIN: 0
HEART RATE AT 1 MIN: 114
BLOOD PRESSURE AT 1 MIN: 102/60
O2 SAT PERCENT ROOM AIR: 99
SITTING BLOOD PRESSURE: 102/60
HEART RATE AT 4 MIN: 104
PERCEIVED BREATHLESSNESS AT 1 MIN: 0
SAO2 AT 5 MIN: 98
PERCEIVED FATIGUE AT 4 MIN: 3
SAO2 AT 2 MIN: 98
PERCEIVED FATIGUE AT 3 MIN: 3
HEART RATE AT 6 MIN: 103
SAO2 AT 1 MIN: 97
HEART RATE AT 2 MIN: 96
HEART RATE AT 5 MIN: 103
HEART RATE AT 2 MIN: 95
PERCEIVED FATIGUE AT 5 MIN: 3
PERCEIVED BREATHLESSNESS AT 5 MIN: 0
SAO2 AT 4 MIN: 98
PERCEIVED FATIGUE AT 6 MIN: 3
AMBULATES WITH O2: WITHOUT O2
PERCEIVED FATIGUE AT 2 MIN: 3
PERCEIVED BREATHLESSNESS AT 4 MIN: 0
HEART RATE AT 1 MIN: 107
PERCENT OF NORMAL WALKED: 13
PERCEIVED BREATHLESSNESS AT 6 MIN: 0
PERCEIVED FATIGUE AT 1 MIN: 3
PERCEIVED BREATHLESSNESS AT 2 MIN: 0

## 2018-01-01 ASSESSMENT — CHA2DS2 SCORE
AGE 65 TO 74: NO
SEX: MALE
HYPERTENSION: YES
VASCULAR DISEASE: NO
CHF OR LEFT VENTRICULAR DYSFUNCTION: NO
AGE 75 OR GREATER: YES
CHA2DS2 VASC SCORE: 3
PRIOR STROKE OR TIA OR THROMBOEMBOLISM: NO
DIABETES: NO

## 2018-01-01 ASSESSMENT — GAIT ASSESSMENTS
ASSISTIVE DEVICE: FRONT WHEEL WALKER
GAIT LEVEL OF ASSIST: MODERATE ASSIST
DISTANCE (FEET): 50
GAIT LEVEL OF ASSIST: UNABLE TO PARTICIPATE
DEVIATION: BRADYKINETIC;SHUFFLED GAIT;INCREASED BASE OF SUPPORT
DEVIATION: DECREASED BASE OF SUPPORT
DISTANCE (FEET): 15
ASSISTIVE DEVICE: FRONT WHEEL WALKER
GAIT LEVEL OF ASSIST: REFUSED
GAIT LEVEL OF ASSIST: MINIMAL ASSIST
GAIT LEVEL OF ASSIST: UNABLE TO PARTICIPATE

## 2018-01-01 ASSESSMENT — COPD QUESTIONNAIRES
HAVE YOU SMOKED AT LEAST 100 CIGARETTES IN YOUR ENTIRE LIFE: YES
DO YOU EVER COUGH UP ANY MUCUS OR PHLEGM?: NO/ONLY WITH OCCASIONAL COLDS OR INFECTIONS
DURING THE PAST 4 WEEKS HOW MUCH DID YOU FEEL SHORT OF BREATH: MOST  OR ALL OF THE TIME
COPD SCREENING SCORE: 7

## 2018-01-01 ASSESSMENT — PULMONARY FUNCTION TESTS
FVC: 1
FVC: 776
EPAP_CMH2O: 5
FVC: 1.1

## 2018-01-01 ASSESSMENT — ACTIVITIES OF DAILY LIVING (ADL): TOILETING: INDEPENDENT

## 2018-01-03 NOTE — PROGRESS NOTES
Chief Complaint   Patient presents with   • Results     PFT results.       HPI: This patient is a 77 y.o. Male who returns for follow-up of shortness of breath. He developed URI symptoms September 2007 with progressive exertional dyspnea, cough and wheezing since then. He was referred to cardiology in the interim who hospitalized him for acute congestive heart failure and atrial fibrillation. Echocardiography showed severe tricuspid regurgitation, left ventricular ejection fraction 55%, a severely dilated right ventricle with reduced RV systolic function. His RVSP was estimated at 45 mmHg. He is now following closely with cardiology with improved dyspnea overall. He underwent pulmonary function testing showing mild restrictive ventilatory defect felt secondary to obesity/effusion. His diffusion capacity was normal. Chest x-ray showed cardiomegaly, small pleural effusion.      Past Medical History:   Diagnosis Date   • Arthritis    • CATARACT     bilateral IOL   • Chickenpox    • Greek measles    • Gout    • Gout    • Hypertension    • Indigestion    • Influenza    • Mumps    • Pain     left hip   • Pain     left knee   • Paroxysmal atrial fibrillation (CMS-HCC)    • Tremor     intermittent       Social History     Social History   • Marital status:      Spouse name: N/A   • Number of children: N/A   • Years of education: N/A     Occupational History   • Not on file.     Social History Main Topics   • Smoking status: Former Smoker     Packs/day: 0.50     Years: 5.00     Types: Cigarettes     Quit date: 1/1/1969   • Smokeless tobacco: Never Used   • Alcohol use Yes      Comment: reduced to occasional.   • Drug use: No   • Sexual activity: Not on file     Other Topics Concern   • Not on file     Social History Narrative   • No narrative on file       Family History   Problem Relation Age of Onset   • Cancer Mother    • Non-contributory Neg Hx        Current Outpatient Prescriptions on File Prior to Visit    Medication Sig Dispense Refill   • metoprolol SR (TOPROL XL) 50 MG TABLET SR 24 HR Take 1 Tab by mouth 2 Times a Day. (Patient taking differently: Take 100 mg by mouth 2 Times a Day.) 180 Tab 3   • potassium chloride SA (KDUR) 20 MEQ Tab CR Take 0.5 Tabs by mouth every day. Take additional tablet when increasing  furosemide. 100 Tab 3   • rivaroxaban (XARELTO) 20 MG Tab tablet Take 1 Tab by mouth with dinner. 90 Tab 3   • lisinopril (PRINIVIL) 5 MG Tab Take 1 Tab by mouth every day. 90 Tab 3   • atorvastatin (LIPITOR) 20 MG Tab Take 1 Tab by mouth every evening. 90 Tab 3   • Multiple Vitamins-Minerals (PRESERVISION AREDS) Tab Take 2 tablet by mouth every day.     • VENTOLIN  (90 Base) MCG/ACT Aero Soln inhalation aerosol Inhale 1 Puff by mouth every four hours as needed for Shortness of Breath.     • allopurinol (ZYLOPRIM) 300 MG Tab TAKE 1 TABLET DAILY 90 Tab 2   • omeprazole (PRILOSEC) 20 MG CPDR Take 20 mg by mouth every day.       • furosemide (LASIX) 40 MG Tab Take 0.5 Tabs by mouth every day. Take additional tablet as needed for ankle swelling. 100 Tab 3     No current facility-administered medications on file prior to visit.        Allergies: Percocet [oxycodone-acetaminophen] and Other environmental    ROS:   Constitutional: Denies fevers, chills, night sweats, fatigue or weight loss  Eyes: Denies vision loss, pain, drainage, double vision  Ears, Nose, Throat: Denies earache, difficulty hearing, tinnitus, nasal congestion, hoarseness  Cardiovascular: Denies chest pain, tightness, palpitations, orthopnea or edema  Respiratory:As in history of present illness  Sleep: Denies daytime sleepiness, snoring, apneas, insomnia, morning headaches  GI: Denies heartburn, dysphagia, nausea, abdominal pain, diarrhea or constipation  : Denies frequent urination, hematuria, discharge or painful urination  Musculoskeletal: Denies back pain, painful joints, sore muscles  Neurological: Denies weakness or  "headaches  Skin: No rashes    Blood pressure 116/72, pulse 98, temperature 36 °C (96.8 °F), resp. rate 16, height 1.753 m (5' 9\"), weight 88.7 kg (195 lb 9.6 oz), SpO2 99 %.    Physical Exam:  Appearance: Well-nourished, well-developed, in no acute distress  HEENT: Normocephalic, atraumatic, white sclera, PERRLA, oropharynx clear  Neck: No adenopathy or masses  Respiratory: no intercostal retractions or accessory muscle use  Lungs auscultation: Clear to auscultation bilaterally  Cardiovascular: Regular rate rhythm. No murmurs, rubs or gallops.  No LE edema  Abdomen: soft, nondistended  Gait: Normal  Digits: No clubbing, cyanosis  Motor: No focal deficits  Orientation: Oriented to time, person and place    Diagnosis:  1. SOB (shortness of breath)     2. Paroxysmal atrial fibrillation (CMS-HCC)     3. Systolic CHF, acute (CMS-HCC)         Plan:  The patient's shortness of breath is principally cardiovascular in etiology. He shows clinical improvement with treatment of his congestive heart failure and was encouraged to continue close follow-up with cardiology.  His pulmonary function testing suggests mild restrictive physiology, felt secondary to pleural effusion and mild obesity. We will continue to monitor clinically and with optimization of his cardiovascular disease.  Return in about 3 months (around 4/2/2018).      "

## 2018-01-10 NOTE — PROGRESS NOTES
Patient Jaime Tamayo discharged on 11/30/17 and [facility] Patient Advocate assisted with discharge orders.  The patient was scheduled for and kept 4 appointments; 2 with cardiology and 2 with pulmonology.  Patient Advocate confirmed that discharge medications were successfully filled.  No DME or HH services were ordered.  The patient is scheduled for 3 future appointments; 2 with cardiology and 1 with pulmonology.

## 2018-01-29 ENCOUNTER — APPOINTMENT (RX ONLY)
Dept: URBAN - METROPOLITAN AREA CLINIC 4 | Facility: CLINIC | Age: 78
Setting detail: DERMATOLOGY
End: 2018-01-29

## 2018-01-29 DIAGNOSIS — L82.0 INFLAMED SEBORRHEIC KERATOSIS: ICD-10-CM

## 2018-01-29 PROBLEM — D04.39 CARCINOMA IN SITU OF SKIN OF OTHER PARTS OF FACE: Status: ACTIVE | Noted: 2018-01-29

## 2018-01-29 PROCEDURE — 99212 OFFICE O/P EST SF 10 MIN: CPT

## 2018-01-29 PROCEDURE — ? OBSERVATION

## 2018-01-29 PROCEDURE — ? LIQUID NITROGEN

## 2018-01-29 ASSESSMENT — LOCATION DETAILED DESCRIPTION DERM: LOCATION DETAILED: LEFT LATERAL MALAR CHEEK

## 2018-01-29 ASSESSMENT — LOCATION ZONE DERM: LOCATION ZONE: FACE

## 2018-01-29 ASSESSMENT — LOCATION SIMPLE DESCRIPTION DERM: LOCATION SIMPLE: LEFT CHEEK

## 2018-01-29 NOTE — PROCEDURE: LIQUID NITROGEN
Include Z78.9 (Other Specified Conditions Influencing Health Status) As An Associated Diagnosis?: No
Medical Necessity Information: It is in your best interest to select a reason for this procedure from the list below. All of these items fulfill various CMS LCD requirements except the new and changing color options.
Consent: The patient's consent was obtained including but not limited to risks of crusting, scabbing, blistering, scarring, darker or lighter pigmentary change, recurrence, incomplete removal and infection.
Post-Care Instructions: I reviewed with the patient in detail post-care instructions. Patient is to wear sunprotection, and avoid picking at any of the treated lesions. Pt may apply Vaseline to crusted or scabbing areas.
Medical Necessity Clause: This procedure was medically necessary because the lesions that were treated were:
Detail Level: Detailed

## 2018-02-13 PROBLEM — I48.20 CHRONIC ATRIAL FIBRILLATION (HCC): Status: ACTIVE | Noted: 2018-01-01

## 2018-02-13 PROBLEM — I34.0 MITRAL INSUFFICIENCY: Status: ACTIVE | Noted: 2018-01-01

## 2018-02-13 PROBLEM — I27.20 PULMONARY HYPERTENSION (HCC): Status: ACTIVE | Noted: 2018-01-01

## 2018-02-13 NOTE — PROGRESS NOTES
Chief complaint: Exertional dyspnea.  Patient was seen in the office on November 29 and was found be in atrial fibrillation with rapid ventricular response. He was dyspneic and was hospitalized. Since discharge she's been short of breath with very minimal activity. Echo showed at least moderate mitral insufficiency, RV enlargement and moderate pulmonary hypertension. He is currently on 40 mg of furosemide a day but still has difficulty with exertional dyspnea. I have personally reviewed the echo images.    Past Medical History:   Diagnosis Date   • Arthritis    • CATARACT     bilateral IOL   • Chickenpox    • Malagasy measles    • Gout    • Gout    • Hypertension    • Indigestion    • Influenza    • Mumps    • Pain     left hip   • Pain     left knee   • Paroxysmal atrial fibrillation (CMS-HCC)    • Tremor     intermittent     Past Surgical History:   Procedure Laterality Date   • KNEE ARTHROPLASTY TOTAL Left 10/17/2016    Procedure: KNEE ARTHROPLASTY TOTAL;  Surgeon: Jaime Carmona M.D.;  Location: [facility];  Service:    • IRRIGATION & DEBRIDEMENT ORTHO  3/19/2013    Performed by Jaime Carmona M.D. at [facility]   • EVACUATION OF HEMATOMA  3/19/2013    Performed by Jaime Carmona M.D. at [facility]   • KNEE ARTHROPLASTY TOTAL  3/18/2013    Performed by Jaime Carmona M.D. at [facility]   • HIP ARTHROPLASTY TOTAL  3/5/2012    Performed by JAIME CARMONA at [facility]   • LUMBAR FUSION POSTERIOR  2012   • CATARACT EXTRACTION WITH IOL Bilateral 2010   • HIP ARTHROPLASTY TOTAL  6/8/2009    right; Performed by JAIME CRAMONA at [facility]   • OTHER ORTHOPEDIC SURGERY  2006    right thumb   • ACHILLES TENDON REP Left 1999   • TONSILLECTOMY  1947   • HIP REPLACEMENT, TOTAL     • LAMINOTOMY       Family History   Problem Relation Age of Onset   • Cancer Mother    • Non-contributory Neg Hx      History   Smoking Status   •  Former Smoker   • Packs/day: 0.50   • Years: 5.00   • Types: Cigarettes   • Quit date: 1/1/1969   Smokeless Tobacco   • Never Used     Allergies   Allergen Reactions   • Percocet [Oxycodone-Acetaminophen] Itching and Anxiety   • Other Environmental      hayfever     Outpatient Encounter Prescriptions as of 2/13/2018   Medication Sig Dispense Refill   • [START ON 2/14/2018] metOLazone (ZAROXOLYN) 2.5 MG Tab Take 1 Tab by mouth every Monday, Wednesday, and Friday. 15 Tab 1   • furosemide (LASIX) 20 MG Tab Take 20 mg by mouth every day.     • umeclidinium-vilanterol (ANORO ELLIPTA) 62.5-25 MCG/INH AEROSOL POWDER, BREATH ACTIVATED inhaler Inhale 1 Puff by mouth every day.     • furosemide (LASIX) 40 MG Tab Take 0.5 Tabs by mouth every day. Take additional tablet as needed for ankle swelling. 100 Tab 3   • metoprolol SR (TOPROL XL) 50 MG TABLET SR 24 HR Take 1 Tab by mouth 2 Times a Day. (Patient taking differently: Take 100 mg by mouth 2 Times a Day.) 180 Tab 3   • potassium chloride SA (KDUR) 20 MEQ Tab CR Take 0.5 Tabs by mouth every day. Take additional tablet when increasing  furosemide. 100 Tab 3   • rivaroxaban (XARELTO) 20 MG Tab tablet Take 1 Tab by mouth with dinner. 90 Tab 3   • lisinopril (PRINIVIL) 5 MG Tab Take 1 Tab by mouth every day. 90 Tab 3   • atorvastatin (LIPITOR) 20 MG Tab Take 1 Tab by mouth every evening. 90 Tab 3   • Multiple Vitamins-Minerals (PRESERVISION AREDS) Tab Take 2 tablet by mouth every day.     • VENTOLIN  (90 Base) MCG/ACT Aero Soln inhalation aerosol Inhale 1 Puff by mouth every four hours as needed for Shortness of Breath.     • allopurinol (ZYLOPRIM) 300 MG Tab TAKE 1 TABLET DAILY 90 Tab 2   • omeprazole (PRILOSEC) 20 MG CPDR Take 20 mg by mouth every day.         No facility-administered encounter medications on file as of 2/13/2018.      Review of Systems   Constitutional: Negative for fever and weight loss.   Respiratory: Positive for shortness of breath.   "  Cardiovascular: Positive for leg swelling. Negative for chest pain.   Endo/Heme/Allergies: Does not bruise/bleed easily.        Objective:   /70   Pulse 96   Ht 1.753 m (5' 9\")   Wt 88.5 kg (195 lb)   SpO2 98%   BMI 28.80 kg/m²     Physical Exam   Constitutional: No distress.   HENT:   Head: Normocephalic and atraumatic.   Cardiovascular: Normal rate.  An irregularly irregular rhythm present.   Murmur heard.   Systolic murmur is present with a grade of 2/6   Pulmonary/Chest: No respiratory distress.   Dullness to percussion left base with absent breath sounds   Musculoskeletal: He exhibits edema.   1+ edema       Assessment:     1. Essential hypertension, benign  TRANSESOPHAGEAL ECHO W/O CONT    DX-CHEST-2 VIEWS   2. Localized edema     3. Chronic atrial fibrillation (CMS-HCC)     4. Non-rheumatic mitral regurgitation  TRANSESOPHAGEAL ECHO W/O CONT    DX-CHEST-2 VIEWS   5. Pulmonary hypertension  TRANSESOPHAGEAL ECHO W/O CONT    DX-CHEST-2 VIEWS       Medical Decision Making:  Today's Assessment / Status / Plan:   Exertional dyspnea. On exam he has evidence of left pleural effusion with dullness to percussion A PA and lateral chest x-ray will be obtained and he will have a thoracentesis if appropriate.   I suspect the patient has severe mitral insufficiency as a cause of his dyspnea and secondary pulmonary hypertension. A transverse esophageal echo will be obtained. In the interim he'll be started on metolazone 2.5 mg Monday Wednesday Friday in addition to his furosemide. He has a follow-up appointment already scheduled for early March. Check laboratory and approximate one week with BMP.  "

## 2018-02-13 NOTE — TELEPHONE ENCOUNTER
----- Message from Eduardo Marroquin M.D. sent at 2018  2:58 PM PST -----  Regarding: Order for JOSSE IBRAHIM    Patient Name: JOSSE IBRAHIM([id number])  Sex: Male  : 1940      PCP: DAXA HART    Center: [facility]     Types of orders made on 2018: Echo, Imaging, Medications    Order Date:2018  Ordering User:EDUARDO MARROQUIN [[id number]]  Encounter Provider:Eduardo Marroquin M.D. [[id number]]  Authorizing Provider: Eduardo Marroquin M.D. [[id number]]  Department:[facility][[id number]]    Order Specific Information  Order: TRANSESOPHAGEAL ECHO W/O CONT [Custom: [id number]]  Order #: [id number]:          1 FUTURE    Priority: Routine    Future Order Information      Expires on:2019              Associated Diagnoses      I10 Essential hypertension, benign      I34.0 Non-rheumatic mitral regurgitation      I27.20 Pulmonary hypertension        Priority: Routine  Class: Normal    Future Order Information      Expires on:2019              Associated Diagnoses      I10 Essential hypertension, benign      I34.0 Non-rheumatic mitral regurgitation      I27.20 Pulmonary hypertension

## 2018-02-13 NOTE — TELEPHONE ENCOUNTER
Patient is scheduled on 2-21-18 for a YUNIOR w/ Dr. Rehman at [facility]. No meds to stop. H&P was done on 2-13-18 by Dr. Melvin. Patient was told to check in at 8:00 for a 10:00 procedure.Pre admit to call patient.

## 2018-02-21 NOTE — DISCHARGE INSTRUCTIONS
ACTIVITY: Rest and take it easy for the first 24 hours.  A responsible adult is recommended to remain with you during that time.  It is normal to feel sleepy.  We encourage you to not do anything that requires balance, judgment or coordination.    MILD FLU-LIKE SYMPTOMS ARE NORMAL. YOU MAY EXPERIENCE GENERALIZED MUSCLE ACHES, THROAT IRRITATION, HEADACHE AND/OR SOME NAUSEA.    FOR 24 HOURS DO NOT:  Drive, operate machinery or run household appliances.  Drink beer or alcoholic beverages.   Make important decisions or sign legal documents.    DIET: To avoid nausea, slowly advance diet as tolerated, avoiding spicy or greasy foods for the first day.  Add more substantial food to your diet according to your physician's instructions.  Babies can be fed formula or breast milk as soon as they are hungry.  INCREASE FLUIDS AND FIBER TO AVOID CONSTIPATION.    FOLLOW-UP APPOINTMENT:  A follow-up appointment should be arranged with your doctor in [place] at [phone]; call to schedule.    You should CALL YOUR PHYSICIAN if you develop:  Fever greater than 101 degrees F.  Pain not relieved by medication, or persistent nausea or vomiting.  Excessive bleeding (blood soaking through dressing) or unexpected drainage from the wound.  Extreme redness or swelling around the incision site, drainage of pus or foul smelling drainage.  Inability to urinate or empty your bladder within 8 hours.  Problems with breathing or chest pain.    You should call [phone] if you develop problems with breathing or chest pain.  If you are unable to contact your doctor or surgical center, you should go to the nearest emergency room or urgent care center.  Physician's telephone #: [phone]    If any questions arise, call your doctor.  If your doctor is not available, please feel free to call the Surgical Center at [phone].  The Center is open Monday through Friday from 7AM to 7PM.  You can also call the [employer] HOTLINE open 24 hours/day, 7 days/week  and speak to a nurse at [phone], or toll free at [phone].    A registered nurse may call you a few days after your surgery to see how you are doing after your procedure.    MEDICATIONS: Resume taking daily medication.  Take prescribed pain medication with food.  If no medication is prescribed, you may take non-aspirin pain medication if needed.  PAIN MEDICATION CAN BE VERY CONSTIPATING.  Take a stool softener or laxative such as senokot, pericolace, or milk of magnesia if needed.    If your physician has prescribed pain medication that includes Acetaminophen (Tylenol), do not take additional Acetaminophen (Tylenol) while taking the prescribed medication.    Depression / Suicide Risk    As you are discharged from this [facility] facility, it is important to learn how to keep safe from harming yourself.    Recognize the warning signs:  · Abrupt changes in personality, positive or negative- including increase in energy   · Giving away possessions  · Change in eating patterns- significant weight changes-  positive or negative  · Change in sleeping patterns- unable to sleep or sleeping all the time   · Unwillingness or inability to communicate  · Depression  · Unusual sadness, discouragement and loneliness  · Talk of wanting to die  · Neglect of personal appearance   · Rebelliousness- reckless behavior  · Withdrawal from people/activities they love  · Confusion- inability to concentrate     If you or a loved one observes any of these behaviors or has concerns about self-harm, here's what you can do:  · Talk about it- your feelings and reasons for harming yourself  · Remove any means that you might use to hurt yourself (examples: pills, rope, extension cords, firearm)  · Get professional help from the community (Mental Health, Substance Abuse, psychological counseling)  · Do not be alone:Call your Safe Contact- someone whom you trust who will be there for you.  · Call your local CRISIS HOTLINE 360-1601 or  745.551.8833  · Call your local Children's Mobile Crisis Response Team [place] (979) 353-3921 or www.[employer]  · Call the toll free National Suicide Prevention Hotlines   · National Suicide Prevention Lifeline 041-718-NMVU (0232)  · All in One Medical Line Network 800-SUICIDE (305-5133)      Transesophageal Echocardiogram  Transesophageal echocardiography (YUNIOR) is a picture test of your heart using sound waves. The pictures taken can give very detailed pictures of your heart. This can help your doctor see if there are problems with your heart. YUNIOR can check:  · If your heart has blood clots in it.  · How well your heart valves are working.  · If you have an infection on the inside of your heart.  · Some of the major arteries of your heart.  · If your heart valve is working after a repair.  · Your heart before a procedure that uses a shock to your heart to get the rhythm back to normal.  BEFORE THE PROCEDURE  · Do not eat or drink for 6 hours before the procedure or as told by your doctor.  · Make plans to have someone drive you home after the procedure. Do not drive yourself home.  · An IV tube will be put in your arm.  PROCEDURE  · You will be given a medicine to help you relax (sedative). It will be given through the IV tube.  · A numbing medicine will be sprayed or gargled in the back of your throat to help numb it.  · The tip of the probe is placed into the back of your mouth. You will be asked to swallow. This helps to pass the probe into your esophagus.  · Once the tip of the probe is in the right place, your doctor can take pictures of your heart.  · You may feel pressure at the back of your throat.  AFTER THE PROCEDURE  · You will be taken to a recovery area so the sedative can wear off.  · Your throat may be sore and scratchy. This will go away slowly over time.  · You will go home when you are fully awake and able to swallow liquids.  · You should have someone stay with you for the next 24  hours.  · Do not drive or operate machinery for the next 24 hours.     This information is not intended to replace advice given to you by your health care provider. Make sure you discuss any questions you have with your health care provider.     Document Released: 10/15/2010 Document Revised: 12/23/2014 Document Reviewed: 06/19/2014  Elsevier Interactive Patient Education ©2016 [employer]

## 2018-02-21 NOTE — OR NURSING
1116 Pt report received from Cath lab RN, pt brought over with RN and anesthesiologist on [facility] [place]. No C/O pain at this time, VSS, family at bedside, pt given water and a snack.     1256 pt given D/C instructions, pt verbalized understanding, pt was also given information on discharge instructions for YUNIOR. Pt taken out in wheelchair, going home with family.

## 2018-02-22 NOTE — TELEPHONE ENCOUNTER
----- Message from Brianna Cummings R.N. sent at 2/21/2018  3:41 PM PST -----  Regarding: FW: YUNIRO      ----- Message -----  From: Eduardo Melvin M.D.  Sent: 2/21/2018   1:01 PM  To: Bita Wang L.P.N.  Subject: YUNIOR                                              I have reviewed today's YUNIOR images and discussed with Dr. Rehman who did the procedure.  Moderate MR, not severe.  Is his breathing any better since starting metolazone.  Increase metolazone to 5 mg on M-W- F.  BMP and BNP in one week.  Needs follow up appointment.

## 2018-02-22 NOTE — TELEPHONE ENCOUNTER
Discussed with wife.  She verbalized understanding.  Lab forms mailed, [facility].  Pt has follow up appt 3/6

## 2018-03-06 NOTE — LETTER
[facility]   [address], [address]  [place] [address]  Phone: [phone]  Fax: [phone]              Jaime Tamayo  1940    Encounter Date: 3/6/2018    Eduardo Melvin M.D.          PROGRESS NOTE:  Chief complaint: Exertional dyspnea.  The patient returns for follow-up after his transesophageal echo. This revealed that the mitral insufficiency is moderate. There is some restriction of aortic valve opening however on the transthoracic Doppler the gradients were not severe and I have personally reviewed the Doppler tracings which confirms this.  He has severe tricuspid insufficiency and mild to moderate pulmonary hypertension.  He was also found to have a left pleural effusion.    The patient is feeling much better since addition of metolazone.  Recent lab was reviewed with him.  Past Medical History:   Diagnosis Date   • Anemia    • Arthritis    • Breath shortness     with minimal exertion no O2   • CATARACT     bilateral IOL   • Chickenpox    • Congestive heart failure (CMS-HCC)    • [place] measles    • Gout    • Gout    • Heart burn    • Heart valve disease    • Hemorrhagic disorder (CMS-HCC)     on Xarelto   • High cholesterol    • Hypertension    • Indigestion    • Influenza    • Mumps    • Pain 02/2018    back   • Pain    • Paroxysmal atrial fibrillation (CMS-HCC)    • Tremor     intermittent     Past Surgical History:   Procedure Laterality Date   • KNEE ARTHROPLASTY TOTAL Left 10/17/2016    Procedure: KNEE ARTHROPLASTY TOTAL;  Surgeon: Jaiem Carmona M.D.;  Location: [facility];  Service:    • IRRIGATION & DEBRIDEMENT ORTHO  3/19/2013    Performed by Jaime Carmona M.D. at [facility]   • EVACUATION OF HEMATOMA  3/19/2013    Performed by Jaime Carmona M.D. at [facility]   • KNEE ARTHROPLASTY TOTAL  3/18/2013    Performed by Jaime Carmona M.D. at [facility]   • HIP  ARTHROPLASTY TOTAL  3/5/2012    Performed by JOSSE KRAUS at [facility]   • LUMBAR FUSION POSTERIOR  2012   • CATARACT EXTRACTION WITH IOL Bilateral 2010   • HIP ARTHROPLASTY TOTAL  6/8/2009    right; Performed by JOSSE KRAUS at [facility]   • OTHER ORTHOPEDIC SURGERY  2006    right thumb   • ACHILLES TENDON REP Left 1999   • TONSILLECTOMY  1947   • HIP REPLACEMENT, TOTAL     • LAMINOTOMY       Family History   Problem Relation Age of Onset   • Cancer Mother    • Non-contributory Neg Hx      History   Smoking Status   • Former Smoker   • Packs/day: 0.50   • Years: 5.00   • Types: Cigarettes   • Quit date: 1/1/1969   Smokeless Tobacco   • Never Used     Allergies   Allergen Reactions   • Percocet [Oxycodone-Acetaminophen] Itching and Anxiety   • Other Environmental      hayfever     Outpatient Encounter Prescriptions as of 3/6/2018   Medication Sig Dispense Refill   • metOLazone (ZAROXOLYN) 5 MG Tab Take one tablet on Monday, Wednesday, Friday 36 Tab 3   • furosemide (LASIX) 40 MG Tab Take 40 mg by mouth every morning.     • vitamin D (CHOLECALCIFEROL) 1000 UNIT Tab Take 1,000 Units by mouth 2 Times a Day.     • Ferrous Sulfate (IRON) 325 (65 Fe) MG Tab Take  by mouth every day.     • metoprolol SR (TOPROL XL) 50 MG TABLET SR 24 HR Take 1 Tab by mouth 2 Times a Day. (Patient taking differently: Take 100 mg by mouth 2 Times a Day.) 180 Tab 3   • potassium chloride SA (KDUR) 20 MEQ Tab CR Take 0.5 Tabs by mouth every day. Take additional tablet when increasing  furosemide. 100 Tab 3   • rivaroxaban (XARELTO) 20 MG Tab tablet Take 1 Tab by mouth with dinner. 90 Tab 3   • atorvastatin (LIPITOR) 20 MG Tab Take 1 Tab by mouth every evening. 90 Tab 3   • Multiple Vitamins-Minerals (PRESERVISION AREDS) Tab Take 2 tablet by mouth every day.     • allopurinol (ZYLOPRIM) 300 MG Tab TAKE 1 TABLET DAILY 90 Tab 2   • omeprazole (PRILOSEC) 20 MG CPDR Take 20 mg by mouth every day.       •  "umeclidinium-vilanterol (ANORO ELLIPTA) 62.5-25 MCG/INH AEROSOL POWDER, BREATH ACTIVATED inhaler Inhale 1 Puff by mouth every day.     • [DISCONTINUED] lisinopril (PRINIVIL) 5 MG Tab Take 1 Tab by mouth every day. 90 Tab 3     No facility-administered encounter medications on file as of 3/6/2018.      Review of Systems   Constitutional: Negative for fever and weight loss.   Respiratory: Positive for shortness of breath.    Cardiovascular: Positive for leg swelling. Negative for chest pain.   Endo/Heme/Allergies: Does not bruise/bleed easily.        Objective:   /70   Pulse 90   Ht 1.753 m (5' 9\")   Wt 88.5 kg (195 lb)   BMI 28.80 kg/m²      Physical Exam   Constitutional: No distress.   HENT:   Head: Normocephalic and atraumatic.   Cardiovascular: Normal rate.  An irregularly irregular rhythm present.   Murmur heard.   Systolic murmur is present with a grade of 2/6   Pulmonary/Chest: No respiratory distress.   Dullness to percussion left base with absent breath sounds   Musculoskeletal: He exhibits no edema.       Assessment:     1. Chronic atrial fibrillation (CMS-HCC)     2. Essential hypertension, benign     3. Non-rheumatic mitral regurgitation         Medical Decision Making:  Today's Assessment / Status / Plan:   Exertional dyspnea:  The etiology is multifactorial. He has moderate mitral insufficiency, mild aortic stenosis by Doppler, and mild to moderate pulmonary hypertension, which could be secondary to a pulmonary cause or valvular disease. He also has a left pleural effusion which is improved by exam today.  In reviewing his transthoracic and transesophageal echo images, his valvular disease is not severe enough to consider valvular surgery at this time. His edema has resolved and he symptomatically better. Continue current medical regimen and reevaluate in 3 months.      Dat Dominguez M.D.  [address]  [address]  [place] [address]  VIA Facsimile: [phone]                 "

## 2018-03-06 NOTE — PROGRESS NOTES
Chief complaint: Exertional dyspnea.  The patient returns for follow-up after his transesophageal echo. This revealed that the mitral insufficiency is moderate. There is some restriction of aortic valve opening however on the transthoracic Doppler the gradients were not severe and I have personally reviewed the Doppler tracings which confirms this.  He has severe tricuspid insufficiency and mild to moderate pulmonary hypertension.  He was also found to have a left pleural effusion.    The patient is feeling much better since addition of metolazone.  Recent lab was reviewed with him.  Past Medical History:   Diagnosis Date   • Anemia    • Arthritis    • Breath shortness     with minimal exertion no O2   • CATARACT     bilateral IOL   • Chickenpox    • Congestive heart failure (CMS-HCC)    • Thai measles    • Gout    • Gout    • Heart burn    • Heart valve disease    • Hemorrhagic disorder (CMS-HCC)     on Xarelto   • High cholesterol    • Hypertension    • Indigestion    • Influenza    • Mumps    • Pain 02/2018    back   • Pain    • Paroxysmal atrial fibrillation (CMS-HCC)    • Tremor     intermittent     Past Surgical History:   Procedure Laterality Date   • KNEE ARTHROPLASTY TOTAL Left 10/17/2016    Procedure: KNEE ARTHROPLASTY TOTAL;  Surgeon: Jaime Carmona M.D.;  Location: [facility];  Service:    • IRRIGATION & DEBRIDEMENT ORTHO  3/19/2013    Performed by Jaime Carmona M.D. at [facility]   • EVACUATION OF HEMATOMA  3/19/2013    Performed by Jaime Carmona M.D. at [facility]   • KNEE ARTHROPLASTY TOTAL  3/18/2013    Performed by Jaime Carmona M.D. at [facility]   • HIP ARTHROPLASTY TOTAL  3/5/2012    Performed by JAIME CARMONA at [facility]   • LUMBAR FUSION POSTERIOR  2012   • CATARACT EXTRACTION WITH IOL Bilateral 2010   • HIP ARTHROPLASTY TOTAL  6/8/2009    right; Per[facility] by JAIME [facility] at [facility]   • OTHER ORTHOPEDIC SURGERY  2006    right thumb   • ACHILLES TENDON REP Left 1999   • TONSILLECTOMY  1947   • HIP REPLACEMENT, TOTAL     • LAMINOTOMY       Family History   Problem Relation Age of Onset   • Cancer Mother    • Non-contributory Neg Hx      History   Smoking Status   • Former Smoker   • Packs/day: 0.50   • Years: 5.00   • Types: Cigarettes   • Quit date: 1/1/1969   Smokeless Tobacco   • Never Used     Allergies   Allergen Reactions   • Percocet [Oxycodone-Acetaminophen] Itching and Anxiety   • Other Environmental      hayfever     Outpatient Encounter Prescriptions as of 3/6/2018   Medication Sig Dispense Refill   • metOLazone (ZAROXOLYN) 5 MG Tab Take one tablet on Monday, Wednesday, Friday 36 Tab 3   • furosemide (LASIX) 40 MG Tab Take 40 mg by mouth every morning.     • vitamin D (CHOLECALCIFEROL) 1000 UNIT Tab Take 1,000 Units by mouth 2 Times a Day.     • Ferrous Sulfate (IRON) 325 (65 Fe) MG Tab Take  by mouth every day.     • metoprolol SR (TOPROL XL) 50 MG TABLET SR 24 HR Take 1 Tab by mouth 2 Times a Day. (Patient taking differently: Take 100 mg by mouth 2 Times a Day.) 180 Tab 3   • potassium chloride SA (KDUR) 20 MEQ Tab CR Take 0.5 Tabs by mouth every day. Take additional tablet when increasing  furosemide. 100 Tab 3   • rivaroxaban (XARELTO) 20 MG Tab tablet Take 1 Tab by mouth with dinner. 90 Tab 3   • atorvastatin (LIPITOR) 20 MG Tab Take 1 Tab by mouth every evening. 90 Tab 3   • Multiple Vitamins-Minerals (PRESERVISION AREDS) Tab Take 2 tablet by mouth every day.     • allopurinol (ZYLOPRIM) 300 MG Tab TAKE 1 TABLET DAILY 90 Tab 2   • omeprazole (PRILOSEC) 20 MG CPDR Take 20 mg by mouth every day.       • umeclidinium-vilanterol (ANORO ELLIPTA) 62.5-25 MCG/INH AEROSOL POWDER, BREATH ACTIVATED inhaler Inhale 1 Puff by mouth every day.     • [DISCONTINUED] lisinopril (PRINIVIL) 5 MG Tab Take 1 Tab by mouth every day. 90 Tab 3     No facility-administered encounter medications on  "file as of 3/6/2018.      Review of Systems   Constitutional: Negative for fever and weight loss.   Respiratory: Positive for shortness of breath.    Cardiovascular: Positive for leg swelling. Negative for chest pain.   Endo/Heme/Allergies: Does not bruise/bleed easily.        Objective:   /70   Pulse 90   Ht 1.753 m (5' 9\")   Wt 88.5 kg (195 lb)   BMI 28.80 kg/m²     Physical Exam   Constitutional: No distress.   HENT:   Head: Normocephalic and atraumatic.   Cardiovascular: Normal rate.  An irregularly irregular rhythm present.   Murmur heard.   Systolic murmur is present with a grade of 2/6   Pulmonary/Chest: No respiratory distress.   Dullness to percussion left base with absent breath sounds   Musculoskeletal: He exhibits no edema.       Assessment:     1. Chronic atrial fibrillation (CMS-HCC)     2. Essential hypertension, benign     3. Non-rheumatic mitral regurgitation         Medical Decision Making:  Today's Assessment / Status / Plan:   Exertional dyspnea:  The etiology is multifactorial. He has moderate mitral insufficiency, mild aortic stenosis by Doppler, and mild to moderate pulmonary hypertension, which could be secondary to a pulmonary cause or valvular disease. He also has a left pleural effusion which is improved by exam today.  In reviewing his transthoracic and transesophageal echo images, his valvular disease is not severe enough to consider valvular surgery at this time. His edema has resolved and he symptomatically better. Continue current medical regimen and reevaluate in 3 months.  "

## 2018-04-02 NOTE — PROGRESS NOTES
Chief Complaint   Patient presents with   • Follow-Up     HPI: This patient is a 78 y.o. Male who returns for follow-up of shortness of breath. He has had progressive exertional dyspnea over the past 6 months. He was ultimately hospitalized for acute congestive heart failure and atrial fibrillation. Echocardiography showed severe tricuspid regurgitation, left ventricular ejection fraction: 55%, a severely dilated right ventricle with reduced RV systolic function. His RVSP was estimated at 45 mmHg. He follows with , and has improved clinically. His dyspnea has improved overall however he remains frustrated with symptoms. He states he would like to be golfing this summer. Denies cough, wheezing, chest tightness or edema. PFTs showed mild restrictive ventilatory defect felt secondary to obesity/pleural effusion. His diffusion capacity was normal. Chest x-ray showed cardiomegaly, left pleural effusion. Updated chest x-ray today shows significant interval improvement in the left pleural effusion.      Past Medical History:   Diagnosis Date   • Anemia    • Arthritis    • Breath shortness     with minimal exertion no O2   • CATARACT     bilateral IOL   • Chickenpox    • Congestive heart failure (CMS-HCC)    • Ma[place] measles    • Gout    • Gout    • Heart burn    • Heart valve disease    • Hemorrhagic disorder (CMS-HCC)     on Xarelto   • High cholesterol    • Hypertension    • Indigestion    • Influenza    • Mumps    • Pain 02/2018    back   • Pain    • Paroxysmal atrial fibrillation (CMS-HCC)    • Tremor     intermittent       Social History     Social History   • Marital status:      Spouse name: N/A   • Number of children: N/A   • Years of education: N/A     Occupational History   • Not on file.     Social History Main Topics   • Smoking status: Former Smoker     Packs/day: 0.50     Years: 5.00     Types: Cigarettes     Quit date: 1/1/1969   • Smokeless tobacco: Never Used   • Alcohol use 1.8 - 2.4  oz/week     3 - 4 Cans of beer per week      Comment: reduced to occasional none since 11/2017   • Drug use: No   • Sexual activity: Not on file     Other Topics Concern   • Not on file     Social History Narrative   • No narrative on file       Family History   Problem Relation Age of Onset   • Cancer Mother    • Non-contributory Neg Hx        Current Outpatient Prescriptions on File Prior to Visit   Medication Sig Dispense Refill   • metOLazone (ZAROXOLYN) 5 MG Tab Take one tablet on Monday, Wednesday, Friday 36 Tab 3   • furosemide (LASIX) 40 MG Tab Take 40 mg by mouth every morning.     • vitamin D (CHOLECALCIFEROL) 1000 UNIT Tab Take 1,000 Units by mouth 2 Times a Day.     • Ferrous Sulfate (IRON) 325 (65 Fe) MG Tab Take  by mouth every day.     • metoprolol SR (TOPROL XL) 50 MG TABLET SR 24 HR Take 1 Tab by mouth 2 Times a Day. (Patient taking differently: Take 100 mg by mouth 2 Times a Day.) 180 Tab 3   • potassium chloride SA (KDUR) 20 MEQ Tab CR Take 0.5 Tabs by mouth every day. Take additional tablet when increasing  furosemide. 100 Tab 3   • rivaroxaban (XARELTO) 20 MG Tab tablet Take 1 Tab by mouth with dinner. 90 Tab 3   • atorvastatin (LIPITOR) 20 MG Tab Take 1 Tab by mouth every evening. 90 Tab 3   • Multiple Vitamins-Minerals (PRESERVISION AREDS) Tab Take 2 tablet by mouth every day.     • allopurinol (ZYLOPRIM) 300 MG Tab TAKE 1 TABLET DAILY 90 Tab 2   • omeprazole (PRILOSEC) 20 MG CPDR Take 20 mg by mouth every day.       • umeclidinium-vilanterol (ANORO ELLIPTA) 62.5-25 MCG/INH AEROSOL POWDER, BREATH ACTIVATED inhaler Inhale 1 Puff by mouth every day.       No current facility-administered medications on file prior to visit.        Allergies: Percocet [oxycodone-acetaminophen] and Other environmental    ROS:   Constitutional: Denies fevers, chills, night sweats, fatigue or weight loss  Eyes: Denies vision loss, pain, drainage, double vision  Ears, Nose, Throat: Denies earache, difficulty hearing,  "tinnitus, nasal congestion, hoarseness  Cardiovascular: Denies chest pain, tightness, palpitations, orthopnea or edema  Respiratory: As in history of present illness  Sleep: Denies daytime sleepiness, snoring, apneas, insomnia, morning headaches  GI: Denies heartburn, dysphagia, nausea, abdominal pain, diarrhea or constipation  : Denies frequent urination, hematuria, discharge or painful urination  Musculoskeletal: Denies back pain, painful joints, sore muscles  Neurological: Denies weakness or headaches  Skin: No rashes    Blood pressure 104/70, pulse 91, temperature 36.6 °C (97.9 °F), resp. rate 16, height 1.753 m (5' 9\"), weight 82.1 kg (181 lb), SpO2 92 %.    Physical Exam:  Appearance: Well-nourished, well-developed, in no acute distress  HEENT: Normocephalic, atraumatic, white sclera, PERRLA, oropharynx clear  Neck: No adenopathy or masses  Respiratory: no intercostal retractions or accessory muscle use  Lungs auscultation: Clear to auscultation bilaterally, diminished breath sounds left lung base  Cardiovascular: Regular rate rhythm. 2/6 systolic murmur, no rubs or gallops.  No LE edema  Abdomen: soft, nondistended  Gait: Normal  Digits: No clubbing, cyanosis  Motor: No focal deficits  Orientation: Oriented to time, person and place    Diagnosis:  1. SOB (shortness of breath)     2. Pleural effusion  DX-CHEST-2 VIEWS   3. Chronic atrial fibrillation (CMS-HCC)     4. Non-rheumatic mitral regurgitation     5.      Pulmonary hypertension    Plan:  The patient's shortness of breath has improved with diuresis. His chest x-ray shows significant interval improvement in left pleural effusion with optimalization of cardiovascular disease.  We will continue to monitor clinically. He was encouraged to follow-up with Dr. Melvin.  RTC 3 months.  No Follow-up on file.      "

## 2018-04-22 NOTE — DISCHARGE INSTRUCTIONS
Laceration Care, Adult  A laceration is a cut that goes through all of the layers of the skin and into the tissue that is right under the skin. Some lacerations heal on their own. Others need to be closed with stitches (sutures), staples, skin adhesive strips, or skin glue. Proper laceration care minimizes the risk of infection and helps the laceration to heal better.  HOW TO CARE FOR YOUR LACERATION  If sutures or staples were used:  · Keep the wound clean and dry.  · If you were given a bandage (dressing), you should change it at least one time per day or as told by your health care provider. You should also change it if it becomes wet or dirty.  · Keep the wound completely dry for the first 24 hours or as told by your health care provider. After that time, you may shower or bathe. However, make sure that the wound is not soaked in water until after the sutures or staples have been removed.  · Clean the wound one time each day or as told by your health care provider:  ¨ Wash the wound with soap and water.  ¨ Rinse the wound with water to remove all soap.  ¨ Pat the wound dry with a clean towel. Do not rub the wound.  · After cleaning the wound, apply a thin layer of antibiotic ointment as told by your health care provider. This will help to prevent infection and keep the dressing from sticking to the wound.  · Have the sutures or staples removed as told by your health care provider.  If skin adhesive strips were used:  · Keep the wound clean and dry.  · If you were given a bandage (dressing), you should change it at least one time per day or as told by your health care provider. You should also change it if it becomes dirty or wet.  · Do not get the skin adhesive strips wet. You may shower or bathe, but be careful to keep the wound dry.  · If the wound gets wet, pat it dry with a clean towel. Do not rub the wound.  · Skin adhesive strips fall off on their own. You may trim the strips as the wound heals. Do not  remove skin adhesive strips that are still stuck to the wound. They will fall off in time.  If skin glue was used:  · Try to keep the wound dry, but you may briefly wet it in the shower or bath. Do not soak the wound in water, such as by swimming.  · After you have showered or bathed, gently pat the wound dry with a clean towel. Do not rub the wound.  · Do not do any activities that will make you sweat heavily until the skin glue has fallen off on its own.  · Do not apply liquid, cream, or ointment medicine to the wound while the skin glue is in place. Using those may loosen the film before the wound has healed.  · If you were given a bandage (dressing), you should change it at least one time per day or as told by your health care provider. You should also change it if it becomes dirty or wet.  · If a dressing is placed over the wound, be careful not to apply tape directly over the skin glue. Doing that may cause the glue to be pulled off before the wound has healed.  · Do not pick at the glue. The skin glue usually remains in place for 5-10 days, then it falls off of the skin.  General Instructions  · Take over-the-counter and prescription medicines only as told by your health care provider.  · If you were prescribed an antibiotic medicine or ointment, take or apply it as told by your doctor. Do not stop using it even if your condition improves.  · To help prevent scarring, make sure to cover your wound with sunscreen whenever you are outside after stitches are removed, after adhesive strips are removed, or when glue remains in place and the wound is healed. Make sure to wear a sunscreen of at least 30 SPF.  · Do not scratch or pick at the wound.  · Keep all follow-up visits as told by your health care provider. This is important.  · Check your wound every day for signs of infection. Watch for:  ¨ Redness, swelling, or pain.  ¨ Fluid, blood, or pus.  · Raise (elevate) the injured area above the level of your heart  while you are sitting or lying down, if possible.  SEEK MEDICAL CARE IF:  · You received a tetanus shot and you have swelling, severe pain, redness, or bleeding at the injection site.  · You have a fever.  · A wound that was closed breaks open.  · You notice a bad smell coming from your wound or your dressing.  · You notice something coming out of the wound, such as wood or glass.  · Your pain is not controlled with medicine.  · You have increased redness, swelling, or pain at the site of your wound.  · You have fluid, blood, or pus coming from your wound.  · You notice a change in the color of your skin near your wound.  · You need to change the dressing frequently due to fluid, blood, or pus draining from the wound.  · You develop a new rash.  · You develop numbness around the wound.  SEEK IMMEDIATE MEDICAL CARE IF:  · You develop severe swelling around the wound.  · Your pain suddenly increases and is severe.  · You develop painful lumps near the wound or on skin that is anywhere on your body.  · You have a red streak going away from your wound.  · The wound is on your hand or foot and you cannot properly move a finger or toe.  · The wound is on your hand or foot and you notice that your fingers or toes look pale or bluish.     This information is not intended to replace advice given to you by your health care provider. Make sure you discuss any questions you have with your health care provider.     Document Released: 12/18/2006 Document Revised: 05/03/2016 Document Reviewed: 12/14/2015  [employer] Interactive Patient Education ©2016 [employer]

## 2018-04-22 NOTE — ED NOTES
Pt presents with a laceration to his left forearm caused by a sharp metal edge on a sliding door earlier this afternoon.

## 2018-04-22 NOTE — ED NOTES
Bedside report to Sonia LOWE. Antibiotic ointment and surgical applied to wound. ER Tech at bedside to apply adaptic and a compression dressing. Patient and wife educated on using antibiotic ointment tomorrow and wound care. Educated to follow-up with MD about xarelto. Educated on stitch removal in 1 week.

## 2018-04-22 NOTE — ED NOTES
Discharge instructions given. Educated on when to return to ed, follow up and removal of suture. Pt and spouse verbalized understanding. Questions answered pt discharged in stable condition.

## 2018-04-22 NOTE — ED PROVIDER NOTES
ED Provider Note    CHIEF COMPLAINT   Chief Complaint   Patient presents with   • Bleeding/Bruising   • Open Wound       HPI   Jaime Tamayo is a 78 y.o. male who presents with bleeding from a wound. The patient suffered a flap-type wound injury and was seen yesterday by Dr. Castañeda placed in a dressing with Silvadene. He is on Xarelto was taken off. She had bleeding through it the dressing he comes in for evaluation no repeat injury no other complication    REVIEW OF SYSTEMS   See HPI for further details.      PAST MEDICAL HISTORY   Past Medical History:   Diagnosis Date   • Anemia    • Arthritis    • Breath shortness     with minimal exertion no O2   • CATARACT     bilateral IOL   • Chickenpox    • Congestive heart failure (CMS-HCC)    • [place] measles    • Gout    • Gout    • Heart burn    • Heart valve disease    • Hemorrhagic disorder (CMS-HCC)     on Xarelto   • High cholesterol    • Hypertension    • Indigestion    • Influenza    • Mumps    • Pain 02/2018    back   • Pain    • Paroxysmal atrial fibrillation (CMS-HCC)    • Tremor     intermittent       FAMILY HISTORY  Family History   Problem Relation Age of Onset   • Cancer Mother    • Non-contributory Neg Hx        SOCIAL HISTORY  Social History     Social History   • Marital status:      Spouse name: N/A   • Number of children: N/A   • Years of education: N/A     Social History Main Topics   • Smoking status: Former Smoker     Packs/day: 0.50     Years: 5.00     Types: Cigarettes     Quit date: 1/1/1969   • Smokeless tobacco: Never Used   • Alcohol use 1.8 - 2.4 oz/week     3 - 4 Cans of beer per week      Comment: Occasionally   • Drug use: No   • Sexual activity: Not on file     Other Topics Concern   • Not on file     Social History Narrative   • No narrative on file       SURGICAL HISTORY  Past Surgical History:   Procedure Laterality Date   • KNEE ARTHROPLASTY TOTAL Left 10/17/2016    Procedure: KNEE ARTHROPLASTY TOTAL;  Surgeon: Jaime PISANO  "CECILE Carmona;  Location: [facility];  Service:    • IRRIGATION & DEBRIDEMENT ORTHO  3/19/2013    Performed by Jaime Carmona M.D. at [facility]   • EVACUATION OF HEMATOMA  3/19/2013    Performed by Jaime Carmona M.D. at [facility]   • KNEE ARTHROPLASTY TOTAL  3/18/2013    Performed by Jaime Carmona M.D. at [facility]   • HIP ARTHROPLASTY TOTAL  3/5/2012    Performed by JAIME CARMONA at [facility]   • LUMBAR FUSION POSTERIOR  2012   • CATARACT EXTRACTION WITH IOL Bilateral 2010   • HIP ARTHROPLASTY TOTAL  6/8/2009    right; Performed by JAIME CARMONA at [facility]   • OTHER ORTHOPEDIC SURGERY  2006    right thumb   • ACHILLES TENDON REP Left 1999   • TONSILLECTOMY  1947   • HIP REPLACEMENT, TOTAL     • LAMINOTOMY         CURRENT MEDICATIONS   Home Medications    **Home medications have not yet been reviewed for this encounter**         ALLERGIES   Allergies   Allergen Reactions   • Percocet [Oxycodone-Acetaminophen] Itching and Anxiety   • Other Environmental      hayfever       PHYSICAL EXAM  VITAL SIGNS: /58   Pulse 64   Temp 36.9 °C (98.4 °F)   Resp 18   Ht 1.753 m (5' 9\") Comment: Stated  Wt 81 kg (178 lb 9.2 oz)   SpO2 94%   BMI 26.37 kg/m²   Constitutional: Patient is alert and oriented x3 in no distress   Cardiovascular: Normal heart rate, Normal rhythm, No murmurs, No rubs, No gallops.   Thorax & Lungs: Normal breath sounds, No respiratory distress, No wheezing,   Skin: Warm, Dry, No erythema, No rash.   Extremities:, Patient does have an area of deep partial to full-thickness avulsion injury and measures about 5 x 4 cm the center which is full-thickness and oozing. Normal distal motor sensation  Vascular: Capillary refill    Procedure: The wound was anesthetized with 1% Xylocaine with epinephrine and it was irrigated and then I used 3-0 Ethilon with large bites and placed a hemostatic suture " over the area of oozing and it did seem to attenuate oozing. Surgery cells being applied with nonstick dressing and compression dressing.       COURSE & MEDICAL DECISION MAKING  Pertinent Labs & Imaging studies reviewed. (See chart for details)  Patient's been placed in a hemostatic suture compression dressing. They're to remove the dressing tomorrow and return if they have bleeding through the dressing and get the suture out in 5 days    FINAL IMPRESSION  1.   1. Bleeding from wound        2.   3.      Electronically signed by: Sorin Marie, 4/22/2018 11:58 AM

## 2018-04-22 NOTE — ED NOTES
Wound cleaned and dressed per ERP orders. Discharge instructions provided.  Pt verbalized the understanding of discharge instructions to follow up with PCP and to return to ER if condition worsens.  Pt ambulated out of ER without difficulty.

## 2018-04-22 NOTE — DISCHARGE INSTRUCTIONS
Wound Care, Adult  Taking care of your wound properly can help to prevent pain and infection. It can also help your wound to heal more quickly.  How is this treated?  Wound care  · Follow instructions from your health care provider about how to take care of your wound. Make sure you:  ¨ Wash your hands with soap and water before you change the bandage (dressing). If soap and water are not available, use hand .  ¨ Change your dressing as told by your health care provider.  ¨ Leave stitches (sutures), skin glue, or adhesive strips in place. These skin closures may need to stay in place for 2 weeks or longer. If adhesive strip edges start to loosen and curl up, you may trim the loose edges. Do not remove adhesive strips completely unless your health care provider tells you to do that.  · Check your wound area every day for signs of infection. Check for:  ¨ More redness, swelling, or pain.  ¨ More fluid or blood.  ¨ Warmth.  ¨ Pus or a bad smell.  · Ask your health care provider if you should clean the wound with mild soap and water. Doing this may include:  ¨ Using a clean towel to pat the wound dry after cleaning it. Do not rub or scrub the wound.  ¨ Applying a cream or ointment. Do this only as told by your health care provider.  ¨ Covering the incision with a clean dressing.  · Ask your health care provider when you can leave the wound uncovered.  Medicines   · If you were prescribed an antibiotic medicine, cream, or ointment, take or use the antibiotic as told by your health care provider. Do not stop taking or using the antibiotic even if your condition improves.  · Take over-the-counter and prescription medicines only as told by your health care provider. If you were prescribed pain medicine, take it at least 30 minutes before doing any wound care or as told by your health care provider.  General instructions  · Return to your normal activities as told by your health care provider. Ask your health care  provider what activities are safe.  · Do not scratch or pick at the wound.  · Keep all follow-up visits as told by your health care provider. This is important.  · Eat a diet that includes protein, vitamin A, vitamin C, and other nutrient-rich foods. These help the wound heal:  ¨ Protein-rich foods include meat, dairy, beans, nuts, and other sources.  ¨ Vitamin A-rich foods include carrots and dark green, leafy vegetables.  ¨ Vitamin C-rich foods include citrus, tomatoes, and other fruits and vegetables.  ¨ Nutrient-rich foods have protein, carbohydrates, fat, vitamins, or minerals. Eat a variety of healthy foods including vegetables, fruits, and whole grains.  Contact a health care provider if:  · You received a tetanus shot and you have swelling, severe pain, redness, or bleeding at the injection site.  · Your pain is not controlled with medicine.  · You have more redness, swelling, or pain around the wound.  · You have more fluid or blood coming from the wound.  · Your wound feels warm to the touch.  · You have pus or a bad smell coming from the wound.  · You have a fever or chills.  · You are nauseous or you vomit.  · You are dizzy.  Get help right away if:  · You have a red streak going away from your wound.  · The edges of the wound open up and separate.  · Your wound is bleeding and the bleeding does not stop with gentle pressure.  · You have a rash.  · You faint.  · You have trouble breathing.  This information is not intended to replace advice given to you by your health care provider. Make sure you discuss any questions you have with your health care provider.  Document Released: 09/26/2009 Document Revised: 08/16/2017 Document Reviewed: 07/04/2017  [employer] Interactive Patient Education © 2017 [employer]

## 2018-04-22 NOTE — ED NOTES
Pt was seen in our departments yesterday for a left forearm laceration.  He returns this AM reporting uncontrolled bleeding.

## 2018-04-23 NOTE — TELEPHONE ENCOUNTER
"Spoke to pt. He has a large \"flap-type\" injury to his arm that actually had to be sutured in ER due to bleeding. ER doctor told him to stop Xarelto, so pt did not take it Saturday or Sunday. Pt to go to PCP Friday for suture removal.     To Dr. Melvin to advise re: Xarelto and how long he should safely hold.  "

## 2018-04-23 NOTE — TELEPHONE ENCOUNTER
Spoke to Dr. Melvin re: wound and Xarelto. Per Dr. Melvin, pt should have wound assessed. Called pt and he and wife changed bandage today with very little bleeding. Pt states he will go to PCP on Wednesday for wound assessment and will ask PCP if OK to re-start Xarelto. Educated re: anticoagulation and stroke prevention coverage. Pt verbalized understanding.
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
## 2018-04-30 NOTE — TELEPHONE ENCOUNTER
----- Message from Shalonda Heath sent at 4/30/2018 11:19 AM PDT -----  Regarding: pt resumed his xarelto  Contact: [phone]  LEYDI/jael    Pt calling to report he started xarelto this past Saturday nite 4/28 following the E/R instructions he had to stop it temporarily due to wound on his arm which would not stop bleeding.  If questions, feel free to call pt at [phone], but pt said no need - fyi only.

## 2018-04-30 NOTE — TELEPHONE ENCOUNTER
Pt. Sustained flap-type wound injury on 4-22-18 and has been holding his Xarelto. He saw his PCP, Dr. Dominguez, on Fri. Who recommended that he restart his Xarelto. Pt. Restarted it on Sat. Evening. Wound is healing well.  CHLOE to FERMIN

## 2018-05-17 NOTE — TELEPHONE ENCOUNTER
----- Message from Jaime Tamayo sent at 5/16/2018  2:42 PM PDT -----  Regarding: Non-Urgent Medical Question  Contact: [phone]  Jaime Tamayo is going to PT for his back pain.  During his exercise his oxygen level drops to between 62-75, depending on the exercise.  He does recover, but the therapist is concerned.  I wondering if Jaime should have supplement oxygen until his values are repaired.  His therapist is Radha Randolph at [facility] on [facility] [address].  [phone].    Celina Tamayo, wife  [phone]      I reviewed his notes and we don't have and any qualifying testing. We would need to see the patient in order to prescribe O2. I just wanted you to see the message prior to me responding to the patient. If you have anything to add, please let me know. Amber

## 2018-06-05 PROBLEM — J90 PLEURAL EFFUSION: Status: ACTIVE | Noted: 2018-01-01

## 2018-06-05 NOTE — PROCEDURES
Test on Room Air. Patient says his lower back hurts.  6 minute walk test completed June 5, 2018 achieved only 13% of the predicted distance desired. Patient had back discomfort and stopped after 90 seconds. No oxygen desaturation at this very low level of activity was noted. Heart rate remained about 100 bpm, maximum 114, stable blood pressure.

## 2018-06-05 NOTE — PROGRESS NOTES
"Chief Complaint   Patient presents with   • Results     6Min walk Results         HPI: This patient is a 78 y.o. male, who presents for 6 minute walk results, follow-up of dyspnea.  He was last seen in April with Dr Quintero.     To reiterate, he has had progressive exertional dyspnea over the past 6 months. He was ultimately hospitalized for acute congestive heart failure and atrial fibrillation. Echocardiography showed severe tricuspid regurgitation, left ventricular ejection fraction: 55%, a severely dilated right ventricle with reduced RV systolic function. His RVSP was estimated at 45 mmHg. PFTs showed mild restrictive ventilatory defect felt secondary to obesity/pleural effusion. His diffusion capacity was normal. Chest x-ray showed cardiomegaly, left pleural effusion. He follows with , and has improved clinically.  According to patient, Dr Melvin is considering valve replacement. He is pending F/U apt this week.    He returns today reporting that during physical therapy his oxygen saturation was dropping into the 70s.  6 minute walk was performed today showing normal saturations with exertion. This was stopped early due to back pain.  Chest x-ray was obtained showing complete resolution of pleural effusion.  Patient reports feeling well, dyspnea has greatly improved.  He denies cough or wheeze.    Questioning of sleep symptoms revealed \"heavy breathing\" at night per the patient's wife.  He denies snoring or resuscitative gasps.  He has some residual fatigue during the day.  He denies a.m. headache.    Past Medical History:   Diagnosis Date   • Anemia    • Arthritis    • Breath shortness     with minimal exertion no O2   • CATARACT     bilateral IOL   • Chickenpox    • Congestive heart failure (HCC)    • [place] measles    • Gout    • Gout    • Heart burn    • Heart valve disease    • Hemorrhagic disorder (HCC)     on Xarelto   • High cholesterol    • Hypertension    • Indigestion    • Influenza    • Mumps  "   • Pain 02/2018    back   • Pain    • Paroxysmal atrial fibrillation (HCC)    • Tremor     intermittent       Social History   Substance Use Topics   • Smoking status: Former Smoker     Packs/day: 0.50     Years: 5.00     Types: Cigarettes     Quit date: 1/1/1969   • Smokeless tobacco: Never Used   • Alcohol use 1.8 - 2.4 oz/week     3 - 4 Cans of beer per week      Comment: Occasionally       Family History   Problem Relation Age of Onset   • Cancer Mother    • Non-contributory Neg Hx        Current medications as of today   Current Outpatient Prescriptions   Medication Sig Dispense Refill   • silver sulfADIAZINE (SILVADENE) 1 % Cream Apply to affected area twice daily until healed 25 g 0   • metOLazone (ZAROXOLYN) 5 MG Tab Take one tablet on Monday, Wednesday, Friday 36 Tab 3   • furosemide (LASIX) 40 MG Tab Take 40 mg by mouth every morning.     • vitamin D (CHOLECALCIFEROL) 1000 UNIT Tab Take 1,000 Units by mouth 2 Times a Day.     • Ferrous Sulfate (IRON) 325 (65 Fe) MG Tab Take  by mouth every day.     • metoprolol SR (TOPROL XL) 50 MG TABLET SR 24 HR Take 1 Tab by mouth 2 Times a Day. (Patient taking differently: Take 100 mg by mouth 2 Times a Day.) 180 Tab 3   • potassium chloride SA (KDUR) 20 MEQ Tab CR Take 0.5 Tabs by mouth every day. Take additional tablet when increasing  furosemide. 100 Tab 3   • rivaroxaban (XARELTO) 20 MG Tab tablet Take 1 Tab by mouth with dinner. 90 Tab 3   • atorvastatin (LIPITOR) 20 MG Tab Take 1 Tab by mouth every evening. 90 Tab 3   • Multiple Vitamins-Minerals (PRESERVISION AREDS) Tab Take 2 tablet by mouth every day.     • allopurinol (ZYLOPRIM) 300 MG Tab TAKE 1 TABLET DAILY 90 Tab 2   • omeprazole (PRILOSEC) 20 MG CPDR Take 20 mg by mouth every day.         No current facility-administered medications for this visit.        Allergies: Percocet [oxycodone-acetaminophen] and Other environmental    Blood pressure 102/60, pulse 88, temperature 36.6 °C (97.9 °F), resp. rate 16,  "height 1.753 m (5' 9\"), weight 80.7 kg (178 lb), SpO2 99 %.      ROS: As per HPI and otherwise negative if not stated.    Physical exam:   Constitutional: Well-nourished, well-developed, in no acute distress  Eyes: PERRL  Mouth/Throat: Oropharynx is moist, clear, no lesions  Neck: supple, trachea midline  Respiratory: no intercostal retractions or accessory muscle use   Lungs auscultation: Fine crackles left base, otherwise clear  Cardiovascular: Irregularly irregular   musculoskeletal:  no clubbing or cyanosis, unsteady gait uses cane to ambulate  Skin: No rashes or lesions noted on exposed skin  Neuro: No focal deficit noted  Psychiatric: Oriented to time, person and place.     Diagnosis:  1. Paroxysmal atrial fibrillation (HCC)     2. Systolic CHF, acute (HCC)     3. Pulmonary hypertension (HCC)  OVERNIGHT OXIMETRY   4. Pleural effusion  DX-CHEST-2 VIEWS       Plan:  Patient's chest x-ray is clear today, no evidence of pleural effusion.  6 minute walk was cut short due to back pain but did not show any evidence of desaturation, heart rate & blood pressure remained stable.  From a pulmonary standpoint, It is okay for patient to continue physical therapy with close monitoring.  He will follow-up with cardiology as scheduled.  Verify nocturnal oximetry.  He will follow-up in our office in August with Dr. Quintero.        "

## 2018-06-21 PROBLEM — I36.1 NON-RHEUMATIC TRICUSPID VALVE INSUFFICIENCY: Status: ACTIVE | Noted: 2018-01-01

## 2018-06-21 NOTE — LETTER
[facility]   [address], [address]  [place] [address]  Phone: [phone]  Fax: [phone]              Jaime Tamayo  1940    Encounter Date: 6/21/2018    Eduardo Melvin M.D.          PROGRESS NOTE:  Chief Complaint   Patient presents with   • Atrial Fibrillation     dx: PAF       Subjective:   Chief complaint: Valvular heart disease.  Jaime Tamayo is a 78 y.o. male who presents today for follow-up of severe tricuspid insufficiency and moderate mitral insufficiency.  YUNIOR was performed in February that indicated severe tricuspid insufficiency, moderate mitral insufficiency and reduced aortic valve opening with possible significant aortic insufficiency.  By transthoracic echo in November, was felt that his aortic valve stenosis was not significant.  He had been getting by fairly well on his current medications until just the last month when he developedworsening edema.  He has not been drinking excess fluids.    Past Medical History:   Diagnosis Date   • Anemia    • Arthritis    • Breath shortness     with minimal exertion no O2   • CATARACT     bilateral IOL   • Chickenpox    • Congestive heart failure (HCC)    • [place] measles    • Gout    • Gout    • Heart burn    • Heart valve disease    • Hemorrhagic disorder (HCC)     on Xarelto   • High cholesterol    • Hypertension    • Indigestion    • Influenza    • Mumps    • Pain 02/2018    back   • Pain    • Paroxysmal atrial fibrillation (HCC)    • Tremor     intermittent     Past Surgical History:   Procedure Laterality Date   • KNEE ARTHROPLASTY TOTAL Left 10/17/2016    Procedure: KNEE ARTHROPLASTY TOTAL;  Surgeon: Jaime Carmona M.D.;  Location: [facility];  Service:    • IRRIGATION & DEBRIDEMENT ORTHO  3/19/2013    Performed by Jaime Carmona M.D. at [facility]   • EVACUATION OF HEMATOMA  3/19/2013    Performed by Jaime Carmona M.D. at [facility]S   • KNEE ARTHROPLASTY TOTAL  3/18/2013    Performed by Jaime Carmona M.D. at SURGERY [facility] ORS   • HIP ARTHROPLASTY TOTAL  3/5/2012    Performed by JAIME CARMONA at SURGERY [facility] ORS   • LUMBAR FUSION POSTERIOR  2012   • CATARACT EXTRACTION WITH IOL Bilateral 2010   • HIP ARTHROPLASTY TOTAL  6/8/2009    right; Performed by JAIME CARMONA at SURGERY [facility] ORS   • OTHER ORTHOPEDIC SURGERY  2006    right thumb   • ACHILLES TENDON REP Left 1999   • TONSILLECTOMY  1947   • HIP REPLACEMENT, TOTAL     • LAMINOTOMY       Family History   Problem Relation Age of Onset   • Cancer Mother    • Non-contributory Neg Hx      Social History     Social History   • Marital status:      Spouse name: N/A   • Number of children: N/A   • Years of education: N/A     Occupational History   • Not on file.     Social History Main Topics   • Smoking status: Former Smoker     Packs/day: 0.50     Years: 5.00     Types: Cigarettes     Quit date: 1/1/1969   • Smokeless tobacco: Never Used   • Alcohol use 1.8 - 2.4 oz/week     3 - 4 Cans of beer per week      Comment: Occasionally   • Drug use: No   • Sexual activity: Not on file     Other Topics Concern   • Not on file     Social History Narrative   • No narrative on file     Allergies   Allergen Reactions   • Percocet [Oxycodone-Acetaminophen] Itching and Anxiety   • Other Environmental      hayfever     Outpatient Encounter Prescriptions as of 6/21/2018   Medication Sig Dispense Refill   • rivaroxaban (XARELTO) 15 MG Tab tablet Take 1 Tab by mouth with dinner. 30 Tab 6   • metOLazone (ZAROXOLYN) 5 MG Tab Take one tablet on Monday, Wednesday, Friday 36 Tab 3   • furosemide (LASIX) 40 MG Tab Take 40 mg by mouth every morning.     • vitamin D (CHOLECALCIFEROL) 1000 UNIT Tab Take 1,000 Units by mouth 2 Times a Day.     • Ferrous Sulfate (IRON) 325 (65 Fe) MG Tab Take  by mouth every day.     • metoprolol SR (TOPROL XL) 50 MG TABLET SR 24 HR Take 1  "Tab by mouth 2 Times a Day. (Patient taking differently: Take 100 mg by mouth 2 Times a Day.) 180 Tab 3   • potassium chloride SA (KDUR) 20 MEQ Tab CR Take 0.5 Tabs by mouth every day. Take additional tablet when increasing  furosemide. 100 Tab 3   • atorvastatin (LIPITOR) 20 MG Tab Take 1 Tab by mouth every evening. 90 Tab 3   • Multiple Vitamins-Minerals (PRESERVISION AREDS) Tab Take 2 tablet by mouth every day.     • allopurinol (ZYLOPRIM) 300 MG Tab TAKE 1 TABLET DAILY 90 Tab 2   • silver sulfADIAZINE (SILVADENE) 1 % Cream Apply to affected area twice daily until healed 25 g 0   • [DISCONTINUED] rivaroxaban (XARELTO) 20 MG Tab tablet Take 1 Tab by mouth with dinner. 90 Tab 3   • omeprazole (PRILOSEC) 20 MG CPDR Take 20 mg by mouth every day.         No facility-administered encounter medications on file as of 6/21/2018.      Review of Systems   Constitutional: Positive for fever. Negative for weight loss.   Respiratory: Negative for shortness of breath.    Cardiovascular: Positive for leg swelling. Negative for chest pain and palpitations.   Neurological: Positive for weakness.   Endo/Heme/Allergies: Bruises/bleeds easily.        Objective:   /68   Pulse 90   Ht 1.753 m (5' 9\")   Wt 84.9 kg (187 lb 3.2 oz)   SpO2 98%   BMI 27.64 kg/m²      Physical Exam   Constitutional: He is oriented to person, place, and time. No distress.   HENT:   Head: Normocephalic.   Eyes: No scleral icterus.   Neck: JVD present.   Cardiovascular: Normal rate and regular rhythm.    Musculoskeletal:   Edema to just below the knee on the left, to mid calf on the right   Neurological: He is alert and oriented to person, place, and time.   Skin: Skin is warm and dry.   Ecchymoses present.       Assessment:     1. Non-rheumatic mitral regurgitation  CBC WITH DIFFERENTIAL    BASIC METABOLIC PANEL    BTYPE NATRIURETIC PEPTIDE   2. Essential hypertension, benign     3. Paroxysmal atrial fibrillation (HCC)     4. Systolic CHF, acute " (HCC)  CBC WITH DIFFERENTIAL    BASIC METABOLIC PANEL    BTYPE NATRIURETIC PEPTIDE   5. Non-rheumatic tricuspid valve insufficiency  CBC WITH DIFFERENTIAL    BASIC METABOLIC PANEL    BTYPE NATRIURETIC PEPTIDE       Medical Decision Making:  Today's Assessment / Status / Plan:   Valvular heart disease: Echo results as above.  The patient is having worsening edema.  His wife gave him an extra Lasix for a day or 2.  We will increase his furosemide to 80 mg on Monday, Wednesday and Friday when he takes his metolazone.  Appropriate labs have been ordered.  He is going to need closer follow-up.  Return in 2 weeks.  The patient and wife are anxious to get valvular surgery, I do not think this is feasible based on his comorbidities and severity of his mitral valve disease.  However, we would be happy to refer him to an surgeons in the future in the near future to confirm this.  His prognosis is quite guarded.      Dat Dominguez M.D.  [address] [place] [address] [id number]  [id number]  [place] [id number]  VIA Facsimile: [phone]

## 2018-06-21 NOTE — PROGRESS NOTES
Chief Complaint   Patient presents with   • Atrial Fibrillation     dx: PAF       Subjective:   Chief complaint: Valvular heart disease.  Jaime Tamayo is a 78 y.o. male who presents today for follow-up of severe tricuspid insufficiency and moderate mitral insufficiency.  YUNIOR was performed in February that indicated severe tricuspid insufficiency, moderate mitral insufficiency and reduced aortic valve opening with possible significant aortic insufficiency.  By transthoracic echo in November, was felt that his aortic valve stenosis was not significant.  He had been getting by fairly well on his current medications until just the last month when he developedworsening edema.  He has not been drinking excess fluids.    Past Medical History:   Diagnosis Date   • Anemia    • Arthritis    • Breath shortness     with minimal exertion no O2   • CATARACT     bilateral IOL   • Chickenpox    • Congestive heart failure (HCC)    • Vietnamese measles    • Gout    • Gout    • Heart burn    • Heart valve disease    • Hemorrhagic disorder (HCC)     on Xarelto   • High cholesterol    • Hypertension    • Indigestion    • Influenza    • Mumps    • Pain 02/2018    back   • Pain    • Paroxysmal atrial fibrillation (HCC)    • Tremor     intermittent     Past Surgical History:   Procedure Laterality Date   • KNEE ARTHROPLASTY TOTAL Left 10/17/2016    Procedure: KNEE ARTHROPLASTY TOTAL;  Surgeon: Jaime Carmona M.D.;  Location: [facility];  Service:    • IRRIGATION & DEBRIDEMENT ORTHO  3/19/2013    Performed by Jaime Carmona M.D. at [facility]   • EVACUATION OF HEMATOMA  3/19/2013    Performed by Jaime Carmona M.D. at [facility]   • KNEE ARTHROPLASTY TOTAL  3/18/2013    Performed by Jaime Carmona M.D. at [facility]   • HIP ARTHROPLASTY TOTAL  3/5/2012    Performed by JAIME CARMONA at [facility]   • LUMBAR FUSION POSTERIOR  2012   • CATARACT EXTRACTION  WITH IOL Bilateral 2010   • HIP ARTHROPLASTY TOTAL  6/8/2009    right; Performed by JOSSE KRAUS at SURGERY [facility] ORS   • OTHER ORTHOPEDIC SURGERY  2006    right thumb   • ACHILLES TENDON REP Left 1999   • TONSILLECTOMY  1947   • HIP REPLACEMENT, TOTAL     • LAMINOTOMY       Family History   Problem Relation Age of Onset   • Cancer Mother    • Non-contributory Neg Hx      Social History     Social History   • Marital status:      Spouse name: N/A   • Number of children: N/A   • Years of education: N/A     Occupational History   • Not on file.     Social History Main Topics   • Smoking status: Former Smoker     Packs/day: 0.50     Years: 5.00     Types: Cigarettes     Quit date: 1/1/1969   • Smokeless tobacco: Never Used   • Alcohol use 1.8 - 2.4 oz/week     3 - 4 Cans of beer per week      Comment: Occasionally   • Drug use: No   • Sexual activity: Not on file     Other Topics Concern   • Not on file     Social History Narrative   • No narrative on file     Allergies   Allergen Reactions   • Percocet [Oxycodone-Acetaminophen] Itching and Anxiety   • Other Environmental      hayfever     Outpatient Encounter Prescriptions as of 6/21/2018   Medication Sig Dispense Refill   • rivaroxaban (XARELTO) 15 MG Tab tablet Take 1 Tab by mouth with dinner. 30 Tab 6   • metOLazone (ZAROXOLYN) 5 MG Tab Take one tablet on Monday, Wednesday, Friday 36 Tab 3   • furosemide (LASIX) 40 MG Tab Take 40 mg by mouth every morning.     • vitamin D (CHOLECALCIFEROL) 1000 UNIT Tab Take 1,000 Units by mouth 2 Times a Day.     • Ferrous Sulfate (IRON) 325 (65 Fe) MG Tab Take  by mouth every day.     • metoprolol SR (TOPROL XL) 50 MG TABLET SR 24 HR Take 1 Tab by mouth 2 Times a Day. (Patient taking differently: Take 100 mg by mouth 2 Times a Day.) 180 Tab 3   • potassium chloride SA (KDUR) 20 MEQ Tab CR Take 0.5 Tabs by mouth every day. Take additional tablet when increasing  furosemide. 100 Tab 3   • atorvastatin (LIPITOR)  "20 MG Tab Take 1 Tab by mouth every evening. 90 Tab 3   • Multiple Vitamins-Minerals (PRESERVISION AREDS) Tab Take 2 tablet by mouth every day.     • allopurinol (ZYLOPRIM) 300 MG Tab TAKE 1 TABLET DAILY 90 Tab 2   • silver sulfADIAZINE (SILVADENE) 1 % Cream Apply to affected area twice daily until healed 25 g 0   • [DISCONTINUED] rivaroxaban (XARELTO) 20 MG Tab tablet Take 1 Tab by mouth with dinner. 90 Tab 3   • omeprazole (PRILOSEC) 20 MG CPDR Take 20 mg by mouth every day.         No facility-administered encounter medications on file as of 6/21/2018.      Review of Systems   Constitutional: Positive for fever. Negative for weight loss.   Respiratory: Negative for shortness of breath.    Cardiovascular: Positive for leg swelling. Negative for chest pain and palpitations.   Neurological: Positive for weakness.   Endo/Heme/Allergies: Bruises/bleeds easily.        Objective:   /68   Pulse 90   Ht 1.753 m (5' 9\")   Wt 84.9 kg (187 lb 3.2 oz)   SpO2 98%   BMI 27.64 kg/m²     Physical Exam   Constitutional: He is oriented to person, place, and time. No distress.   HENT:   Head: Normocephalic.   Eyes: No scleral icterus.   Neck: JVD present.   Cardiovascular: Normal rate and regular rhythm.    Musculoskeletal:   Edema to just below the knee on the left, to mid calf on the right   Neurological: He is alert and oriented to person, place, and time.   Skin: Skin is warm and dry.   Ecchymoses present.       Assessment:     1. Non-rheumatic mitral regurgitation  CBC WITH DIFFERENTIAL    BASIC METABOLIC PANEL    BTYPE NATRIURETIC PEPTIDE   2. Essential hypertension, benign     3. Paroxysmal atrial fibrillation (HCC)     4. Systolic CHF, acute (HCC)  CBC WITH DIFFERENTIAL    BASIC METABOLIC PANEL    BTYPE NATRIURETIC PEPTIDE   5. Non-rheumatic tricuspid valve insufficiency  CBC WITH DIFFERENTIAL    BASIC METABOLIC PANEL    BTYPE NATRIURETIC PEPTIDE       Medical Decision Making:  Today's Assessment / Status / Plan: "   Valvular heart disease: Echo results as above.  The patient is having worsening edema.  His wife gave him an extra Lasix for a day or 2.  We will increase his furosemide to 80 mg on Monday, Wednesday and Friday when he takes his metolazone.  Appropriate labs have been ordered.  He is going to need closer follow-up.  Return in 2 weeks.  The patient and wife are anxious to get valvular surgery, I do not think this is feasible based on his comorbidities and severity of his mitral valve disease.  However, we would be happy to refer him to an surgeons in the future in the near future to confirm this.  His prognosis is quite guarded.

## 2018-06-26 NOTE — TELEPHONE ENCOUNTER
----- Message from Eduardo Melvin M.D. sent at 6/26/2018  7:53 AM PDT -----  Lab reviewed. Call to see if edema is better  ----- Message -----  From: Bita Wang L.P.N.  Sent: 6/25/2018   8:49 AM  To: Eduardo Melvin M.D.    FV 7-3-18 with .

## 2018-06-26 NOTE — TELEPHONE ENCOUNTER
"NIA webster pt., spoke with wife. Pt's. Edema is \"much better\" . He is taking Lasix 80mg on Mon.-Wed.-Fri. Along with Metolazone 2.5mg on those days. Other days of the week he takes Lasix 40mg QD. He takes Potassium 20mEq QD, increasing to 40mEq on Mon.-Wed.-Fri.  FYI to Dr. Melvin. He will continue this regimen until he sees Raymond 7-3-18.   "

## 2018-07-03 PROBLEM — I50.812 CHRONIC RIGHT-SIDED CONGESTIVE HEART FAILURE (HCC): Status: ACTIVE | Noted: 2018-01-01

## 2018-07-03 PROBLEM — N18.30 CKD (CHRONIC KIDNEY DISEASE) STAGE 3, GFR 30-59 ML/MIN: Status: ACTIVE | Noted: 2018-01-01

## 2018-07-03 NOTE — PROGRESS NOTES
Chief Complaint   Patient presents with   • Atrial Fibrillation       Subjective:   Jaime Tamayo is a 78 y.o. male who presents today with his wife, Celina, to follow-up on congestive heart failure, ankle edema and valvular heart disease.    He was last seen by Dr. Melvin on June 21, 2018.  At that time he was very fluid overloaded therefore Lasix was increased to 80 mg Monday Wednesday and Friday with 40 mg the other days.  The patient also takes metolazone 2.5 mg on Monday, Wednesday and Friday.    Per his wife's report his edema is reduced.  Patient complains of abdominal bloating but feels that it is improved as well.  He does feel short of breath walking on a flat surface which has been usual for him for an extended period of time.  He has no orthopnea or PND.    He tells me he would like to get his valve fixed. YUNIOR was performed in February that indicated severe tricuspid insufficiency, moderate mitral insufficiency and reduced aortic valve opening with possible significant aortic insufficiency.    Per Dr. Melvin, he may not be a surgical candidate due to his comorbidities.  However the patient would like to be referred to the cardiac surgeon.    Past Medical History:   Diagnosis Date   • Anemia    • Arthritis    • Breath shortness     with minimal exertion no O2   • CATARACT     bilateral IOL   • Chickenpox    • Congestive heart failure (HCC)    • Hebrew measles    • Gout    • Gout    • Heart burn    • Heart valve disease    • Hemorrhagic disorder (HCC)     on Xarelto   • High cholesterol    • Hypertension    • Indigestion    • Influenza    • Mumps    • Pain 02/2018    back   • Pain    • Paroxysmal atrial fibrillation (HCC)    • Tremor     intermittent     Past Surgical History:   Procedure Laterality Date   • KNEE ARTHROPLASTY TOTAL Left 10/17/2016    Procedure: KNEE ARTHROPLASTY TOTAL;  Surgeon: Jaime Carmona M.D.;  Location: SURGERY [facility];  Service:    • IRRIGATION & DEBRIDEMENT ORTHO   3/19/2013    Performed by Jaime Carmona M.D. at SURGERY [facility]   • EVACUATION OF HEMATOMA  3/19/2013    Performed by Jaime Carmona M.D. at SURGERY [facility]   • KNEE ARTHROPLASTY TOTAL  3/18/2013    Performed by Jaime Carmona M.D. at [facility]   • HIP ARTHROPLASTY TOTAL  3/5/2012    Performed by JAIME CARMONA at [facility]   • LUMBAR FUSION POSTERIOR  2012   • CATARACT EXTRACTION WITH IOL Bilateral 2010   • HIP ARTHROPLASTY TOTAL  6/8/2009    right; Performed by JAIME CARMONA at [facility]   • OTHER ORTHOPEDIC SURGERY  2006    right thumb   • ACHILLES TENDON REP Left 1999   • TONSILLECTOMY  1947   • HIP REPLACEMENT, TOTAL     • LAMINOTOMY       Family History   Problem Relation Age of Onset   • Cancer Mother    • Non-contributory Neg Hx      Social History     Social History   • Marital status:      Spouse name: N/A   • Number of children: N/A   • Years of education: N/A     Occupational History   • Not on file.     Social History Main Topics   • Smoking status: Former Smoker     Packs/day: 0.50     Years: 5.00     Types: Cigarettes     Quit date: 1/1/1969   • Smokeless tobacco: Never Used   • Alcohol use 1.8 - 2.4 oz/week     3 - 4 Cans of beer per week      Comment: Occasionally   • Drug use: No   • Sexual activity: Not on file     Other Topics Concern   • Not on file     Social History Narrative   • No narrative on file     Allergies   Allergen Reactions   • Percocet [Oxycodone-Acetaminophen] Itching and Anxiety   • Other Environmental      hayfever     Outpatient Encounter Prescriptions as of 7/3/2018   Medication Sig Dispense Refill   • furosemide (LASIX) 40 MG Tab Take 2 tablets on Mon, Wed and Fri.  Take 1 tablet other days. 120 Tab 3   • potassium chloride SA (KDUR) 20 MEQ Tab CR Take 2 tablets Mon.-Wed.-Fri. And 1 tablet other days of the week. 120 Tab 3   • metoprolol SR (TOPROL XL) 50 MG TABLET SR 24 HR Take 1 Tab by  "mouth every evening. 90 Tab 3   • rivaroxaban (XARELTO) 15 MG Tab tablet Take 1 Tab by mouth with dinner. 90 Tab 3   • metOLazone (ZAROXOLYN) 5 MG Tab Take one tablet on Monday, Wednesday, Friday 36 Tab 3   • vitamin D (CHOLECALCIFEROL) 1000 UNIT Tab Take 1,000 Units by mouth 2 Times a Day.     • Ferrous Sulfate (IRON) 325 (65 Fe) MG Tab Take  by mouth every day.     • atorvastatin (LIPITOR) 20 MG Tab Take 1 Tab by mouth every evening. 90 Tab 3   • Multiple Vitamins-Minerals (PRESERVISION AREDS) Tab Take 2 tablet by mouth every day.     • allopurinol (ZYLOPRIM) 300 MG Tab TAKE 1 TABLET DAILY 90 Tab 2   • [DISCONTINUED] furosemide (LASIX) 80 MG Tab Take 80 mg by mouth every day.     • [DISCONTINUED] potassium chloride SA (KDUR) 20 MEQ Tab CR Take 2 tablets Mon.-Wed.-Fri. And 1 tablet other days of the week. 50 Tab 3   • [DISCONTINUED] silver sulfADIAZINE (SILVADENE) 1 % Cream Apply to affected area twice daily until healed 25 g 0   • [DISCONTINUED] furosemide (LASIX) 40 MG Tab Take 40 mg by mouth every morning.     • [DISCONTINUED] metoprolol SR (TOPROL XL) 50 MG TABLET SR 24 HR Take 1 Tab by mouth 2 Times a Day. (Patient taking differently: Take 100 mg by mouth 2 Times a Day.) 180 Tab 3   • omeprazole (PRILOSEC) 20 MG CPDR Take 20 mg by mouth every day.         No facility-administered encounter medications on file as of 7/3/2018.      Review of Systems   Constitutional: Negative for malaise/fatigue.   Respiratory: Positive for shortness of breath (walking on flat surface.). Negative for cough.    Cardiovascular: Positive for leg swelling (Reduced per the patient's wife's report.). Negative for chest pain, palpitations, orthopnea, claudication and PND.   Gastrointestinal: Negative for abdominal pain.        Abd bloating.   Musculoskeletal: Negative for myalgias.   Neurological: Negative for dizziness and weakness.        Objective:   BP (!) 92/56   Pulse 80   Ht 1.753 m (5' 9\")   Wt 84.4 kg (186 lb)   SpO2 96%  "  BMI 27.47 kg/m²     Physical Exam   Constitutional: He is oriented to person, place, and time. He appears well-developed and well-nourished.   HENT:   Head: Normocephalic.   Eyes: EOM are normal.   Neck: Normal range of motion. No JVD present.   Cardiovascular: Normal rate and regular rhythm.  Exam reveals no friction rub.    No murmur heard.  Pulmonary/Chest: Effort normal.   Abdominal: Soft. Bowel sounds are normal.   Musculoskeletal: He exhibits no edema.   Neurological: He is alert and oriented to person, place, and time.   Skin: Skin is warm and dry.   Psychiatric: He has a normal mood and affect.     Results for JOSSE IBRAHIM (MRN [id number])    Ref. Range 6/22/2018 09:56   WBC Latest Ref Range: 3.4 - 10.8 x10E3/uL 6.1   RBC Latest Ref Range: 4.14 - 5.80 x10E6/uL 3.39 (L)   Hemoglobin Latest Ref Range: 13.0 - 17.7 g/dL 11.4 (L)   Hematocrit Latest Ref Range: 37.5 - 51.0 % 34.5 (L)   MCV Latest Ref Range: 79 - 97 fL 102 (H)   MCH Latest Ref Range: 26.6 - 33.0 pg 33.6 (H)   MCHC Latest Ref Range: 31.5 - 35.7 g/dL 33.0   RDW Latest Ref Range: 12.3 - 15.4 % 15.7 (H)   Platelet Count Latest Ref Range: 150 - 379 x10E3/uL 166   Immature Cells Unknown CANCELED   Neutrophils-Polys Latest Ref Range: Not Estab. % 74   Neutrophils (Absolute) Latest Ref Range: 1.4 - 7.0 x10E3/uL 4.6   Lymphocytes Latest Ref Range: Not Estab. % 11   Lymphs (Absolute) Latest Ref Range: 0.7 - 3.1 x10E3/uL 0.7   Monocytes Latest Ref Range: Not Estab. % 13   Monos (Absolute) Latest Ref Range: 0.1 - 0.9 x10E3/uL 0.8   Eosinophils Latest Ref Range: Not Estab. % 1   Eos (Absolute) Latest Ref Range: 0.0 - 0.4 x10E3/uL 0.1   Basophils Latest Ref Range: Not Estab. % 1   Baso (Absolute) Latest Ref Range: 0.0 - 0.2 x10E3/uL 0.0   Immature Granulocytes Latest Ref Range: Not Estab. % 0   Immature Granulocytes (abs) Latest Ref Range: 0.0 - 0.1 x10E3/uL 0.0   Nucleated RBC Unknown CANCELED   Comments-Diff Unknown CANCELED   Sodium Latest Ref Range:  134 - 144 mmol/L 138   Potassium Latest Ref Range: 3.5 - 5.2 mmol/L 4.1   Chloride Latest Ref Range: 96 - 106 mmol/L 101   Co2 Latest Ref Range: 20 - 29 mmol/L 20   Glucose Latest Ref Range: 65 - 99 mg/dL 119 (H)   Bun Latest Ref Range: 8 - 27 mg/dL 59 (H)   Creatinine Latest Ref Range: 0.76 - 1.27 mg/dL 1.84 (H)   GFR If  Latest Ref Range: >59 mL/min/1.73 40 (L)   GFR If Non  Latest Ref Range: >59 mL/min/1.73 35 (L)   Bun-Creatinine Ratio Latest Ref Range: 10 - 24  32 (H)   B Natriuretic Peptide Latest Ref Range: 0.0 - 100.0 pg/mL 450.6 (H)     Assessment:     1. Chronic right-sided congestive heart failure (HCC)     2. Non-rheumatic tricuspid valve insufficiency, severe  REFERRAL TO CARDIOTHORACIC SURGERY   3. Paroxysmal atrial fibrillation (HCC)     4. CKD (chronic kidney disease) stage 3, GFR 30-59 ml/min         Medical Decision Making:  Today's Assessment / Status / Plan:     Congestive heart failure: Appears to be right-sided as he has abdominal bloating and ankle edema.  His lungs are clear today and per his last chest x-ray his pleural effusion has resolved.    He seems to be doing well on the current dose of diuretics.  Therefore we will have him continue with Lasix 80 mg 3 days a week with his metolazone and single dose on the opposite days.  In the future we can consider reduction in diuretics but the patient is stable at this time therefore I do not want to make changes.    We will check a basic metabolic panel and a BNP to determine his electrolytes and his level of fluid overload.    Tricuspid insufficiency: Severe.  Patient would like to be evaluated by the cardiac surgeon to see if he is possibly a candidate for surgery.  I will refer him to Dr. Deng for evaluation.    Paroxysmal atrial fibrillation: He is in a regular rhythm today.  His blood pressure is low therefore I will reduce metoprolol to 50 mg once a day and have him take it in the evening.    Chronic  kidney disease: Slightly worsened with the diuretics.  He is not taking any NSAIDs.    He does have some chronic back pain and is getting nerve ablations.  He has another one scheduled and they do not require him to stop any of his medications.  He may undergo the ablation which is only a 20 minute procedure.    I will have the patient follow-up in 3 weeks with myself to monitor his fluid status.  We will get him scheduled to see Dr. Melvin in the future.  He will follow-up sooner if problems.    Collaborating Provider: Dr Stein.

## 2018-07-03 NOTE — LETTER
[facility]   [address] [place] [address]  [place] [address]  Phone: [phone]  Fax: [phone]              Jaime Tamayo  1940    Encounter Date: 7/3/2018    SANDRA Coley          PROGRESS NOTE:  Chief Complaint   Patient presents with   • Atrial Fibrillation       Subjective:   Jaime Tamayo is a 78 y.o. male who presents today with his wife, Celina, to follow-up on congestive heart failure, ankle edema and valvular heart disease.    He was last seen by Dr. Melvin on June 21, 2018.  At that time he was very fluid overloaded therefore Lasix was increased to 80 mg Monday Wednesday and Friday with 40 mg the other days.  The patient also takes metolazone 2.5 mg on Monday, Wednesday and Friday.    Per his wife's report his edema is reduced.  Patient complains of abdominal bloating but feels that it is improved as well.  He does feel short of breath walking on a flat surface which has been usual for him for an extended period of time.  He has no orthopnea or PND.    He tells me he would like to get his valve fixed. YUNIOR was performed in February that indicated severe tricuspid insufficiency, moderate mitral insufficiency and reduced aortic valve opening with possible significant aortic insufficiency.     Per Dr. Melvin, he may not be a surgical candidate due to his comorbidities.  However the patient would like to be referred to the cardiac surgeon.    Past Medical History:   Diagnosis Date   • Anemia    • Arthritis    • Breath shortness     with minimal exertion no O2   • CATARACT     bilateral IOL   • Chickenpox    • Congestive heart failure (HCC)    • [place] measles    • Gout    • Gout    • Heart burn    • Heart valve disease    • Hemorrhagic disorder (HCC)     on Xarelto   • High cholesterol    • Hypertension    • Indigestion    • Influenza    • Mumps    • Pain 02/2018    back   • Pain    • Paroxysmal atrial fibrillation (HCC)    • Tremor       intermittent     Past Surgical History:   Procedure Laterality Date   • KNEE ARTHROPLASTY TOTAL Left 10/17/2016    Procedure: KNEE ARTHROPLASTY TOTAL;  Surgeon: Jaime Carmona M.D.;  Location: [facility];  Service:    • IRRIGATION & DEBRIDEMENT ORTHO  3/19/2013    Performed by Jaime Carmona M.D. at [facility]   • EVACUATION OF HEMATOMA  3/19/2013    Performed by Jaime Carmona M.D. at [facility]   • KNEE ARTHROPLASTY TOTAL  3/18/2013    Performed by Jaime Carmona M.D. at [facility]   • HIP ARTHROPLASTY TOTAL  3/5/2012    Performed by JAIME CARMONA at [facility]   • LUMBAR FUSION POSTERIOR  2012   • CATARACT EXTRACTION WITH IOL Bilateral 2010   • HIP ARTHROPLASTY TOTAL  6/8/2009    right; Performed by JAIME CARMONA at [facility]   • OTHER ORTHOPEDIC SURGERY  2006    right thumb   • ACHILLES TENDON REP Left 1999   • TONSILLECTOMY  1947   • HIP REPLACEMENT, TOTAL     • LAMINOTOMY       Family History   Problem Relation Age of Onset   • Cancer Mother    • Non-contributory Neg Hx      Social History     Social History   • Marital status:      Spouse name: N/A   • Number of children: N/A   • Years of education: N/A     Occupational History   • Not on file.     Social History Main Topics   • Smoking status: Former Smoker     Packs/day: 0.50     Years: 5.00     Types: Cigarettes     Quit date: 1/1/1969   • Smokeless tobacco: Never Used   • Alcohol use 1.8 - 2.4 oz/week     3 - 4 Cans of beer per week      Comment: Occasionally   • Drug use: No   • Sexual activity: Not on file     Other Topics Concern   • Not on file     Social History Narrative   • No narrative on file     Allergies   Allergen Reactions   • Percocet [Oxycodone-Acetaminophen] Itching and Anxiety   • Other Environmental      hayfever     Outpatient Encounter Prescriptions as of 7/3/2018   Medication Sig Dispense Refill   • furosemide (LASIX) 40  MG Tab Take 2 tablets on Mon, Wed and Fri.  Take 1 tablet other days. 120 Tab 3   • potassium chloride SA (KDUR) 20 MEQ Tab CR Take 2 tablets Mon.-Wed.-Fri. And 1 tablet other days of the week. 120 Tab 3   • metoprolol SR (TOPROL XL) 50 MG TABLET SR 24 HR Take 1 Tab by mouth every evening. 90 Tab 3   • rivaroxaban (XARELTO) 15 MG Tab tablet Take 1 Tab by mouth with dinner. 90 Tab 3   • metOLazone (ZAROXOLYN) 5 MG Tab Take one tablet on Monday, Wednesday, Friday 36 Tab 3   • vitamin D (CHOLECALCIFEROL) 1000 UNIT Tab Take 1,000 Units by mouth 2 Times a Day.     • Ferrous Sulfate (IRON) 325 (65 Fe) MG Tab Take  by mouth every day.     • atorvastatin (LIPITOR) 20 MG Tab Take 1 Tab by mouth every evening. 90 Tab 3   • Multiple Vitamins-Minerals (PRESERVISION AREDS) Tab Take 2 tablet by mouth every day.     • allopurinol (ZYLOPRIM) 300 MG Tab TAKE 1 TABLET DAILY 90 Tab 2   • [DISCONTINUED] furosemide (LASIX) 80 MG Tab Take 80 mg by mouth every day.     • [DISCONTINUED] potassium chloride SA (KDUR) 20 MEQ Tab CR Take 2 tablets Mon.-Wed.-Fri. And 1 tablet other days of the week. 50 Tab 3   • [DISCONTINUED] silver sulfADIAZINE (SILVADENE) 1 % Cream Apply to affected area twice daily until healed 25 g 0   • [DISCONTINUED] furosemide (LASIX) 40 MG Tab Take 40 mg by mouth every morning.     • [DISCONTINUED] metoprolol SR (TOPROL XL) 50 MG TABLET SR 24 HR Take 1 Tab by mouth 2 Times a Day. (Patient taking differently: Take 100 mg by mouth 2 Times a Day.) 180 Tab 3   • omeprazole (PRILOSEC) 20 MG CPDR Take 20 mg by mouth every day.         No facility-administered encounter medications on file as of 7/3/2018.      Review of Systems   Constitutional: Negative for malaise/fatigue.   Respiratory: Positive for shortness of breath (walking on flat surface.). Negative for cough.    Cardiovascular: Positive for leg swelling (Reduced per the patient's wife's report.). Negative for chest pain, palpitations, orthopnea, claudication and  "PND.   Gastrointestinal: Negative for abdominal pain.        Abd bloating.   Musculoskeletal: Negative for myalgias.   Neurological: Negative for dizziness and weakness.        Objective:   BP (!) 92/56   Pulse 80   Ht 1.753 m (5' 9\")   Wt 84.4 kg (186 lb)   SpO2 96%   BMI 27.47 kg/m²      Physical Exam   Constitutional: He is oriented to person, place, and time. He appears well-developed and well-nourished.   HENT:   Head: Normocephalic.   Eyes: EOM are normal.   Neck: Normal range of motion. No JVD present.   Cardiovascular: Normal rate and regular rhythm.  Exam reveals no friction rub.    No murmur heard.  Pulmonary/Chest: Effort normal.   Abdominal: Soft. Bowel sounds are normal.   Musculoskeletal: He exhibits no edema.   Neurological: He is alert and oriented to person, place, and time.   Skin: Skin is warm and dry.   Psychiatric: He has a normal mood and affect.     Results for JOSSE IBRAHIM (MRN [id number])    Ref. Range 6/22/2018 09:56   WBC Latest Ref Range: 3.4 - 10.8 x10E3/uL 6.1   RBC Latest Ref Range: 4.14 - 5.80 x10E6/uL 3.39 (L)   Hemoglobin Latest Ref Range: 13.0 - 17.7 g/dL 11.4 (L)   Hematocrit Latest Ref Range: 37.5 - 51.0 % 34.5 (L)   MCV Latest Ref Range: 79 - 97 fL 102 (H)   MCH Latest Ref Range: 26.6 - 33.0 pg 33.6 (H)   MCHC Latest Ref Range: 31.5 - 35.7 g/dL 33.0   RDW Latest Ref Range: 12.3 - 15.4 % 15.7 (H)   Platelet Count Latest Ref Range: 150 - 379 x10E3/uL 166   Immature Cells Unknown CANCELED   Neutrophils-Polys Latest Ref Range: Not Estab. % 74   Neutrophils (Absolute) Latest Ref Range: 1.4 - 7.0 x10E3/uL 4.6   Lymphocytes Latest Ref Range: Not Estab. % 11   Lymphs (Absolute) Latest Ref Range: 0.7 - 3.1 x10E3/uL 0.7   Monocytes Latest Ref Range: Not Estab. % 13   Monos (Absolute) Latest Ref Range: 0.1 - 0.9 x10E3/uL 0.8   Eosinophils Latest Ref Range: Not Estab. % 1   Eos (Absolute) Latest Ref Range: 0.0 - 0.4 x10E3/uL 0.1   Basophils Latest Ref Range: Not Estab. % 1   Baso " (Absolute) Latest Ref Range: 0.0 - 0.2 x10E3/uL 0.0   Immature Granulocytes Latest Ref Range: Not Estab. % 0   Immature Granulocytes (abs) Latest Ref Range: 0.0 - 0.1 x10E3/uL 0.0   Nucleated RBC Unknown CANCELED   Comments-Diff Unknown CANCELED   Sodium Latest Ref Range: 134 - 144 mmol/L 138   Potassium Latest Ref Range: 3.5 - 5.2 mmol/L 4.1   Chloride Latest Ref Range: 96 - 106 mmol/L 101   Co2 Latest Ref Range: 20 - 29 mmol/L 20   Glucose Latest Ref Range: 65 - 99 mg/dL 119 (H)   Bun Latest Ref Range: 8 - 27 mg/dL 59 (H)   Creatinine Latest Ref Range: 0.76 - 1.27 mg/dL 1.84 (H)   GFR If  Latest Ref Range: >59 mL/min/1.73 40 (L)   GFR If Non  Latest Ref Range: >59 mL/min/1.73 35 (L)   Bun-Creatinine Ratio Latest Ref Range: 10 - 24  32 (H)   B Natriuretic Peptide Latest Ref Range: 0.0 - 100.0 pg/mL 450.6 (H)     Assessment:     1. Chronic right-sided congestive heart failure (HCC)     2. Non-rheumatic tricuspid valve insufficiency, severe  REFERRAL TO CARDIOTHORACIC SURGERY   3. Paroxysmal atrial fibrillation (HCC)     4. CKD (chronic kidney disease) stage 3, GFR 30-59 ml/min         Medical Decision Making:  Today's Assessment / Status / Plan:     Congestive heart failure: Appears to be right-sided as he has abdominal bloating and ankle edema.  His lungs are clear today and per his last chest x-ray his pleural effusion has resolved.    He seems to be doing well on the current dose of diuretics.  Therefore we will have him continue with Lasix 80 mg 3 days a week with his metolazone and single dose on the opposite days.  In the future we can consider reduction in diuretics but the patient is stable at this time therefore I do not want to make changes.    We will check a basic metabolic panel and a BNP to determine his electrolytes and his level of fluid overload.    Tricuspid insufficiency: Severe.  Patient would like to be evaluated by the cardiac surgeon to see if he is possibly a  candidate for surgery.  I will refer him to Dr. Deng for evaluation.    Paroxysmal atrial fibrillation: He is in a regular rhythm today.  His blood pressure is low therefore I will reduce metoprolol to 50 mg once a day and have him take it in the evening.    Chronic kidney disease: Slightly worsened with the diuretics.  He is not taking any NSAIDs.    He does have some chronic back pain and is getting nerve ablations.  He has another one scheduled and they do not require him to stop any of his medications.  He may undergo the ablation which is only a 20 minute procedure.    I will have the patient follow-up in 3 weeks with myself to monitor his fluid status.  We will get him scheduled to see Dr. Melvin in the future.  He will follow-up sooner if problems.    Collaborating Provider: Dr Stein.        No Recipients

## 2018-07-05 NOTE — TELEPHONE ENCOUNTER
The referral to Cardiothoracic Surgery will be sent to [facility]. The contact number is [phone].     Patient given this information.

## 2018-07-05 NOTE — TELEPHONE ENCOUNTER
----- Message from SANDRA Coley sent at 7/5/2018 10:07 AM PDT -----  Please contact patient and tell him that his potassium is in the normal range so continue the current dosing.  Kidney function is stable.  His BNP is about the same.  Keep follow-up appointment.

## 2018-07-25 NOTE — PROGRESS NOTES
Chief Complaint   Patient presents with   • CHF (Chronic)       Subjective:   Jaime Tamayo is a 78 y.o. male who presents today with his wife Maribell, to follow-up on congestive heart failure.  He was last seen by myself approximately 3 weeks ago.  We left his diuretics the same and are monitoring him and his lab work.    He continues to have shortness of breath with exertion which has been stable.  His leg swelling is much improved.  He is able to do his physical therapy where he rides a stationary bike for 15 minutes and tolerates it well.  He denies any anginal symptoms.    He has severe tricuspid regurgitation and is asking about his previous referral to cardiac surgery.  He was told by Dr. Melvin that he would not be a candidate for valve surgery due to his multiple comorbidities.    Past Medical History:   Diagnosis Date   • Anemia    • Arthritis    • Breath shortness     with minimal exertion no O2   • CATARACT     bilateral IOL   • Chickenpox    • Congestive heart failure (HCC)    • Kazakh measles    • Gout    • Gout    • Heart burn    • Heart valve disease    • Hemorrhagic disorder (HCC)     on Xarelto   • High cholesterol    • Hypertension    • Indigestion    • Influenza    • Mumps    • Pain 02/2018    back   • Pain    • Paroxysmal atrial fibrillation (HCC)    • Tremor     intermittent     Past Surgical History:   Procedure Laterality Date   • KNEE ARTHROPLASTY TOTAL Left 10/17/2016    Procedure: KNEE ARTHROPLASTY TOTAL;  Surgeon: Jaime Carmona M.D.;  Location: [facility];  Service:    • IRRIGATION & DEBRIDEMENT ORTHO  3/19/2013    Performed by Jaime Carmona M.D. at [facility]   • EVACUATION OF HEMATOMA  3/19/2013    Performed by Jaime Carmona M.D. at [facility]   • KNEE ARTHROPLASTY TOTAL  3/18/2013    Performed by Jaime Carmona M.D. at [facility]   • HIP ARTHROPLASTY TOTAL  3/5/2012    Performed by JAIME CARMONA at [facility]  [facility]   • LUMBAR FUSION POSTERIOR  2012   • CATARACT EXTRACTION WITH IOL Bilateral 2010   • HIP ARTHROPLASTY TOTAL  6/8/2009    right; Performed by JOSSE KRAUS at SURGERY [facility] ORS   • OTHER ORTHOPEDIC SURGERY  2006    right thumb   • ACHILLES TENDON REP Left 1999   • TONSILLECTOMY  1947   • HIP REPLACEMENT, TOTAL     • LAMINOTOMY       Family History   Problem Relation Age of Onset   • Cancer Mother    • Non-contributory Neg Hx      Social History     Social History   • Marital status:      Spouse name: N/A   • Number of children: N/A   • Years of education: N/A     Occupational History   • Not on file.     Social History Main Topics   • Smoking status: Former Smoker     Packs/day: 0.50     Years: 5.00     Types: Cigarettes     Quit date: 1/1/1969   • Smokeless tobacco: Never Used   • Alcohol use 1.8 - 2.4 oz/week     3 - 4 Cans of beer per week      Comment: several days per week.   • Drug use: No   • Sexual activity: Not on file     Other Topics Concern   • Not on file     Social History Narrative   • No narrative on file     Allergies   Allergen Reactions   • Percocet [Oxycodone-Acetaminophen] Itching and Anxiety   • Other Environmental      hayfever     Outpatient Encounter Prescriptions as of 7/25/2018   Medication Sig Dispense Refill   • furosemide (LASIX) 40 MG Tab Take 2 tablets on Mon, Wed and Fri.  Take 1 tablet other days. 120 Tab 3   • potassium chloride SA (KDUR) 20 MEQ Tab CR Take 2 tablets Mon.-Wed.-Fri. And 1 tablet other days of the week. 120 Tab 3   • metoprolol SR (TOPROL XL) 50 MG TABLET SR 24 HR Take 1 Tab by mouth every evening. 90 Tab 3   • rivaroxaban (XARELTO) 15 MG Tab tablet Take 1 Tab by mouth with dinner. 90 Tab 3   • metOLazone (ZAROXOLYN) 5 MG Tab Take one tablet on Monday, Wednesday, Friday 36 Tab 3   • Cholecalciferol (VITAMIN D) 2000 units Cap Take 2,000 Units by mouth every day.     • Ferrous Sulfate (IRON) 325 (65 Fe) MG Tab Take  by mouth every  "day.     • atorvastatin (LIPITOR) 20 MG Tab Take 1 Tab by mouth every evening. 90 Tab 3   • Multiple Vitamins-Minerals (PRESERVISION AREDS) Tab Take 2 tablet by mouth every day.     • allopurinol (ZYLOPRIM) 300 MG Tab TAKE 1 TABLET DAILY 90 Tab 2   • omeprazole (PRILOSEC) 20 MG CPDR Take 20 mg by mouth every day.         No facility-administered encounter medications on file as of 7/25/2018.      Review of Systems   Constitutional: Negative for malaise/fatigue.   Respiratory: Positive for shortness of breath (walking on flat surface.). Negative for cough.    Cardiovascular: Positive for leg swelling (Reduced per the patient's wife's report.). Negative for chest pain, palpitations, orthopnea, claudication and PND.   Gastrointestinal: Negative for abdominal pain.        Abd bloating.   Musculoskeletal: Negative for myalgias.   Neurological: Negative for dizziness and weakness.        Objective:   /64   Pulse 82   Resp 12   Ht 1.753 m (5' 9\")   Wt 82.1 kg (181 lb)   SpO2 94%   BMI 26.73 kg/m²     Physical Exam   Constitutional: He is oriented to person, place, and time. He appears well-developed and well-nourished.   HENT:   Head: Normocephalic.   Eyes: EOM are normal.   Neck: Normal range of motion. No JVD present.   Cardiovascular: Normal rate and regular rhythm.  Exam reveals no friction rub.    Murmur heard.   Systolic murmur is present with a grade of 1/6   Pulmonary/Chest: Effort normal.   Abdominal: Soft. Bowel sounds are normal.   Musculoskeletal: He exhibits no edema (Trace edema on right, 1+ edema on left.  Hemosiderin staining present.).   Neurological: He is alert and oriented to person, place, and time.   Skin: Skin is warm and dry.   Psychiatric: He has a normal mood and affect.     Results for JOSSE IBRAHIM    Ref. Range 6/22/2018 09:56 7/3/2018 12:17   Sodium Latest Ref Range: 134 - 144 mmol/L 138 142   Potassium Latest Ref Range: 3.5 - 5.2 mmol/L 4.1 4.1   Chloride Latest Ref Range: " 96 - 106 mmol/L 101 102   Co2 Latest Ref Range: 20 - 29 mmol/L 20 22   Glucose Latest Ref Range: 65 - 99 mg/dL 119 (H) 120 (H)   Bun Latest Ref Range: 8 - 27 mg/dL 59 (H) 56 (H)   Creatinine Latest Ref Range: 0.76 - 1.27 mg/dL 1.84 (H) 1.82 (H)   GFR If  Latest Ref Range: >59 mL/min/1.73 40 (L) 41 (L)   GFR If Non  Latest Ref Range: >59 mL/min/1.73 35 (L) 35 (L)   Bun-Creatinine Ratio Latest Ref Range: 10 - 24  32 (H) 31 (H)   Calcium Latest Ref Range: 8.6 - 10.2 mg/dL  8.2 (L)   B Natriuretic Peptide Latest Ref Range: 0.0 - 100.0 pg/mL 450.6 (H) 566.3 (H)     Assessment:     1. Chronic right-sided congestive heart failure (HCC)  BASIC METABOLIC PANEL    BTYPE NATRIURETIC PEPTIDE   2. Localized edema     3. CKD (chronic kidney disease) stage 3, GFR 30-59 ml/min     4. Essential hypertension, benign     5. Non-rheumatic tricuspid valve insufficiency         Medical Decision Making:  Today's Assessment / Status / Plan:     Congestive heart failure, right-sided: He appears stable at this time.  His ankle edema is improved.  We discussed the possibility of reducing his diuretics but he feels that it is working and would like to keep things the same.    After discussion it turns out he is drinking fair amount of water as well as Gatorade.  He is also drinking alcohol almost daily.  I have counseled him to reduce total fluids.  If he does reduce his fluids we may consider reducing his diuretics.    His BNP has been stable running in the 400-500 level.  Back in November 2017 when he was hospitalized his BNP was in the 900s.  His BNP probably is normally in the 400-500 level.  We will check a basic metabolic panel and BNP prior to his next visit.    Chronic kidney disease: His kidney function is stable.  If it does deteriorate we will consider reducing diuretics if possible.    Hypertension: His blood pressure is good in the office today.  Continue current regimen.    Tricuspid insufficiency:  The patient has been referred to cardiac surgery to discuss possibility of valve surgery.  The referral has been placed but not received by the surgeon's office.  My nurse called and will have the patient contacted so that he may schedule.    Patient is stable enough to follow-up as scheduled with Dr. Melvin in November with lab work prior.  He will follow-up sooner if problems.    Collaborating Provider: Dr. Digna Coreas.

## 2018-07-25 NOTE — LETTER
[facility]   [address] [place] [address]  [place] [address]  Phone: [phone]  Fax: [phone]              Jaime Tamayo  1940    Encounter Date: 7/25/2018    SANDRA Coley          PROGRESS NOTE:  Chief Complaint   Patient presents with   • CHF (Chronic)       Subjective:   Jaime Tamayo is a 78 y.o. male who presents today with his wife Maribell, to follow-up on congestive heart failure.  He was last seen by myself approximately 3 weeks ago.  We left his diuretics the same and are monitoring him and his lab work.    He continues to have shortness of breath with exertion which has been stable.  His leg swelling is much improved.  He is able to do his physical therapy where he rides a stationary bike for 15 minutes and tolerates it well.  He denies any anginal symptoms.    He has severe tricuspid regurgitation and is asking about his previous referral to cardiac surgery.  He was told by Dr. Melvin that he would not be a candidate for valve surgery due to his multiple comorbidities.    Past Medical History:   Diagnosis Date   • Anemia    • Arthritis    • Breath shortness     with minimal exertion no O2   • CATARACT     bilateral IOL   • Chickenpox    • Congestive heart failure (HCC)    • Malagasy measles    • Gout    • Gout    • Heart burn    • Heart valve disease    • Hemorrhagic disorder (HCC)     on Xarelto   • High cholesterol    • Hypertension    • Indigestion    • Influenza    • Mumps    • Pain 02/2018    back   • Pain    • Paroxysmal atrial fibrillation (HCC)    • Tremor     intermittent     Past Surgical History:   Procedure Laterality Date   • KNEE ARTHROPLASTY TOTAL Left 10/17/2016    Procedure: KNEE ARTHROPLASTY TOTAL;  Surgeon: Jaime Carmona M.D.;  Location: [facility];  Service:    • IRRIGATION & DEBRIDEMENT ORTHO  3/19/2013    Performed by Jaime Carmona M.D. at [facility]   • EVACUATION OF  HEMATOMA  3/19/2013    Performed by Jaime Carmona M.D. at SURGERY [facility]   • KNEE ARTHROPLASTY TOTAL  3/18/2013    Performed by Jaime Carmona M.D. at [facility]   • HIP ARTHROPLASTY TOTAL  3/5/2012    Performed by JAIME CARMONA at [facility]   • LUMBAR FUSION POSTERIOR  2012   • CATARACT EXTRACTION WITH IOL Bilateral 2010   • HIP ARTHROPLASTY TOTAL  6/8/2009    right; Performed by JAIME CARMONA at SURGERY [facility]   • OTHER ORTHOPEDIC SURGERY  2006    right thumb   • ACHILLES TENDON REP Left 1999   • TONSILLECTOMY  1947   • HIP REPLACEMENT, TOTAL     • LAMINOTOMY       Family History   Problem Relation Age of Onset   • Cancer Mother    • Non-contributory Neg Hx      Social History     Social History   • Marital status:      Spouse name: N/A   • Number of children: N/A   • Years of education: N/A     Occupational History   • Not on file.     Social History Main Topics   • Smoking status: Former Smoker     Packs/day: 0.50     Years: 5.00     Types: Cigarettes     Quit date: 1/1/1969   • Smokeless tobacco: Never Used   • Alcohol use 1.8 - 2.4 oz/week     3 - 4 Cans of beer per week      Comment: several days per week.   • Drug use: No   • Sexual activity: Not on file     Other Topics Concern   • Not on file     Social History Narrative   • No narrative on file     Allergies   Allergen Reactions   • Percocet [Oxycodone-Acetaminophen] Itching and Anxiety   • Other Environmental      hayfever     Outpatient Encounter Prescriptions as of 7/25/2018   Medication Sig Dispense Refill   • furosemide (LASIX) 40 MG Tab Take 2 tablets on Mon, Wed and Fri.  Take 1 tablet other days. 120 Tab 3   • potassium chloride SA (KDUR) 20 MEQ Tab CR Take 2 tablets Mon.-Wed.-Fri. And 1 tablet other days of the week. 120 Tab 3   • metoprolol SR (TOPROL XL) 50 MG TABLET SR 24 HR Take 1 Tab by mouth every evening. 90 Tab 3   • rivaroxaban (XARELTO) 15 MG Tab tablet Take 1 Tab by  "mouth with dinner. 90 Tab 3   • metOLazone (ZAROXOLYN) 5 MG Tab Take one tablet on Monday, Wednesday, Friday 36 Tab 3   • Cholecalciferol (VITAMIN D) 2000 units Cap Take 2,000 Units by mouth every day.     • Ferrous Sulfate (IRON) 325 (65 Fe) MG Tab Take  by mouth every day.     • atorvastatin (LIPITOR) 20 MG Tab Take 1 Tab by mouth every evening. 90 Tab 3   • Multiple Vitamins-Minerals (PRESERVISION AREDS) Tab Take 2 tablet by mouth every day.     • allopurinol (ZYLOPRIM) 300 MG Tab TAKE 1 TABLET DAILY 90 Tab 2   • omeprazole (PRILOSEC) 20 MG CPDR Take 20 mg by mouth every day.         No facility-administered encounter medications on file as of 7/25/2018.      Review of Systems   Constitutional: Negative for malaise/fatigue.   Respiratory: Positive for shortness of breath (walking on flat surface.). Negative for cough.    Cardiovascular: Positive for leg swelling (Reduced per the patient's wife's report.). Negative for chest pain, palpitations, orthopnea, claudication and PND.   Gastrointestinal: Negative for abdominal pain.        Abd bloating.   Musculoskeletal: Negative for myalgias.   Neurological: Negative for dizziness and weakness.        Objective:   /64   Pulse 82   Resp 12   Ht 1.753 m (5' 9\")   Wt 82.1 kg (181 lb)   SpO2 94%   BMI 26.73 kg/m²      Physical Exam   Constitutional: He is oriented to person, place, and time. He appears well-developed and well-nourished.   HENT:   Head: Normocephalic.   Eyes: EOM are normal.   Neck: Normal range of motion. No JVD present.   Cardiovascular: Normal rate and regular rhythm.  Exam reveals no friction rub.    Murmur heard.   Systolic murmur is present with a grade of 1/6   Pulmonary/Chest: Effort normal.   Abdominal: Soft. Bowel sounds are normal.   Musculoskeletal: He exhibits no edema (Trace edema on right, 1+ edema on left.  Hemosiderin staining present.).   Neurological: He is alert and oriented to person, place, and time.   Skin: Skin is warm and " dry.   Psychiatric: He has a normal mood and affect.     Results for JOSSE IBRAHIM    Ref. Range 6/22/2018 09:56 7/3/2018 12:17   Sodium Latest Ref Range: 134 - 144 mmol/L 138 142   Potassium Latest Ref Range: 3.5 - 5.2 mmol/L 4.1 4.1   Chloride Latest Ref Range: 96 - 106 mmol/L 101 102   Co2 Latest Ref Range: 20 - 29 mmol/L 20 22   Glucose Latest Ref Range: 65 - 99 mg/dL 119 (H) 120 (H)   Bun Latest Ref Range: 8 - 27 mg/dL 59 (H) 56 (H)   Creatinine Latest Ref Range: 0.76 - 1.27 mg/dL 1.84 (H) 1.82 (H)   GFR If  Latest Ref Range: >59 mL/min/1.73 40 (L) 41 (L)   GFR If Non  Latest Ref Range: >59 mL/min/1.73 35 (L) 35 (L)   Bun-Creatinine Ratio Latest Ref Range: 10 - 24  32 (H) 31 (H)   Calcium Latest Ref Range: 8.6 - 10.2 mg/dL  8.2 (L)   B Natriuretic Peptide Latest Ref Range: 0.0 - 100.0 pg/mL 450.6 (H) 566.3 (H)     Assessment:     1. Chronic right-sided congestive heart failure (HCC)  BASIC METABOLIC PANEL    BTYPE NATRIURETIC PEPTIDE   2. Localized edema     3. CKD (chronic kidney disease) stage 3, GFR 30-59 ml/min     4. Essential hypertension, benign     5. Non-rheumatic tricuspid valve insufficiency         Medical Decision Making:  Today's Assessment / Status / Plan:     Congestive heart failure, right-sided: He appears stable at this time.  His ankle edema is improved.  We discussed the possibility of reducing his diuretics but he feels that it is working and would like to keep things the same.    After discussion it turns out he is drinking fair amount of water as well as Gatorade.  He is also drinking alcohol almost daily.  I have counseled him to reduce total fluids.  If he does reduce his fluids we may consider reducing his diuretics.    His BNP has been stable running in the 400-500 level.  Back in November 2017 when he was hospitalized his BNP was in the 900s.  His BNP probably is normally in the 400-500 level.  We will check a basic metabolic panel and BNP prior  to his next visit.    Chronic kidney disease: His kidney function is stable.  If it does deteriorate we will consider reducing diuretics if possible.    Hypertension: His blood pressure is good in the office today.  Continue current regimen.    Tricuspid insufficiency: The patient has been referred to cardiac surgery to discuss possibility of valve surgery.  The referral has been placed but not received by the surgeon's office.  My nurse called and will have the patient contacted so that he may schedule.    Patient is stable enough to follow-up as scheduled with Dr. Melvin in November with lab work prior.  He will follow-up sooner if problems.    Collaborating Provider: Dr. Digna Coreas.        No Recipients

## 2018-08-01 ENCOUNTER — APPOINTMENT (RX ONLY)
Dept: URBAN - METROPOLITAN AREA CLINIC 4 | Facility: CLINIC | Age: 78
Setting detail: DERMATOLOGY
End: 2018-08-01

## 2018-08-01 DIAGNOSIS — Z85.828 PERSONAL HISTORY OF OTHER MALIGNANT NEOPLASM OF SKIN: ICD-10-CM

## 2018-08-01 DIAGNOSIS — L57.0 ACTINIC KERATOSIS: ICD-10-CM

## 2018-08-01 DIAGNOSIS — L82.1 OTHER SEBORRHEIC KERATOSIS: ICD-10-CM

## 2018-08-01 DIAGNOSIS — L81.4 OTHER MELANIN HYPERPIGMENTATION: ICD-10-CM

## 2018-08-01 DIAGNOSIS — L82.0 INFLAMED SEBORRHEIC KERATOSIS: ICD-10-CM

## 2018-08-01 PROCEDURE — 99213 OFFICE O/P EST LOW 20 MIN: CPT | Mod: 25

## 2018-08-01 PROCEDURE — 17003 DESTRUCT PREMALG LES 2-14: CPT

## 2018-08-01 PROCEDURE — ? ADDITIONAL NOTES

## 2018-08-01 PROCEDURE — ? LIQUID NITROGEN

## 2018-08-01 PROCEDURE — ? LIQUID NITROGEN (COSMETIC)

## 2018-08-01 PROCEDURE — 17000 DESTRUCT PREMALG LESION: CPT

## 2018-08-01 ASSESSMENT — LOCATION SIMPLE DESCRIPTION DERM
LOCATION SIMPLE: LEFT EAR
LOCATION SIMPLE: LEFT CHEEK
LOCATION SIMPLE: RIGHT FOREHEAD
LOCATION SIMPLE: RIGHT EAR
LOCATION SIMPLE: RIGHT LOWER BACK
LOCATION SIMPLE: UPPER BACK
LOCATION SIMPLE: LEFT FOREHEAD
LOCATION SIMPLE: LEFT SCALP
LOCATION SIMPLE: RIGHT CHEEK

## 2018-08-01 ASSESSMENT — LOCATION DETAILED DESCRIPTION DERM
LOCATION DETAILED: RIGHT SUPERIOR MEDIAL MALAR CHEEK
LOCATION DETAILED: LEFT SUPERIOR PREAURICULAR CHEEK
LOCATION DETAILED: LEFT CENTRAL FRONTAL SCALP
LOCATION DETAILED: RIGHT SUPERIOR MEDIAL MIDBACK
LOCATION DETAILED: LEFT SUPERIOR MEDIAL FOREHEAD
LOCATION DETAILED: LEFT CAVUM CONCHA
LOCATION DETAILED: INFERIOR THORACIC SPINE
LOCATION DETAILED: RIGHT SUPERIOR HELIX
LOCATION DETAILED: RIGHT SUPERIOR FOREHEAD
LOCATION DETAILED: LEFT SUPERIOR FOREHEAD
LOCATION DETAILED: LEFT INFERIOR LATERAL MALAR CHEEK

## 2018-08-01 ASSESSMENT — LOCATION ZONE DERM
LOCATION ZONE: TRUNK
LOCATION ZONE: EAR
LOCATION ZONE: SCALP
LOCATION ZONE: FACE

## 2018-08-01 NOTE — PROCEDURE: REASSURANCE
Detail Level: Detailed
Include Location In Plan?: No
Detail Level: Generalized
Include Location In Plan?: Yes

## 2018-08-20 NOTE — TELEPHONE ENCOUNTER
Patient is scheduled on 8-27-18 for a R&L hrt cath with Dr. UGARTE. Patient was told to hold Xarelto for 48hrs prior and to hold lasix am day of procedure. Patient was told to check in at 7:00am for a 9:00 procedure. Updated H&P to be done by NP on day of procedure. Pre admit to call patient.

## 2018-08-24 NOTE — TELEPHONE ENCOUNTER
Notified NP to do an updated H&P on patient on admit on 8-27-18 at 7:00 for angio with Dr. DAUGHERTY

## 2018-08-27 NOTE — OR NURSING
Patient is currently resting comfortably, alert and oriented. Right brachial and left groin puncture sites are CDI, no hematomas present. Right radial TR band in place:  12:00 Attempted to remove 3cc, bled immediately, replaced the 3cc  12:15 removed 1 cc, no bleeding   12:30 removed 1 cc, no bleeding  12:45 removed 1 cc, no bleeding

## 2018-08-27 NOTE — OR NURSING
Patient alert, oriented, vital signs stable, puncture sites CDI, denies pain. Discharged home via wheel chair with spouse after discharge instructions reviewed.

## 2018-08-27 NOTE — OR NURSING
TR air removed 1 cc at a time due to bleeding, currently completely deflated, no bleeding or hematoma present. Left groin site and right brachial site CDI, no hematomas present. Alert, oriented, vital signs, on bed rest until 1530. Spouse at bedside.

## 2018-08-27 NOTE — H&P
History:  Primary Diagnosis: severe tricuspid regurgitation. Severe mitral regurgitation.  Mild aortic insufficiency     HPI:  78 years old male patient of Dr. Melvin with significant history of congestive heart failure, paroxysmal atrial fibrillation, hypertension, hyperlipidemia, and valvular heart disease. Recent cardiac imaging showed severe valvular diseases, consulted with CTS for possible surgery.     Currently patient denies chest pain, shortness of breath or palpitations. Patient does complain of symptoms of SOB and lower extremity edema.     Echocardiography Laboratory  11/30/17  Prior echo 03/17/13; compared to the report of the study done - there   has been progression to severe tricuspid regurgitation with severely   enlarged right ventricle.  Normal left ventricular systolic function.  Left ventricular ejection fraction is visually estimated to be 55%.  Severely dilated right ventricle. Reduced right ventricular systolic   function.  Moderate to severe mitral regurgitation.  Mild aortic insufficiency.  Severe tricuspid regurgitation.  Estimated right ventricular systolic pressure is 45 mmHg.    YUNIOR  2/21/18  The aortic valve is heavily calcified with reduced opening.  By   planimetry, the TASIA is < 1.0 cm2.  There is moderate, likely severe AS.    There is moderate mitral regurgitation.    The septal leaflet of the tricuspid valve appears tethered inferiorly,   likey from the dilation of the RV.  There is severe TR.    Medical history:   Past Medical History:   Diagnosis Date   • Anemia    • Arthritis    • Breath shortness     with minimal exertion no O2   • CATARACT     bilateral IOL   • Chickenpox    • Congestive heart failure (HCC)    • Croatian measles    • Gout    • Gout    • Heart burn    • Heart valve disease    • Hemorrhagic disorder (HCC)     on Xarelto   • Hernia, inguinal, right    • Hiatus hernia syndrome    • High cholesterol    • Hypertension    • Indigestion    • Influenza    • Mumps    •  Pain 02/2018    back   • Pain    • Paroxysmal atrial fibrillation (HCC)    • Tremor     intermittent       Surgical history:   Past Surgical History:   Procedure Laterality Date   • KNEE ARTHROPLASTY TOTAL Left 10/17/2016    Procedure: KNEE ARTHROPLASTY TOTAL;  Surgeon: Jaime Carmona M.D.;  Location: [facility];  Service:    • IRRIGATION & DEBRIDEMENT ORTHO  3/19/2013    Performed by Jaime Carmona M.D. at [facility]   • EVACUATION OF HEMATOMA  3/19/2013    Performed by Jaime Carmona M.D. at [facility]   • KNEE ARTHROPLASTY TOTAL  3/18/2013    Performed by Jaime Carmona M.D. at [facility]   • HIP ARTHROPLASTY TOTAL  3/5/2012    Performed by JAIME CARMONA at [facility]   • LUMBAR FUSION POSTERIOR  2012   • CATARACT EXTRACTION WITH IOL Bilateral 2010   • HIP ARTHROPLASTY TOTAL  6/8/2009    right; Performed by JAIME CARMONA at [facility]   • OTHER ORTHOPEDIC SURGERY  2006    right thumb   • ACHILLES TENDON REP Left 1999   • TONSILLECTOMY  1947   • HIP REPLACEMENT, TOTAL     • LAMINOTOMY         Social history:   History   Smoking Status   • Former Smoker   • Packs/day: 0.50   • Years: 5.00   • Types: Cigarettes   • Quit date: 1/1/1969   Smokeless Tobacco   • Never Used      History   Alcohol Use   • 1.8 - 2.4 oz/week   • 3 - 4 Cans of beer per week      History   Drug Use No        Family history:   Family History   Problem Relation Age of Onset   • Cancer Mother    • Non-contributory Neg Hx        Allergies:   Allergies   Allergen Reactions   • Percocet [Oxycodone-Acetaminophen] Itching and Anxiety   • Other Environmental      hayfever       Home medications: No current facility-administered medications for this encounter.     Review of Systems:  Review of Systems   Constitutional: Negative for malaise/fatigue.   Respiratory: Positive for shortness of breath. Negative for cough, hemoptysis, sputum production and  wheezing.    Cardiovascular: Positive for leg swelling. Negative for chest pain, palpitations, orthopnea, claudication and PND.   Gastrointestinal: Negative for nausea and vomiting.   Neurological: Negative for dizziness and weakness.       Physical Examination:  Physical Exam   Constitutional: He is oriented to person, place, and time. He appears well-developed. No distress.   Cardiovascular: Normal rate and regular rhythm.    Murmur heard.  Pulmonary/Chest: Effort normal and breath sounds normal.   Abdominal: He exhibits no distension. There is no tenderness.   Musculoskeletal: He exhibits edema (1+ pitting edema ).   Neurological: He is alert and oriented to person, place, and time.   Skin: Skin is dry. He is not diaphoretic.   Bruise on the left knee           Plan:  Left and right cardiac cath with possible intervention.     The risks, benefits, and alternatives to coronary angiography with IV sedation were discussed in great detail. Specific risks mentioned include bleeding, infection, kidney damage, allergic reaction, cardiac perforation with possible tamponade requiring pericardiocentesis or possibly open heart surgery. In addition, we discussed that 10% of patients will experience small to moderate bruising at the site of the arterial puncture. Lastly, the risks of heart attack, stroke and death were discussed; the risk of major complications such as heart attack or stroke caused by the angiogram is approximately 1%; the risk of death is approximately 1 in 1000. The patient verbalized understanding of the potential complications and wishes to proceed with this procedure.    The risks/benefits of the procedure will be further discussed with the consenting physician performing the procedure.

## 2018-08-27 NOTE — DISCHARGE INSTRUCTIONS
"Radial Catherization Discharge Instructions      · Do not subject hand/arm to any forceful movements for 48 hours    i.e. supporting weight when rising from the chair or bed.   · Do not drive a car for 24 hours  · You may remove the dressing tomorrow  · You may shower on the day following your procedure.  Do not take a tub bath or submerge the puncture site in water for 3 days following the procedure.  · No Lifting more than 3-5 pounds with affected wrist for 5 days  · Follow up with Dr. Miller  2-4 weeks.  · Increase fluids for 2 days post procedure.  · Continue all previous medications unless otherwise instructed.    If bleeding should occur following discharge:  · Sit down and apply firm pressure to site with your fingers for 10 minutes  · If the bleeding stops, continue to sit quietly, keeping your wrist straight for 2 hours.  Notify physician as soon as possible ( [phone])  If bleeding does not stop after 10 minutes, or if there is a large amount of bleeding or spurting, call 911 immediately.  Do not drive yourself to the hospital.    Post Angiogram Groin Care Instructions     INSTRUCTIONS  Examine (look and feel) the site of your incision site TODAY so you can recognize changes that should be called to your doctor (see below).  Avoid straining either by lifting or pulling objects for 4-5 days. Avoid lifting over 5 pounds.   For at least 72 hours, if you should sneeze or cough, please hold pressure over your groin area.  If you should begin to have oozing from the catheterization site, please hold firm pressure and call your doctor's office immediately.  If profuse bleeding occurs from the catheterization site, hold firm pressure and call \"911\" immediately for assistance.  Remove bandage after 24 hours.     ACTIVITY  Limit activity as instructed by your doctor.  No driving or very limited driving with frequent stops for one week.   If you must take a long car ride, stop every hour and walk around the " car.   Warm showers or baths are permitted after the bandage is removed. Avoid hot showers, baths, hot tubs, and swimming for one week.    PLEASE CALL YOUR DOCTOR IF:  Temperature elevation occurs.  Catheterization site becomes reddened or begins to drain.   Bruising appears to be new or not resolving. The bruise may move down your leg. This is normal.  The small round lump in the groin increases in size.  Any leg numbness, aching, or discomfort (immediately).  Increasing discomfort in the leg at the insertion site.  Chest pains, even if relieved by Nitroglycerin.    MISCELLANEOUS INSTRUCTIONS  Bruising may occur as a result of heart catheterization. Some of the discoloration may travel down the leg, going from blue to green in color.  A small round lump under the catheterization site will remain for up to six weeks.  If any questions arise call your physician's office. You can also call the [employer] HOTLINE open 24 hours/day, 7 days/week and speak to a nurse at [phone], or toll free at [phone].   You should call [phone] if you develop problems with breathing or chest pain.    FOR PROBLEMS CALL HEATHER Miller AT: [phone]    I acknowledge receipt and understanding of these Home Care instructions.    ACTIVITY: Rest and take it easy for the first 24 hours.  A responsible adult is recommended to remain with you during that time.  It is normal to feel sleepy.  We encourage you to not do anything that requires balance, judgment or coordination.    MILD FLU-LIKE SYMPTOMS ARE NORMAL. YOU MAY EXPERIENCE GENERALIZED MUSCLE ACHES, THROAT IRRITATION, HEADACHE AND/OR SOME NAUSEA.    FOR 24 HOURS DO NOT:  Drive, operate machinery or run household appliances.  Drink beer or alcoholic beverages.   Make important decisions or sign legal documents.    SPECIAL INSTRUCTIONS: Keep dressing on for 48    DIET: To avoid nausea, slowly advance diet as tolerated, avoiding spicy or greasy foods for the first day.  Add more  substantial food to your diet according to your physician's instructions.  Babies can be fed formula or breast milk as soon as they are hungry.  INCREASE FLUIDS AND FIBER TO AVOID CONSTIPATION.    SURGICAL DRESSING/BATHING: May remove dressing and shower after 24 hours       You should CALL YOUR PHYSICIAN if you develop:  Fever greater than 101 degrees F.  Pain not relieved by medication, or persistent nausea or vomiting.  Excessive bleeding (blood soaking through dressing) or unexpected drainage from the wound.  Extreme redness or swelling around the incision site, drainage of pus or foul smelling drainage.  Inability to urinate or empty your bladder within 8 hours.  Problems with breathing or chest pain.    You should call 911 if you develop problems with breathing or chest pain.  If you are unable to contact your doctor or surgical center, you should go to the nearest emergency room or urgent care center.  Physician's telephone #: [phone]    If any questions arise, call your doctor.  If your doctor is not available, please feel free to call the Surgical Center at [phone].  The Center is open Monday through Friday from 7AM to 7PM.  You can also call the [employer] HOTLINE open 24 hours/day, 7 days/week and speak to a nurse at [phone], or toll free at [phone].    A registered nurse may call you a few days after your surgery to see how you are doing after your procedure.    MEDICATIONS: Resume taking daily medication.  Take prescribed pain medication with food.  If no medication is prescribed, you may take non-aspirin pain medication if needed.  PAIN MEDICATION CAN BE VERY CONSTIPATING.  Take a stool softener or laxative such as senokot, pericolace, or milk of magnesia if needed.      If your physician has prescribed pain medication that includes Acetaminophen (Tylenol), do not take additional Acetaminophen (Tylenol) while taking the prescribed medication.    Depression / Suicide Risk    As you  are discharged from this [facility] facility, it is important to learn how to keep safe from harming yourself.    Recognize the warning signs:  Abrupt changes in personality, positive or negative- including increase in energy   Giving away possessions  Change in eating patterns- significant weight changes-  positive or negative  Change in sleeping patterns- unable to sleep or sleeping all the time   Unwillingness or inability to communicate  Depression  Unusual sadness, discouragement and loneliness  Talk of wanting to die  Neglect of personal appearance   Rebelliousness- reckless behavior  Withdrawal from people/activities they love  Confusion- inability to concentrate     If you or a loved one observes any of these behaviors or has concerns about self-harm, here's what you can do:  Talk about it- your feelings and reasons for harming yourself  Remove any means that you might use to hurt yourself (examples: pills, rope, extension cords, firearm)  Get professional help from the community (Mental Health, Substance Abuse, psychological counseling)  Do not be alone:Call your Safe Contact- someone whom you trust who will be there for you.  Call your local CRISIS HOTLINE 972-1484 or [phone]  Call your local Children's Mobile Crisis Response Team [place] (680) 545-1702 or www.[employer]  Call the toll free National Suicide Prevention Hotlines   National Suicide Prevention Lifeline 866-311-YRSE (2828)  National Hope Line Network 800-SUICIDE (525-0502)

## 2018-08-28 NOTE — PROCEDURES
DATE OF SERVICE:  08/27/2018    PREOPERATIVE DIAGNOSES:  Severe tricuspid regurgitation with   moderate-to-severe mitral regurgitation and questionable aortic stenosis in   need of pre-valve surgery cardiac catheterization.    POSTOPERATIVE DIAGNOSES:  1.  Normal left ventricular systolic function.  Ejection fraction was 60%.  2.  A 2+ mitral regurgitation with minimal gradient across aortic valve.  3.  Mildly elevated pulmonary pressure at 37/24 mmHg with a mean of 30 mmHg.    Pulmonary capillary wedge pressure mean of 22 mmHg. Large V wave seen on   right atrial pressure waveform at 29 mmHg with a mean right atrial pressure of   20 mmHg.  4.  A 50-60% stenosis in the midportion of a medium-sized first obtuse   marginal of the left circumflex artery and chronic total occlusion of the mid   right coronary artery with diffuse calcified stenosis of the proximal right   coronary artery.    PROCEDURES:  1.  Right and left heart catheterization with left ventriculography.  2.  Selective coronary angiography.  3.  Conscious sedation.    COMPLICATIONS:  None.    DESCRIPTION OF PROCEDURE:  After informed consent was obtained, he was brought   to cardiac catheterization laboratory in fasting state.  The patient has   inguinal hernia in the right groin and prefers that we perform the procedure from   the arm with radial approach.  The patient has an IV in the antecubital fossa.    It was then prepped and draped in the usual sterile fashion as well as the   right wrist and left groin after an Toby's test was confirmed to be negative.    Xylocaine was then applied to the right antecubital fossa and right wrist area.  The existing IV was then exchanged for a 6-Tongan venous sheath.    Next, a 6-Tongan Terumo glide sheath was placed in right radial artery using   Seldinger technique.  A 2.5 mg verapamil, 100 mcg nitroglycerin, and 4000   units of heparin were administered into the radial sheath.    Next, a 6-Tongan  thermodilution catheter was then advanced into the venous   sheath in the right antecubital fossa.  The catheter; however, could not be  advance past the area around the right shoulder despite multiple attempts and   an Ironman guidewire support.  We had to switch to a femoral approach.    Using portable ultrasound, the left common femoral vein was identified.  A   local anesthetic as applied and a 6-Russian sheath was then placed.    Prior to placement of the venous sheath in the left groin, we did advance   4-Russian JL4 across the aortic valve rather easily into the left ventricle.  The JL4 was exchanfged to JR4.  Left ventriculography was performed using   24 mL of contrast injected over 3 seconds.  This, however, did not illustrate the mitral   regurgitation well.  The JR4 was then exchanged over the wire for a 4-Russian   angled pigtail catheter.  Another left ventriculography was then performed   using 30 mL of contrast injected over 3 seconds.  Left heart pullback was then   performed.  Only minimal gradient of about 5 mmHg was noted across the aortic   valve.    The catheter was then exchanged for a 4-Russian JL 3.5 catheter and selective   angiography of the left coronary artery was performed in multiple views,   followed by selective of the right coronary artery using 4-Russian JR4.    Next, again the 6-Russian sheath was placed in the left common femoral vein   using ultrasound guidance.  A 6-Russian thermodilution catheter was then   advanced into the right heart and subsequently into wedge position.  Pulmonary   capillary wedge pressure was recorded.  Balloon was deflated.  Catheter was   withdrawn back slightly into the pulmonary artery.  Cardiac output had been   determined using thermodilution technique.  Right heart pullback subsequently   performed.  Thermodilution catheter was then removed.  The radial sheath was   then removed.  Hemostasis was obtained using Terumo TR wrist band.  The venous   sheath  was then removed.  Hemostasis was obtained using manual compression.    Patient tolerated procedure well.  He left cardiac catheterization laboratory   in stable condition.    I supervised managing under conscious sedation starting at 10:18 a.m. until   the end of the case at 11:11 a.m.    FINDINGS:  1.  Hemodynamics:  Mean pulmonary capillary wedge pressure was 23 mmHg.    Pulmonary artery pressure with 37/24 with a mean of 20.  RV systolic pressure   was 39 with RV end-diastolic pressure of 19.  Mean right atrial pressure was   20 with the peek V wave at 29 mmHg.  LV systolic pressure was 107 mmHg with LV end-diastolic pressure of 20   mmHg.  Aortic pressure was 103/69 with the gradient across the aortic valves   of 4-5 mmHg only. Cardiac output was 3.25 liters per minute.    2.  Left ventriculography showed upper normal size of left ventricle with   relatively normal overall LV systolic function.  There was hypokinesis of the   basal and mid inferior wall.  Ejection fraction was calculated to be 60%.    3.  Coronary artery disease.    The left main is a large caliber vessel.  It appeared to be moderately   calcified.  It has roughly about 20% ostial stenosis, but no flow   limiting disease seen.  It trifurcates into left anterior descending artery,   ramus intermediate artery, and left circumflex artery.    The left anterior descending artery is a large vessel.  It extends slightly   past the apex.  It gives rises to relatively long medium-to-large first   diagonal branch shortly after its origin.  There is no significant disease   noted in the left anterior descending artery or its major branches.    Collateral via septal arcade filling distal part of the right coronary artery   was visualized during injection of the left coronary system.    Ramus intermediate artery is medium size, somewhat long and fee of disease.    Left circumflex artery is a medium to large caliber vessel.  It gives off one   medium size  obtuse marginal branch in the midsegment and terminate into atrial   branch and AV groove branch.  The obtuse marginal branch has eccentric 50-60%   stenosis, although antegrade flow remained normal.    The right coronary artery is moderately to heavily calcified in its proximal   to mid portion.  It has diffuse disease in proximal portion up to 70%-80% and   became occluded in the mid portion after gives rise to 2 small acute marginal   branches.  Distal right coronary artery, mostly the posterior descending and   posterolateral branch were seen during injections of the left coronary system.   The retrograde filling showed only to just slightly proximal to the crux.  The posterior   descending artery is relatively disease free.  Posterolateral branch has a   long diffuse moderate disease in its proximal portion for up to 50-60%.    PLAN:  Bed rest for few hours until hemostasis achieved.  Limit right wrist   movement for 24-48 hours.  We will discuss information with Dr. Deng,   cardiac surgeon.       ____________________________________     MD DORIS TAYLOR / SALLY    DD:  08/28/2018 12:41:13  DT:  08/28/2018 14:10:22    D#:  [id number]  Job#:  [id number]    cc: ELZBIETA MARROQUIN MD

## 2018-10-15 NOTE — CARE PLAN
Problem: Pre Op  Goal: Optimal preparation for CABG/Heart Valve surgery  Outcome: PROGRESSING AS EXPECTED  Problem: Pre Op  Goal: Optimal preparation for CABG/Heart Valve surgery  Intervention: Pre Op education to patient/significant other. Provide patient [facility] Patient Guideline for Cardiac Surgery (See Pt. Ed.)  Discussed anatomy and physiology of cardiac surgery with patient and family to include pre-op regimen. Reviewed post-op expectations to include the use of incentive spirometry with return demonstration, ventilator management, cardiac monitoring, tubes and drains, early ambulation, and expected length of stay. Also provided information on Cardiac Rehab and how to schedule an appointment. Patient and family state full understanding of all information given.    Intervention: Baseline assessment documented to include IS volume, weight, bilateral BP and peripheral pulses.  Baseline IS: 3250    Intervention: NPO at midnight except cardiac medications. (No ASA, coumadin or Plavix)  Instructed patient nothing to eat or drink after midnight the night prior to scheduled surgery date.    Intervention: Shower with chlorhexidine x 2  Instructed patient to wash entire body with chlorhexedine wipes prior to bedtime the night before surgery.

## 2018-10-16 PROBLEM — J96.01 ACUTE RESPIRATORY FAILURE WITH HYPOXIA (HCC): Status: ACTIVE | Noted: 2018-01-01

## 2018-10-16 PROBLEM — Z98.890 S/P MVR (MITRAL VALVE REPAIR): Status: ACTIVE | Noted: 2018-01-01

## 2018-10-16 PROBLEM — Z98.890 S/P TVR (TRICUSPID VALVE REPAIR): Status: ACTIVE | Noted: 2018-01-01

## 2018-10-16 NOTE — PROGRESS NOTES
Med rec complete per pt and family at bedside  Interviewed pt with family at bedside with permission from pt  Allergies reviewed and updated.

## 2018-10-16 NOTE — H&P
Surgery Cardiac History & Physical Note    Date  10/16/2018    Primary Care Physician  Dat Dominguez M.D.        HPI  This is a 78 y.o. male who presented with Shortness of breath progressively getting worse over the last year. He had echocardiogram that showed moderate AS, severe TR, moderate MR and EF 55%. He was set-up for elective surgery.    Past Medical History:   Diagnosis Date   • Anemia    • Arthritis    • Breath shortness     with minimal exertion no O2   • CATARACT     bilateral IOL   • Chickenpox    • Congestive heart failure (HCC)    • St Helenian measles    • Gout    • Gout    • Heart burn    • Heart valve disease    • Hemorrhagic disorder (HCC)     on Xarelto   • Hernia, inguinal, right    • Hiatus hernia syndrome    • High cholesterol    • Hypertension    • Indigestion    • Influenza    • Mumps    • Pain 02/2018    back   • Pain    • Paroxysmal atrial fibrillation (HCC)    • Tremor     intermittent       Past Surgical History:   Procedure Laterality Date   • KNEE ARTHROPLASTY TOTAL Left 10/17/2016    Procedure: KNEE ARTHROPLASTY TOTAL;  Surgeon: Jaime Carmona M.D.;  Location: [facility];  Service:    • IRRIGATION & DEBRIDEMENT ORTHO  3/19/2013    Performed by Jaime Carmona M.D. at [facility]   • EVACUATION OF HEMATOMA  3/19/2013    Performed by Jaime Carmona M.D. at [facility]   • KNEE ARTHROPLASTY TOTAL  3/18/2013    Performed by Jaime Carmona M.D. at [facility]   • HIP ARTHROPLASTY TOTAL  3/5/2012    Performed by JAIME CARMONA at [facility]   • LUMBAR FUSION POSTERIOR  2012   • CATARACT EXTRACTION WITH IOL Bilateral 2010   • HIP ARTHROPLASTY TOTAL  6/8/2009    right; Performed by JAIME CARMONA at [facility]   • OTHER ORTHOPEDIC SURGERY  2006    right thumb   • ACHILLES TENDON REP Left 1999   • TONSILLECTOMY  1947   • HIP REPLACEMENT, TOTAL     • LAMINOTOMY         Current Facility-Administered  Medications   Medication Dose Route Frequency Provider Last Rate Last Dose   • lidocaine (XYLOCAINE) 1 % injection 0.5 mL  0.5 mL Intradermal Once PRN Jana Deng M.D.   0.5 mL at 10/16/18 0632   • Respiratory Care per Protocol   Nebulization Continuous RT Jana Deng M.D.       • lidocaine (XYLOCAINE) 1 % injection 0.5 mL  0.5 mL Intradermal Once PRN Jana Deng M.D.       • insulin regular (HUMULIN R) injection 2-15 Units  2-15 Units Subcutaneous Once PRN Jana Deng M.D.       • insulin regular human (HUMULIN/NOVOLIN R) 62.5 Units in  mL infusion per protocol  1-6 Units/hr Intravenous Continuous Jana Deng M.D.       • EPINEPHrine 4 mg in  mL Infusion  0-0.2 mcg/kg/min Intravenous Continuous Jana Deng M.D.       • dexmedetomidine (PRECEDEX) 400 mcg/100mL NS premix infusion  0-1.5 mcg/kg/hr Intravenous Continuous Jana Deng M.D.       • ceFAZolin (ANCEF) IVPB 2 g  2 g Intravenous Once Jana Deng M.D.       • aminocaproic acid (AMICAR) 7.8 g in  mL IV Bolus  100 mg/kg Intravenous Once Jana Deng M.D.       • aminocaproic acid (AMICAR) 5 g in  mL Infusion  2 g/hr Intravenous Once Jana Deng M.D.           Social History     Social History   • Marital status:      Spouse name: N/A   • Number of children: N/A   • Years of education: N/A     Occupational History   • Not on file.     Social History Main Topics   • Smoking status: Former Smoker     Packs/day: 0.50     Years: 5.00     Types: Cigarettes     Quit date: 1/1/1969   • Smokeless tobacco: Never Used   • Alcohol use 1.8 - 2.4 oz/week     3 - 4 Cans of beer per week   • Drug use: No   • Sexual activity: Not on file     Other Topics Concern   • Not on file     Social History Narrative   • No narrative on file       Family History   Problem Relation Age of Onset   • Cancer Mother    • Non-contributory Neg Hx        Allergies  Percocet [oxycodone-acetaminophen] and Other  environmental    Review of Systems  Negative except for hearing loss, arthralgias, myalgias    Physical Exam  Constitutional: He is oriented to person, place, and time. He appears well-developed and well-nourished.   HENT:   Head: Normocephalic.   Eyes: EOM are normal.   Neck: Normal range of motion. No JVD present.   Cardiovascular: Normal rate and regular rhythm.  Exam reveals no friction rub.    Murmur heard.   Systolic murmur is present with a grade of 1/6   Pulmonary/Chest: Effort normal.   Abdominal: Soft. Bowel sounds are normal.   Musculoskeletal: He exhibits no edema (Trace edema on right, 1+ edema on left).   Neurological: He is alert and oriented to person, place, and time.   Skin: Skin is warm and dry.   Psychiatric: He has a normal mood and affect.     Vital Signs  Blood Pressure : 118/80 (left arm)   Temperature: 36.2 °C (97.1 °F)   Pulse: 92   Respiration: 18   Pulse Oximetry: 99 %       Labs:  Recent Labs      10/15/18   1002   WBC  5.3   RBC  3.05*   HEMOGLOBIN  10.6*   HEMATOCRIT  32.5*   MCV  106.6*   MCH  34.8*   MCHC  32.6*   RDW  61.1*   PLATELETCT  125*   MPV  10.9     Recent Labs      10/15/18   1002   SODIUM  136   POTASSIUM  4.3   CHLORIDE  98   CO2  25   GLUCOSE  124*   BUN  76*   CREATININE  2.29*   CALCIUM  8.6     Recent Labs      10/15/18   1002   APTT  33.8   INR  1.34*     Recent Labs      10/15/18   1002   ASTSGOT  19   ALTSGPT  22   TBILIRUBIN  1.6*   ALKPHOSPHAT  205*   GLOBULIN  2.6   INR  1.34*       Radiology:  DX-CHEST-2 VIEWS   Final Result      Probable nipple shadow right lower lung field. Interval follow-up chest x-rays using nipple markers a consideration.   Right lung base atelectasis. No consolidation.      DX-CHEST-PORTABLE (1 VIEW)    (Results Pending)         Assessment   * No Diagnosis Codes entered *  Procedure(s):  MITRAL VALVE REPAIR-OR  MITRAL VALVE REPLACE  TRICUSPID VALVE REPAIR-OR REPLACEMENT  AORTIC VALVE REPLACEMENT- POSS  MAZE PROCEDURE- POSS - VS LEFT  ATRIAL APPENDAGE LIGATION  YUNIOR     Plan:  Mitral valve repair or replacement, Tricuspid valve repair or replacement, possible Aortic valve replacement, possible maze procedure vs left atrial appendage ligation w/ YUNIOR

## 2018-10-16 NOTE — CONSULTS
DATE OF SERVICE:  10/16/2018    PULMONARY CRITICAL CARE CONSULTATION    REQUESTING PHYSICIAN:  Jana Deng MD    REASON FOR REQUEST:  Postoperative critical care management.    HISTORY OF PRESENT ILLNESS:  This is a 78-year-old gentleman currently on full   mechanical ventilatory support, status post valve repair and all information   taken from chart review inside out.  It appears that this gentleman has a   chronic history of severe MR, severe TR, paroxysmal atrial fibrillation, on   anticoagulation, stage III kidney disease, pulmonary hypertension with last   reported RVSP of 45, chronic hypertension and dyslipidemia.  Patient admitted   electively today for valve repair, looking at mitral, tricuspid as well as   aortic valve.  He is status post repair of the mitral and tricuspid valves.    The aortic valve on examination intraoperatively showed only mild aortic   stenosis, minimal debridement performed and patient was transferred to the   intensive care unit.  At the present time, he is on 0.04 epinephrine and on   Precedex, full mechanical ventilatory support, recovering from anesthesia with   pulmonary artery catheter in place as well as mediastinal chest tubes.    ALLERGIES:  PERCOCET.    OUTPATIENT MEDICATIONS:  Include Lasix, potassium, metoprolol, Xarelto,   Zaroxolyn, Lipitor, allopurinol, and Prilosec.    FAMILY HISTORY:  Unable to obtain.    PAST MEDICAL HISTORY:  As indicated above in HPI.    SOCIAL HISTORY:  Unable to obtain.    REVIEW OF SYSTEMS:  Unable to obtain.    PHYSICAL EXAMINATION:  VITAL SIGNS:  Temperature 96.8, pulse rate 89, blood pressure 90/46 on   epinephrine and satting 99% on 70% FIO2.  GENERAL:  Patient recovering from anesthesia.  HEENT:  Normocephalic, atraumatic, anicteric.  Pupils approximately 2 mm   bilaterally equal and reactive.  CARDIOVASCULAR:  Currently in sinus rhythm, regular rate.   RESPIRATORY:  Scattered rhonchi, otherwise mildly diminished.  No wheezes or    rales.  ABDOMEN:  Soft and nondistended.  Positive bowel sounds.  EXTREMITIES:  With trace edema.  NEUROLOGIC:  Currently recovering from anesthesia.  PSYCHIATRIC:  Unable to assess given clinical state.    LABORATORY DATA:  Most recent H and H of 8.5 and 25, platelet count 139.    I-stat sodium 142, potassium 3.8, glucose 149, ionized calcium 1.02, INR 1.22,   PTT 37.8.  ABG showing pH of 7.43, pCO2 of 37, pO2 of 273, saturation of 100%   on 100% FIO2.     ASSESSMENT:    1.  Status post repair of mitral valve, tricuspid valve and ligation of left   atrial appendage.  2.  Mechanical ventilatory support.  3.  Acute blood loss anemia.  4.  Stage III chronic kidney disease.  5.  Paroxysmal atrial fibrillation -- currently in sinus.   6.  Pulmonary hypertension with last reported RVSP of 45.  7.  Chronic hypertension.   8.  Dyslipidemia.   9.  Incomplete medical database.     PLAN:  Again, this is a 78-year-old gentleman with above indicated history and   presentation, currently on full mechanical ventilatory support, recovering   from anesthesia, status post mitral valve and tricuspid valve repairs with   ligation of left atrial appendage and debridement of aortic valve.  At the   present time, we will continue titrating ventilator according to ABG and   pulmonary mechanics.  We will allow patient to further waken on Precedex as   well as p.r.n. fentanyl for sedation.  The patient will have closely monitored   electrolytes.  Further, we will continue to follow chest tube output   monitoring for any evidence of interval development of coagulopathy or   significant bleed.  Given his underlying renal function, we will renally dose   medications.  We will minimize further nephrotoxic substances.  Monitoring   urine output closely, again following for electrolyte abnormalities.  The   patient currently on epinephrine with active titration, will be on insulin   infusion with q. hour fingerstick blood sugars x24 hours prior  to   transitioning to subq.    Prognosis is guarded.    Total critical care time not including billable procedures 35 minutes.       ____________________________________     MD CORBIN Ward / SALLY    DD:  10/16/2018 14:44:00  DT:  10/16/2018 16:27:06    D#:  [id number]  Job#:  [id number]

## 2018-10-16 NOTE — CARE PLAN
Problem: Ventilation Defect:  Goal: Ability to achieve and maintain unassisted ventilation or tolerate decreased levels of ventilator support  Adult Ventilation Update    Total Vent Days: 1    Patient Lines/Drains/Airways Status    Active Airway     Name: Placement date: Placement time: Site: Days:    Airway ETT Oral 7.5 10/16/18   0815   Oral   less than 1                      Static Compliance (ml / cm H2O): 36.1 (10/16/18 1330)    Patient failed trials because of    Barriers to SBT    Length of Weaning Trial          (ASV) % MIN VOL: 140 (10/16/18 1330)  ASV Inspiratory Pressures  % Spontaneous 0    Sputum/Suction   Cough: Non Productive (10/16/18 1330)                  Events/Summary/Plan: ASV% droppped to 140% (10/16/18 1442)

## 2018-10-16 NOTE — OR SURGEON
Immediate Post OP Note    PreOp Diagnosis: Severe MR, severe TR, mild AS, A fib    PostOp Diagnosis: Severe MR, severe TR, mild AS, A fib    Procedure(s):  MVRepair (34mm Cabrales flexible annuloplasty band)  TVRepair (38mm Cabrales flexible annuloplasty band)  AVRepair (valvuloplasty, debridement)  TITI LIGATION  YUNIOR    Surgeon(s):  Jana Deng M.D.    Assistant:  1.  Bradley Watson M.D.  2.  CHICO Ahuja    Anesthesiologist/Type of Anesthesia:  Anesthesiologist: Shin Coleman M.D.  Anesthesia Technician: Lazaro Baxter/General    Surgical Staff:  Assistant: SANDRA Rosenberg  Circulator: Yefri Knapp R.N.  Perfusionist: Nahid Hill Circulator: Shalini Do R.N.  Scrub Person: Nikkie Chino; Estuardo Sanchez    Specimens removed if any:  ID Type Source Tests Collected by Time Destination   A : Left atrial appendage Tissue Chest PATHOLOGY SPECIMEN Jana Deng M.D. 10/16/2018 10:04 AM        Estimated Blood Loss: Min    Findings: MR, TR, mild AS, a fib    Complications: None        10/16/2018 12:56 PM Jana Deng M.D.

## 2018-10-16 NOTE — OP REPORT
DATE OF SERVICE:  10/16/2018    REFERRING PHYSICIAN:  Eduardo Melvin MD    PREOPERATIVE DIAGNOSES:  Severe tricuspid regurgitation, moderate-to-severe   mitral regurgitation, moderate aortic stenosis, congestive heart failure,   valvular heart disease (NYHA class III), paroxysmal atrial fibrillation,   pulmonary hypertension, chronic kidney disease (stage III).    POSTOPERATIVE DIAGNOSES:  Severe tricuspid regurgitation, moderate-to-severe   mitral regurgitation, mild aortic stenosis, congestive heart failure, valvular   heart disease (NYHA class III), paroxysmal atrial fibrillation, pulmonary   hypertension, chronic kidney disease (stage III).    PROCEDURES:  Tricuspid valve repair (38 mm Cabrales flexible annuloplasty   band), mitral valve repair (34 mm Cabrales flexible annuloplasty band), aortic   valve repair (valvuloplasty, debridement), left atrial appendage ligation and   intraoperative transesophageal echocardiography.    SURGEON:  Jana Deng MD    FIRST ASSISTANT:  Bradley Watson MD    SECOND ASSISTANT:  CHICO Ahuja.    ANESTHESIOLOGIST:  Shin Coleman MD    ANESTHESIA:  General endotracheal.    ESTIMATED BLOOD LOSS:  Minimal.    DRAINS:  Mediastinal chest tubes x2 (32-Frisian straight and angled).    MISCELLANEOUS:  Temporary epicardial ventricular pacemaker wires.    COMPLICATIONS:  None.    INDICATIONS:  The patient is a very pleasant 78-year-old white male who has   had worsening shortness of breath with exertion over the last year and lower   extremity edema.  Echocardiography showed severe tricuspid regurgitation,   moderate-to-severe mitral regurgitation, and moderate aortic stenosis.  His   left ventricular ejection fraction was approximately 55%.  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    DESCRIPTION OF PROCEDURE:  The patient was brought to the operating room and   placed on the operating room table in the supine position.   After successful   induction of general anesthesia and endotracheal intubation, the patient was   prepped and draped in the usual sterile fashion.  Dr. Bradley Watson was the   first assistant during the operation and CHICO Ahuja was the second   assistant and she also closed the sternal wound.  Intraoperative   transesophageal echocardiography showed severe tricuspid regurgitation,   moderate-to-severe mitral regurgitation and mitral annular calcifications, and   mild aortic stenosis.  His left ventricular ejection fraction was 55-60%.  An   incision was made from the sternal notch to the xiphoid.  The sternum was   opened longitudinally with a sternal saw.  Hemostasis was obtained with   electrocautery at the sternal edges.  The patient was systemically   heparinized.  The pericardium was opened longitudinally and tented anteriorly   with Ethibond stay stitches.  The aortic cannula was inserted first followed   by SVC and IVC venous cannulae.  An antegrade cardioplegia cannula was placed   in the ascending aorta and a retrograde one in the coronary sinus.    Cardiopulmonary bypass was instituted.  The aorta was cross-clamped and the   patient was given 1 L of cold cardioplegia in an antegrade fashion.  There was   prompt cardiac arrest.  Ice slush was placed on the heart for further   myocardial protection.  A phrenic nerve protector pad was used.  From this   point on, cardioplegia was given in either antegrade and/or retrograde fashion   every 15-20 minutes while the aorta was cross-clamped.  A right atriotomy was   performed.  The tricuspid valve had fairly normal appearance.  A simple   annuloplasty was performed.  A #2-0 Ethibond stitches were then placed around   the tricuspid valve annulus in a horizontal mattress fashion from trigone to   trigone.  A 38 mm Cabrales flexible annuloplasty band was then placed in the   tricuspid valve annulus and secured in place with the Ethibond stitches   utilizing  the Cor-Knot device.  Testing of the repair with cold saline did not   show any tricuspid regurgitation.  The right atriotomy was closed in 2 layers   using #5-0 Prolene sutures.  A left atriotomy was performed just below the   interatrial groove.  The mitral valve leaflets were slightly sclerotic.  There   was a moderate amount of mitral annular calcification in the posterior   annulus near P2 and P3.  A simple annuloplasty was performed.  A #2-0 Ethibond   stitches were then placed around the mitral valve annulus in a horizontal   mattress fashion from trigone to trigone.  A 34-mm Cabrales flexible   annuloplasty band was then placed in the mitral valve annulus and secured in   place with the Ethibond stitches utilizing the Cor-Knot device.  Testing of   the repair with cold saline showed trace mitral regurgitation.  The left   atriotomy was closed in 2 layers using #4-0 Prolene sutures.  A transverse   aortotomy was performed.  The aortic valve leaflets were moderately calcified   as was the annulus.  A simple valvuloplasty was performed with finger fracture   of the leaflets, mild calcifications.  There was a large amount of calcium   dripping down from the left coronary leaflet towards the left ventricular   outflow tract, this was extensively debrided with a rongeur.  Rewarming of the   patient was initiated.  The aortotomy was closed in 2 layers using #5-0   Prolene sutures.  Approximately 300 mL of warm blood was given in a retrograde   fashion and the aortic cross-clamp was removed.  Aortic cross-clamp time was   110 minutes.  Total cardiopulmonary bypass time was 144 minutes.  The left   ventricle was deaired in the usual fashion.  The carbon dioxide, which had   been released over the operative field during the operation was discontinued.    The retrograde cardioplegia cannula was removed.  A straight and angled   32-Hungarian chest tubes were placed in the mediastinum.  Temporary epicardial   ventricular  pacemaker wires were inserted.  The patient was electrically   cardioverted into sinus rhythm.  The antegrade cardioplegia cannula was   removed.  When he was adequately warmed, he was slowly taken off   cardiopulmonary bypass, which he tolerated well.  The superior and inferior   vena cava cannulae were removed.  Protamine was given to reverse the effects   of heparin.  The aortic cannula was removed.  When adequate hemostasis had   been obtained, the sternum was reapproximated using size 5 sternal wires and   the remainder of the incision was closed in 3 layers using Vicryl sutures.    Intraoperative transesophageal echocardiography showed mild-to-moderate   tricuspid regurgitation, trace to mild mitral regurgitation, mild aortic   stenosis with peak and mean transvalvular gradient of 17 and 10 mmHg   respectively.  The tricuspid valvular gradients were 5 and 2 mmHg and the   mitral gradients were 12 and 4 mmHg.  His left ventricular ejection fraction   remained the same at approximately 55-60%.  There were no apparent   complications.  The patient tolerated the procedure well and left the   operating room in guarded condition.       ____________________________________     MD BIBIANA COTO / SALLY    DD:  10/16/2018 13:14:01  DT:  10/16/2018 14:24:32    D#:  [id number]  Job#:  [id number]    cc: ELZBIETA MARROQUIN MD

## 2018-10-16 NOTE — PROGRESS NOTES
Pt arrived to room at 1332.  Placed on monitors and report received from Anesthesiologist.  Drips verified.  Pt on Epi and Dex at this time.  Blood sugars checked and blood sent to lab.

## 2018-10-17 PROBLEM — D62 ACUTE BLOOD LOSS ANEMIA: Status: ACTIVE | Noted: 2018-01-01

## 2018-10-17 NOTE — CARE PLAN
Problem: Day of surgery post CABG/Heart valve replacement  Goal: Stabilization in immediate post op period    Intervention: VS q 15 min x 4 hours, then q 1 hour. Include temperature immediately upon arrival. Check CO/CI q 2-4 hours and PRN  Complete    Intervention: If radial artery used, elevate arm, no BP checks or needle sticks from affected arm, monitor ulnar pulse and capillary refill  Not applicable    Intervention: First post op hour labs and diagnostics per MD order  Complete    Intervention: Serum K q 6 hours x 24 hours.  ABG and CBC prn.  Complete    Intervention: For FSBS greater than 130, start Post Cardiac Surgery Insulin Drip Protocol  Protocol initiated and fiollowed  Intervention: FSBS frequency as per Cardiac Surgery Insulin Drip Protocol  In progress    Intervention: For patients on Beta Blockers: verify dose given prior to surgery or within 6 hours after arrival to the unit  Not applicable    Intervention: Chest tube to 20 cm suction, record CT drainage with VS  Complete    Intervention: For CT drainage > 300 cc in first post op hour and/or 150 cc in subsequent hours: platelets, coag screen, fibrinogen, H&H per order  In progress    Intervention: Titrate and wean off vasoactive drips per patient's condition and per MD order while maintaining SBP  mmHg per MD order  Currently on epi gtt.  Will titrate as blood pressure allows    Intervention: VAP protocol in place  Complete    Intervention: Wean from vent per protocol (see protocol), extubation goal with 2-6 hours post op.  In progress.  Weaning vent as parameters and ABGs allow  Intervention: IS q 1 hour while awake post extubation  Pt not yet extubated    Intervention: Bedrest until extubated and groin lines out  Pt still intubated    Intervention: Out of bed, dangle 4 hours post extubation  To be completed after pt is extubated    Intervention: Up in chair 4 hours, day of extubation  To be completed per protocol after  extubation    Intervention: Maintain all original surgical dressings for 24 hours  Dressings in place    Intervention: Clear liquids post extubation, advance as tolerated  Diet will be ordered after pt extubated    Intervention: Discontinue [place] patel and arterial line 12-18 hours post op if hemodynamically stable and off vasoactive drips  [place] and A-Line in place at this time    Intervention: A-Fib and DVT prophylaxis per MD order or contraindications documented (refer to DVT/VTE problem on Care Plan)  Not applicable    Intervention: Amiodarone protocol per MD order  Not applicable at this time

## 2018-10-17 NOTE — PROGRESS NOTES
Critical Care Progress Note    Date of admission  10/16/2018    Chief Complaint  78 y.o. male admitted 10/16/2018 with severe MR and TR, s/p repair    Hospital Course    78-year-old gentleman currently on full   mechanical ventilatory support, status post valve repair and all information   taken from chart review inside out.  It appears that this gentleman has a   chronic history of severe MR, severe TR, paroxysmal atrial fibrillation, on   anticoagulation, stage III kidney disease, pulmonary hypertension with last   reported RVSP of 45, chronic hypertension and dyslipidemia.  Patient admitted   electively today for valve repair, looking at mitral, tricuspid as well as   aortic valve.  He is status post repair of the mitral and tricuspid valves.    The aortic valve on examination intraoperatively showed only mild aortic   stenosis, minimal debridement performed and patient was transferred to the   intensive care unit.  At the present time, he is on 0.04 epinephrine and on   Precedex, full mechanical ventilatory support, recovering from anesthesia with   pulmonary artery catheter in place as well as mediastinal chest tubes      Interval Problem Update  Reviewed last 24 hour events:  Tm 98.1  +3.5L over last 24hr  Cxr, [place] and ETT in place, no acute findings o/w  Hb 6.9  Plat 145->104  Cr 2.28  Abg 7.52/25/85/98 on 40%    Epi 0.03  Insulin 0.75  precedex 0.06    Pt extubated overnight per protocol and immediately became pale/unresponsive and pulseless  Gentle cpr + epi initiate and pt re-intubated with ROSC within few min.       Review of Systems  Review of Systems   Unable to perform ROS: Acuity of condition        Vital Signs for last 24 hours   Temp:  [35.7 °C (96.3 °F)-36.4 °C (97.5 °F)] 36.2 °C (97.2 °F)  Pulse:  [] 74  Resp:  [12-27] 16  BP: ()/(37-83) 106/49    Hemodynamic parameters for last 24 hours  CVP:  [6 MM HG-12 MM HG] 10 MM HG  PCWP:  [11 MM HG-15 MM HG] 13 MM HG  CO:  [5.4-6.2] 5.6  CI:   [2.7-3.1] 2.8    Vent Settings for last 24 hours  [place] Vent Mode: APVCMV  Rate (breaths/min):  [16-18] 16  PEEP/CPAP:  [8] 8  FiO2:  [] 40  P Peak (PIP):  [17-21] 20  P MEAN:  [9-13] 12    Physical Exam   Physical Exam   Constitutional: He appears well-developed and well-nourished.   HENT:   Head: Normocephalic and atraumatic.   Eyes: Pupils are equal, round, and reactive to light. No scleral icterus.   Neck: No tracheal deviation present.   Cardiovascular: Normal rate and intact distal pulses.    Pulmonary/Chest: He has no wheezes. He has no rales.   Abdominal: Soft. There is no tenderness. There is no rebound and no guarding.   Musculoskeletal: He exhibits no edema.   Neurological: No cranial nerve deficit.   Skin: Skin is warm and dry. He is not diaphoretic.   Psychiatric:   sedate   Nursing note and vitals reviewed.      Medications  Current Facility-Administered Medications   Medication Dose Route Frequency Provider Last Rate Last Dose   • potassium chloride (KCL) ivpb 10 mEq  10 mEq Intravenous Once Amber Escobar A.P.N. 100 mL/hr at 10/17/18 0634 10 mEq at 10/17/18 0634   • atorvastatin (LIPITOR) tablet 20 mg  20 mg Oral Q EVENING Amber Escobar A.P.N.   Stopped at 10/16/18 2015   • Respiratory Care per Protocol   Nebulization Continuous RT Amber Escobar A.P.N.       • NS infusion   Intravenous Continuous Amber Escobar A.P.N. 10 mL/hr at 10/16/18 1400     • aspirin EC (ECOTRIN) tablet 81 mg  81 mg Oral DAILY Amber Escobar A.P.N.   Stopped at 10/17/18 0600   • clevidipine (CLEVIPREX) IV emulsion  1-21 mg/hr Intravenous Continuous Amber Escobar A.P.N.   Stopped at 10/16/18 1345   • nitroglycerin 50 mg in D5W 250 ml infusion  0-100 mcg/min Intravenous Continuous Amber Escobar A.P.N.   Stopped at 10/16/18 1345   • Pharmacy Consult Request ...Pain Management Review 1 Each  1 Each Other PRN YARED Rosenberg.P.N.       • oxyCODONE immediate-release (ROXICODONE) tablet 5 mg  5 mg Oral  Q3HRS PRN Amber Escobar, A.P.N.       • oxyCODONE immediate release (ROXICODONE) tablet 10 mg  10 mg Oral Q3HRS PRN Amber Escobar, A.P.N.       • tramadol (ULTRAM) 50 MG tablet 50 mg  50 mg Oral Q4HRS PRN Amber Escobar, A.P.N.       • midazolam (VERSED) 2 MG/2ML injection 2 mg  2 mg Intravenous Q HOUR PRN Amber Escobar, A.P.N.   2 mg at 10/16/18 2240   • dexmedetomidine (PRECEDEX) 400 mcg/100mL NS premix infusion  0-1.5 mcg/kg/hr Intravenous Continuous Amber Escobar, A.P.N. 11.2 mL/hr at 10/16/18 2244 0.6 mcg/kg/hr at 10/16/18 2244   • sodium bicarbonate 8.4 % injection 50 mEq  50 mEq Intravenous Q HOUR PRN Amber Escobar, A.P.N.       • morphine (pf) 4 mg/ml injection 4 mg  4 mg Intravenous Q HOUR PRN Amber Escobar, A.P.N.   4 mg at 10/17/18 0416   • ondansetron (ZOFRAN) syringe/vial injection 4 mg  4 mg Intravenous Q6HRS PRN Amber Escobar, A.P.N.        Or   • prochlorperazine (COMPAZINE) injection 10 mg  10 mg Intravenous Q6HRS PRN Amber Escobar, A.P.N.        Or   • promethazine (PHENERGAN) suppository 25 mg  25 mg Rectal Q6HRS PRN Amber Escobar, A.P.N.       • acetaminophen (TYLENOL) tablet 650 mg  650 mg Oral Q4HRS PRN Amber Escobar, A.P.N.        Or   • acetaminophen (TYLENOL) suppository 650 mg  650 mg Rectal Q4HRS PRN Amber Escobar, A.P.N.       • senna-docusate (PERICOLACE or SENOKOT S) 8.6-50 MG per tablet 2 Tab  2 Tab Oral BID Amber Escobar A.P.N.   Stopped at 10/16/18 1800    And   • polyethylene glycol/lytes (MIRALAX) PACKET 1 Packet  1 Packet Oral QDAY PRN Amber Escobar, A.P.N.        And   • magnesium hydroxide (MILK OF MAGNESIA) suspension 30 mL  30 mL Oral QDAY PRN Amber Escobar, A.P.N.        And   • bisacodyl (DULCOLAX) suppository 10 mg  10 mg Rectal QDAY PRN Amber Escobar, A.P.N.       • mag hydrox-al hydrox-simeth (MAALOX PLUS ES or MYLANTA DS) suspension 30 mL  30 mL Oral Q4HRS PRN Amber Escobar, A.P.N.       • diphenhydrAMINE (BENADRYL) tablet/capsule 25 mg   25 mg Oral HS PRN - MR X 1 YARED Rosenberg.P.N.       • electrolyte-A (PLASMALYTE-A) infusion   Intravenous PRN JESSIE RosenbergP.NMiriam   1,000 mL at 10/16/18 2252   • Magnesium Sulfate in D5W IVPB premix 1 g  1 g Intravenous QDAY YARED Rosenberg.P.N.   Stopped at 10/16/18 1823   • MD Alert...Warfarin per Pharmacy   Other pharmacy to dose YARED Rosenberg.P.N.       • HYDROcodone-acetaminophen (NORCO) 5-325 MG per tablet 1-2 Tab  1-2 Tab Oral Q4HRS PRN JESSIE RosenbergP.N.       • insulin regular human (HUMULIN/NOVOLIN R) 62.5 Units in  mL infusion per protocol  1-6 Units/hr Intravenous Continuous Jana Deng M.D. 3 mL/hr at 10/17/18 0705 0.75 Units/hr at 10/17/18 0705    And   • insulin regular (HUMULIN R) injection 0-14 Units  0-14 Units Intravenous Once Jana Deng M.D.        And   • insulin regular (HUMULIN R) injection 0-10 Units  0-10 Units Intravenous PRN Jana Deng M.D.   1 Units at 10/16/18 2000    And   • dextrose 50% (D50W) injection 25 mL  25 mL Intravenous PRN Jana Deng M.D.       • insulin lispro (HUMALOG) injection 0-20 Units  0-20 Units Subcutaneous PRN Jana Deng M.D.       • EPINEPHrine 4 mg in  mL Infusion  0-0.2 mcg/kg/min Intravenous Continuous Jana Deng M.D. 8.4 mL/hr at 10/17/18 0636 0.03 mcg/kg/min at 10/17/18 0636   • electrolyte-148 (PLASMALYTE-148) infusion 1,000 mL  1,000 mL Intravenous Once PRN JESSIE OseiPMiriamNMiriam           Fluids    Intake/Output Summary (Last 24 hours) at 10/17/18 0726  Last data filed at 10/17/18 0700   Gross per 24 hour   Intake          8584.54 ml   Output             4353 ml   Net          4231.54 ml       Laboratory  Recent Results (from the past 48 hour(s))   PLATELETS REQUEST    Collection Time: 10/15/18  7:52 AM   Result Value Ref Range    Component P       PTP                 Plts,Pheresis       [id number]   transfused   10/16/18   12:32      Product Type Platelets  Pheresis LR     Dispense Status  Transfused     Unit Number (Barcoded) [id number]     Product Code (Barcoded) H4502H73     Blood Type (Barcoded) 9500    COD (Adult)    Collection Time: 10/15/18  9:56 AM   Result Value Ref Range    ABO Grouping Only O     Rh Grouping Only POS     Antibody Screen-Cod NEG     Component R       R33                 Red Blood Cells     [id number]   transfused   10/16/18   10:34      Product Type Red Blood Cells LR Pheresis     Dispense Status Transfused     Unit Number (Barcoded) [id number]     Product Code (Barcoded) F6453D50     Blood Type (Barcoded) 9500     Component R       R3                  Red Blood Cells3    [id number]   transfused   10/16/18   12:00      Product Type Red Blood Cells LR Pheresis     Dispense Status Transfused     Unit Number (Barcoded) [id number]     Product Code (Barcoded) V6287T85     Blood Type (Barcoded) 5100     Component R       R7                  Red Blood Cells7    [id number]   issued       10/17/18   05:42      Product Type Red Blood Cells LR Pheresis     Dispense Status Issued     Unit Number (Barcoded) [id number]     Product Code (Barcoded) Y2371Q54     Blood Type (Barcoded) 5100     Component R       R3                  Red Blood Cells3    [id number]   issued       10/17/18   06:33      Product Type Red Blood Cells LR Pheresis     Dispense Status Issued     Unit Number (Barcoded) [id number]     Product Code (Barcoded) H0962C91     Blood Type (Barcoded) 5100    Comp Metabolic Panel (CMP)    Collection Time: 10/15/18 10:02 AM   Result Value Ref Range    Sodium 136 135 - 145 mmol/L    Potassium 4.3 3.6 - 5.5 mmol/L    Chloride 98 96 - 112 mmol/L    Co2 25 20 - 33 mmol/L    Anion Gap 13.0 (H) 0.0 - 11.9    Glucose 124 (H) 65 - 99 mg/dL    Bun 76 (HH) 8 - 22 mg/dL    Creatinine 2.29 (H) 0.50 - 1.40 mg/dL    Calcium 8.6 8.4 - 10.2 mg/dL    AST(SGOT) 19 12 - 45 U/L    ALT(SGPT) 22 2 - 50 U/L    Alkaline Phosphatase 205 (H) 30 - 99 U/L    Total Bilirubin 1.6 (H)  0.1 - 1.5 mg/dL    Albumin 3.8 3.2 - 4.9 g/dL    Total Protein 6.4 6.0 - 8.2 g/dL    Globulin 2.6 1.9 - 3.5 g/dL    A-G Ratio 1.5 g/dL   CBC with Differential    Collection Time: 10/15/18 10:02 AM   Result Value Ref Range    WBC 5.3 4.8 - 10.8 K/uL    RBC 3.05 (L) 4.70 - 6.10 M/uL    Hemoglobin 10.6 (L) 14.0 - 18.0 g/dL    Hematocrit 32.5 (L) 42.0 - 52.0 %    .6 (H) 81.4 - 97.8 fL    MCH 34.8 (H) 27.0 - 33.0 pg    MCHC 32.6 (L) 33.7 - 35.3 g/dL    RDW 61.1 (H) 35.9 - 50.0 fL    Platelet Count 125 (L) 164 - 446 K/uL    MPV 10.9 9.0 - 12.9 fL    Neutrophils-Polys 81.90 (H) 44.00 - 72.00 %    Lymphocytes 10.40 (L) 22.00 - 41.00 %    Monocytes 6.20 0.00 - 13.40 %    Eosinophils 0.90 0.00 - 6.90 %    Basophils 0.40 0.00 - 1.80 %    Immature Granulocytes 0.20 0.00 - 0.90 %    Nucleated RBC 0.00 /100 WBC    Neutrophils (Absolute) 4.33 1.82 - 7.42 K/uL    Lymphs (Absolute) 0.55 (L) 1.00 - 4.80 K/uL    Monos (Absolute) 0.33 0.00 - 0.85 K/uL    Eos (Absolute) 0.05 0.00 - 0.51 K/uL    Baso (Absolute) 0.02 0.00 - 0.12 K/uL    Immature Granulocytes (abs) 0.01 0.00 - 0.11 K/uL    NRBC (Absolute) 0.00 K/uL   Hemoglobin  A1c    Collection Time: 10/15/18 10:02 AM   Result Value Ref Range    Glycohemoglobin 6.6 (H) 0.0 - 5.6 %    Est Avg Glucose 143 mg/dL   Prothrombin time (INR)    Collection Time: 10/15/18 10:02 AM   Result Value Ref Range    PT 16.7 (H) 12.0 - 14.6 sec    INR 1.34 (H) 0.87 - 1.13   APTT (PTT)    Collection Time: 10/15/18 10:02 AM   Result Value Ref Range    APTT 33.8 24.7 - 36.0 sec   ESTIMATED GFR    Collection Time: 10/15/18 10:02 AM   Result Value Ref Range    GFR If  34 (A) >60 mL/min/1.73 m 2    GFR If Non  28 (A) >60 mL/min/1.73 m 2   EKG    Collection Time: 10/15/18 10:05 AM   Result Value Ref Range    Report       [facility]    Test Date:  2018-10-15  Pt Name:    JOSSE IBRAHIM                 Department: [facility]  MRN:        [id number]                       Room:  Gender:     Male                         Technician: [facility]  :        1940                   Requested By:JONATHON STANLEY  Order #:    [id number]                    Reading MD: Digna Coreas MD    Measurements  Intervals                                Axis  Rate:       81                           P:  ME:                                      QRS:        -32  QRSD:       114                          T:          -3  QT:         444  QTc:        516    Interpretive Statements  ATRIAL FIBRILLATION, V-RATE  72- 89  VENTRICULAR PREMATURE COMPLEX  INCOMPLETE RIGHT BUNDLE BRANCH BLOCK  POSSIBLE ANTERIOR INFARCT, AGE INDETERMINATE  Compared to ECG 2018 09:57:30  Myocardial infarct finding now present    Electronically Signed On 10- 16:21:50 PDT by Digna Coreas MD     URINALYSIS    Collection Time: 10/15/18 10:50 AM   Result Value Ref Range    Color Yellow     Character Clear     Specific Gravity 1.009 <1.035    Ph 5.5 5.0 - 8.0    Glucose Negative Negative mg/dL    Ketones Negative Negative mg/dL    Protein Negative Negative mg/dL    Bilirubin Negative Negative    Urobilinogen, Urine 0.2 Negative    Nitrite Negative Negative    Leukocyte Esterase Negative Negative    Occult Blood Negative Negative    Micro Urine Req see below    ACCU-CHEK GLUCOSE    Collection Time: 10/16/18  6:19 AM   Result Value Ref Range    Glucose - Accu-Ck 127 (H) 65 - 99 mg/dL   ABO AND RH CONFIRMATION    Collection Time: 10/16/18  6:20 AM   Result Value Ref Range    ABO Confirm O     Second Rh Group POS    ISTAT ARTERIAL BLOOD GAS    Collection Time: 10/16/18  8:19 AM   Result Value Ref Range    Ph 7.454 7.400 - 7.500    Pco2 37.5 (H) 26.0 - 37.0 mmHg    Po2 392 (H) 64 - 87 mmHg    Tco2 27 20 - 33 mmol/L    S02 100 (H) 93 - 99 %    Hco3 26.3 (H) 17.0 - 25.0 mmol/L    BE 2 -4 - 3 mmol/L    Body Temp 37.0 C degrees    Ph Temp Kim 7.454 7.400 - 7.500    Pco2 Temp Co 37.5 (H) 26.0 - 37.0 mmHg    Po2 Temp Cor 392 (H) 64 - 87 mmHg     Specimen Arterial     Action Range Triggered NO     Inst. Qualified Patient YES    ISTAT GLUCOSE    Collection Time: 10/16/18  8:19 AM   Result Value Ref Range    Istat Glucose 133 (H) 65 - 99 mg/dL   ISTAT SODIUM    Collection Time: 10/16/18  8:19 AM   Result Value Ref Range    Istat Sodium 140 135 - 145 mmol/L   ISTAT POTASSIUM    Collection Time: 10/16/18  8:19 AM   Result Value Ref Range    Istat Potassium 3.5 (L) 3.6 - 5.5 mmol/L   ISTAT IONIZED CA    Collection Time: 10/16/18  8:19 AM   Result Value Ref Range    Istat Ionized Calcium 1.06 (L) 1.10 - 1.30 mmol/L   ISTAT HEMATOCRIT AND HEMOGLOBIN    Collection Time: 10/16/18  8:19 AM   Result Value Ref Range    Istat Hematocrit 30 (L) 42 - 52 %    Istat Hemoglobin 10.2 (L) 14.0 - 18.0 g/dL   ISTAT VENOUS BLOOD GAS    Collection Time: 10/16/18 10:05 AM   Result Value Ref Range    Ph 7.405 7.310 - 7.450    Pco2 41.0 41.0 - 51.0 mmHg    Po2 46 (H) 25 - 40 mmHg    Tco2 27 20 - 33 mmol/L    SO2 82 %    Hco3 25.7 24.0 - 28.0 mmol/L    BE 1 -4 - 3 mmol/L    Body Temp 37.0 C degrees    O2 Therapy 80 %    iPF Ratio 58     Ph Temp Correc 7.405 7.310 - 7.450    Pco2 Temp Kim 41.0 41.0 - 51.0 mmHg    Po2 Temp Corre 46 (H) 25 - 40 mmHg    Specimen Venous     Action Range Triggered YES     Inst. Qualified Patient YES    ISTAT GLUCOSE    Collection Time: 10/16/18 10:05 AM   Result Value Ref Range    Istat Glucose 138 (H) 65 - 99 mg/dL   ISTAT SODIUM    Collection Time: 10/16/18 10:05 AM   Result Value Ref Range    Istat Sodium 139 135 - 145 mmol/L   ISTAT POTASSIUM    Collection Time: 10/16/18 10:05 AM   Result Value Ref Range    Istat Potassium 3.8 3.6 - 5.5 mmol/L   ISTAT IONIZED CA    Collection Time: 10/16/18 10:05 AM   Result Value Ref Range    Istat Ionized Calcium 0.94 (L) 1.10 - 1.30 mmol/L   ISTAT HEMATOCRIT AND HEMOGLOBIN    Collection Time: 10/16/18 10:05 AM   Result Value Ref Range    Istat Hematocrit 20 (L) 42 - 52 %    Istat Hemoglobin 6.8 (L) 14.0 - 18.0 g/dL    ISTAT ARTERIAL BLOOD GAS    Collection Time: 10/16/18 10:40 AM   Result Value Ref Range    Ph 7.450 7.400 - 7.500    Pco2 39.8 (H) 26.0 - 37.0 mmHg    Po2 348 (H) 64 - 87 mmHg    Tco2 29 20 - 33 mmol/L    S02 100 (H) 93 - 99 %    Hco3 27.6 (H) 17.0 - 25.0 mmol/L    BE 3 -4 - 3 mmol/L    Body Temp 37.0 C degrees    O2 Therapy 75 %    iPF Ratio 464     Ph Temp Kim 7.450 7.400 - 7.500    Pco2 Temp Co 39.8 (H) 26.0 - 37.0 mmHg    Po2 Temp Cor 348 (H) 64 - 87 mmHg    Specimen Arterial     Action Range Triggered NO     Inst. Qualified Patient YES    ISTAT GLUCOSE    Collection Time: 10/16/18 10:40 AM   Result Value Ref Range    Istat Glucose 162 (H) 65 - 99 mg/dL   ISTAT SODIUM    Collection Time: 10/16/18 10:40 AM   Result Value Ref Range    Istat Sodium 138 135 - 145 mmol/L   ISTAT POTASSIUM    Collection Time: 10/16/18 10:40 AM   Result Value Ref Range    Istat Potassium 3.9 3.6 - 5.5 mmol/L   ISTAT IONIZED CA    Collection Time: 10/16/18 10:40 AM   Result Value Ref Range    Istat Ionized Calcium 0.94 (L) 1.10 - 1.30 mmol/L   ISTAT HEMATOCRIT AND HEMOGLOBIN    Collection Time: 10/16/18 10:40 AM   Result Value Ref Range    Istat Hematocrit 20 (L) 42 - 52 %    Istat Hemoglobin 6.8 (L) 14.0 - 18.0 g/dL   ISTAT ARTERIAL BLOOD GAS    Collection Time: 10/16/18 11:18 AM   Result Value Ref Range    Ph 7.322 (L) 7.400 - 7.500    Pco2 52.3 (HH) 26.0 - 37.0 mmHg    Po2 327 (H) 64 - 87 mmHg    Tco2 29 20 - 33 mmol/L    S02 100 (H) 93 - 99 %    Hco3 27.1 (H) 17.0 - 25.0 mmol/L    BE 1 -4 - 3 mmol/L    Body Temp 37.0 C degrees    O2 Therapy 75 %    iPF Ratio 436     Ph Temp Kim 7.322 (L) 7.400 - 7.500    Pco2 Temp Co 52.3 (HH) 26.0 - 37.0 mmHg    Po2 Temp Cor 327 (H) 64 - 87 mmHg    Specimen Arterial     Action Range Triggered YES     Inst. Qualified Patient YES    ISTAT GLUCOSE    Collection Time: 10/16/18 11:18 AM   Result Value Ref Range    Istat Glucose 178 (H) 65 - 99 mg/dL   ISTAT SODIUM    Collection Time: 10/16/18 11:18  AM   Result Value Ref Range    Istat Sodium 137 135 - 145 mmol/L   ISTAT POTASSIUM    Collection Time: 10/16/18 11:18 AM   Result Value Ref Range    Istat Potassium 3.9 3.6 - 5.5 mmol/L   ISTAT IONIZED CA    Collection Time: 10/16/18 11:18 AM   Result Value Ref Range    Istat Ionized Calcium 0.94 (L) 1.10 - 1.30 mmol/L   ISTAT HEMATOCRIT AND HEMOGLOBIN    Collection Time: 10/16/18 11:18 AM   Result Value Ref Range    Istat Hematocrit 21 (L) 42 - 52 %    Istat Hemoglobin 7.1 (L) 14.0 - 18.0 g/dL   ISTAT ARTERIAL BLOOD GAS    Collection Time: 10/16/18 11:53 AM   Result Value Ref Range    Ph 7.406 7.400 - 7.500    Pco2 40.1 (H) 26.0 - 37.0 mmHg    Po2 316 (H) 64 - 87 mmHg    Tco2 26 20 - 33 mmol/L    S02 100 (H) 93 - 99 %    Hco3 25.2 (H) 17.0 - 25.0 mmol/L    BE 0 -4 - 3 mmol/L    Body Temp 37.0 C degrees    O2 Therapy 85 %    iPF Ratio 372     Ph Temp Kim 7.406 7.400 - 7.500    Pco2 Temp Co 40.1 (H) 26.0 - 37.0 mmHg    Po2 Temp Cor 316 (H) 64 - 87 mmHg    Specimen Arterial     Action Range Triggered NO     Inst. Qualified Patient YES    ISTAT GLUCOSE    Collection Time: 10/16/18 11:53 AM   Result Value Ref Range    Istat Glucose 172 (H) 65 - 99 mg/dL   ISTAT SODIUM    Collection Time: 10/16/18 11:53 AM   Result Value Ref Range    Istat Sodium 138 135 - 145 mmol/L   ISTAT POTASSIUM    Collection Time: 10/16/18 11:53 AM   Result Value Ref Range    Istat Potassium 4.6 3.6 - 5.5 mmol/L   ISTAT IONIZED CA    Collection Time: 10/16/18 11:53 AM   Result Value Ref Range    Istat Ionized Calcium 0.96 (L) 1.10 - 1.30 mmol/L   ISTAT HEMATOCRIT AND HEMOGLOBIN    Collection Time: 10/16/18 11:53 AM   Result Value Ref Range    Istat Hematocrit 20 (L) 42 - 52 %    Istat Hemoglobin 6.8 (L) 14.0 - 18.0 g/dL   CRYOPRECIPITATE    Collection Time: 10/16/18 12:46 PM   Result Value Ref Range    Component Ct       CT5                 Cryo,5Unit-Pool     [id number]   transfused   10/16/18   13:02      Product Type Cryoprecipitated AHF  [address] [place] [address]     Dispense Status Transfused     Unit Number (Barcoded) [id number]     Product Code (Barcoded) C0125S29     Blood Type (Barcoded) 7300    ISTAT ARTERIAL BLOOD GAS    Collection Time: 10/16/18 12:49 PM   Result Value Ref Range    Ph 7.430 7.400 - 7.500    Pco2 37.7 (H) 26.0 - 37.0 mmHg    Po2 273 (H) 64 - 87 mmHg    Tco2 26 20 - 33 mmol/L    S02 100 (H) 93 - 99 %    Hco3 25.0 17.0 - 25.0 mmol/L    BE 1 -4 - 3 mmol/L    Body Temp 37.0 C degrees    O2 Therapy 100 %    iPF Ratio 273     Ph Temp Kim 7.430 7.400 - 7.500    Pco2 Temp Co 37.7 (H) 26.0 - 37.0 mmHg    Po2 Temp Cor 273 (H) 64 - 87 mmHg    Specimen Arterial     Action Range Triggered NO     Inst. Qualified Patient YES    ISTAT GLUCOSE    Collection Time: 10/16/18 12:49 PM   Result Value Ref Range    Istat Glucose 149 (H) 65 - 99 mg/dL   ISTAT SODIUM    Collection Time: 10/16/18 12:49 PM   Result Value Ref Range    Istat Sodium 142 135 - 145 mmol/L   ISTAT POTASSIUM    Collection Time: 10/16/18 12:49 PM   Result Value Ref Range    Istat Potassium 3.8 3.6 - 5.5 mmol/L   ISTAT IONIZED CA    Collection Time: 10/16/18 12:49 PM   Result Value Ref Range    Istat Ionized Calcium 1.02 (L) 1.10 - 1.30 mmol/L   ISTAT HEMATOCRIT AND HEMOGLOBIN    Collection Time: 10/16/18 12:49 PM   Result Value Ref Range    Istat Hematocrit 25 (L) 42 - 52 %    Istat Hemoglobin 8.5 (L) 14.0 - 18.0 g/dL   EKG on arrival to [facility]    Collection Time: 10/16/18  1:30 PM   Result Value Ref Range    Report       [facility]    Test Date:  2018-10-16  Pt Name:    JOSSE IBRAHIM                 Department: 161  MRN:        [id number]                      Room:       [facility]  Gender:     Male                         Technician: [facility]  :        1940                   Requested By:JOHNATHON CASILLAS  Order #:    [id number]                    Van MD: Dain Malave MD    Measurements  Intervals                                Axis  Rate:       93                           P:           70  TN:         196                          QRS:        -49  QRSD:       98                           T:          30  QT:         404  QTc:        503    Interpretive Statements  SINUS RHYTHM  ATRIAL PREMATURE COMPLEXES  LOW VOLTAGE IN FRONTAL LEADS  CONSIDER RIGHT VENTRICULAR HYPERTROPHY  CONSIDER INFERIOR INFARCT  PROLONGED QT INTERVAL      Electronically Signed On 10- 16:29:16 PDT by Dain Malave MD     ACCU-CHEK GLUCOSE    Collection Time: 10/16/18  1:40 PM   Result Value Ref Range    Glucose - Accu-Ck 128 (H) 65 - 99 mg/dL   Platelet count    Collection Time: 10/16/18  1:42 PM   Result Value Ref Range    Platelet Count 139 (L) 164 - 446 K/uL   Magnesium    Collection Time: 10/16/18  1:42 PM   Result Value Ref Range    Magnesium 2.1 1.5 - 2.5 mg/dL   Prothrombin time (INR)    Collection Time: 10/16/18  1:42 PM   Result Value Ref Range    PT 15.6 (H) 12.0 - 14.6 sec    INR 1.22 (H) 0.87 - 1.13   APTT (PTT)    Collection Time: 10/16/18  1:42 PM   Result Value Ref Range    APTT 37.8 (H) 24.7 - 36.0 sec   POTASSIUM SERUM (K)    Collection Time: 10/16/18  1:42 PM   Result Value Ref Range    Potassium 3.9 3.6 - 5.5 mmol/L   ISTAT ARTERIAL BLOOD GAS    Collection Time: 10/16/18  1:49 PM   Result Value Ref Range    Ph 7.487 7.400 - 7.500    Pco2 28.7 26.0 - 37.0 mmHg    Po2 154 (H) 64 - 87 mmHg    Tco2 23 20 - 33 mmol/L    S02 100 (H) 93 - 99 %    Hco3 21.7 17.0 - 25.0 mmol/L    BE -1 -4 - 3 mmol/L    Body Temp 36.1 C degrees    O2 Therapy 100 %    iPF Ratio 154     Ph Temp Kim 7.501 (H) 7.400 - 7.500    Pco2 Temp Co 27.5 26.0 - 37.0 mmHg    Po2 Temp Cor 149 (H) 64 - 87 mmHg    Specimen Arterial     Action Range Triggered NO     Inst. Qualified Patient YES    ISTAT SODIUM    Collection Time: 10/16/18  1:49 PM   Result Value Ref Range    Istat Sodium 140 135 - 145 mmol/L   ISTAT POTASSIUM    Collection Time: 10/16/18  1:49 PM   Result Value Ref Range    Istat Potassium 3.6 3.6 - 5.5 mmol/L   [facility]  IONIZED CA    Collection Time: 10/16/18  1:49 PM   Result Value Ref Range    Istat Ionized Calcium 0.99 (L) 1.10 - 1.30 mmol/L   [facility] HEMATOCRIT AND HEMOGLOBIN    Collection Time: 10/16/18  1:49 PM   Result Value Ref Range    Istat Hematocrit 28 (L) 42 - 52 %    Istat Hemoglobin 9.5 (L) 14.0 - 18.0 g/dL   HISTOLOGY REQUEST    Collection Time: 10/16/18  2:10 PM   Result Value Ref Range    Pathology Request Sent to [facility]    ACCU-CHEK GLUCOSE    Collection Time: 10/16/18  2:31 PM   Result Value Ref Range    Glucose - Accu-Ck 147 (H) 65 - 99 mg/dL   [facility] ARTERIAL BLOOD GAS    Collection Time: 10/16/18  2:40 PM   Result Value Ref Range    Ph 7.511 (H) 7.400 - 7.500    Pco2 27.5 26.0 - 37.0 mmHg    Po2 108 (H) 64 - 87 mmHg    Tco2 23 20 - 33 mmol/L    S02 99 93 - 99 %    Hco3 22.0 17.0 - 25.0 mmol/L    BE 0 -4 - 3 mmol/L    Body Temp 35.7 C degrees    O2 Therapy 70 %    iPF Ratio 154     Ph Temp Kim 7.531 (H) 7.400 - 7.500    Pco2 Temp Co 26.0 26.0 - 37.0 mmHg    Po2 Temp Cor 101 (H) 64 - 87 mmHg    Specimen Arterial     Action Range Triggered NO     Inst. Qualified Patient YES    ACCU-CHEK GLUCOSE    Collection Time: 10/16/18  3:40 PM   Result Value Ref Range    Glucose - Accu-Ck 144 (H) 65 - 99 mg/dL   ISTAT ARTERIAL BLOOD GAS    Collection Time: 10/16/18  3:42 PM   Result Value Ref Range    Ph 7.512 (H) 7.400 - 7.500    Pco2 24.9 (L) 26.0 - 37.0 mmHg    Po2 76 64 - 87 mmHg    Tco2 21 20 - 33 mmol/L    S02 97 93 - 99 %    Hco3 20.0 17.0 - 25.0 mmol/L    BE -3 -4 - 3 mmol/L    Body Temp 35.8 C degrees    O2 Therapy 50 %    iPF Ratio 152     Ph Temp Kim 7.531 (H) 7.400 - 7.500    Pco2 Temp Co 23.7 (L) 26.0 - 37.0 mmHg    Po2 Temp Cor 70 64 - 87 mmHg    Specimen Arterial     Action Range Triggered NO     Inst. Qualified Patient YES    ACCU-CHEK GLUCOSE    Collection Time: 10/16/18  4:39 PM   Result Value Ref Range    Glucose - Accu-Ck 121 (H) 65 - 99 mg/dL   ISTAT ARTERIAL BLOOD GAS    Collection Time: 10/16/18  4:45  PM   Result Value Ref Range    Ph 7.477 7.400 - 7.500    Pco2 29.7 26.0 - 37.0 mmHg    Po2 86 64 - 87 mmHg    Tco2 23 20 - 33 mmol/L    S02 97 93 - 99 %    Hco3 22.0 17.0 - 25.0 mmol/L    BE -1 -4 - 3 mmol/L    Body Temp 36.2 C degrees    O2 Therapy 50 %    iPF Ratio 172     Ph Temp Kim 7.489 7.400 - 7.500    Pco2 Temp Co 28.7 26.0 - 37.0 mmHg    Po2 Temp Cor 82 64 - 87 mmHg    Specimen Arterial     Action Range Triggered NO     Inst. Qualified Patient YES    ACCU-CHEK GLUCOSE    Collection Time: 10/16/18  5:46 PM   Result Value Ref Range    Glucose - Accu-Ck 136 (H) 65 - 99 mg/dL   ISTAT ARTERIAL BLOOD GAS    Collection Time: 10/16/18  5:52 PM   Result Value Ref Range    Ph 7.440 7.400 - 7.500    Pco2 33.2 26.0 - 37.0 mmHg    Po2 87 64 - 87 mmHg    Tco2 24 20 - 33 mmol/L    S02 97 93 - 99 %    Hco3 22.6 17.0 - 25.0 mmol/L    BE -1 -4 - 3 mmol/L    Body Temp 36.3 C degrees    O2 Therapy 40 %    iPF Ratio 218     Ph Temp Kim 7.451 7.400 - 7.500    Pco2 Temp Co 32.2 26.0 - 37.0 mmHg    Po2 Temp Cor 83 64 - 87 mmHg    Specimen Arterial     Action Range Triggered NO     Inst. Qualified Patient YES    ACCU-CHEK GLUCOSE    Collection Time: 10/16/18  6:37 PM   Result Value Ref Range    Glucose - Accu-Ck 126 (H) 65 - 99 mg/dL   CBC WITHOUT DIFFERENTIAL    Collection Time: 10/16/18  7:12 PM   Result Value Ref Range    WBC 11.7 (H) 4.8 - 10.8 K/uL    RBC 2.37 (L) 4.70 - 6.10 M/uL    Hemoglobin 8.3 (L) 14.0 - 18.0 g/dL    Hematocrit 24.8 (L) 42.0 - 52.0 %    .1 (H) 81.4 - 97.8 fL    MCH 35.4 (H) 27.0 - 33.0 pg    MCHC 33.7 33.7 - 35.3 g/dL    RDW 67.7 (H) 35.9 - 50.0 fL    Platelet Count 145 (L) 164 - 446 K/uL    MPV 11.2 9.0 - 12.9 fL   COMP METABOLIC PANEL    Collection Time: 10/16/18  7:12 PM   Result Value Ref Range    Sodium 139 135 - 145 mmol/L    Potassium 5.2 3.6 - 5.5 mmol/L    Chloride 106 96 - 112 mmol/L    Co2 18 (L) 20 - 33 mmol/L    Anion Gap 15.0 (H) 0.0 - 11.9    Glucose 165 (H) 65 - 99 mg/dL    Bun 76  (HH) 8 - 22 mg/dL    Creatinine 2.17 (H) 0.50 - 1.40 mg/dL    Calcium 7.3 (L) 8.5 - 10.5 mg/dL    AST(SGOT) 50 (H) 12 - 45 U/L    ALT(SGPT) 28 2 - 50 U/L    Alkaline Phosphatase 102 (H) 30 - 99 U/L    Total Bilirubin 1.7 (H) 0.1 - 1.5 mg/dL    Albumin 3.1 (L) 3.2 - 4.9 g/dL    Total Protein 4.7 (L) 6.0 - 8.2 g/dL    Globulin 1.6 (L) 1.9 - 3.5 g/dL    A-G Ratio 1.9 g/dL   MAGNESIUM    Collection Time: 10/16/18  7:12 PM   Result Value Ref Range    Magnesium 2.8 (H) 1.5 - 2.5 mg/dL   PHOSPHORUS    Collection Time: 10/16/18  7:12 PM   Result Value Ref Range    Phosphorus 3.8 2.5 - 4.5 mg/dL   APTT    Collection Time: 10/16/18  7:12 PM   Result Value Ref Range    APTT 37.3 (H) 24.7 - 36.0 sec   PROTHROMBIN TIME    Collection Time: 10/16/18  7:12 PM   Result Value Ref Range    PT 18.0 (H) 12.0 - 14.6 sec    INR 1.48 (H) 0.87 - 1.13   ESTIMATED GFR    Collection Time: 10/16/18  7:12 PM   Result Value Ref Range    GFR If  36 (A) >60 mL/min/1.73 m 2    GFR If Non African American 30 (A) >60 mL/min/1.73 m 2   EKG    Collection Time: 10/16/18  7:20 PM   Result Value Ref Range    Report       [facility]    Test Date:  2018-10-16  Pt Name:    JOSSE IBRAHIM                 Department: 161  MRN:        [id number]                      Room:       T623  Gender:     Male                         Technician: CRYSTAL  :        1940                   Requested By:NATALIIA ORR  Order #:    [id number]                    Reading MD: Rogelio Bran MD    Measurements  Intervals                                Axis  Rate:       108                          P:          0  IL:         165                          QRS:        -12  QRSD:       98                           T:          41  QT:         360  QTc:        483    Interpretive Statements  SINUS TACHYCARDIA  LOW VOLTAGE THROUGHOUT  BORDERLINE T WAVE ABNORMALITIES  BORDERLINE PROLONGED QT INTERVAL  Compared to ECG 10/16/2018 13:30:45Sinus rhythm no longer  present  Atrial premature complex(es) no longer present    Electronically Signed On 10- 6:14:09 PDT by Rogelio Bran MD     ACCU-CHEK GLUCOSE    Collection Time: 10/16/18  7:44 PM   Result Value Ref Range    Glucose - Accu-Ck 141 (H) 65 - 99 mg/dL   ISTAT ARTERIAL BLOOD GAS    Collection Time: 10/16/18  7:45 PM   Result Value Ref Range    Ph 7.397 (L) 7.400 - 7.500    Pco2 35.6 26.0 - 37.0 mmHg    Po2 293 (H) 64 - 87 mmHg    Tco2 23 20 - 33 mmol/L    S02 100 (H) 93 - 99 %    Hco3 21.9 17.0 - 25.0 mmol/L    BE -3 -4 - 3 mmol/L    Body Temp 36.3 C degrees    O2 Therapy 100 %    iPF Ratio 293     Ph Temp Kim 7.407 7.400 - 7.500    Pco2 Temp Co 34.5 26.0 - 37.0 mmHg    Po2 Temp Cor 290 (H) 64 - 87 mmHg    Specimen Arterial     Action Range Triggered NO     Inst. Qualified Patient YES    EC-ECHOCARDIOGRAM LTD W/ CONT    Collection Time: 10/16/18  8:43 PM   Result Value Ref Range    Eject.Frac. MOD BP 52.06     Eject.Frac. MOD 4C 41.08     Eject.Frac. MOD 2C 62.68     Left Ventrical Ejection Fraction 55    ACCU-CHEK GLUCOSE    Collection Time: 10/16/18  8:59 PM   Result Value Ref Range    Glucose - Accu-Ck 109 (H) 65 - 99 mg/dL   ACCU-CHEK GLUCOSE    Collection Time: 10/16/18  9:59 PM   Result Value Ref Range    Glucose - Accu-Ck 96 65 - 99 mg/dL   ACCU-CHEK GLUCOSE    Collection Time: 10/16/18 10:59 PM   Result Value Ref Range    Glucose - Accu-Ck 100 (H) 65 - 99 mg/dL   Potassium Serum (K)    Collection Time: 10/16/18 11:00 PM   Result Value Ref Range    Potassium 3.8 3.6 - 5.5 mmol/L   PHOSPHORUS    Collection Time: 10/16/18 11:00 PM   Result Value Ref Range    Phosphorus 3.7 2.5 - 4.5 mg/dL   ACCU-CHEK GLUCOSE    Collection Time: 10/17/18 12:59 AM   Result Value Ref Range    Glucose - Accu-Ck 72 65 - 99 mg/dL   ACCU-CHEK GLUCOSE    Collection Time: 10/17/18  2:02 AM   Result Value Ref Range    Glucose - Accu-Ck 75 65 - 99 mg/dL   ACCU-CHEK GLUCOSE    Collection Time: 10/17/18  3:19 AM   Result Value Ref  Range    Glucose - Accu-Ck 80 65 - 99 mg/dL   ACCU-CHEK GLUCOSE    Collection Time: 10/17/18  4:07 AM   Result Value Ref Range    Glucose - Accu-Ck 88 65 - 99 mg/dL   ISTAT ARTERIAL BLOOD GAS    Collection Time: 10/17/18  4:20 AM   Result Value Ref Range    Ph 7.523 (H) 7.400 - 7.500    Pco2 25.7 (L) 26.0 - 37.0 mmHg    Po2 85 64 - 87 mmHg    Tco2 22 20 - 33 mmol/L    S02 98 93 - 99 %    Hco3 21.1 17.0 - 25.0 mmol/L    BE -1 -4 - 3 mmol/L    Body Temp 36.6 C degrees    O2 Therapy 40 %    iPF Ratio 213     Ph Temp Kim 7.529 (H) 7.400 - 7.500    Pco2 Temp Co 25.3 (L) 26.0 - 37.0 mmHg    Po2 Temp Cor 83 64 - 87 mmHg    Specimen Arterial     Action Range Triggered NO     Inst. Qualified Patient YES    CBC without Differential Critical Care 0130    Collection Time: 10/17/18  5:00 AM   Result Value Ref Range    WBC 6.7 4.8 - 10.8 K/uL    RBC 2.01 (L) 4.70 - 6.10 M/uL    Hemoglobin 6.9 (L) 14.0 - 18.0 g/dL    Hematocrit 20.6 (L) 42.0 - 52.0 %    .5 (H) 81.4 - 97.8 fL    MCH 34.3 (H) 27.0 - 33.0 pg    MCHC 33.5 (L) 33.7 - 35.3 g/dL    RDW 63.9 (H) 35.9 - 50.0 fL    Platelet Count 104 (L) 164 - 446 K/uL    MPV 10.4 9.0 - 12.9 fL   Basic Metabolic Panel (BMP) Critical Care 0130    Collection Time: 10/17/18  5:00 AM   Result Value Ref Range    Sodium 142 135 - 145 mmol/L    Potassium 4.2 3.6 - 5.5 mmol/L    Chloride 106 96 - 112 mmol/L    Co2 21 20 - 33 mmol/L    Glucose 94 65 - 99 mg/dL    Bun 78 (HH) 8 - 22 mg/dL    Creatinine 2.28 (H) 0.50 - 1.40 mg/dL    Calcium 7.9 (L) 8.5 - 10.5 mg/dL    Anion Gap 15.0 (H) 0.0 - 11.9   ESTIMATED GFR    Collection Time: 10/17/18  5:00 AM   Result Value Ref Range    GFR If  34 (A) >60 mL/min/1.73 m 2    GFR If Non  28 (A) >60 mL/min/1.73 m 2   ACCU-CHEK GLUCOSE    Collection Time: 10/17/18  5:05 AM   Result Value Ref Range    Glucose - Accu-Ck 93 65 - 99 mg/dL   EKG in the AM Post Op Day #1 (Specify Time)    Collection Time: 10/17/18  5:29 AM    Result Value Ref Range    Report       [facility]    Test Date:  2018-10-17  Pt Name:    JOSSE IBRAHIM                 Department: 161  MRN:        [id number]                      Room:       T623  Gender:     Male                         Technician: AMY  :        1940                   Requested By:JOHNATHON CASILLAS  Order #:    [id number]                    Reading MD:    Measurements  Intervals                                Axis  Rate:       82                           P:  AL:                                      QRS:        -11  QRSD:       69                           T:  QT:         574  QTc:        671    Interpretive Statements  ACCELERATED JUNCTIONAL RHYTHM  LOW VOLTAGE, EXTREMITY AND PRECORDIAL LEADS  PROBABLE ANTEROSEPTAL INFARCT, OLD  PROLONGED QT INTERVAL  Compared to ECG 10/16/2018 19:20:54  Accelerated junctional rhythm now present  Myocardial infarct finding now present  Sinus tachycardia no longer present  T-wave abnormality no longer present         Imaging      Assessment/Plan  * Acute respiratory failure with hypoxia (HCC)   Assessment & Plan    Intubated 10/16; went to PEA arrest approx 1 min post extubation and required emergent intubation  Continue full vent support  I am adjusting vent based on abg/pulm mechanics  / protocols  On precedex   Keep dry volume balance        S/P TVR (tricuspid valve repair)   Assessment & Plan    S/P repair 10/16  Continue post heart protocols        S/P MVR (mitral valve repair)   Assessment & Plan    S/P repair 10/16  Continue post heart protocols        Paroxysmal atrial fibrillation (HCC)- (present on admission)   Assessment & Plan    Currently in sinus  Keep K > 4 and Mg > 2  Monitor for need of amio        Essential hypertension, benign- (present on admission)   Assessment & Plan    Currently on epi  Resume when clinically appropriate        Acute blood loss anemia   Assessment & Plan    Hb 6.9  Transfuse 1u prbcs  F/u chest tube op, no  other obvious source of bleed clinically        CKD (chronic kidney disease) stage 3, GFR 30-59 ml/min (HCC)- (present on admission)   Assessment & Plan    Renally dose meds  Minimize nephrotoxic substances  Monitor UO  Ramey in place  May need renal consult given level of underlying acute on chronic conditions   At present producing adequate urine        Pulmonary hypertension (HCC)- (present on admission)   Assessment & Plan    Likely related to previous severe MR  Keep dry volume balance  Will consider therapy if proves difficult to extubate        Mixed hyperlipidemia- (present on admission)   Assessment & Plan    statin             VTE:  Contraindicated  Ulcer: H2 Antagonist  Lines: Central Line  Ongoing indication addressed    I have performed a physical exam and reviewed and updated ROS and Plan today (10/17/2018). In review of yesterday's note (10/16/2018), there are no changes except as documented above.     Discussed patient condition and risk of morbidity and/or mortality with RN, RT, Therapies, Pharmacy and [facility]  The patient remains critically ill.  Critical care time = 80 minutes in directly providing and coordinating critical care and extensive data review.  No time overlap and excludes procedures.

## 2018-10-17 NOTE — PROGRESS NOTES
1939--Dr Watson at bedside assessing pt.  Pt wife at bedside.  Dr Zapata and Dr. Gonzáles at bedside.  Pt condition discussed, precipitating events, and plan of care.  Questions answered.  Dr. Gonzáles performed bedside cardiac ultrasound.  Findings discussed with Dr Watson.  Pt gtts, hemodynamics, and current plan of care discussed with MDs at bedside.  Family questions answered and updated on plan of care.      Currently pt VSS on Epi and ventilator.

## 2018-10-17 NOTE — PROGRESS NOTES
At 1850, pt on spontaneous setting for 1/2 hour.  Pt awake and following commands.  RT assessed pt readiness to extubate and pt met parameters for extubation. (see RT note)  ABG met protocols for extubation.  RN and RT agreed that pt was ready to be extubated.      Pt extubated at 1900 to 4L NC.  Pt initially able to cough and clear secretions.  Shortly after extubation, pt O2 sats decreasing and pt appeared to be having difficulty coughing and clearing secretions despite prompting.  Pt placed on 15L mask.  Pt continued to drop O2 sats, decreasing RR, and BP dropping.  Attempted to ventilate pt via bag mask, unable to achieve adequate chest rise.  Called code blue.  (See code blue flowsheet for further details.)

## 2018-10-17 NOTE — RESPIRATORY CARE
Cardiopulmonary Resuscitation/Intubation Assist     Intubation assist performed Yes  Reason for intubation Respiratory failure/code blue  Positive Color Change on EZCap? Yes  EtCO2 mmH  Difficult Intubation/Number of attempts No/1  Evidence of aspiration No  Airway ETT Oral 7.5-Secured At  (cm): 24      Events/Summary/Plan: Responded to code blue, pt reintubated immediately after extubation. End tidal used. ROSC obtained and pt placed back on vent. MD at bedside.

## 2018-10-17 NOTE — CARE PLAN
Problem: Day of surgery post CABG/Heart valve replacement  Goal: Stabilization in immediate post op period  Outcome: PROGRESSING SLOWER THAN EXPECTED  Pt required reintubation within minutes of planned extubation.  Pt unable to manage secretions and protect airway.  PEA arrest secondary to hypoxia  Intervention: Serum K q 6 hours x 24 hours.  ABG and CBC prn.  Potassium replaced every 6 hours as needed per K scale to 4.5  Intervention: FSBS frequency as per Cardiac Surgery Insulin Drip Protocol  Blood sugar checked every 1-2 hours, insulin gtt titrated as needed per protocol 979  Intervention: Chest tube to 20 cm suction, record CT drainage with VS  Chest tubes maintained at 20 cm suction, no air leak noted.  Drainage recorded at least hourly.  Intervention: Titrate and wean off vasoactive drips per patient's condition and per MD order while maintaining SBP  mmHg per MD order  Pt requires Epinephrine for hypotension.  Per Dr. Watson - leave Epi on at least 0.02 mcg/kg/min for tonight  Intervention: VAP protocol in place  HOB flat for arterial line in right femoral artery.  Reverse trendelenburg used  Intervention: Wean from vent per protocol (see protocol), extubation goal with 2-6 hours post op.  Pt was initially extubated within 6 hours of arrival to [facility]  Intervention: Bedrest until extubated and groin lines out  Bedrest maintained - right femoral arterial line  Intervention: Maintain all original surgical dressings for 24 hours  Island dressing intact  Intervention: Discontinue Clifford patel and arterial line 12-18 hours post op if hemodynamically stable and off vasoactive drips  Will leave lines in place until more stable or extubated  Intervention: A-Fib and DVT prophylaxis per MD order or contraindications documented (refer to DVT/VTE problem on Care Plan)  Bilateral SCDs in use

## 2018-10-17 NOTE — PROCEDURES
Date: 10/16/2018    Procedure:  Emergent orotracheal intubation    Indication: Respiratory failure    Physician:  Dr. Kris Zapata MD    Consent:  Emergent     Procedure:  This patient has developed respiratory failure in the setting of PEA arrest after extubation and requires emergent intubation.  RSI was not required.  Using a #4 glidescope, a 7.5 ETT was placed on the first attempt w/o complication.  Tube placement confirmed by direct visualization of placement, end-tidal CO2 monitor color change and equal breath sounds.  No immediate complications.  Vitals remained stable throughout the procedure.  A post-procedure CXR will be reviewed.

## 2018-10-17 NOTE — ASSESSMENT & PLAN NOTE
Baseline Cr 1.8  Renally dose meds  Minimize nephrotoxic substances  Ramey in place for strict I/Os  Keep even fluid balance in next 24 hours.

## 2018-10-17 NOTE — DOCUMENTATION QUERY
"DOCUMENTATION QUERY    PROVIDERS: Please select “Cosign w/ note”to reply to query.    To better represent the severity of illness of your patient, please review the following information and exercise your independent professional judgment in responding to this query.     \"Respiratory Failure\" is documented in the Procedures Note on 10/16/18. Based upon the clinical findings, risk factors, and treatment, can this diagnosis be further specified?    • Acute respiratory failure with hypoxia  • Acute respiratory failure with hypercapnia  • Acute on chronic respiratory failure with hypoxia  • Acute on chronic respiratory failure with hypercapnia  • Respiratory Distress  • Hypoxia  • Other explanation of clinical findings  • Findings of no clinical significance  • Unable to determine    The medical record reflects the following:   Clinical Findings - \"Developed respiratory failure in the setting of PEA arrest after extubation and requires emergent intubation\" documented in the Procedures Note on 10/16/18  - ABGs on 10/17/18 at 0420 noted pH: 7.523 and pCO2: 25.7  - CXR on 10/16/18 noted \"Perihilar and RUL opacities likely represent atelectasis\"   Treatment - s/p Intubation w/Mechanical Ventilation  - ABGs  - CXR  - RT/O2 per Protocol   Risk Factors - s/p Tricuspid Valve, Mitral Valve, and Aortic Valve Repairs w/Left Atrial Appendage Ligation and YUNIOR  - hx HTN + CHF + CKD III  - hx PAF  - hx Pulmonary HTN   Location within medical record  History and Physical, Progress Notes, Lab Results , Radiology Results and Procedures Notes.     Thank you,     CLAUDY SaavedraN, RN  Clinical Documentation Improvement Specialist  P: [phone]  "

## 2018-10-17 NOTE — CARE PLAN
Problem: Ventilation Defect:  Goal: Ability to achieve and maintain unassisted ventilation or tolerate decreased levels of ventilator support  Outcome: PROGRESSING SLOWER THAN EXPECTED    Intervention: Support and monitor invasive and noninvasive mechanical ventilation  Adult Ventilation Update    Total Vent Days: 2    Patient Lines/Drains/Airways Status    Active Airway     Name: Placement date: Placement time: Site: Days:    Airway ETT Oral 10/16/18   1908   Oral   2                #FVC / Vital Capacity (liters) : 1 (10/16/18 1912)  NIF (cm H2O) : -40 (10/16/18 1912)  Rapid Shallow Breathing Index (RR/VT): 53 (10/16/18 1912)     Static Compliance (ml / cm H2O): 48.8 (10/17/18 0153)    Mobility  Level of Mobility: Level III (10/17/18 0400)  Reason Not Mobilized: Bed rest (10/17/18 0000)  Mobilization Comments: fermoral arterial line (10/17/18 0000)    Events/Summary/Plan: ABG drawn RR to 16 (10/17/18 8441)

## 2018-10-17 NOTE — PROGRESS NOTES
Called to bedside after patient experienced a PEA arrest. He met all parameters for extubation but rapidly experienced respiratory distress with inability to protect his airway soon after extubation. He was promptly reintubated with ROSC. Bedside echo showed no evidence of pericardial effusion but sluggish RV. During my assessment, post-reintubation ABG is pending, CI 2.5, CVP 9, PCWP 12, step up 12 and MAPs in 70s on epi 0.06. Chest tube output has been minimal, 450 cc since the OR and 50cc in last hour. Tubes appear to be patent. I have a low suspicion for cardiogenic causes of his arrest, but will obtain an official TTE. Discussed with Dr. Xie as well as nursing to continue supportive care, continue mechanical ventilatory support overnight as well as epi infusion for RV support. Pt's wife was updated of his condition.      Ramírez Watson MD  Cardiac surgeon

## 2018-10-17 NOTE — ASSESSMENT & PLAN NOTE
Rate controlled HR in 70s.   On Keep K > 4 and Mg > 2  On amiodarone  On coumadin, pharmacy dosing. Titrate dosing to goal INR 2-3

## 2018-10-17 NOTE — PROGRESS NOTES
Annie Oreilly APN updated on pt status, intake and output and vital signs.  Order for albumin and additional plasmalyte infusion received.

## 2018-10-17 NOTE — PROGRESS NOTES
Pt woke and was agitated.  Grabbing for ETT, flailing arms around.  RN attempted to reorient pt to no avail.  Morphine given for assumed pain without relief.  Versed given with resulting sedation.

## 2018-10-17 NOTE — PROGRESS NOTES
Wife has gone home with number to reach RN ;with any questions.  Pt coughing occasionally, attempting to lift shoulders off bed.  Able to move toes and fingers minimally to command.  No eye opening.  Urine output marginal, Epi gtt titrated to keep MAP 65.

## 2018-10-17 NOTE — DISCHARGE PLANNING
Medical Social Work    Referral: Code Blue    Intervention:  LSW responded to bedside for Code Blue.  When LSW arrived pt had pulses back and social work was dismissed by Charge RN.  Pt's wife later to bedside and updated.    Plan: LSW will continue to remain available for support as needed

## 2018-10-17 NOTE — PROGRESS NOTES
Pt has been reintubated, tachycardic following code dose Epi, Epi drip running.  Pt wife at bedside updated.  70 ml total chest tube drainage recorded following code with gentle compressions.  Labs sent per Dr. Zapata

## 2018-10-17 NOTE — PROGRESS NOTES
Inpatient Anticoagulation Service Note    Date: 10/17/2018  Reason for Anticoagulation: Bioprosthetic Valve Replacement, Atrial Fibrillation   [id number] VASc Score: 3    Hemoglobin Value: (!) 6.9  Hematocrit Value: (!) 20.6  Lab Platelet Value: (!) 104  Target INR: 2.0 to 3.0    INR from last 7 days     Date/Time INR Value    10/16/18 1912 (!)  1.48    10/16/18 1342 (!)  1.22    10/15/18 1002 (!)  1.34        Dose from last 7 days     Date/Time Dose (mg)    10/17/18 1200  5        Significant Interactions: Aspirin  Bridge Therapy: No     Reversal Agent Administered: Not Applicable  Comments: POD1 start warfarin for bioprosthetic valve. History of afib previously treated with xarelto, CHADSVASC score 3. No bridge therapy. Aspirin interaction. Renal indices elevated, some chronic dysfunction at baseline. Urine output marginal. Remains intubated at this time.    Plan:  5mg  Education Material Provided?: No  Pharmacist suggested discharge dosing: TBSADA Akers

## 2018-10-17 NOTE — RESPIRATORY CARE
7:00 P.M pt was awake and able to follow commands. FVC: 1.0L, NIF: -40, pt able to turn head and elevate without problems, cuff leak noted, after extubation pt was unable to protect airway causing him to decline rapidly, Dr. Zapata in room for intubation

## 2018-10-17 NOTE — PROGRESS NOTES
Cardiovascular Surgery Progress Note    Name: Jaime Tamayo  MRN: [id number]  : 1940  Admit Date: 10/16/2018  5:44 AM  Procedure:  Procedure(s) and Anesthesia Type:     * MITRAL VALVE REPAIR - General     * TRICUSPID VALVE REPAIR - General     * AORTIC VALVE REPAIR - General     * LEFT ATRIAL APPENDAGE LIGATION - General     * YUNIOR - General  1 Day Post-Op    Vitals:  Patient Vitals for the past 8 hrs:   Temp Monitored Temp 2 SpO2 Pulse Heart Rate (Monitored) Resp BP NIBP Weight   10/17/18 0743 - - 98 % - 76 - - - -   10/17/18 0700 - 36.2 °C (97.2 °F) 98 % 74 73 16 - 103/58 -   10/17/18 0645 - 36.3 °C (97.3 °F) 98 % 75 76 18 - - -   10/17/18 0637 36.2 °C (97.2 °F) - 96 % 78 - 20 106/49 - -   10/17/18 0630 - 36.4 °C (97.5 °F) 98 % 81 80 18 - - -   10/17/18 0615 - 36.5 °C (97.7 °F) 98 % 81 79 18 - - -   10/17/18 0600 - 36.5 °C (97.7 °F) 97 % 80 82 16 - (!) 94/50 -   10/17/18 0548 36.4 °C (97.5 °F) - 96 % 82 - 16 101/47 - -   10/17/18 0545 - 36.6 °C (97.9 °F) 96 % 80 83 16 - - -   10/17/18 0530 - 36.6 °C (97.9 °F) 96 % 82 82 16 - - -   10/17/18 0515 - 36.7 °C (98.1 °F) 96 % 78 78 16 - - -   10/17/18 0500 - 36.7 °C (98.1 °F) 96 % 78 78 16 - (!) 90/52 -   10/17/18 0445 - 36.7 °C (98.1 °F) 97 % 78 78 16 - - -   10/17/18 0430 - 36.6 °C (97.9 °F) 97 % 78 78 15 - - -   10/17/18 0415 - 36.5 °C (97.7 °F) 99 % 73 78 20 - - -   10/17/18 0400 36.4 °C (97.5 °F) 36.4 °C (97.5 °F) 97 % 76 77 17 - (!) 97/58 79.6 kg (175 lb 7.8 oz)   10/17/18 0345 - 36.4 °C (97.5 °F) 98 % 78 77 18 - - -   10/17/18 0330 - 36.3 °C (97.3 °F) 96 % 77 77 18 - - -   10/17/18 0315 - 36.3 °C (97.3 °F) 96 % 80 77 (!) 21 - (!) 91/51 -   10/17/18 0300 - 36.2 °C (97.2 °F) 98 % 78 78 (!) 24 - - -   10/17/18 0245 - 36.1 °C (97 °F) 96 % 78 79 18 - (!) 94/50 -   10/17/18 0230 - 36 °C (96.8 °F) 96 % 78 81 18 - (!) 92/51 -   10/17/18 0215 - 35.9 °C (96.6 °F) 96 % 80 78 18 - (!) 9151 -   10/17/18 0200 - 35.8 °C (96.4 °F) 96 % 78 79 13 - (!) 89/52 -    10/17/18 0153 - - 96 % - 73 - - - -   10/17/18 0145 - 35.7 °C (96.3 °F) 96 % 83 80 18 - (!) 94/57 -   10/17/18 0130 - 35.7 °C (96.3 °F) 96 % 79 80 18 - (!) 95/53 -   10/17/18 0115 - 35.7 °C (96.3 °F) 96 % 80 83 18 - (!) 91/53 -   10/17/18 0100 - 35.7 °C (96.3 °F) 96 % 80 80 18 - (!) 94/54 -   10/17/18 0045 - 35.8 °C (96.4 °F) 98 % 84 81 18 - (!) 93/55 -   10/17/18 0030 - 35.9 °C (96.6 °F) 97 % 85 85 18 - 107/65 -     Temp (24hrs), Av.1 °C (97 °F), Min:35.7 °C (96.3 °F), Max:36.4 °C (97.5 °F)      Respiratory:  Jimenez Vent Mode: APVCMV, Rate (breaths/min): 16, PEEP/CPAP: 8, FiO2: 40, P Peak (PIP): 17, P MEAN: 13 Respiration: 16, Pulse Oximetry: 98 %     Chest Tube Drains:          Fluids:    Intake/Output Summary (Last 24 hours) at 10/17/18 0819  Last data filed at 10/17/18 0700   Gross per 24 hour   Intake          8584.54 ml   Output             4353 ml   Net          4231.54 ml     Admit weight: Weight: 77.9 kg (171 lb 11.8 oz)  Current weight: Weight: 79.6 kg (175 lb 7.8 oz) (10/17/18 0400)    Labs:  Recent Labs      10/15/18   1002  10/16/18   1342  10/16/18   1912  10/17/18   0500   WBC  5.3   --   11.7*  6.7   RBC  3.05*   --   2.37*  2.01*   HEMOGLOBIN  10.6*   --   8.3*  6.9*   HEMATOCRIT  32.5*   --   24.8*  20.6*   MCV  106.6*   --   105.1*  102.5*   MCH  34.8*   --   35.4*  34.3*   MCHC  32.6*   --   33.7  33.5*   RDW  61.1*   --   67.7*  63.9*   PLATELETCT  125*  139*  145*  104*   MPV  10.9   --   11.2  10.4     Recent Labs      10/15/18   1002   NEUTSPOLYS  81.90*   LYMPHOCYTES  10.40*   MONOCYTES  6.20   EOSINOPHILS  0.90   BASOPHILS  0.40     Recent Labs      10/15/18   1002   10/16/18   1912  10/16/18   2300  10/17/18   0500   SODIUM  136   --   139   --   142   POTASSIUM  4.3   < >  5.2  3.8  4.2   CHLORIDE  98   --   106   --   106   CO2  25   --   18*   --   21   GLUCOSE  124*   --   165*   --   94   BUN  76*   --   76*   --   78*   CREATININE  2.29*   --   2.17*   --   2.28*   CALCIUM   8.6   --   7.3*   --   7.9*    < > = values in this interval not displayed.     Recent Labs      10/15/18   1002  10/16/18   1342  10/16/18   1912   APTT  33.8  37.8*  37.3*   INR  1.34*  1.22*  1.48*       Medications:  • famotidine  20 mg      Or   • famotidine  20 mg     • atorvastatin  20 mg     • aspirin EC  81 mg     • senna-docusate  2 Tab     • magnesium sulfate  1 g Stopped (10/16/18 1823)   • MD Alert...Warfarin per Pharmacy       • insulin regular  0-14 Units          Ordered Medications:    ASA - Yes    Plavix - No; contraindicated because of Other coumadin    Post-operative Beta Blockers - No; contraindicated because of High risk for heart block    Ace Inhibitor - No; contraindicated because of Other normal ef    Statin - No; contraindicated because of No cad          Central Line:  Type of line: cordis  Date of insertion: 10/16/18  Date of removal: see RN notes    Exam:   Review of Systems   Unable to perform ROS: Intubated       Physical Exam   Constitutional: He appears well-developed. No distress. He is intubated.   Eyes: Pupils are equal, round, and reactive to light.   Neck: Normal range of motion. No JVD present.   Cardiovascular: Normal rate, regular rhythm and normal heart sounds.    Pulmonary/Chest: Effort normal. He is intubated. He has decreased breath sounds in the right lower field and the left lower field.   Abdominal: Soft. Bowel sounds are normal. He exhibits no distension. There is no guarding.   Genitourinary:   Genitourinary Comments: domínguez   Musculoskeletal: Normal range of motion. He exhibits edema.   Neurological: He is alert.   Skin: Skin is warm and dry.   Surgical incisions CDI   Psychiatric:   Calm and cooperative       Quality Measures:   Quality-Core Measures   Reviewed items::  Medications reviewed, EKG reviewed, Labs reviewed and Radiology images reviewed  Domínguez catheter::  One or Two Days Post Surgery (Day of Surgery being Day 0)  Central line in place:  Need for access  and Vasopressors  DVT prophylaxis pharmacological::  Contraindicated - Anemia requiring blood transfusion  DVT prophylaxis - mechanical:  SCDs  Ulcer Prophylaxis::  Yes      Assessment/Plan:  POD 1 S/P respiratory/PEA arrest last night post extubation.  Improved this AM, vented but alert and cooperative.  H/H low s/p acute blood lose anemia.  On low dose epi, swan numbers good. PLAN: Transfuse 2 units PRBC.  Vent per pulm.  Keep domínguez/CTs today.    Active Hospital Problems    Diagnosis   • S/P MVR (mitral valve repair) [Z98.890]     Priority: High   • S/P TVR (tricuspid valve repair) [Z98.890]     Priority: High   • Paroxysmal atrial fibrillation (HCC) [I48.0]     Priority: High   • Essential hypertension, benign [I10]     Priority: Medium   • Acute respiratory failure with hypoxia (HCC) [J96.01]   • CKD (chronic kidney disease) stage 3, GFR 30-59 ml/min (HCC) [N18.3]   • Pulmonary hypertension (HCC) [I27.20]   • Mixed hyperlipidemia [E78.2]

## 2018-10-17 NOTE — PROGRESS NOTES
"Code Blue Note    I was called urgently to the bedside for a \"code blue.\"  The patient is immediately post-op after AV and MV band annuloplasty repair and AV repair.  He was extubated with excellent extubation parameters on low dose epi only; then within 2 minutes became pale, developed resp distress then pulseless in PEA arrest.  There was minimal chest tube o/p.  He remained in SR but DDD epicardial pacing was increased to 120; low dose epi and gentle ECC were initiated.  He was quickly re-intubated w/o difficulty and immediately had return of BP on art-line.Case reviewed in detail with CVS at bedside, PA catheter reviewed and POC echo performed with no effusion, good LV fxn, poor RV contraction.  Pt remains on low dose epi gtt and full vent support.  "

## 2018-10-17 NOTE — PROGRESS NOTES
Pt wakes short intervals, nods appropriately, denies pain.  Weaning Epi gtt slowly as BP allows - no change in CO/CI with lower doses of Epi.

## 2018-10-17 NOTE — ASSESSMENT & PLAN NOTE
Intubated 10/16; went to PEA arrest approx 1 min post extubation and required emergent intubation  Extubated to bipap 10/18 successfully and since weaned off  Resp status stable  Need continuous IS/PEP and mobility   Remains volume overload but not affecting resp status.

## 2018-10-17 NOTE — PROGRESS NOTES
Bedside report received from Alexandra LOWE. Patient resting in bed and denies pain. All drips verified. Wife at bedside and updated on plan of care. Patient able to wake to voice, nod approprietly and move all extremities on command.

## 2018-10-17 NOTE — CODE DOCUMENTATION
"Dr. Gonzáles at bedside with cardiac ultrasound.   Nivia states, \"It looks like Cor pulmonale; the right side of heart is not moving.\"  "

## 2018-10-18 PROBLEM — D69.6 THROMBOCYTOPENIA (HCC): Status: ACTIVE | Noted: 2018-01-01

## 2018-10-18 PROBLEM — D69.1 THROMBOCYTOPATHIA (HCC): Status: ACTIVE | Noted: 2018-01-01

## 2018-10-18 NOTE — RESPIRATORY CARE
Ventilator Weaning Update    Patient is on vent day 3.  SBT was tolerated for a minimum of 1 Hour hours on settings of 5/8.    Wean parameters for this SBT were:  #FVC / Vital Capacity (liters) : 776 (10/18/18 0719)  NIF (cm H2O) : -32 (10/18/18 0719)  Rapid Shallow Breathing Index (RR/VT): 17 (10/18/18 0719)  RR (bpm): 10 (10/18/18 0719)  Spontaneous VE: (!) 4.8 (10/18/18 0719)  Spontaneous VT: 584 (10/18/18 0719)    Recommendation: Extubate.

## 2018-10-18 NOTE — PROGRESS NOTES
APN White at beside. Patient to stay intubated over night to rest and per APN can start weaning at 4am. Sedation restarted.

## 2018-10-18 NOTE — CARE PLAN
Problem: Ventilation Defect:  Goal: Ability to achieve and maintain unassisted ventilation or tolerate decreased levels of ventilator support  Adult Ventilation Update    Total Vent Days: 2    Patient Lines/Drains/Airways Status    Active Airway     Name: Placement date: Placement time: Site: Days:    Airway ETT Oral 10/16/18   1908   Oral   less than 1              In the last 24 hours, the patient tolerated SBT for 2 on settings of 5/8.    #FVC / Vital Capacity (liters) : 1.1 (10/17/18 1436)  NIF (cm H2O) : -24 (10/17/18 1436)  Rapid Shallow Breathing Index (RR/VT): 40 (10/17/18 1436)     Static Compliance (ml / cm H2O): 96.4 (10/17/18 1151)    Patient failed trials because of    Barriers to SBT    Length of Weaning Trial Length of Weaning Trial (Hours): 1 hour  (10/17/18 1436)  Cough: Productive (10/17/18 1600)  Sputum Amount: Small (10/17/18 1151)  Sputum Color: White;Clear (10/17/18 1151)  Sputum Consistency: Thin (10/17/18 1151)    Mobility  Level of Mobility: Level III (10/17/18 0800)  Reason Not Mobilized: Bed rest (10/17/18 0800)  Mobilization Comments:  (intubated, vented, post code yesterday) (10/17/18 0800)    Events/Summary/Plan: Parameters obtained (10/17/18 1436)

## 2018-10-18 NOTE — PROGRESS NOTES
Critical Care Progress Note    Date of admission  10/16/2018    Chief Complaint  78 y.o. male admitted 10/16/2018 with severe MR and TR, s/p repair    Hospital Course    78-year-old gentleman currently on full   mechanical ventilatory support, status post valve repair and all information   taken from chart review inside out.  It appears that this gentleman has a   chronic history of severe MR, severe TR, paroxysmal atrial fibrillation, on   anticoagulation, stage III kidney disease, pulmonary hypertension with last   reported RVSP of 45, chronic hypertension and dyslipidemia.  Patient admitted   electively today for valve repair, looking at mitral, tricuspid as well as   aortic valve.  He is status post repair of the mitral and tricuspid valves.    The aortic valve on examination intraoperatively showed only mild aortic   stenosis, minimal debridement performed and patient was transferred to the   intensive care unit.  At the present time, he is on 0.04 epinephrine and on   Precedex, full mechanical ventilatory support, recovering from anesthesia with   pulmonary artery catheter in place as well as mediastinal chest tubes      Interval Problem Update  Reviewed last 24 hour events:  Tm 98.1  +588cc over last 24hr, +4.1L since admit  Cxr, improved aeration  Hb 8.0  Plat 145->104->79  Cr 2.28->2.38  Abg 7.36/33/105/98 on 40%    Epi 0.03  precedex @ pause -> 0.2      Review of Systems  Review of Systems   Unable to perform ROS: Acuity of condition        Vital Signs for last 24 hours   Temp:  [35.6 °C (96.1 °F)-36.5 °C (97.7 °F)] 36.3 °C (97.3 °F)  Pulse:  [] 93  Resp:  [8-37] 12    Hemodynamic parameters for last 24 hours  PAS: (57)/(20) 57/20  PAD: (57)/(20) 57/20  CVP:  [13 MM HG-15 MM HG] 15 MM HG  PCWP:  [16 MM HG-22 MM HG] 18 MM HG  CO:  [5-6.3] 5.9  CI:  [2.5-3.2] 3    Vent Settings for last 24 hours  Jimenez Vent Mode: APVCMV  Rate (breaths/min):  [12-16] 12  PEEP/CPAP:  [8] 8  FiO2:  [40] 40  P Peak  (PIP):  [14-22] 14  P MEAN:  [9.5-13] 9.5    Physical Exam   Physical Exam   Constitutional: He appears well-developed and well-nourished.   HENT:   Head: Normocephalic and atraumatic.   Eyes: Pupils are equal, round, and reactive to light. No scleral icterus.   Neck: No tracheal deviation present.   Cardiovascular: Normal rate and intact distal pulses.    Pulmonary/Chest: He has no wheezes. He has no rales.   Abdominal: Soft. There is no tenderness. There is no rebound and no guarding.   Musculoskeletal: He exhibits no edema.   Neurological: No cranial nerve deficit.   Skin: Skin is warm and dry. He is not diaphoretic.   Psychiatric:   Anxious, awake on vent   Nursing note and vitals reviewed.      Medications  Current Facility-Administered Medications   Medication Dose Route Frequency Provider Last Rate Last Dose   • aspirin (ASA) chewable tab 81 mg  81 mg Oral DAILY YARED Rosenberg.P.N.       • famotidine (PEPCID) tablet 20 mg  20 mg Oral DAILY Jana Deng M.D.        Or   • famotidine (PEPCID) injection 20 mg  20 mg Intravenous DAILY Jana Deng M.D.   20 mg at 10/18/18 0508   • insulin lispro (HUMALOG) injection 0-15 Units  0-15 Units Subcutaneous Q6HRS Jayce Thompson M.D.   2 Units at 10/18/18 0507   • warfarin (COUMADIN) tablet 5 mg  5 mg Oral COUMADIN-DAILY Jana Deng M.D.   5 mg at 10/17/18 1738   • atorvastatin (LIPITOR) tablet 20 mg  20 mg Oral Q EVENING Amber Escobar A.P.N.   20 mg at 10/17/18 1739   • Respiratory Care per Protocol   Nebulization Continuous RT Amber Escobar A.P.N.       • NS infusion   Intravenous Continuous Amber Escobar A.P.N. 10 mL/hr at 10/16/18 1400     • clevidipine (CLEVIPREX) IV emulsion  1-21 mg/hr Intravenous Continuous Amber Escobar A.P.N.   Stopped at 10/16/18 1345   • nitroglycerin 50 mg in D5W 250 ml infusion  0-100 mcg/min Intravenous Continuous Amber Escobar A.P.N.   Stopped at 10/16/18 4192   • Pharmacy Consult Request ...Pain Management  Review 1 Each  1 Each Other PRN Amber Escobar, A.P.N.       • oxyCODONE immediate-release (ROXICODONE) tablet 5 mg  5 mg Oral Q3HRS PRN Amber Escobar, A.P.N.       • oxyCODONE immediate release (ROXICODONE) tablet 10 mg  10 mg Oral Q3HRS PRN Amber Escobar, A.P.N.   10 mg at 10/18/18 0508   • tramadol (ULTRAM) 50 MG tablet 50 mg  50 mg Oral Q4HRS PRN Amber Escobar, A.P.N.       • midazolam (VERSED) 2 MG/2ML injection 2 mg  2 mg Intravenous Q HOUR PRN Amber Escobar, A.P.N.   2 mg at 10/17/18 1849   • dexmedetomidine (PRECEDEX) 400 mcg/100mL NS premix infusion  0-1.5 mcg/kg/hr Intravenous Continuous Amber Escobar, A.P.N.   Stopped at 10/18/18 0622   • sodium bicarbonate 8.4 % injection 50 mEq  50 mEq Intravenous Q HOUR PRN Amber Escobar, A.P.N.       • morphine (pf) 4 mg/ml injection 4 mg  4 mg Intravenous Q HOUR PRN Amber Escobar, A.P.N.   4 mg at 10/18/18 0247   • ondansetron (ZOFRAN) syringe/vial injection 4 mg  4 mg Intravenous Q6HRS PRN Amber Escobar, A.P.N.        Or   • prochlorperazine (COMPAZINE) injection 10 mg  10 mg Intravenous Q6HRS PRN Amber Escobar, A.P.N.        Or   • promethazine (PHENERGAN) suppository 25 mg  25 mg Rectal Q6HRS PRN Amber Escobar, A.P.N.       • acetaminophen (TYLENOL) tablet 650 mg  650 mg Oral Q4HRS PRN Amber Escobar, A.P.N.        Or   • acetaminophen (TYLENOL) suppository 650 mg  650 mg Rectal Q4HRS PRN Amber Escobar, A.P.N.       • senna-docusate (PERICOLACE or SENOKOT S) 8.6-50 MG per tablet 2 Tab  2 Tab Oral BID Amber Escobar, A.P.N.   2 Tab at 10/18/18 0508    And   • polyethylene glycol/lytes (MIRALAX) PACKET 1 Packet  1 Packet Oral QDAY PRN Amber Escobar, A.P.N.        And   • magnesium hydroxide (MILK OF MAGNESIA) suspension 30 mL  30 mL Oral QDAY PRN Amber Escobar, A.P.N.        And   • bisacodyl (DULCOLAX) suppository 10 mg  10 mg Rectal QDAY PRN Amber Escobar, A.P.N.       • mag hydrox-al hydrox-simeth (MAALOX PLUS ES or MYLANTA DS)  suspension 30 mL  30 mL Oral Q4HRS PRN Amber Escobar A.P.N.       • diphenhydrAMINE (BENADRYL) tablet/capsule 25 mg  25 mg Oral HS PRN - MR X 1 Amber Escobar A.P.N.       • electrolyte-A (PLASMALYTE-A) infusion   Intravenous PRN YARED Rosenberg.P.N.   1,000 mL at 10/16/18 2252   • Magnesium Sulfate in D5W IVPB premix 1 g  1 g Intravenous QDAY Amber Escobar A.P.N.   Stopped at 10/16/18 1823   • MD Alert...Warfarin per Pharmacy   Other pharmacy to dose YARED Rosenberg.P.N.       • HYDROcodone-acetaminophen (NORCO) 5-325 MG per tablet 1-2 Tab  1-2 Tab Oral Q4HRS PRN YARED Rosenberg.P.N.       • EPINEPHrine 4 mg in  mL Infusion  0-0.2 mcg/kg/min Intravenous Continuous Jana Deng M.D. 8.4 mL/hr at 10/18/18 0625 0.03 mcg/kg/min at 10/18/18 0625   • electrolyte-148 (PLASMALYTE-148) infusion 1,000 mL  1,000 mL Intravenous Once PRN Annie Oreilly A.P.NMiriam           Fluids    Intake/Output Summary (Last 24 hours) at 10/18/18 0717  Last data filed at 10/18/18 0600   Gross per 24 hour   Intake          1443.44 ml   Output             1505 ml   Net           -61.56 ml       Laboratory  Recent Results (from the past 48 hour(s))   [facility] ARTERIAL BLOOD GAS    Collection Time: 10/16/18  8:19 AM   Result Value Ref Range    Ph 7.454 7.400 - 7.500    Pco2 37.5 (H) 26.0 - 37.0 mmHg    Po2 392 (H) 64 - 87 mmHg    Tco2 27 20 - 33 mmol/L    S02 100 (H) 93 - 99 %    Hco3 26.3 (H) 17.0 - 25.0 mmol/L    BE 2 -4 - 3 mmol/L    Body Temp 37.0 C degrees    Ph Temp Kim 7.454 7.400 - 7.500    Pco2 Temp Co 37.5 (H) 26.0 - 37.0 mmHg    Po2 Temp Cor 392 (H) 64 - 87 mmHg    Specimen Arterial     Action Range Triggered NO     Inst. Qualified Patient YES    [facility]AT GLUCOSE    Collection Time: 10/16/18  8:19 AM   Result Value Ref Range    Istat Glucose 133 (H) 65 - 99 mg/dL   [facility]AT SODIUM    Collection Time: 10/16/18  8:19 AM   Result Value Ref Range    Istat Sodium 140 135 - 145 mmol/L   [facility]AT POTASSIUM    Collection Time:  10/16/18  8:19 AM   Result Value Ref Range    Istat Potassium 3.5 (L) 3.6 - 5.5 mmol/L   ISTAT IONIZED CA    Collection Time: 10/16/18  8:19 AM   Result Value Ref Range    Istat Ionized Calcium 1.06 (L) 1.10 - 1.30 mmol/L   [facility] HEMATOCRIT AND HEMOGLOBIN    Collection Time: 10/16/18  8:19 AM   Result Value Ref Range    Istat Hematocrit 30 (L) 42 - 52 %    Istat Hemoglobin 10.2 (L) 14.0 - 18.0 g/dL   [facility] VENOUS BLOOD GAS    Collection Time: 10/16/18 10:05 AM   Result Value Ref Range    Ph 7.405 7.310 - 7.450    Pco2 41.0 41.0 - 51.0 mmHg    Po2 46 (H) 25 - 40 mmHg    Tco2 27 20 - 33 mmol/L    SO2 82 %    Hco3 25.7 24.0 - 28.0 mmol/L    BE 1 -4 - 3 mmol/L    Body Temp 37.0 C degrees    O2 Therapy 80 %    iPF Ratio 58     Ph Temp Correc 7.405 7.310 - 7.450    Pco2 Temp Kim 41.0 41.0 - 51.0 mmHg    Po2 Temp Corre 46 (H) 25 - 40 mmHg    Specimen Venous     Action Range Triggered YES     Inst. Qualified Patient YES    ISTAT GLUCOSE    Collection Time: 10/16/18 10:05 AM   Result Value Ref Range    Istat Glucose 138 (H) 65 - 99 mg/dL   ISTAT SODIUM    Collection Time: 10/16/18 10:05 AM   Result Value Ref Range    Istat Sodium 139 135 - 145 mmol/L   ISTAT POTASSIUM    Collection Time: 10/16/18 10:05 AM   Result Value Ref Range    Istat Potassium 3.8 3.6 - 5.5 mmol/L   ISTAT IONIZED CA    Collection Time: 10/16/18 10:05 AM   Result Value Ref Range    Istat Ionized Calcium 0.94 (L) 1.10 - 1.30 mmol/L   [facility]AT HEMATOCRIT AND HEMOGLOBIN    Collection Time: 10/16/18 10:05 AM   Result Value Ref Range    Istat Hematocrit 20 (L) 42 - 52 %    Istat Hemoglobin 6.8 (L) 14.0 - 18.0 g/dL   [facility] ARTERIAL BLOOD GAS    Collection Time: 10/16/18 10:40 AM   Result Value Ref Range    Ph 7.450 7.400 - 7.500    Pco2 39.8 (H) 26.0 - 37.0 mmHg    Po2 348 (H) 64 - 87 mmHg    Tco2 29 20 - 33 mmol/L    S02 100 (H) 93 - 99 %    Hco3 27.6 (H) 17.0 - 25.0 mmol/L    BE 3 -4 - 3 mmol/L    Body Temp 37.0 C degrees    O2 Therapy 75 %    iPF Ratio 464      Ph Temp Kim 7.450 7.400 - 7.500    Pco2 Temp Co 39.8 (H) 26.0 - 37.0 mmHg    Po2 Temp Cor 348 (H) 64 - 87 mmHg    Specimen Arterial     Action Range Triggered NO     Inst. Qualified Patient YES    ISTAT GLUCOSE    Collection Time: 10/16/18 10:40 AM   Result Value Ref Range    Istat Glucose 162 (H) 65 - 99 mg/dL   ISTAT SODIUM    Collection Time: 10/16/18 10:40 AM   Result Value Ref Range    Istat Sodium 138 135 - 145 mmol/L   ISTAT POTASSIUM    Collection Time: 10/16/18 10:40 AM   Result Value Ref Range    Istat Potassium 3.9 3.6 - 5.5 mmol/L   ISTAT IONIZED CA    Collection Time: 10/16/18 10:40 AM   Result Value Ref Range    Istat Ionized Calcium 0.94 (L) 1.10 - 1.30 mmol/L   ISTAT HEMATOCRIT AND HEMOGLOBIN    Collection Time: 10/16/18 10:40 AM   Result Value Ref Range    Istat Hematocrit 20 (L) 42 - 52 %    Istat Hemoglobin 6.8 (L) 14.0 - 18.0 g/dL   [facility] ARTERIAL BLOOD GAS    Collection Time: 10/16/18 11:18 AM   Result Value Ref Range    Ph 7.322 (L) 7.400 - 7.500    Pco2 52.3 (HH) 26.0 - 37.0 mmHg    Po2 327 (H) 64 - 87 mmHg    Tco2 29 20 - 33 mmol/L    S02 100 (H) 93 - 99 %    Hco3 27.1 (H) 17.0 - 25.0 mmol/L    BE 1 -4 - 3 mmol/L    Body Temp 37.0 C degrees    O2 Therapy 75 %    iPF Ratio 436     Ph Temp Kim 7.322 (L) 7.400 - 7.500    Pco2 Temp Co 52.3 (HH) 26.0 - 37.0 mmHg    Po2 Temp Cor 327 (H) 64 - 87 mmHg    Specimen Arterial     Action Range Triggered YES     Inst. Qualified Patient YES    ISTAT GLUCOSE    Collection Time: 10/16/18 11:18 AM   Result Value Ref Range    Istat Glucose 178 (H) 65 - 99 mg/dL   ISTAT SODIUM    Collection Time: 10/16/18 11:18 AM   Result Value Ref Range    Istat Sodium 137 135 - 145 mmol/L   ISTAT POTASSIUM    Collection Time: 10/16/18 11:18 AM   Result Value Ref Range    Istat Potassium 3.9 3.6 - 5.5 mmol/L   ISTAT IONIZED CA    Collection Time: 10/16/18 11:18 AM   Result Value Ref Range    Istat Ionized Calcium 0.94 (L) 1.10 - 1.30 mmol/L   ISTAT HEMATOCRIT AND  HEMOGLOBIN    Collection Time: 10/16/18 11:18 AM   Result Value Ref Range    Istat Hematocrit 21 (L) 42 - 52 %    Istat Hemoglobin 7.1 (L) 14.0 - 18.0 g/dL   IST[facility] ARTERIAL BLOOD GAS    Collection Time: 10/16/18 11:53 AM   Result Value Ref Range    Ph 7.406 7.400 - 7.500    Pco2 40.1 (H) 26.0 - 37.0 mmHg    Po2 316 (H) 64 - 87 mmHg    Tco2 26 20 - 33 mmol/L    S02 100 (H) 93 - 99 %    Hco3 25.2 (H) 17.0 - 25.0 mmol/L    BE 0 -4 - 3 mmol/L    Body Temp 37.0 C degrees    O2 Therapy 85 %    iPF Ratio 372     Ph Temp Kim 7.406 7.400 - 7.500    Pco2 Temp Co 40.1 (H) 26.0 - 37.0 mmHg    Po2 Temp Cor 316 (H) 64 - 87 mmHg    Specimen Arterial     Action Range Triggered NO     Inst. Qualified Patient YES    ISTAT GLUCOSE    Collection Time: 10/16/18 11:53 AM   Result Value Ref Range    Istat Glucose 172 (H) 65 - 99 mg/dL   ISTAT SODIUM    Collection Time: 10/16/18 11:53 AM   Result Value Ref Range    Istat Sodium 138 135 - 145 mmol/L   ISTAT POTASSIUM    Collection Time: 10/16/18 11:53 AM   Result Value Ref Range    Istat Potassium 4.6 3.6 - 5.5 mmol/L   ISTAT IONIZED CA    Collection Time: 10/16/18 11:53 AM   Result Value Ref Range    Istat Ionized Calcium 0.96 (L) 1.10 - 1.30 mmol/L   ISTAT HEMATOCRIT AND HEMOGLOBIN    Collection Time: 10/16/18 11:53 AM   Result Value Ref Range    Istat Hematocrit 20 (L) 42 - 52 %    Istat Hemoglobin 6.8 (L) 14.0 - 18.0 g/dL   CRYOPRECIPITATE    Collection Time: 10/16/18 12:46 PM   Result Value Ref Range    Component Ct       CT5                 Cryo,5Unit-Pool     [id number]   transfused   10/16/18   13:02      Product Type Cryoprecipitated AHF 5UnitPoolTh     Dispense Status Transfused     Unit Number (Barcoded) [id number]     Product Code (Barcoded) [id number]     Blood Type (Barcoded) 7300    ISTAT ARTERIAL BLOOD GAS    Collection Time: 10/16/18 12:49 PM   Result Value Ref Range    Ph 7.430 7.400 - 7.500    Pco2 37.7 (H) 26.0 - 37.0 mmHg    Po2 273 (H) 64 - 87 mmHg    Tco2 26 20 -  33 mmol/L    S02 100 (H) 93 - 99 %    Hco3 25.0 17.0 - 25.0 mmol/L    BE 1 -4 - 3 mmol/L    Body Temp 37.0 C degrees    O2 Therapy 100 %    iPF Ratio 273     Ph Temp Kim 7.430 7.400 - 7.500    Pco2 Temp Co 37.7 (H) 26.0 - 37.0 mmHg    Po2 Temp Cor 273 (H) 64 - 87 mmHg    Specimen Arterial     Action Range Triggered NO     Inst. Qualified Patient YES    ISTAT GLUCOSE    Collection Time: 10/16/18 12:49 PM   Result Value Ref Range    Istat Glucose 149 (H) 65 - 99 mg/dL   ISTAT SODIUM    Collection Time: 10/16/18 12:49 PM   Result Value Ref Range    Istat Sodium 142 135 - 145 mmol/L   ISTAT POTASSIUM    Collection Time: 10/16/18 12:49 PM   Result Value Ref Range    Istat Potassium 3.8 3.6 - 5.5 mmol/L   ISTAT IONIZED CA    Collection Time: 10/16/18 12:49 PM   Result Value Ref Range    Istat Ionized Calcium 1.02 (L) 1.10 - 1.30 mmol/L   ISTAT HEMATOCRIT AND HEMOGLOBIN    Collection Time: 10/16/18 12:49 PM   Result Value Ref Range    Istat Hematocrit 25 (L) 42 - 52 %    Istat Hemoglobin 8.5 (L) 14.0 - 18.0 g/dL   EKG on arrival to [facility]SU    Collection Time: 10/16/18  1:30 PM   Result Value Ref Range    Report       [facility]    Test Date:  2018-10-16  Pt Name:    JOSSE IBRAHIM                 Department: 161  MRN:        [id number]                      Room:       T623  Gender:     Male                         Technician: LENNOX  :        1940                   Requested By:JOHNATHON CASILLAS  Order #:    [id number]                    Reading MD: Dain Malave MD    Measurements  Intervals                                Axis  Rate:       93                           P:          70  NC:         196                          QRS:        -49  QRSD:       98                           T:          30  QT:         404  QTc:        503    Interpretive Statements  SINUS RHYTHM  ATRIAL PREMATURE COMPLEXES  LOW VOLTAGE IN FRONTAL LEADS  CONSIDER RIGHT VENTRICULAR HYPERTROPHY  CONSIDER INFERIOR INFARCT  PROLONGED QT  INTERVAL      Electronically Signed On 10- 16:29:16 PDT by Dain Malave MD     ACCU-CHEK GLUCOSE    Collection Time: 10/16/18  1:40 PM   Result Value Ref Range    Glucose - Accu-Ck 128 (H) 65 - 99 mg/dL   Platelet count    Collection Time: 10/16/18  1:42 PM   Result Value Ref Range    Platelet Count 139 (L) 164 - 446 K/uL   Magnesium    Collection Time: 10/16/18  1:42 PM   Result Value Ref Range    Magnesium 2.1 1.5 - 2.5 mg/dL   Prothrombin time (INR)    Collection Time: 10/16/18  1:42 PM   Result Value Ref Range    PT 15.6 (H) 12.0 - 14.6 sec    INR 1.22 (H) 0.87 - 1.13   APTT (PTT)    Collection Time: 10/16/18  1:42 PM   Result Value Ref Range    APTT 37.8 (H) 24.7 - 36.0 sec   POTASSIUM SERUM (K)    Collection Time: 10/16/18  1:42 PM   Result Value Ref Range    Potassium 3.9 3.6 - 5.5 mmol/L   ISTAT ARTERIAL BLOOD GAS    Collection Time: 10/16/18  1:49 PM   Result Value Ref Range    Ph 7.487 7.400 - 7.500    Pco2 28.7 26.0 - 37.0 mmHg    Po2 154 (H) 64 - 87 mmHg    Tco2 23 20 - 33 mmol/L    S02 100 (H) 93 - 99 %    Hco3 21.7 17.0 - 25.0 mmol/L    BE -1 -4 - 3 mmol/L    Body Temp 36.1 C degrees    O2 Therapy 100 %    iPF Ratio 154     Ph Temp Kim 7.501 (H) 7.400 - 7.500    Pco2 Temp Co 27.5 26.0 - 37.0 mmHg    Po2 Temp Cor 149 (H) 64 - 87 mmHg    Specimen Arterial     Action Range Triggered NO     Inst. Qualified Patient YES    ISTAT SODIUM    Collection Time: 10/16/18  1:49 PM   Result Value Ref Range    Istat Sodium 140 135 - 145 mmol/L   ISTAT POTASSIUM    Collection Time: 10/16/18  1:49 PM   Result Value Ref Range    Istat Potassium 3.6 3.6 - 5.5 mmol/L   ISTAT IONIZED CA    Collection Time: 10/16/18  1:49 PM   Result Value Ref Range    Istat Ionized Calcium 0.99 (L) 1.10 - 1.30 mmol/L   [facility]AT HEMATOCRIT AND HEMOGLOBIN    Collection Time: 10/16/18  1:49 PM   Result Value Ref Range    Istat Hematocrit 28 (L) 42 - 52 %    Istat Hemoglobin 9.5 (L) 14.0 - 18.0 g/dL   HISTOLOGY REQUEST    Collection  Time: 10/16/18  2:10 PM   Result Value Ref Range    Pathology Request Sent to [facility]    ACCU-CHEK GLUCOSE    Collection Time: 10/16/18  2:31 PM   Result Value Ref Range    Glucose - Accu-Ck 147 (H) 65 - 99 mg/dL   [facility]AT ARTERIAL BLOOD GAS    Collection Time: 10/16/18  2:40 PM   Result Value Ref Range    Ph 7.511 (H) 7.400 - 7.500    Pco2 27.5 26.0 - 37.0 mmHg    Po2 108 (H) 64 - 87 mmHg    Tco2 23 20 - 33 mmol/L    S02 99 93 - 99 %    Hco3 22.0 17.0 - 25.0 mmol/L    BE 0 -4 - 3 mmol/L    Body Temp 35.7 C degrees    O2 Therapy 70 %    iPF Ratio 154     Ph Temp Kim 7.531 (H) 7.400 - 7.500    Pco2 Temp Co 26.0 26.0 - 37.0 mmHg    Po2 Temp Cor 101 (H) 64 - 87 mmHg    Specimen Arterial     Action Range Triggered NO     Inst. Qualified Patient YES    ACCU-CHEK GLUCOSE    Collection Time: 10/16/18  3:40 PM   Result Value Ref Range    Glucose - Accu-Ck 144 (H) 65 - 99 mg/dL   [facility]AT ARTERIAL BLOOD GAS    Collection Time: 10/16/18  3:42 PM   Result Value Ref Range    Ph 7.512 (H) 7.400 - 7.500    Pco2 24.9 (L) 26.0 - 37.0 mmHg    Po2 76 64 - 87 mmHg    Tco2 21 20 - 33 mmol/L    S02 97 93 - 99 %    Hco3 20.0 17.0 - 25.0 mmol/L    BE -3 -4 - 3 mmol/L    Body Temp 35.8 C degrees    O2 Therapy 50 %    iPF Ratio 152     Ph Temp Kim 7.531 (H) 7.400 - 7.500    Pco2 Temp Co 23.7 (L) 26.0 - 37.0 mmHg    Po2 Temp Cor 70 64 - 87 mmHg    Specimen Arterial     Action Range Triggered NO     Inst. Qualified Patient YES    ACCU-CHEK GLUCOSE    Collection Time: 10/16/18  4:39 PM   Result Value Ref Range    Glucose - Accu-Ck 121 (H) 65 - 99 mg/dL   ISTAT ARTERIAL BLOOD GAS    Collection Time: 10/16/18  4:45 PM   Result Value Ref Range    Ph 7.477 7.400 - 7.500    Pco2 29.7 26.0 - 37.0 mmHg    Po2 86 64 - 87 mmHg    Tco2 23 20 - 33 mmol/L    S02 97 93 - 99 %    Hco3 22.0 17.0 - 25.0 mmol/L    BE -1 -4 - 3 mmol/L    Body Temp 36.2 C degrees    O2 Therapy 50 %    iPF Ratio 172     Ph Temp Kim 7.489 7.400 - 7.500    Pco2 Temp Co 28.7 26.0 - 37.0  mmHg    Po2 Temp Cor 82 64 - 87 mmHg    Specimen Arterial     Action Range Triggered NO     Inst. Qualified Patient YES    ACCU-CHEK GLUCOSE    Collection Time: 10/16/18  5:46 PM   Result Value Ref Range    Glucose - Accu-Ck 136 (H) 65 - 99 mg/dL   ISTAT ARTERIAL BLOOD GAS    Collection Time: 10/16/18  5:52 PM   Result Value Ref Range    Ph 7.440 7.400 - 7.500    Pco2 33.2 26.0 - 37.0 mmHg    Po2 87 64 - 87 mmHg    Tco2 24 20 - 33 mmol/L    S02 97 93 - 99 %    Hco3 22.6 17.0 - 25.0 mmol/L    BE -1 -4 - 3 mmol/L    Body Temp 36.3 C degrees    O2 Therapy 40 %    iPF Ratio 218     Ph Temp Kim 7.451 7.400 - 7.500    Pco2 Temp Co 32.2 26.0 - 37.0 mmHg    Po2 Temp Cor 83 64 - 87 mmHg    Specimen Arterial     Action Range Triggered NO     Inst. Qualified Patient YES    ACCU-CHEK GLUCOSE    Collection Time: 10/16/18  6:37 PM   Result Value Ref Range    Glucose - Accu-Ck 126 (H) 65 - 99 mg/dL   CBC WITHOUT DIFFERENTIAL    Collection Time: 10/16/18  7:12 PM   Result Value Ref Range    WBC 11.7 (H) 4.8 - 10.8 K/uL    RBC 2.37 (L) 4.70 - 6.10 M/uL    Hemoglobin 8.3 (L) 14.0 - 18.0 g/dL    Hematocrit 24.8 (L) 42.0 - 52.0 %    .1 (H) 81.4 - 97.8 fL    MCH 35.4 (H) 27.0 - 33.0 pg    MCHC 33.7 33.7 - 35.3 g/dL    RDW 67.7 (H) 35.9 - 50.0 fL    Platelet Count 145 (L) 164 - 446 K/uL    MPV 11.2 9.0 - 12.9 fL   COMP METABOLIC PANEL    Collection Time: 10/16/18  7:12 PM   Result Value Ref Range    Sodium 139 135 - 145 mmol/L    Potassium 5.2 3.6 - 5.5 mmol/L    Chloride 106 96 - 112 mmol/L    Co2 18 (L) 20 - 33 mmol/L    Anion Gap 15.0 (H) 0.0 - 11.9    Glucose 165 (H) 65 - 99 mg/dL    Bun 76 (HH) 8 - 22 mg/dL    Creatinine 2.17 (H) 0.50 - 1.40 mg/dL    Calcium 7.3 (L) 8.5 - 10.5 mg/dL    AST(SGOT) 50 (H) 12 - 45 U/L    ALT(SGPT) 28 2 - 50 U/L    Alkaline Phosphatase 102 (H) 30 - 99 U/L    Total Bilirubin 1.7 (H) 0.1 - 1.5 mg/dL    Albumin 3.1 (L) 3.2 - 4.9 g/dL    Total Protein 4.7 (L) 6.0 - 8.2 g/dL    Globulin 1.6 (L) 1.9 -  3.5 g/dL    A-G Ratio 1.9 g/dL   MAGNESIUM    Collection Time: 10/16/18  7:12 PM   Result Value Ref Range    Magnesium 2.8 (H) 1.5 - 2.5 mg/dL   PHOSPHORUS    Collection Time: 10/16/18  7:12 PM   Result Value Ref Range    Phosphorus 3.8 2.5 - 4.5 mg/dL   APTT    Collection Time: 10/16/18  7:12 PM   Result Value Ref Range    APTT 37.3 (H) 24.7 - 36.0 sec   PROTHROMBIN TIME    Collection Time: 10/16/18  7:12 PM   Result Value Ref Range    PT 18.0 (H) 12.0 - 14.6 sec    INR 1.48 (H) 0.87 - 1.13   ESTIMATED GFR    Collection Time: 10/16/18  7:12 PM   Result Value Ref Range    GFR If  36 (A) >60 mL/min/1.73 m 2    GFR If Non African American 30 (A) >60 mL/min/1.73 m 2   EKG    Collection Time: 10/16/18  7:20 PM   Result Value Ref Range    Report       [facility]    Test Date:  2018-10-16  Pt Name:    JOSSE IBRAHIM                 Department: 161  MRN:        [id number]                      Room:       [facility]  Gender:     Male                         Technician: [facility]  :        1940                   Requested By:NATALIIA ORR  Order #:    [id number]                    Reading MD: Rogelio Bran MD    Measurements  Intervals                                Axis  Rate:       108                          P:          0  OR:         165                          QRS:        -12  QRSD:       98                           T:          41  QT:         360  QTc:        483    Interpretive Statements  SINUS TACHYCARDIA  LOW VOLTAGE THROUGHOUT  BORDERLINE T WAVE ABNORMALITIES  BORDERLINE PROLONGED QT INTERVAL  Compared to ECG 10/16/2018 13:30:45Sinus rhythm no longer present  Atrial premature complex(es) no longer present    Electronically Signed On 10- 6:14:09 PDT by Rogelio Bran MD     ACCU-CHEK GLUCOSE    Collection Time: 10/16/18  7:44 PM   Result Value Ref Range    Glucose - Accu-Ck 141 (H) 65 - 99 mg/dL   ISTAT ARTERIAL BLOOD GAS    Collection Time: 10/16/18  7:45 PM   Result Value Ref Range     Ph 7.397 (L) 7.400 - 7.500    Pco2 35.6 26.0 - 37.0 mmHg    Po2 293 (H) 64 - 87 mmHg    Tco2 23 20 - 33 mmol/L    S02 100 (H) 93 - 99 %    Hco3 21.9 17.0 - 25.0 mmol/L    BE -3 -4 - 3 mmol/L    Body Temp 36.3 C degrees    O2 Therapy 100 %    iPF Ratio 293     Ph Temp Kim 7.407 7.400 - 7.500    Pco2 Temp Co 34.5 26.0 - 37.0 mmHg    Po2 Temp Cor 290 (H) 64 - 87 mmHg    Specimen Arterial     Action Range Triggered NO     Inst. Qualified Patient YES    EC-ECHOCARDIOGRAM LTD W/ CONT    Collection Time: 10/16/18  8:43 PM   Result Value Ref Range    Eject.Frac. MOD BP 52.06     Eject.Frac. MOD 4C 41.08     Eject.Frac. MOD 2C 62.68     Left Ventrical Ejection Fraction 55    ACCU-CHEK GLUCOSE    Collection Time: 10/16/18  8:59 PM   Result Value Ref Range    Glucose - Accu-Ck 109 (H) 65 - 99 mg/dL   ACCU-CHEK GLUCOSE    Collection Time: 10/16/18  9:59 PM   Result Value Ref Range    Glucose - Accu-Ck 96 65 - 99 mg/dL   ACCU-CHEK GLUCOSE    Collection Time: 10/16/18 10:59 PM   Result Value Ref Range    Glucose - Accu-Ck 100 (H) 65 - 99 mg/dL   Potassium Serum (K)    Collection Time: 10/16/18 11:00 PM   Result Value Ref Range    Potassium 3.8 3.6 - 5.5 mmol/L   PHOSPHORUS    Collection Time: 10/16/18 11:00 PM   Result Value Ref Range    Phosphorus 3.7 2.5 - 4.5 mg/dL   ACCU-CHEK GLUCOSE    Collection Time: 10/17/18 12:59 AM   Result Value Ref Range    Glucose - Accu-Ck 72 65 - 99 mg/dL   ACCU-CHEK GLUCOSE    Collection Time: 10/17/18  2:02 AM   Result Value Ref Range    Glucose - Accu-Ck 75 65 - 99 mg/dL   ACCU-CHEK GLUCOSE    Collection Time: 10/17/18  3:19 AM   Result Value Ref Range    Glucose - Accu-Ck 80 65 - 99 mg/dL   ACCU-CHEK GLUCOSE    Collection Time: 10/17/18  4:07 AM   Result Value Ref Range    Glucose - Accu-Ck 88 65 - 99 mg/dL   ISTAT ARTERIAL BLOOD GAS    Collection Time: 10/17/18  4:20 AM   Result Value Ref Range    Ph 7.523 (H) 7.400 - 7.500    Pco2 25.7 (L) 26.0 - 37.0 mmHg    Po2 85 64 - 87 mmHg    Tco2 22  20 - 33 mmol/L    S02 98 93 - 99 %    Hco3 21.1 17.0 - 25.0 mmol/L    BE -1 -4 - 3 mmol/L    Body Temp 36.6 C degrees    O2 Therapy 40 %    iPF Ratio 213     Ph Temp Kim 7.529 (H) 7.400 - 7.500    Pco2 Temp Co 25.3 (L) 26.0 - 37.0 mmHg    Po2 Temp Cor 83 64 - 87 mmHg    Specimen Arterial     Action Range Triggered NO     Inst. Qualified Patient YES    CBC without Differential Critical Care 0    Collection Time: 10/17/18  5:00 AM   Result Value Ref Range    WBC 6.7 4.8 - 10.8 K/uL    RBC 2.01 (L) 4.70 - 6.10 M/uL    Hemoglobin 6.9 (L) 14.0 - 18.0 g/dL    Hematocrit 20.6 (L) 42.0 - 52.0 %    .5 (H) 81.4 - 97.8 fL    MCH 34.3 (H) 27.0 - 33.0 pg    MCHC 33.5 (L) 33.7 - 35.3 g/dL    RDW 63.9 (H) 35.9 - 50.0 fL    Platelet Count 104 (L) 164 - 446 K/uL    MPV 10.4 9.0 - 12.9 fL   Basic Metabolic Panel (BMP) Critical Care 0    Collection Time: 10/17/18  5:00 AM   Result Value Ref Range    Sodium 142 135 - 145 mmol/L    Potassium 4.2 3.6 - 5.5 mmol/L    Chloride 106 96 - 112 mmol/L    Co2 21 20 - 33 mmol/L    Glucose 94 65 - 99 mg/dL    Bun 78 (HH) 8 - 22 mg/dL    Creatinine 2.28 (H) 0.50 - 1.40 mg/dL    Calcium 7.9 (L) 8.5 - 10.5 mg/dL    Anion Gap 15.0 (H) 0.0 - 11.9   ESTIMATED GFR    Collection Time: 10/17/18  5:00 AM   Result Value Ref Range    GFR If  34 (A) >60 mL/min/1.73 m 2    GFR If Non  28 (A) >60 mL/min/1.73 m 2   ACCU-CHEK GLUCOSE    Collection Time: 10/17/18  5:05 AM   Result Value Ref Range    Glucose - Accu-Ck 93 65 - 99 mg/dL   EKG in the AM Post Op Day #1 (Specify Time)    Collection Time: 10/17/18  5:29 AM   Result Value Ref Range    Report       [facility]    Test Date:  2018-10-17  Pt Name:    JOSSE IBRAHIM                 Department: 161  MRN:        [id number]                      Room:       R[facility]  Gender:     Male                         Technician: AMY  :        1940                   Requested By:JOHNATHON CASILLAS  Order #:    [id number]                     Reading MD: Digna Coreas MD    Measurements  Intervals                                Axis  Rate:       82                           P:  IN:                                      QRS:        -11  QRSD:       69                           T:  QT:         574  QTc:        671    Interpretive Statements  PROBABLY ACCELERATED JUNCTIONAL RHYTHM  LOW VOLTAGE, EXTREMITY AND PRECORDIAL LEADS  PROBABLE ANTEROSEPTAL INFARCT, OLD  PROLONGED QT INTERVAL  NON-SPECIFIC T-WAVE CHANGES  Compared to ECG 10/16/2018 19:20:54  No significant change noted  Electronically Signed On 10- 8:51:04 PDT by Digna Coreas MD     ACCU-CHEK GLUCOSE    Collection Time: 10/17/18  7:08 AM   Result Value Ref Range    Glucose - Accu-Ck 90 65 - 99 mg/dL   ACCU-CHEK GLUCOSE    Collection Time: 10/17/18  9:02 AM   Result Value Ref Range    Glucose - Accu-Ck 94 65 - 99 mg/dL   ISTAT ARTERIAL BLOOD GAS    Collection Time: 10/17/18  9:32 AM   Result Value Ref Range    Ph 7.439 7.400 - 7.500    Pco2 31.9 26.0 - 37.0 mmHg    Po2 87 64 - 87 mmHg    Tco2 23 20 - 33 mmol/L    S02 97 93 - 99 %    Hco3 21.6 17.0 - 25.0 mmol/L    BE -2 -4 - 3 mmol/L    Body Temp 35.7 C degrees    O2 Therapy 40 %    iPF Ratio 218     Ph Temp Kim 7.459 7.400 - 7.500    Pco2 Temp Co 30.1 26.0 - 37.0 mmHg    Po2 Temp Cor 80 64 - 87 mmHg    Specimen Arterial     Action Range Triggered NO     Inst. Qualified Patient YES    ISTAT SODIUM    Collection Time: 10/17/18  9:32 AM   Result Value Ref Range    Istat Sodium 141 135 - 145 mmol/L   ISTAT POTASSIUM    Collection Time: 10/17/18  9:32 AM   Result Value Ref Range    Istat Potassium 4.1 3.6 - 5.5 mmol/L   ISTAT IONIZED CA    Collection Time: 10/17/18  9:32 AM   Result Value Ref Range    Istat Ionized Calcium 0.99 (L) 1.10 - 1.30 mmol/L   ISTAT HEMATOCRIT AND HEMOGLOBIN    Collection Time: 10/17/18  9:32 AM   Result Value Ref Range    Istat Hematocrit 35 (L) 42 - 52 %    Istat Hemoglobin 11.9 (L) 14.0 - 18.0 g/dL   ACCU-CHEK  GLUCOSE    Collection Time: 10/17/18 11:20 AM   Result Value Ref Range    Glucose - Accu-Ck 77 65 - 99 mg/dL   PROTHROMBIN TIME    Collection Time: 10/17/18  1:04 PM   Result Value Ref Range    PT 17.1 (H) 12.0 - 14.6 sec    INR 1.38 (H) 0.87 - 1.13   ACCU-CHEK GLUCOSE    Collection Time: 10/17/18  1:04 PM   Result Value Ref Range    Glucose - Accu-Ck 87 65 - 99 mg/dL   ACCU-CHEK GLUCOSE    Collection Time: 10/17/18  2:13 PM   Result Value Ref Range    Glucose - Accu-Ck 84 65 - 99 mg/dL   ACCU-CHEK GLUCOSE    Collection Time: 10/17/18  5:43 PM   Result Value Ref Range    Glucose - Accu-Ck 118 (H) 65 - 99 mg/dL   ACCU-CHEK GLUCOSE    Collection Time: 10/17/18 11:17 PM   Result Value Ref Range    Glucose - Accu-Ck 148 (H) 65 - 99 mg/dL   CBC without Differential Critical Care 0130    Collection Time: 10/18/18  4:12 AM   Result Value Ref Range    WBC 3.9 (L) 4.8 - 10.8 K/uL    RBC 2.46 (L) 4.70 - 6.10 M/uL    Hemoglobin 8.0 (L) 14.0 - 18.0 g/dL    Hematocrit 25.0 (L) 42.0 - 52.0 %    .6 (H) 81.4 - 97.8 fL    MCH 32.5 27.0 - 33.0 pg    MCHC 32.0 (L) 33.7 - 35.3 g/dL    RDW 71.5 (H) 35.9 - 50.0 fL    Platelet Count 79 (L) 164 - 446 K/uL    MPV 10.7 9.0 - 12.9 fL   Basic Metabolic Panel (BMP) Critical Care 0130    Collection Time: 10/18/18  4:12 AM   Result Value Ref Range    Sodium 141 135 - 145 mmol/L    Potassium 4.6 3.6 - 5.5 mmol/L    Chloride 106 96 - 112 mmol/L    Co2 17 (L) 20 - 33 mmol/L    Glucose 156 (H) 65 - 99 mg/dL    Bun 80 (HH) 8 - 22 mg/dL    Creatinine 2.38 (H) 0.50 - 1.40 mg/dL    Calcium 8.1 (L) 8.5 - 10.5 mg/dL    Anion Gap 18.0 (H) 0.0 - 11.9   PROTHROMBIN TIME    Collection Time: 10/18/18  4:12 AM   Result Value Ref Range    PT 17.8 (H) 12.0 - 14.6 sec    INR 1.45 (H) 0.87 - 1.13   ESTIMATED GFR    Collection Time: 10/18/18  4:12 AM   Result Value Ref Range    GFR If  32 (A) >60 mL/min/1.73 m 2    GFR If Non  27 (A) >60 mL/min/1.73 m 2   ISTAT ARTERIAL BLOOD  GAS    Collection Time: 10/18/18  4:18 AM   Result Value Ref Range    Ph 7.363 (L) 7.400 - 7.500    Pco2 33.8 26.0 - 37.0 mmHg    Po2 105 (H) 64 - 87 mmHg    Tco2 20 20 - 33 mmol/L    S02 98 93 - 99 %    Hco3 19.3 17.0 - 25.0 mmol/L    BE -6 (L) -4 - 3 mmol/L    Body Temp 36.1 C degrees    O2 Therapy 40 %    iPF Ratio 263     Ph Temp Kim 7.376 (L) 7.400 - 7.500    Pco2 Temp Co 32.5 26.0 - 37.0 mmHg    Po2 Temp Cor 99 (H) 64 - 87 mmHg    Specimen Arterial     Action Range Triggered NO     Inst. Qualified Patient YES    ACCU-CHEK GLUCOSE    Collection Time: 10/18/18  5:05 AM   Result Value Ref Range    Glucose - Accu-Ck 143 (H) 65 - 99 mg/dL       Imaging      Assessment/Plan  * Acute respiratory failure with hypoxia (HCC)   Assessment & Plan    Intubated 10/16; went to PEA arrest approx 1 min post extubation and required emergent intubation  Continue full vent support  I am adjusting vent based on abg/pulm mechanics  Rt/02 protocols  On precedex   Keep dry volume balance  When ready for extubation plan to transition directly to bipap as bridge        Thrombocytopenia (HCC)   Assessment & Plan    Suspect consumption  Repeat cbc this afternoon  Consider holding any antiplatelet/ac therapies at present        S/P TVR (tricuspid valve repair)   Assessment & Plan    S/P repair 10/16  Continue post heart protocols        S/P MVR (mitral valve repair)   Assessment & Plan    S/P repair 10/16  Continue post heart protocols  CT per CTS - monitor OP  Titrate epi to maintain adequate CO and systemic perfusion        Paroxysmal atrial fibrillation (HCC)- (present on admission)   Assessment & Plan    Currently in sinus  Keep K > 4 and Mg > 2  Monitor for need of amio        Essential hypertension, benign- (present on admission)   Assessment & Plan    Resume anti-htn meds when clinically appropriate  Currently on epinephrine infusion        Acute blood loss anemia   Assessment & Plan    S/P PRBC 10/17  F/u chest tube op, no other  obvious source of bleed clinically  Transfuse to keep > 7        CKD (chronic kidney disease) stage 3, GFR 30-59 ml/min (HCC)- (present on admission)   Assessment & Plan    Renally dose meds  Minimize nephrotoxic substances  Monitor UO - currently adequate  Ramey in place  May need renal consult given level of underlying acute on chronic conditions         Pulmonary hypertension (HCC)- (present on admission)   Assessment & Plan    Likely related to previous severe MR  Keep dry volume balance  Will consider therapy if proves difficult to extubate        Mixed hyperlipidemia- (present on admission)   Assessment & Plan    statin             VTE:  Contraindicated  Ulcer: H2 Antagonist  Lines: Central Line  Ongoing indication addressed    I have performed a physical exam and reviewed and updated ROS and Plan today (10/18/2018). In review of yesterday's note (10/17/2018), there are no changes except as documented above.     Discussed patient condition and risk of morbidity and/or mortality with RN, RT, Therapies, Pharmacy and [facility]  The patient remains critically ill.  Critical care time = 32 minutes in directly providing and coordinating critical care and extensive data review.  No time overlap and excludes procedures.

## 2018-10-18 NOTE — THERAPY
Occupational Therapy Contact Note:    POD 2 s/p OHS, pt remains intubated and sedated. Will hold OT eval and continue to monitor    Shalini Llamas OTR/JULIET  Pager: [phone]

## 2018-10-18 NOTE — CARE PLAN
Problem: Ventilation Defect:  Goal: Ability to achieve and maintain unassisted ventilation or tolerate decreased levels of ventilator support  Outcome: PROGRESSING SLOWER THAN EXPECTED    Intervention: Support and monitor invasive and noninvasive mechanical ventilation  Adult Ventilation Update    Total Vent Days: 3    Patient Lines/Drains/Airways Status    Active Airway     Name: Placement date: Placement time: Site: Days:    Airway ETT Oral 10/16/18   1908   Oral   3              #FVC / Vital Capacity (liters) : 1.1 (10/17/18 1436)  NIF (cm H2O) : -24 (10/17/18 1436)  Rapid Shallow Breathing Index (RR/VT): 40 (10/17/18 1436)     Static Compliance (ml / cm H2O): 48.9 (10/18/18 0149)    Patient failed trials because of    Barriers to SBT    Length of Weaning Trial Length of Weaning Trial (Hours): 1 hour  (10/17/18 1436)    Sputum/Suction   Cough: Productive (10/18/18 0000)  Sputum Amount: Small (10/18/18 0000)  Sputum Color: White;Clear (10/18/18 0000)  Sputum Consistency: Thin (10/18/18 0000)    Mobility  Level of Mobility: Level III (10/18/18 0000)  Activity Performed: Unable to mobilize (10/18/18 0000)  Reason Not Mobilized: Bed rest (10/18/18 0000)  Mobilization Comments: Post OHS, remains intubated  (10/18/18 0000)    Events/Summary/Plan: ABG drawn (10/18/18 4223)

## 2018-10-18 NOTE — RESPIRATORY CARE
Extubation    Cuff leak noted Yes  Stridor present No     FiO2%: 30 % (10/18/18 5456)  O2 (LPM): 40 (10/17/18 7961)     Patient toleration No complications at this time  RCP Complete? No  Events/Summary/Plan: Extubated per MD order (10/18/18 1210)

## 2018-10-18 NOTE — PROGRESS NOTES
Inpatient Anticoagulation Service Note    Date: 10/18/2018  Reason for Anticoagulation: Bioprosthetic Valve Replacement, Atrial Fibrillation   [id number] VASc Score: 3    Hemoglobin Value: (!) 8  Hematocrit Value: (!) 25  Lab Platelet Value: (!) 79  Target INR: 2.0 to 3.0    INR from last 7 days     Date/Time INR Value    10/18/18 0412 (!)  1.45    10/17/18 1304 (!)  1.38    10/16/18 1912 (!)  1.48    10/16/18 1342 (!)  1.22    10/15/18 1002 (!)  1.34        Dose from last 7 days     Date/Time Dose (mg)    10/18/18 0900  2.5    10/17/18 1200  5        Significant Interactions: Aspirin  Bridge Therapy: No     Reversal Agent Administered: Not Applicable  Comments: POD2, INR subtherapeutic, anticipate a couple days before seeing warfaring effect. INR elevated at baseline. Renal function worse today. Patient remains intubated. Minimal chest tube output with slight drop in h/h. After discussion with CT surgery, will hold dose.     Plan:  Hold  Education Material Provided?: No  Pharmacist suggested discharge dosing: TBSADA Akers, PharmD

## 2018-10-18 NOTE — PROGRESS NOTES
Cardiovascular Surgery Progress Note    Name: Jaime Tamayo  MRN: [id number]  : 1940  Admit Date: 10/16/2018  5:44 AM  Procedure:  Procedure(s) and Anesthesia Type:     * MITRAL VALVE REPAIR - General     * TRICUSPID VALVE REPAIR - General     * AORTIC VALVE REPAIR - General     * LEFT ATRIAL APPENDAGE LIGATION - General     * YUNIOR - General  2 Day Post-Op    Vitals:  Patient Vitals for the past 8 hrs:   Monitored Temp 2 SpO2 Pulse Heart Rate (Monitored) Resp NIBP   10/18/18 1216 - 91 % - 90 - -   10/18/18 1215 - 100 % 90 90 18 -   10/18/18 1210 - (!) 82 % - 91 - -   10/18/18 1000 36 °C (96.8 °F) - 90 89 14 -   10/18/18 0945 36.1 °C (97 °F) - 89 88 18 -   10/18/18 0930 36 °C (96.8 °F) - 88 88 13 -   10/18/18 0915 36 °C (96.8 °F) - 89 89 (!) 10 -   10/18/18 0900 36 °C (96.8 °F) - 89 89 14 (!) 89/54   10/18/18 0845 35.9 °C (96.6 °F) - 91 91 14 -   10/18/18 0830 35.9 °C (96.6 °F) - 90 91 16 -   10/18/18 0815 35.9 °C (96.6 °F) - 95 90 13 -   10/18/18 0800 35.9 °C (96.6 °F) - 89 91 12 (!) 91/53   10/18/18 0745 35.9 °C (96.6 °F) - 93 92 (!) 7 -   10/18/18 0730 35.9 °C (96.6 °F) - 94 92 (!) 9 -   10/18/18 0719 - 95 % - 92 - -   10/18/18 0715 35.9 °C (96.6 °F) - 92 93 (!) 27 -   10/18/18 0700 35.9 °C (96.6 °F) 95 % 91 92 (!) 11 (!) 92/56   10/18/18 0645 35.9 °C (96.6 °F) - 92 92 12 -   10/18/18 0630 35.9 °C (96.6 °F) - 93 92 12 -   10/18/18 0615 35.9 °C (96.6 °F) 97 % 86 86 (!) 11 -   10/18/18 0600 35.9 °C (96.6 °F) - 87 84 12 (!) 92/53     Temp (24hrs), Av.3 °C (97.4 °F), Min:36.3 °C (97.3 °F), Max:36.5 °C (97.7 °F)      Respiratory:  Jimenez Vent Mode: APVCMV, Rate (breaths/min): 12, PEEP/CPAP: 8, FiO2: 40, P Peak (PIP): 14, P MEAN: 9.5 Respiration: 18, Pulse Oximetry: 91 %     Chest Tube Drains:          Fluids:    Intake/Output Summary (Last 24 hours) at 10/18/18 1346  Last data filed at 10/18/18 1300   Gross per 24 hour   Intake           492.86 ml   Output             1248 ml   Net          -755.14 ml      Admit weight: Weight: 77.9 kg (171 lb 11.8 oz)  Current weight: Weight: 85 kg (187 lb 6.3 oz) (10/18/18 0400)    Labs:  Recent Labs      10/17/18   0500  10/18/18   0412  10/18/18   1255   WBC  6.7  3.9*  3.0*   RBC  2.01*  2.46*  2.24*   HEMOGLOBIN  6.9*  8.0*  7.6*   HEMATOCRIT  20.6*  25.0*  23.0*   MCV  102.5*  101.6*  102.7*   MCH  34.3*  32.5  33.9*   MCHC  33.5*  32.0*  33.0*   RDW  63.9*  71.5*  70.8*   PLATELETCT  104*  79*  82*   MPV  10.4  10.7  10.6     Recent Labs      10/18/18   1255   NEUTSPOLYS  78.80*   LYMPHOCYTES  8.10*   MONOCYTES  12.10   EOSINOPHILS  0.00   BASOPHILS  0.00   RBCMORPHOLO  Present     Recent Labs      10/16/18   1912  10/16/18   2300  10/17/18   0500  10/18/18   0412   SODIUM  139   --   142  141   POTASSIUM  5.2  3.8  4.2  4.6   CHLORIDE  106   --   106  106   CO2  18*   --   21  17*   GLUCOSE  165*   --   94  156*   BUN  76*   --   78*  80*   CREATININE  2.17*   --   2.28*  2.38*   CALCIUM  7.3*   --   7.9*  8.1*     Recent Labs      10/16/18   1342  10/16/18   1912  10/17/18   1304  10/18/18   0412   APTT  37.8*  37.3*   --    --    INR  1.22*  1.48*  1.38*  1.45*       Medications:  • aspirin  81 mg     • furosemide  40 mg     • famotidine  20 mg      Or   • famotidine  20 mg     • insulin lispro  0-15 Units     • atorvastatin  20 mg     • senna-docusate  2 Tab     • magnesium sulfate  1 g Stopped (10/16/18 1823)   • MD Alert...Warfarin per Pharmacy            Ordered Medications:    ASA - Yes    Plavix - No; contraindicated because of Other coumadin    Post-operative Beta Blockers - No; contraindicated because of High risk for heart block    Ace Inhibitor - No; contraindicated because of Other normal ef    Statin - No; contraindicated because of No cad          Central Line:  Type of line: cordis  Date of insertion: 10/16/18  Date of removal: see RN notes    Exam:   Review of Systems   Constitutional: Negative.    HENT: Negative.    Eyes: Negative.    Respiratory:  Negative.    Cardiovascular: Negative.    Gastrointestinal: Negative.    Genitourinary: Negative.    Musculoskeletal: Negative.    Skin: Negative.    Neurological: Negative.    Endo/Heme/Allergies: Negative.    Psychiatric/Behavioral: Negative.        Physical Exam   Constitutional: He appears well-developed. No distress.   Eyes: Pupils are equal, round, and reactive to light.   Neck: Normal range of motion. No JVD present.   Cardiovascular: Normal rate, regular rhythm and normal heart sounds.    Pulmonary/Chest: Effort normal. No respiratory distress. He has decreased breath sounds in the right lower field and the left lower field. He has no wheezes.   Abdominal: Soft. Bowel sounds are normal. He exhibits no distension. There is no guarding.   Genitourinary:   Genitourinary Comments: domínguez   Musculoskeletal: Normal range of motion. He exhibits edema.   Neurological: He is alert.   Skin: Skin is warm and dry.   Surgical incisions CDI   Psychiatric:   Calm and cooperative       Quality Measures:   Quality-Core Measures   Reviewed items::  Medications reviewed, EKG reviewed, Labs reviewed and Radiology images reviewed  Domínguez catheter::  Strict Intake and Output During Sepisis or Shock  Central line in place:  Need for access and Vasopressors  DVT prophylaxis pharmacological::  Contraindicated - Anemia requiring blood transfusion  DVT prophylaxis - mechanical:  SCDs  Ulcer Prophylaxis::  Yes  Assessed for rehabilitation services:  Patient returned to prior level of function, rehabilitation not indicated at this time      Assessment/Plan:  POD 1 S/P respiratory/PEA arrest last night post extubation.  Improved this AM, vented but alert and cooperative.  H/H low s/p acute blood lose anemia.  On low dose epi, swan numbers good. PLAN: Transfuse 2 units PRBC.  Vent per pulm.  Keep domínguez/CTs today.  POD 2 Extubated, HDS, SR, on low dose epi 0.03mcg for right heart support, d/c swan/cordis/CT, diurese, h/h- watch, swallow  YURIY vazquez    Patient seen, examined and plan reviewed with midlevel provider. I agree with the plan.      Active Hospital Problems    Diagnosis   • Thrombocytopenia (HCC) [D69.6]     Priority: High   • S/P MVR (mitral valve repair) [Z98.890]     Priority: High   • S/P TVR (tricuspid valve repair) [Z98.890]     Priority: High   • Paroxysmal atrial fibrillation (HCC) [I48.0]     Priority: High   • Essential hypertension, benign [I10]     Priority: Medium   • Acute blood loss anemia [D62]   • Acute respiratory failure with hypoxia (HCC) [J96.01]   • CKD (chronic kidney disease) stage 3, GFR 30-59 ml/min (HCC) [N18.3]   • Pulmonary hypertension (HCC) [I27.20]   • Mixed hyperlipidemia [E78.2]

## 2018-10-18 NOTE — PROGRESS NOTES
Extubated at 1210 with Richard, RT. Placed on nasal CPAP. Educated not to talk for at least one hour following extubation. Per MD: have speech swallow eval performed. Restraints discontinued. Vital signs stable.

## 2018-10-18 NOTE — CARE PLAN
Problem: Post op day 2 CABG/Heart Valve Replacement  Goal: Optimal care of the post op CABG/heart valve replacement post op day 2    Intervention: FSBS: when 2 consecutive BS < 130 after post op day 2, discontinue FSBS unless patient is insulin dependent diabetic  Pt on Q6H FSBS for NPO status to monitor sugars  Intervention: Daily Weights  85kg per bed scale   Intervention: Up in chair for all meals  Unable to complete pt remains intubated/sedated   Intervention: Ambulate, increasing the distance each time x 3 and before bed  Unable to complete pt remains intubated/sedated  Intervention: Stand at sink and wash up with assistance.  Clean incisions twice daily with soap and water.  Unable to complete pt remains intubated/sedated  Intervention: IS q 1 hour while awake and record best IS volume  Unable to complete pt remains intubated/sedated  Intervention: Consider pacer wire removal by MD  To be discussed with MD's during dayshift   Intervention: Consider removal of domínguez and chest tube if not already done  Pt remains intubated/sedated

## 2018-10-19 NOTE — CARE PLAN
Problem: Post op day 2 CABG/Heart Valve Replacement  Goal: Optimal care of the post op CABG/heart valve replacement post op day 2  Outcome: PROGRESSING AS EXPECTED    Intervention: FSBS: when 2 consecutive BS < 130 after post op day 2, discontinue FSBS unless patient is insulin dependent diabetic  Not met.   Intervention: Daily Weights  Complete.   Intervention: Up in chair for all meals  NPO.   Intervention: Ambulate, increasing the distance each time x 3 and before bed  Not met. Extubated today.   Intervention: Stand at sink and wash up with assistance.  Clean incisions twice daily with soap and water.  Not met. Extubated today.   Intervention: IS q 1 hour while awake and record best IS volume  Not met. Extubated today.   Intervention: Consider pacer wire removal by MD  Pacer wires capped.   Intervention: Consider removal of domínguez and chest tube if not already done  Chest tubes removed. Domínguez remains in place.

## 2018-10-19 NOTE — THERAPY
"Occupational Therapy Evaluation completed.   Functional Status:  Total A LB dressing, CGA seated grooming, Max A x2 supine<>sit, Mod Ax2 sit<>stand, Max A x2 lateral steps up bed  Plan of Care: Will benefit from Occupational Therapy 3 times per week  Discharge Recommendations:  Equipment: Will Continue to Assess for Equipment Needs. Post-acute therapy Discharge to a transitional care facility for continued skilled therapy services.    See \"Rehab Therapy-Acute\" Patient Summary Report for complete documentation.    Pt is a 77 y/o male who presents to acute s/p OHS. PMH includes arthritis, CHF, Gout, heart valve disease, HTN, Mumps, and A-fib. Pt lives w/ spouse who is available for support upon DC. At this point pt presents w/ significantly decreased balance, functional mobility activity tolerance, generalized weakness, and decreased knowledge of post op precautions in relation to BADLs. Will follow for Acute OT services while in house. Recommend DC to subacute placement for continued inpt therapy services prior to DCing home.     "

## 2018-10-19 NOTE — DISCHARGE PLANNING
Care Transition Team Assessment  Spoke to pt and wife at bedside. They are thinking about the different d/c choices. PT/OT explained pt may go to either Rehab or HH. Pt prefers HH, but wife indicates she is his only cg and just had back surgery. Pt is not able to push himself out of chairs and has no strength in his legs to stand up independently at this time.    They understand possible d/c choices are Rehab vs SNF vs HH. LSW answered all posed questions for each. They carry both Medicare and  for Life. They use both mail scripts from [facility] and [facility].    Pt reports his support network consists of his wife who lives w/ him and his two dtrs: Raissa Deleon and Brooklyn Zurita.    Information Source  Orientation : Oriented x 4  Information Given By: Patient  Informant's Name: Jamie  Who is responsible for making decisions for patient? : Patient    Readmission Evaluation  Is this a readmission?: No    Elopement Risk  Legal Hold: No  Ambulatory or Self Mobile in Wheelchair: No-Not an Elopement Risk  Elopement Risk: Not at Risk for Elopement    Interdisciplinary Discharge Planning  Does Admitting Nurse Feel This Could be a Complex Discharge?: No  Primary Care Physician: Dr. Dominguez  Lives with - Patient's Self Care Capacity: Spouse  Patient or legal guardian wants to designate a caregiver (see row info): No  Support Systems: Spouse / Significant Other  Housing / Facility: [address]  Do You Take your Prescribed Medications Regularly: Yes  Able to Return to Previous ADL's: Future Time w/Therapy  Mobility Issues: Yes  Prior Services: Home-Independent  Patient Expects to be Discharged to:: home  Assistance Needed: Unknown at this Time  Durable Medical Equipment: Not Applicable    Discharge Preparedness  What is your plan after discharge?:  (rehab vs snf vs hh)  Prior Functional Level: Ambulatory, Drives Self, Independent with Activities of Daily Living, Independent with  Medication Management  Difficulity with ADLs:  (wife puts on socks only, cant bend over )  Difficulity with IADLs:  (still drives, wife fills med minder box-too many to keep str)    Functional Assesment  Prior Functional Level: Ambulatory, Drives Self, Independent with Activities of Daily Living, Independent with Medication Management    Finances  Financial Barriers to Discharge: No  Prescription Coverage: Yes ([email or web address] on [facility] and [facility])    Vision / Hearing Impairment  Hearing Impairment: Both Ears, Hearing Device Not Available    Values / Beliefs / Concerns  Values / Beliefs Concerns : No  Spiritual Requests During Hospitalization: No    Advance Directive  Advance Directive?: DPOA for Health Care  Durable Power of  Name and Contact : Maribell philip    Domestic Abuse  Have you ever been the victim of abuse or violence?: No  Physical Abuse or Sexual Abuse: No  Verbal Abuse or Emotional Abuse: No  Possible Abuse Reported to:: Not Applicable    Psychological Assessment  History of Substance Abuse:  (reports likes his beer, has several 6 packs per week )  History of Psychiatric Problems: No         Anticipated Discharge Information  Discharge Address:  (home is [address] [place] [address] [place] [id number])  Discharge Contact Phone Number: [phone]

## 2018-10-19 NOTE — CARE PLAN
Problem: Hyperinflation:  Goal: Prevent or improve atelectasis  Outcome: PROGRESSING AS EXPECTED  Respiratory Therapy Update    Interdisciplinary Plan of Care-Goals (Indications)  Hyperinflation Protocol Indications: Chest Trauma (Blunt, Penetrative, or Surgical) (10/19/18 1459)  Interdisciplinary Plan of Care-Outcomes   Hyperinflation Protocol Goals/Outcome: Stable Vital Capacity x24 hrs and Patient Understands / uses I.S.;Increased Vital Capacity or Return to Pre-operative Values (10/19/18 1459)    #PEP/CPT (Manual) Initial: Initial (10/18/18 2008)      IS: 1000    Cough: Productive (10/19/18 1200)  Sputum Amount: Moderate (10/19/18 1200)  Sputum Color: Tan (10/19/18 1200)  Sputum Consistency: Thick (10/19/18 1200)    O2 (LPM): 1 (10/19/18 1459)  O2 Daily Delivery Respiratory : Silicone Nasal Cannula (10/19/18 1459)    Breath Sounds  Pre/Post Intervention: Post Intervention Assessment (10/18/18 2008)  RUL Breath Sounds: Clear (10/19/18 1459)  RML Breath Sounds: Diminished (10/19/18 1459)  RLL Breath Sounds: Diminished (10/19/18 1459)  HERNANDEZ Breath Sounds: Clear (10/19/18 1459)  LLL Breath Sounds: Diminished (10/19/18 1459)    Events/Summary/Plan: RT titrated patient to 1L. No complications at this time (10/19/18 1129)

## 2018-10-19 NOTE — PROGRESS NOTES
Critical Care Progress Note    Date of admission  10/16/2018    Chief Complaint  78 y.o. male admitted 10/16/2018 with severe MR and TR, s/p repair    Hospital Course    78-year-old gentleman currently on full   mechanical ventilatory support, status post valve repair and all information   taken from chart review inside out.  It appears that this gentleman has a   chronic history of severe MR, severe TR, paroxysmal atrial fibrillation, on   anticoagulation, stage III kidney disease, pulmonary hypertension with last   reported RVSP of 45, chronic hypertension and dyslipidemia.  Patient admitted   electively today for valve repair, looking at mitral, tricuspid as well as   aortic valve.  He is status post repair of the mitral and tricuspid valves.    The aortic valve on examination intraoperatively showed only mild aortic   stenosis, minimal debridement performed and patient was transferred to the   intensive care unit.  At the present time, he is on 0.04 epinephrine and on   Precedex, full mechanical ventilatory support, recovering from anesthesia with   pulmonary artery catheter in place as well as mediastinal chest tubes      Interval Problem Update  Reviewed last 24 hour events:  Tm 98.8  -225cc over last 24hr, +3.9L since admit  No cxr this am  Hb 7.1  Plat 145->104->79->81  Cr 2.28->2.38->2.84    Epi 0.03  Lasix 20 bid    2L Nc  IS  Last Bm PTA    Review of Systems  Review of Systems   Constitutional: Negative for chills, fatigue and fever.   HENT: Negative for congestion and sore throat.    Respiratory: Negative for shortness of breath.    Cardiovascular: Positive for chest pain.   Gastrointestinal: Negative for nausea and vomiting.   Neurological: Negative for weakness.   Psychiatric/Behavioral: Negative for agitation.   All other systems reviewed and are negative.       Vital Signs for last 24 hours   Temp:  [35.9 °C (96.6 °F)-36.9 °C (98.4 °F)] 36.9 °C (98.4 °F)  Pulse:  [] 96  Resp:  [7-24]  15    Hemodynamic parameters for last 24 hours  CVP:  [19 MM HG] 19 MM HG  PCWP:  [18 MM HG] 18 MM HG  CO:  [6.1] 6.1  CI:  [3.1] 3.1    Vent Settings for last 24 hours       Physical Exam   Physical Exam   Constitutional: He appears well-developed and well-nourished.   HENT:   Head: Normocephalic and atraumatic.   Eyes: Pupils are equal, round, and reactive to light. No scleral icterus.   Neck: No tracheal deviation present.   Cardiovascular: Normal rate and intact distal pulses.    Pulmonary/Chest: He has no wheezes. He has no rales.   Abdominal: Soft. There is no tenderness. There is no rebound and no guarding.   Musculoskeletal: He exhibits no edema.   Neurological: No cranial nerve deficit.   Skin: Skin is warm and dry. He is not diaphoretic.   Psychiatric: He has a normal mood and affect.   Nursing note and vitals reviewed.      Medications  Current Facility-Administered Medications   Medication Dose Route Frequency Provider Last Rate Last Dose   • aspirin (ASA) chewable tab 81 mg  81 mg Oral DAILY Amber Escobar A.P.N.   Stopped at 10/19/18 0600   • fentaNYL (SUBLIMAZE) injection 25 mcg  25 mcg Intravenous Q2HRS PRN Jayce Thompson M.D.   25 mcg at 10/18/18 1616   • furosemide (LASIX) injection 20 mg  20 mg Intravenous BID Jayce Thompson M.D.   20 mg at 10/19/18 0530   • atorvastatin (LIPITOR) tablet 20 mg  20 mg Oral Q EVENING Amber Escobar A.P.N.   Stopped at 10/18/18 1800   • Respiratory Care per Protocol   Nebulization Continuous RT Amber Escobar A.P.N.       • NS infusion   Intravenous Continuous Amber Escobar A.P.N. 10 mL/hr at 10/16/18 1400     • clevidipine (CLEVIPREX) IV emulsion  1-21 mg/hr Intravenous Continuous Amber Escobar A.P.N.   Stopped at 10/16/18 1345   • nitroglycerin 50 mg in D5W 250 ml infusion  0-100 mcg/min Intravenous Continuous Amber Escobar A.P.N.   Stopped at 10/16/18 1345   • Pharmacy Consult Request ...Pain Management Review 1 Each  1 Each Other PRN Amber OSWALD  Luz, A.P.N.       • oxyCODONE immediate-release (ROXICODONE) tablet 5 mg  5 mg Oral Q3HRS PRN Amber Escobar A.P.N.       • oxyCODONE immediate release (ROXICODONE) tablet 10 mg  10 mg Oral Q3HRS PRN Amber Escobar, A.P.N.   10 mg at 10/18/18 0508   • tramadol (ULTRAM) 50 MG tablet 50 mg  50 mg Oral Q4HRS PRN Amber Escobar, A.P.N.       • midazolam (VERSED) 2 MG/2ML injection 2 mg  2 mg Intravenous Q HOUR PRN Amber Escobar A.P.N.   2 mg at 10/17/18 1849   • dexmedetomidine (PRECEDEX) 400 mcg/100mL NS premix infusion  0-1.5 mcg/kg/hr Intravenous Continuous Amber Escobar A.P.N.   Stopped at 10/18/18 1200   • sodium bicarbonate 8.4 % injection 50 mEq  50 mEq Intravenous Q HOUR PRN Amber Escobar, A.P.N.       • morphine (pf) 4 mg/ml injection 4 mg  4 mg Intravenous Q HOUR PRN Amber Escobar A.P.N.   4 mg at 10/18/18 0247   • ondansetron (ZOFRAN) syringe/vial injection 4 mg  4 mg Intravenous Q6HRS PRN Amber Escobar, A.P.N.        Or   • prochlorperazine (COMPAZINE) injection 10 mg  10 mg Intravenous Q6HRS PRN Amber Escobar, A.P.N.        Or   • promethazine (PHENERGAN) suppository 25 mg  25 mg Rectal Q6HRS PRN Amber Escobar, A.P.N.       • acetaminophen (TYLENOL) tablet 650 mg  650 mg Oral Q4HRS PRN Amber Escobar, A.P.N.        Or   • acetaminophen (TYLENOL) suppository 650 mg  650 mg Rectal Q4HRS PRN Amber Escobar, A.P.N.       • senna-docusate (PERICOLACE or SENOKOT S) 8.6-50 MG per tablet 2 Tab  2 Tab Oral BID Amber Escobar A.P.N.   Stopped at 10/18/18 1800    And   • polyethylene glycol/lytes (MIRALAX) PACKET 1 Packet  1 Packet Oral QDAY PRN Amber Escobar, A.P.N.        And   • magnesium hydroxide (MILK OF MAGNESIA) suspension 30 mL  30 mL Oral QDAY PRN Amber Escobar, A.P.N.        And   • bisacodyl (DULCOLAX) suppository 10 mg  10 mg Rectal QDAY PRN Amber Escobar, A.P.N.       • mag hydrox-al hydrox-simeth (MAALOX PLUS ES or MYLANTA DS) suspension 30 mL  30 mL Oral Q4HRS PRN  YARED Rosenberg.P.N.       • diphenhydrAMINE (BENADRYL) tablet/capsule 25 mg  25 mg Oral HS PRN - MR X 1 JESSIE RosenbergP.N.       • electrolyte-A (PLASMALYTE-A) infusion   Intravenous PRN YARED Rosenberg.P.N.   1,000 mL at 10/16/18 2252   • Magnesium Sulfate in D5W IVPB premix 1 g  1 g Intravenous QDAY YARED Rosenberg.P.NMiriam   Stopped at 10/16/18 1823   • MD Alert...Warfarin per Pharmacy   Other pharmacy to dose YARED Rosenberg.P.N.       • HYDROcodone-acetaminophen (NORCO) 5-325 MG per tablet 1-2 Tab  1-2 Tab Oral Q4HRS PRN YARED Rosenberg.P.N.       • EPINEPHrine 4 mg in  mL Infusion  0-0.2 mcg/kg/min Intravenous Continuous Jana Deng M.D. 8.4 mL/hr at 10/19/18 0334 0.03 mcg/kg/min at 10/19/18 0334   • electrolyte-148 (PLASMALYTE-148) infusion 1,000 mL  1,000 mL Intravenous Once PRN Annie Oreilly A.P.NMiriam           Fluids    Intake/Output Summary (Last 24 hours) at 10/19/18 0715  Last data filed at 10/19/18 0600   Gross per 24 hour   Intake           742.88 ml   Output              921 ml   Net          -178.12 ml       Laboratory  Recent Results (from the past 48 hour(s))   ACCU-CHEK GLUCOSE    Collection Time: 10/17/18  9:02 AM   Result Value Ref Range    Glucose - Accu-Ck 94 65 - 99 mg/dL   ISTAT ARTERIAL BLOOD GAS    Collection Time: 10/17/18  9:32 AM   Result Value Ref Range    Ph 7.439 7.400 - 7.500    Pco2 31.9 26.0 - 37.0 mmHg    Po2 87 64 - 87 mmHg    Tco2 23 20 - 33 mmol/L    S02 97 93 - 99 %    Hco3 21.6 17.0 - 25.0 mmol/L    BE -2 -4 - 3 mmol/L    Body Temp 35.7 C degrees    O2 Therapy 40 %    iPF Ratio 218     Ph Temp Kim 7.459 7.400 - 7.500    Pco2 Temp Co 30.1 26.0 - 37.0 mmHg    Po2 Temp Cor 80 64 - 87 mmHg    Specimen Arterial     Action Range Triggered NO     Inst. Qualified Patient YES    ISTAT SODIUM    Collection Time: 10/17/18  9:32 AM   Result Value Ref Range    Istat Sodium 141 135 - 145 mmol/L   ISTAT POTASSIUM    Collection Time: 10/17/18  9:32 AM   Result  Value Ref Range    Istat Potassium 4.1 3.6 - 5.5 mmol/L   ISTAT IONIZED CA    Collection Time: 10/17/18  9:32 AM   Result Value Ref Range    Istat Ionized Calcium 0.99 (L) 1.10 - 1.30 mmol/L   ISTAT HEMATOCRIT AND HEMOGLOBIN    Collection Time: 10/17/18  9:32 AM   Result Value Ref Range    Istat Hematocrit 35 (L) 42 - 52 %    Istat Hemoglobin 11.9 (L) 14.0 - 18.0 g/dL   ACCU-CHEK GLUCOSE    Collection Time: 10/17/18 11:20 AM   Result Value Ref Range    Glucose - Accu-Ck 77 65 - 99 mg/dL   PROTHROMBIN TIME    Collection Time: 10/17/18  1:04 PM   Result Value Ref Range    PT 17.1 (H) 12.0 - 14.6 sec    INR 1.38 (H) 0.87 - 1.13   ACCU-CHEK GLUCOSE    Collection Time: 10/17/18  1:04 PM   Result Value Ref Range    Glucose - Accu-Ck 87 65 - 99 mg/dL   ACCU-CHEK GLUCOSE    Collection Time: 10/17/18  2:13 PM   Result Value Ref Range    Glucose - Accu-Ck 84 65 - 99 mg/dL   ACCU-CHEK GLUCOSE    Collection Time: 10/17/18  5:43 PM   Result Value Ref Range    Glucose - Accu-Ck 118 (H) 65 - 99 mg/dL   ACCU-CHEK GLUCOSE    Collection Time: 10/17/18 11:17 PM   Result Value Ref Range    Glucose - Accu-Ck 148 (H) 65 - 99 mg/dL   CBC without Differential Critical Care 0130    Collection Time: 10/18/18  4:12 AM   Result Value Ref Range    WBC 3.9 (L) 4.8 - 10.8 K/uL    RBC 2.46 (L) 4.70 - 6.10 M/uL    Hemoglobin 8.0 (L) 14.0 - 18.0 g/dL    Hematocrit 25.0 (L) 42.0 - 52.0 %    .6 (H) 81.4 - 97.8 fL    MCH 32.5 27.0 - 33.0 pg    MCHC 32.0 (L) 33.7 - 35.3 g/dL    RDW 71.5 (H) 35.9 - 50.0 fL    Platelet Count 79 (L) 164 - 446 K/uL    MPV 10.7 9.0 - 12.9 fL   Basic Metabolic Panel (BMP) Critical Care 0130    Collection Time: 10/18/18  4:12 AM   Result Value Ref Range    Sodium 141 135 - 145 mmol/L    Potassium 4.6 3.6 - 5.5 mmol/L    Chloride 106 96 - 112 mmol/L    Co2 17 (L) 20 - 33 mmol/L    Glucose 156 (H) 65 - 99 mg/dL    Bun 80 (HH) 8 - 22 mg/dL    Creatinine 2.38 (H) 0.50 - 1.40 mg/dL    Calcium 8.1 (L) 8.5 - 10.5 mg/dL     Anion Gap 18.0 (H) 0.0 - 11.9   PROTHROMBIN TIME    Collection Time: 10/18/18  4:12 AM   Result Value Ref Range    PT 17.8 (H) 12.0 - 14.6 sec    INR 1.45 (H) 0.87 - 1.13   ESTIMATED GFR    Collection Time: 10/18/18  4:12 AM   Result Value Ref Range    GFR If  32 (A) >60 mL/min/1.73 m 2    GFR If Non  27 (A) >60 mL/min/1.73 m 2   ISTAT ARTERIAL BLOOD GAS    Collection Time: 10/18/18  4:18 AM   Result Value Ref Range    Ph 7.363 (L) 7.400 - 7.500    Pco2 33.8 26.0 - 37.0 mmHg    Po2 105 (H) 64 - 87 mmHg    Tco2 20 20 - 33 mmol/L    S02 98 93 - 99 %    Hco3 19.3 17.0 - 25.0 mmol/L    BE -6 (L) -4 - 3 mmol/L    Body Temp 36.1 C degrees    O2 Therapy 40 %    iPF Ratio 263     Ph Temp Kim 7.376 (L) 7.400 - 7.500    Pco2 Temp Co 32.5 26.0 - 37.0 mmHg    Po2 Temp Cor 99 (H) 64 - 87 mmHg    Specimen Arterial     Action Range Triggered NO     Inst. Qualified Patient YES    ACCU-CHEK GLUCOSE    Collection Time: 10/18/18  5:05 AM   Result Value Ref Range    Glucose - Accu-Ck 143 (H) 65 - 99 mg/dL   CBC WITH DIFFERENTIAL    Collection Time: 10/18/18 12:55 PM   Result Value Ref Range    WBC 3.0 (L) 4.8 - 10.8 K/uL    RBC 2.24 (L) 4.70 - 6.10 M/uL    Hemoglobin 7.6 (L) 14.0 - 18.0 g/dL    Hematocrit 23.0 (L) 42.0 - 52.0 %    .7 (H) 81.4 - 97.8 fL    MCH 33.9 (H) 27.0 - 33.0 pg    MCHC 33.0 (L) 33.7 - 35.3 g/dL    RDW 70.8 (H) 35.9 - 50.0 fL    Platelet Count 82 (L) 164 - 446 K/uL    MPV 10.6 9.0 - 12.9 fL    Nucleated RBC 0.00 /100 WBC    NRBC (Absolute) 0.00 K/uL    Neutrophils-Polys 78.80 (H) 44.00 - 72.00 %    Lymphocytes 8.10 (L) 22.00 - 41.00 %    Monocytes 12.10 0.00 - 13.40 %    Eosinophils 0.00 0.00 - 6.90 %    Basophils 0.00 0.00 - 1.80 %    Immature Granulocytes 1.00 (H) 0.00 - 0.90 %    Lymphs (Absolute) 0.24 (L) 1.00 - 4.80 K/uL    Monos (Absolute) 0.36 0.00 - 0.85 K/uL    Eos (Absolute) 0.00 0.00 - 0.51 K/uL    Baso (Absolute) 0.00 0.00 - 0.12 K/uL    Immature Granulocytes  (abs) 0.03 0.00 - 0.11 K/uL    Neutrophils (Absolute) 2.35 1.82 - 7.42 K/uL    Anisocytosis 1+     Macrocytosis 1+     Microcytosis 1+    ACCU-CHEK GLUCOSE    Collection Time: 10/18/18 12:55 PM   Result Value Ref Range    Glucose - Accu-Ck 152 (H) 65 - 99 mg/dL   PLATELET ESTIMATE    Collection Time: 10/18/18 12:55 PM   Result Value Ref Range    Plt Estimation Decreased    MORPHOLOGY    Collection Time: 10/18/18 12:55 PM   Result Value Ref Range    RBC Morphology Present     Poikilocytosis 1+     Ovalocytes 1+    PERIPHERAL SMEAR REVIEW    Collection Time: 10/18/18 12:55 PM   Result Value Ref Range    Peripheral Smear Review see below    DIFFERENTIAL COMMENT    Collection Time: 10/18/18 12:55 PM   Result Value Ref Range    Comments-Diff see below    ACCU-CHEK GLUCOSE    Collection Time: 10/18/18  5:22 PM   Result Value Ref Range    Glucose - Accu-Ck 129 (H) 65 - 99 mg/dL   ACCU-CHEK GLUCOSE    Collection Time: 10/18/18 11:21 PM   Result Value Ref Range    Glucose - Accu-Ck 127 (H) 65 - 99 mg/dL   CBC without Differential Critical Care 0130    Collection Time: 10/19/18  4:00 AM   Result Value Ref Range    WBC 2.5 (LL) 4.8 - 10.8 K/uL    RBC 2.15 (L) 4.70 - 6.10 M/uL    Hemoglobin 7.1 (L) 14.0 - 18.0 g/dL    Hematocrit 22.2 (L) 42.0 - 52.0 %    .3 (H) 81.4 - 97.8 fL    MCH 33.0 27.0 - 33.0 pg    MCHC 32.0 (L) 33.7 - 35.3 g/dL    RDW 71.1 (H) 35.9 - 50.0 fL    Platelet Count 81 (L) 164 - 446 K/uL    MPV 11.0 9.0 - 12.9 fL   Basic Metabolic Panel (BMP) Critical Care 0130    Collection Time: 10/19/18  4:00 AM   Result Value Ref Range    Sodium 144 135 - 145 mmol/L    Potassium 4.6 3.6 - 5.5 mmol/L    Chloride 109 96 - 112 mmol/L    Co2 20 20 - 33 mmol/L    Glucose 169 (H) 65 - 99 mg/dL    Bun 91 (HH) 8 - 22 mg/dL    Creatinine 2.84 (H) 0.50 - 1.40 mg/dL    Calcium 9.2 8.5 - 10.5 mg/dL    Anion Gap 15.0 (H) 0.0 - 11.9   PROTHROMBIN TIME    Collection Time: 10/19/18  4:00 AM   Result Value Ref Range    PT 18.6 (H)  12.0 - 14.6 sec    INR 1.55 (H) 0.87 - 1.13   ESTIMATED GFR    Collection Time: 10/19/18  4:00 AM   Result Value Ref Range    GFR If  26 (A) >60 mL/min/1.73 m 2    GFR If Non African American 22 (A) >60 mL/min/1.73 m 2       Imaging      Assessment/Plan  * Acute respiratory failure with hypoxia (HCC)   Assessment & Plan    Intubated 10/16; went to PEA arrest approx 1 min post extubation and required emergent intubation  Extubated to bipap 10/18 successfully and since weaned off  Rt/02 protocols  Keep dry volume balance  IS/PEP/Mobilize        Thrombocytopenia (HCC)   Assessment & Plan    Suspect consumption  Hold any antiplatelet/ac therapy  follow        S/P TVR (tricuspid valve repair)   Assessment & Plan    S/P repair 10/16  Continue post heart protocols        S/P MVR (mitral valve repair)   Assessment & Plan    S/P repair 10/16  Continue post heart protocols  CT removed 10/18  Continue to titrate epi to maintain adequate CO and systemic perfusion        Paroxysmal atrial fibrillation (HCC)- (present on admission)   Assessment & Plan    Remains presently in sinus  Keep K > 4 and Mg > 2  Monitor for need of amio        Essential hypertension, benign- (present on admission)   Assessment & Plan    Resume anti-htn meds when clinically appropriate  Currently on epinephrine infusion        Acute blood loss anemia   Assessment & Plan    S/P PRBC 10/17  Give additional prbc today        CKD (chronic kidney disease) stage 3, GFR 30-59 ml/min ([facility])- (present on admission)   Assessment & Plan    Renally dose meds  Minimize nephrotoxic substances  Monitor UO - currently adequate  Ramey in place  Recommend renal evaluation - pt indicates he does not have a nephrologist          Pulmonary hypertension (HCC)- (present on admission)   Assessment & Plan    Likely related to previous severe MR  Keep dry volume balance  Lasix 20 bid        Mixed hyperlipidemia- (present on admission)   Assessment & Plan    statin              VTE:  Contraindicated  Ulcer: H2 Antagonist  Lines: Central Line  Ongoing indication addressed    I have performed a physical exam and reviewed and updated ROS and Plan today (10/19/2018). In review of yesterday's note (10/18/2018), there are no changes except as documented above.     Discussed patient condition and risk of morbidity and/or mortality with RN, RT, Therapies, Pharmacy and [facility]  The patient remains critically ill.  Critical care time = 34 minutes in directly providing and coordinating critical care and extensive data review.  No time overlap and excludes procedures.

## 2018-10-19 NOTE — DIETARY
Nutrition Services: Consult cardiac rehab diet education    Pt is a 78 y.o. Male with Dx: MITRAL AND TRICUSPID  REGURGITATION, Mitral valve stenosis, severe, Aortic stenosis    Admit day 3.  S/p Tricuspid valve repair, mitral valve repair, aortic valve repair, left atrial appendage ligation and intraoperative transesophageal echocardiography 10/16.  Pt required re-intubation post-op.   Pt now liberated from vent and on a cardiac, consistent carbohydrate diet.  HA1c 6.6%  Attempted diet education, but pt had 3 visitors in room.  Left handouts outside of room.    RD will F/u to discuss diet recommendations prior to D/c.

## 2018-10-19 NOTE — THERAPY
"Speech Language Therapy Clinical Swallow Evaluation completed.  Functional Status: The patient was seen for clinical swallow evaluation this date. The patient was awake, alert and oriented to self, location and reason. The patient was able to follow oral motor directives with no gross deficits appreciated and strong vocal quality. The patient was given PO trials of ice chips, nectars, purees, and soft solids. The consumed PO trials with no overt s/s of aspiration. Intermittent throat clear noted following large bites of mixed consistency solids however, cleared with introduction of double swallow strategy. At this time, recommend patient begin regular diet textures with SLP following 1-2 more times to ensure tolerance and carry-over of trained strategies.     Recommendations - Diet:Regular                          Strategies: Head of Bed at 90 Degrees                          Medication Administration: Whole with Liquid Wash    Plan of Care: Will benefit from Speech Therapy 2 times per week  Post-Acute Therapy: Currently anticipate no further skilled therapy needs once patient is discharged from the inpatient setting.    See \"Rehab Therapy-Acute\" Patient Summary Report for complete documentation.     "

## 2018-10-19 NOTE — PROGRESS NOTES
Inpatient Anticoagulation Service Note    Date: 10/19/2018  Reason for Anticoagulation: Bioprosthetic Valve Replacement, Atrial Fibrillation   [id number] VASc Score: 3    Hemoglobin Value: (!) 7.1  Hematocrit Value: (!) 22.2  Lab Platelet Value: (!) 81  Target INR: 2.0 to 3.0    INR from last 7 days     Date/Time INR Value    10/19/18 0400 (!)  1.55    10/18/18 0412 (!)  1.45    10/17/18 1304 (!)  1.38    10/16/18 1912 (!)  1.48    10/16/18 1342 (!)  1.22    10/15/18 1002 (!)  1.34        Dose from last 7 days     Date/Time Dose (mg)    10/19/18 1400  0    10/18/18 0900  0    10/17/18 1200  5        Significant Interactions: Aspirin  Bridge Therapy: No     Reversal Agent Administered: Not Applicable  Comments: Pt had warfarin held yesterday due to needing PRBC infusion. D/W CHICO Ahuja, will hold warfarin again today and reassess resuming warfarin tomorrow.    Education Material Provided?: No  Pharmacist suggested discharge dosing: RALPH Koroma, PharmD

## 2018-10-19 NOTE — PROGRESS NOTES
Cardiovascular Surgery Progress Note    Name: Jaime Tamayo  MRN: [id number]  : 1940  Admit Date: 10/16/2018  5:44 AM  Procedure:  Procedure(s) and Anesthesia Type:     * MITRAL VALVE REPAIR - General     * TRICUSPID VALVE REPAIR - General     * AORTIC VALVE REPAIR - General     * LEFT ATRIAL APPENDAGE LIGATION - General     * YUNIOR - General  3 Day Post-Op    Vitals:  Patient Vitals for the past 8 hrs:   Monitored Temp 2 SpO2 Pulse Heart Rate (Monitored) Resp NIBP Weight   10/19/18 0645 36.5 °C (97.7 °F) 99 % 96 96 15 - -   10/19/18 0630 36.5 °C (97.7 °F) 99 % 96 96 15 - -   10/19/18 0615 36.5 °C (97.7 °F) 99 % 95 95 16 - -   10/19/18 0600 36.5 °C (97.7 °F) 99 % 97 98 17 103/65 -   10/19/18 0545 36.6 °C (97.9 °F) 98 % 99 98 (!) 24 - -   10/19/18 0530 36.6 °C (97.9 °F) 100 % 97 97 17 - -   10/19/18 0515 36.6 °C (97.9 °F) 100 % 97 98 15 - -   10/19/18 0500 36.6 °C (97.9 °F) 100 % 96 93 16 111/63 -   10/19/18 0445 36.7 °C (98.1 °F) 100 % 97 97 17 - -   10/19/18 0430 36.7 °C (98.1 °F) 100 % 97 97 20 - -   10/19/18 0415 36.7 °C (98.1 °F) 100 % 96 98 18 124/66 -   10/19/18 0400 36.8 °C (98.2 °F) 100 % 98 98 19 - -   10/19/18 0345 36.8 °C (98.2 °F) 100 % 93 96 17 - -   10/19/18 0330 36.8 °C (98.2 °F) 100 % 97 99 17 - 84.9 kg (187 lb 2.7 oz)   10/19/18 0315 36.8 °C (98.2 °F) 100 % 97 95 18 - -   10/19/18 0300 36.8 °C (98.2 °F) 100 % 97 100 (!) 22 103/63 -   10/19/18 0245 36.8 °C (98.2 °F) 100 % 97 99 17 - -   10/19/18 0230 36.8 °C (98.2 °F) 100 % 97 98 18 - -   10/19/18 0215 36.8 °C (98.2 °F) 100 % 96 (!) 101 16 101/40 -   10/19/18 0200 36.8 °C (98.2 °F) 97 % 100 (!) 101 (!) 23 - -   10/19/18 0145 36.9 °C (98.4 °F) 100 % 97 98 17 - -   10/19/18 0130 36.9 °C (98.4 °F) 100 % 97 98 16 - -   10/19/18 0115 36.9 °C (98.4 °F) 100 % 96 96 16 - -   10/19/18 0100 36.9 °C (98.4 °F) 99 % 96 96 18 (!) 94/53 -   10/19/18 0045 36.9 °C (98.4 °F) 100 % 97 99 18 - -   10/19/18 0030 36.9 °C (98.4 °F) 100 % 95 96 18 - -   10/19/18  0015 36.9 °C (98.4 °F) 100 % 96 96 17 - -   10/19/18 0000 37 °C (98.6 °F) 100 % 99 99 19 104/54 -     Temp (24hrs), Av.4 °C (97.5 °F), Min:35.9 °C (96.6 °F), Max:36.9 °C (98.4 °F)      Respiratory:    Respiration: 15, Pulse Oximetry: 99 %, O2 Daily Delivery Respiratory : Silicone Nasal Cannula     Chest Tube Drains:          Fluids:    Intake/Output Summary (Last 24 hours) at 10/19/18 0751  Last data filed at 10/19/18 0600   Gross per 24 hour   Intake           742.88 ml   Output              921 ml   Net          -178.12 ml     Admit weight: Weight: 77.9 kg (171 lb 11.8 oz)  Current weight: Weight: 84.9 kg (187 lb 2.7 oz) (10/19/18 0330)    Labs:  Recent Labs      10/18/18   0412  10/18/18   1255  10/19/18   0400   WBC  3.9*  3.0*  2.5*   RBC  2.46*  2.24*  2.15*   HEMOGLOBIN  8.0*  7.6*  7.1*   HEMATOCRIT  25.0*  23.0*  22.2*   MCV  101.6*  102.7*  103.3*   MCH  32.5  33.9*  33.0   MCHC  32.0*  33.0*  32.0*   RDW  71.5*  70.8*  71.1*   PLATELETCT  79*  82*  81*   MPV  10.7  10.6  11.0     Recent Labs      10/18/18   1255   NEUTSPOLYS  78.80*   LYMPHOCYTES  8.10*   MONOCYTES  12.10   EOSINOPHILS  0.00   BASOPHILS  0.00   RBCMORPHOLO  Present     Recent Labs      10/17/18   0500  10/18/18   0412  10/19/18   0400   SODIUM  142  141  144   POTASSIUM  4.2  4.6  4.6   CHLORIDE  106  106  109   CO2  21  17*  20   GLUCOSE  94  156*  169*   BUN  78*  80*  91*   CREATININE  2.28*  2.38*  2.84*   CALCIUM  7.9*  8.1*  9.2     Recent Labs      10/16/18   1342  10/16/18   1912  10/17/18   1304  10/18/18   0412  10/19/18   0400   APTT  37.8*  37.3*   --    --    --    INR  1.22*  1.48*  1.38*  1.45*  1.55*       Medications:  • calcium CHLORIDE  1 g     • aspirin  81 mg     • atorvastatin  20 mg     • senna-docusate  2 Tab     • magnesium sulfate  1 g Stopped (10/16/18 0153)   • MD Alert...Warfarin per Pharmacy            Ordered Medications:    ASA - Yes    Plavix - No; contraindicated because of Other  coumadin    Post-operative Beta Blockers - No; contraindicated because of High risk for heart block    Ace Inhibitor - No; contraindicated because of Other normal ef    Statin - No; contraindicated because of No cad          Central Line:  Type of line: cordis  Date of insertion: 10/16/18  Date of removal: see RN notes    Exam:   Review of Systems   Constitutional: Negative.    HENT: Negative.    Eyes: Negative.    Respiratory: Negative.    Cardiovascular: Negative.    Gastrointestinal: Negative.    Genitourinary: Negative.    Musculoskeletal: Negative.    Skin: Negative.    Neurological: Negative.    Endo/Heme/Allergies: Negative.    Psychiatric/Behavioral: Negative.        Physical Exam   Constitutional: He appears well-developed. No distress.   Eyes: Pupils are equal, round, and reactive to light.   Neck: Normal range of motion. No JVD present.   Cardiovascular: Normal rate, regular rhythm and normal heart sounds.    Pulmonary/Chest: Effort normal. No respiratory distress. He has decreased breath sounds in the right lower field and the left lower field. He has no wheezes.   Abdominal: Soft. Bowel sounds are normal. He exhibits no distension. There is no guarding.   Genitourinary:   Genitourinary Comments: domínguez   Musculoskeletal: Normal range of motion. He exhibits edema.   Neurological: He is alert.   Skin: Skin is warm and dry.   Surgical incisions CDI   Psychiatric:   Calm and cooperative       Quality Measures:   Quality-Core Measures   Reviewed items::  Medications reviewed, EKG reviewed, Labs reviewed and Radiology images reviewed  Domínguez catheter::  Strict Intake and Output During Sepisis or Shock  Central line in place:  Need for access and Vasopressors  DVT prophylaxis pharmacological::  Contraindicated - Anemia requiring blood transfusion  DVT prophylaxis - mechanical:  SCDs  Ulcer Prophylaxis::  Yes  Assessed for rehabilitation services:  Patient returned to prior level of function, rehabilitation not  indicated at this time      Assessment/Plan:  POD 1 S/P respiratory/PEA arrest last night post extubation.  Improved this AM, vented but alert and cooperative.  H/H low s/p acute blood lose anemia.  On low dose epi, devon numbers good. PLAN: Transfuse 2 units PRBC.  Vent per pulm.  Keep domínguez/CTs today.  POD 2 Extubated, HDS, SR, on low dose epi 0.03mcg for right heart support, d/c swan/cordis/CT, diurese, h/h- watch, swallow eval, CPM  POD 3 HDS, SR, wean epi, d/c art line, CR elevated- UO improved with albumin- add maintenance fluids, acute blood loss anemia- transfuse PRBC, NPO- awaiting swallow eval, amb, enc IS    Patient seen, examined and plan reviewed with midlevel provider. I agree with the plan.      Active Hospital Problems    Diagnosis   • Thrombocytopenia (HCC) [D69.6]     Priority: High   • S/P MVR (mitral valve repair) [Z98.890]     Priority: High   • S/P TVR (tricuspid valve repair) [Z98.890]     Priority: High   • Paroxysmal atrial fibrillation (HCC) [I48.0]     Priority: High   • Essential hypertension, benign [I10]     Priority: Medium   • Acute blood loss anemia [D62]   • Acute respiratory failure with hypoxia (HCC) [J96.01]   • CKD (chronic kidney disease) stage 3, GFR 30-59 ml/min (HCC) [N18.3]   • Pulmonary hypertension (HCC) [I27.20]   • Mixed hyperlipidemia [E78.2]

## 2018-10-19 NOTE — CARE PLAN
Problem: Ventilation Defect:  Goal: Ability to achieve and maintain unassisted ventilation or tolerate decreased levels of ventilator support  PEP QID/AY=[id number]

## 2018-10-19 NOTE — CARE PLAN
Problem: Safety  Goal: Will remain free from injury  Bed alarm active and audible. Hourly rounds maintained.     Problem: Fluid Volume:  Goal: Will maintain balanced intake and output  Pt c/o extreme thirst. He states that he is very dry.    Problem: Respiratory:  Goal: Respiratory status will improve  Pt has done well post extubation. Sats are good on 2L NC.

## 2018-10-19 NOTE — CARE PLAN
Problem: Hyperinflation:  Goal: Prevent or improve atelectasis  PEP QID pt pulled 100   [id number]

## 2018-10-19 NOTE — THERAPY
"Physical Therapy Evaluation completed.   Bed Mobility:  Supine to Sit: Maximal Assist  Transfers: Sit to Stand: Moderate Assist (x2)  Gait: Level Of Assist: Unable to Participate (side stepping at EOB) with No Equipment Needed       Plan of Care: Will benefit from Physical Therapy 3 times per week  Discharge Recommendations: Equipment: Will Continue to Assess for Equipment Needs. Post-acute therapy Discharge to a transitional care facility for continued skilled therapy services prior to DC home.    See \"Rehab Therapy-Acute\" Patient Summary Report for complete documentation.     "

## 2018-10-20 NOTE — CARE PLAN
Problem: Fluid Volume:  Goal: Will maintain balanced intake and output  Pt maintained good urine output overnight.    Problem: Mobility  Goal: Risk for activity intolerance will decrease  Pt gets tired and SOB when sitting at edge of bed.

## 2018-10-20 NOTE — RESPIRATORY CARE
COPD EDUCATION by COPD CLINICAL EDUCATOR  10/20/2018 at 12:29 PM by Lorelei Sam     Patient reviewed by COPD education team. Patient does not qualify for COPD program.

## 2018-10-20 NOTE — PROGRESS NOTES
Critical Care Progress Note    Date of admission  10/16/2018    Chief Complaint  78 y.o. male admitted 10/16/2018 with severe MR and TR, s/p repair    Hospital Course    78-year-old gentleman currently on full   mechanical ventilatory support, status post valve repair and all information   taken from chart review inside out.  It appears that this gentleman has a   chronic history of severe MR, severe TR, paroxysmal atrial fibrillation, on   anticoagulation, stage III kidney disease, pulmonary hypertension with last   reported RVSP of 45, chronic hypertension and dyslipidemia.  Patient admitted   electively today for valve repair, looking at mitral, tricuspid as well as   aortic valve.  He is status post repair of the mitral and tricuspid valves.    The aortic valve on examination intraoperatively showed only mild aortic   stenosis, minimal debridement performed and patient was transferred to the   intensive care unit.  At the present time, he is on 0.04 epinephrine and on   Precedex, full mechanical ventilatory support, recovering from anesthesia with   pulmonary artery catheter in place as well as mediastinal chest tubes      Interval Problem Update  Reviewed last 24 hour events:  Tm 97.7  +1.2L over last 24hr, +5.2L since admit  No cxr this am  Hb 8.7  Plat 145->104->79->81->76  Cr 2.28->2.38->2.84->2.77  Received 2 units of prbcs yesterday    NS @ 50  Lasix 20 once    2L Nc  IS  Last Bm PTA    Review of Systems  Review of Systems   Constitutional: Negative for chills, fatigue and fever.   HENT: Negative for congestion and sore throat.    Respiratory: Negative for shortness of breath.    Cardiovascular: Positive for chest pain.   Gastrointestinal: Negative for nausea and vomiting.   Neurological: Negative for weakness.   Psychiatric/Behavioral: Negative for agitation.   All other systems reviewed and are negative.       Vital Signs for last 24 hours   Temp:  [36.2 °C (97.2 °F)-36.5 °C (97.7 °F)] 36.2 °C (97.2  °F)  Pulse:  [] 89  Resp:  [10-29] 13  BP: ()/(60-64) 123/64    Hemodynamic parameters for last 24 hours       Vent Settings for last 24 hours       Physical Exam   Physical Exam   Constitutional: He appears well-developed and well-nourished.   HENT:   Head: Normocephalic and atraumatic.   Eyes: Pupils are equal, round, and reactive to light. No scleral icterus.   Neck: No tracheal deviation present.   Cardiovascular: Normal rate and intact distal pulses.    Pulmonary/Chest: He has no wheezes. He has no rales.   Abdominal: Soft. There is no tenderness. There is no rebound and no guarding.   Musculoskeletal: He exhibits edema.   Neurological: No cranial nerve deficit.   Skin: Skin is warm and dry.   Psychiatric: He has a normal mood and affect.   Nursing note and vitals reviewed.      Medications  Current Facility-Administered Medications   Medication Dose Route Frequency Provider Last Rate Last Dose   • furosemide (LASIX) injection 20 mg  20 mg Intravenous Once Amber Escobar A.P.N.       • NS infusion   Intravenous Continuous Amber Escobar A.P.N. 50 mL/hr at 10/20/18 0146     • aspirin (ASA) chewable tab 81 mg  81 mg Oral DAILY Amber Escobar A.P.N.   81 mg at 10/20/18 0600   • fentaNYL (SUBLIMAZE) injection 25 mcg  25 mcg Intravenous Q2HRS PRN Jayce Thompson M.D.   25 mcg at 10/18/18 1616   • atorvastatin (LIPITOR) tablet 20 mg  20 mg Oral Q EVENING Amber Escobar A.P.N.   20 mg at 10/19/18 1735   • Respiratory Care per Protocol   Nebulization Continuous RT Amber Escobar A.P.N.       • Pharmacy Consult Request ...Pain Management Review 1 Each  1 Each Other PRN Amber Escobar A.P.N.       • oxyCODONE immediate-release (ROXICODONE) tablet 5 mg  5 mg Oral Q3HRS PRN Amber Escobar A.P.N.   5 mg at 10/19/18 1313   • oxyCODONE immediate release (ROXICODONE) tablet 10 mg  10 mg Oral Q3HRS PRN Amber Escobar A.P.N.   10 mg at 10/19/18 1812   • tramadol (ULTRAM) 50 MG tablet 50 mg  50 mg  Oral Q4HRS PRN Amber Escobar, A.P.N.       • sodium bicarbonate 8.4 % injection 50 mEq  50 mEq Intravenous Q HOUR PRN Amber Escobar, A.P.N.       • ondansetron (ZOFRAN) syringe/vial injection 4 mg  4 mg Intravenous Q6HRS PRN Amber Escobar, A.P.N.        Or   • prochlorperazine (COMPAZINE) injection 10 mg  10 mg Intravenous Q6HRS PRN Amber Escobar, A.P.N.        Or   • promethazine (PHENERGAN) suppository 25 mg  25 mg Rectal Q6HRS PRN Amber Escobar, A.P.N.       • acetaminophen (TYLENOL) tablet 650 mg  650 mg Oral Q4HRS PRN Amber Escobar, A.P.N.        Or   • acetaminophen (TYLENOL) suppository 650 mg  650 mg Rectal Q4HRS PRN Amber Escobar, A.P.N.       • senna-docusate (PERICOLACE or SENOKOT S) 8.6-50 MG per tablet 2 Tab  2 Tab Oral BID Amber Escobar, A.P.N.   2 Tab at 10/20/18 0600    And   • polyethylene glycol/lytes (MIRALAX) PACKET 1 Packet  1 Packet Oral QDAY PRN Amber Escobar, A.P.N.        And   • magnesium hydroxide (MILK OF MAGNESIA) suspension 30 mL  30 mL Oral QDAY PRN Amber Escobar, A.P.N.        And   • bisacodyl (DULCOLAX) suppository 10 mg  10 mg Rectal QDAY PRN Amber Escobar, A.P.N.       • mag hydrox-al hydrox-simeth (MAALOX PLUS ES or MYLANTA DS) suspension 30 mL  30 mL Oral Q4HRS PRN Amber Escobar, A.P.N.       • diphenhydrAMINE (BENADRYL) tablet/capsule 25 mg  25 mg Oral HS PRN - MR X 1 Amber Escobar, A.P.N.       • electrolyte-A (PLASMALYTE-A) infusion   Intravenous PRN Amber Escobar, A.P.N.   1,000 mL at 10/16/18 6352   • MD Alert...Warfarin per Pharmacy   Other pharmacy to dose YARED Rosenberg.P.N.       • HYDROcodone-acetaminophen (NORCO) 5-325 MG per tablet 1-2 Tab  1-2 Tab Oral Q4HRS PRN YARED Rosenberg.P.N.       • electrolyte-148 (PLASMALYTE-148) infusion 1,000 mL  1,000 mL Intravenous Once PRN JESSIE OseiP.NMiriam           Fluids    Intake/Output Summary (Last 24 hours) at 10/20/18 0713  Last data filed at 10/20/18 0600   Gross per 24 hour   Intake           2537.23 ml   Output             1265 ml   Net          1272.23 ml       Laboratory  Recent Results (from the past 48 hour(s))   CBC WITH DIFFERENTIAL    Collection Time: 10/18/18 12:55 PM   Result Value Ref Range    WBC 3.0 (L) 4.8 - 10.8 K/uL    RBC 2.24 (L) 4.70 - 6.10 M/uL    Hemoglobin 7.6 (L) 14.0 - 18.0 g/dL    Hematocrit 23.0 (L) 42.0 - 52.0 %    .7 (H) 81.4 - 97.8 fL    MCH 33.9 (H) 27.0 - 33.0 pg    MCHC 33.0 (L) 33.7 - 35.3 g/dL    RDW 70.8 (H) 35.9 - 50.0 fL    Platelet Count 82 (L) 164 - 446 K/uL    MPV 10.6 9.0 - 12.9 fL    Nucleated RBC 0.00 /100 WBC    NRBC (Absolute) 0.00 K/uL    Neutrophils-Polys 78.80 (H) 44.00 - 72.00 %    Lymphocytes 8.10 (L) 22.00 - 41.00 %    Monocytes 12.10 0.00 - 13.40 %    Eosinophils 0.00 0.00 - 6.90 %    Basophils 0.00 0.00 - 1.80 %    Immature Granulocytes 1.00 (H) 0.00 - 0.90 %    Lymphs (Absolute) 0.24 (L) 1.00 - 4.80 K/uL    Monos (Absolute) 0.36 0.00 - 0.85 K/uL    Eos (Absolute) 0.00 0.00 - 0.51 K/uL    Baso (Absolute) 0.00 0.00 - 0.12 K/uL    Immature Granulocytes (abs) 0.03 0.00 - 0.11 K/uL    Neutrophils (Absolute) 2.35 1.82 - 7.42 K/uL    Anisocytosis 1+     Macrocytosis 1+     Microcytosis 1+    ACCU-CHEK GLUCOSE    Collection Time: 10/18/18 12:55 PM   Result Value Ref Range    Glucose - Accu-Ck 152 (H) 65 - 99 mg/dL   PLATELET ESTIMATE    Collection Time: 10/18/18 12:55 PM   Result Value Ref Range    Plt Estimation Decreased    MORPHOLOGY    Collection Time: 10/18/18 12:55 PM   Result Value Ref Range    RBC Morphology Present     Poikilocytosis 1+     Ovalocytes 1+    PERIPHERAL SMEAR REVIEW    Collection Time: 10/18/18 12:55 PM   Result Value Ref Range    Peripheral Smear Review see below    DIFFERENTIAL COMMENT    Collection Time: 10/18/18 12:55 PM   Result Value Ref Range    Comments-Diff see below    ACCU-CHEK GLUCOSE    Collection Time: 10/18/18  5:22 PM   Result Value Ref Range    Glucose - Accu-Ck 129 (H) 65 - 99 mg/dL   ACCU-CHEK GLUCOSE     Collection Time: 10/18/18 11:21 PM   Result Value Ref Range    Glucose - Accu-Ck 127 (H) 65 - 99 mg/dL   CBC without Differential Critical Care 0130    Collection Time: 10/19/18  4:00 AM   Result Value Ref Range    WBC 2.5 (LL) 4.8 - 10.8 K/uL    RBC 2.15 (L) 4.70 - 6.10 M/uL    Hemoglobin 7.1 (L) 14.0 - 18.0 g/dL    Hematocrit 22.2 (L) 42.0 - 52.0 %    .3 (H) 81.4 - 97.8 fL    MCH 33.0 27.0 - 33.0 pg    MCHC 32.0 (L) 33.7 - 35.3 g/dL    RDW 71.1 (H) 35.9 - 50.0 fL    Platelet Count 81 (L) 164 - 446 K/uL    MPV 11.0 9.0 - 12.9 fL   Basic Metabolic Panel (BMP) Critical Care 0130    Collection Time: 10/19/18  4:00 AM   Result Value Ref Range    Sodium 144 135 - 145 mmol/L    Potassium 4.6 3.6 - 5.5 mmol/L    Chloride 109 96 - 112 mmol/L    Co2 20 20 - 33 mmol/L    Glucose 169 (H) 65 - 99 mg/dL    Bun 91 (HH) 8 - 22 mg/dL    Creatinine 2.84 (H) 0.50 - 1.40 mg/dL    Calcium 9.2 8.5 - 10.5 mg/dL    Anion Gap 15.0 (H) 0.0 - 11.9   PROTHROMBIN TIME    Collection Time: 10/19/18  4:00 AM   Result Value Ref Range    PT 18.6 (H) 12.0 - 14.6 sec    INR 1.55 (H) 0.87 - 1.13   ESTIMATED GFR    Collection Time: 10/19/18  4:00 AM   Result Value Ref Range    GFR If  26 (A) >60 mL/min/1.73 m 2    GFR If Non African American 22 (A) >60 mL/min/1.73 m 2   COD (ADULT)    Collection Time: 10/19/18  4:00 AM   Result Value Ref Range    ABO Grouping Only O     Rh Grouping Only POS     Antibody Screen-Cod NEG    COMPONENT CELLULAR    Collection Time: 10/19/18  4:00 AM   Result Value Ref Range    Component R       R3                  Red Blood Cells3    [id number]   transfused   10/19/18   09:03      Product Type Red Blood Cells LR Pheresis     Dispense Status Transfused     Unit Number (Barcoded) [id number]     Product Code (Barcoded) V1732G66     Blood Type (Barcoded) 5100     Component R       R3                  Red Blood Cells3    [id number]   transfused   10/19/18   15:52      Product Type Red Blood  Cells LR Pheresis     Dispense Status Transfused     Unit Number (Barcoded) [id number]     Product Code (Barcoded) O2884I72     Blood Type (Barcoded) 5100    MAGNESIUM    Collection Time: 10/19/18  2:08 PM   Result Value Ref Range    Magnesium 2.2 1.5 - 2.5 mg/dL   HEMOGLOBIN AND HEMATOCRIT    Collection Time: 10/19/18  2:08 PM   Result Value Ref Range    Hemoglobin 8.0 (L) 14.0 - 18.0 g/dL    Hematocrit 25.3 (L) 42.0 - 52.0 %   CBC without Differential Critical Care 0130    Collection Time: 10/20/18  5:35 AM   Result Value Ref Range    WBC 2.6 (L) 4.8 - 10.8 K/uL    RBC 2.62 (L) 4.70 - 6.10 M/uL    Hemoglobin 8.7 (L) 14.0 - 18.0 g/dL    Hematocrit 25.9 (L) 42.0 - 52.0 %    MCV 98.9 (H) 81.4 - 97.8 fL    MCH 33.2 (H) 27.0 - 33.0 pg    MCHC 33.6 (L) 33.7 - 35.3 g/dL    RDW 70.9 (H) 35.9 - 50.0 fL    Platelet Count 76 (L) 164 - 446 K/uL    MPV 10.9 9.0 - 12.9 fL   Basic Metabolic Panel (BMP) Critical Care 0130    Collection Time: 10/20/18  5:35 AM   Result Value Ref Range    Sodium 144 135 - 145 mmol/L    Potassium 4.3 3.6 - 5.5 mmol/L    Chloride 110 96 - 112 mmol/L    Co2 24 20 - 33 mmol/L    Glucose 163 (H) 65 - 99 mg/dL    Bun 92 (HH) 8 - 22 mg/dL    Creatinine 2.77 (H) 0.50 - 1.40 mg/dL    Calcium 9.4 8.5 - 10.5 mg/dL    Anion Gap 10.0 0.0 - 11.9   PROTHROMBIN TIME    Collection Time: 10/20/18  5:35 AM   Result Value Ref Range    PT 16.7 (H) 12.0 - 14.6 sec    INR 1.34 (H) 0.87 - 1.13   ESTIMATED GFR    Collection Time: 10/20/18  5:35 AM   Result Value Ref Range    GFR If  27 (A) >60 mL/min/1.73 m 2    GFR If Non African American 22 (A) >60 mL/min/1.73 m 2       Imaging      Assessment/Plan  * Acute respiratory failure with hypoxia (HCC)   Assessment & Plan    Intubated 10/16; went to PEA arrest approx 1 min post extubation and required emergent intubation  Extubated to bipap 10/18 successfully and since weaned off  Rt/02 protocols  Keep dry volume balance  IS/PEP/Mobilize         Thrombocytopenia (HCC)   Assessment & Plan    Suspect consumption  Will also have significant qualitative disorder given bun of 90s + asa  follow        S/P TVR (tricuspid valve repair)   Assessment & Plan    S/P repair 10/16  Continue post heart protocols        S/P MVR (mitral valve repair)   Assessment & Plan    S/P repair 10/16  Continue post heart protocols  CT removed 10/18          Paroxysmal atrial fibrillation (HCC)- (present on admission)   Assessment & Plan    Remains presently in sinus  Keep K > 4 and Mg > 2  Monitor for need of amio        Essential hypertension, benign- (present on admission)   Assessment & Plan    Resume anti-htn meds when clinically appropriate  Off epinephrine         Acute blood loss anemia   Assessment & Plan    S/P PRBC 10/17  S/P 2u PRBCs 10/19  Hb 8.7 presently and holding        CKD (chronic kidney disease) stage 3, GFR 30-59 ml/min ([facility])- (present on admission)   Assessment & Plan    Baseline Cr 1.8  Renally dose meds  Minimize nephrotoxic substances  Monitor UO - currently adequate  Ramey in place  Recommend renal evaluation - pt indicates he does not have a nephrologist            Pulmonary hypertension (HCC)- (present on admission)   Assessment & Plan    Likely related to previous severe MR  Keep dry volume balance          Mixed hyperlipidemia- (present on admission)   Assessment & Plan    statin             VTE:  Contraindicated  Ulcer: Not Indicated  Lines: Central Line  Ongoing indication addressed    I have performed a physical exam and reviewed and updated ROS and Plan today (10/20/2018). In review of yesterday's note (10/19/2018), there are no changes except as documented above.     Discussed patient condition and risk of morbidity and/or mortality with RN, RT, Therapies, Pharmacy and [facility]

## 2018-10-20 NOTE — CARE PLAN
Problem: Hyperinflation:  Goal: Prevent or improve atelectasis    Intervention: Instruct incentive spirometry usage  PEP QID, pt pulled 1000

## 2018-10-20 NOTE — CARE PLAN
Problem: Hyperinflation:  Goal: Prevent or improve atelectasis  Outcome: PROGRESSING AS EXPECTED  Respiratory Therapy Update    Interdisciplinary Plan of Care-Goals (Indications)  Hyperinflation Protocol Indications: Chest Trauma (Blunt, Penetrative, or Surgical) (10/20/18 1635)  Interdisciplinary Plan of Care-Outcomes   Hyperinflation Protocol Goals/Outcome: Stable Vital Capacity x24 hrs and Patient Understands / uses I.S. (10/20/18 1635)    #PEP/CPT (Manual) Initial: Initial (10/18/18 2008)      IS: 750    Cough: Congested (10/20/18 1635)  Sputum Amount: Unable to Evaluate (10/20/18 1635)  Sputum Color: Unable to Evaluate (10/20/18 1635)  Sputum Consistency: Unable to Evaluate (10/20/18 1635)    O2 (LPM): 6 (10/20/18 1635)  O2 Daily Delivery Respiratory : OxyMask (10/20/18 1635)    Breath Sounds  Pre/Post Intervention: Post Intervention Assessment (10/19/18 2014)  RUL Breath Sounds: Clear (10/20/18 1635)  RML Breath Sounds: Diminished (10/20/18 1635)  RLL Breath Sounds: Diminished (10/20/18 1635)  HERNANDEZ Breath Sounds: Clear (10/20/18 1635)  LLL Breath Sounds: Diminished (10/20/18 1635)

## 2018-10-20 NOTE — PROGRESS NOTES
Cardiovascular Surgery Progress Note    Name: Jaime Tamayo  MRN: [id number]  : 1940  Admit Date: 10/16/2018  5:44 AM  Procedure:  Procedure(s) and Anesthesia Type:     * MITRAL VALVE REPAIR - General     * TRICUSPID VALVE REPAIR - General     * AORTIC VALVE REPAIR - General     * LEFT ATRIAL APPENDAGE LIGATION - General     * YUNIOR - General  4 Day Post-Op    Vitals:  Patient Vitals for the past 8 hrs:   Monitored Temp 2 SpO2 O2 Delivery O2 (LPM) Pulse Heart Rate (Monitored) Resp NIBP Weight   10/20/18 0846 - 95 % - 2 90 91 17 - -   10/20/18 0400 36.4 °C (97.5 °F) 95 % Nasal Cannula 2 89 91 13 108/63 84.7 kg (186 lb 11.7 oz)   10/20/18 0200 36.3 °C (97.3 °F) 96 % - - 90 90 12 122/73 -     Temp (24hrs), Av.3 °C (97.3 °F), Min:36.2 °C (97.2 °F), Max:36.4 °C (97.5 °F)      Respiratory:    Respiration: 17, Pulse Oximetry: 95 %, O2 Daily Delivery Respiratory : Silicone Nasal Cannula     Chest Tube Drains:          Fluids:    Intake/Output Summary (Last 24 hours) at 10/20/18 0933  Last data filed at 10/20/18 0600   Gross per 24 hour   Intake          2520.43 ml   Output             1040 ml   Net          1480.43 ml     Admit weight: Weight: 77.9 kg (171 lb 11.8 oz)  Current weight: Weight: 84.7 kg (186 lb 11.7 oz) (10/20/18 0400)    Labs:  Recent Labs      10/18/18   1255  10/19/18   0400  10/19/18   1408  10/20/18   0535   WBC  3.0*  2.5*   --   2.6*   RBC  2.24*  2.15*   --   2.62*   HEMOGLOBIN  7.6*  7.1*  8.0*  8.7*   HEMATOCRIT  23.0*  22.2*  25.3*  25.9*   MCV  102.7*  103.3*   --   98.9*   MCH  33.9*  33.0   --   33.2*   MCHC  33.0*  32.0*   --   33.6*   RDW  70.8*  71.1*   --   70.9*   PLATELETCT  82*  81*   --   76*   MPV  10.6  11.0   --   10.9     Recent Labs      10/18/18   1255   NEUTSPOLYS  78.80*   LYMPHOCYTES  8.10*   MONOCYTES  12.10   EOSINOPHILS  0.00   BASOPHILS  0.00   RBCMORPHOLO  Present     Recent Labs      10/18/18   0412  10/19/18   0400  10/20/18   0535   SODIUM  141  144  144    POTASSIUM  4.6  4.6  4.3   CHLORIDE  106  109  110   CO2  17*  20  24   GLUCOSE  156*  169*  163*   BUN  80*  91*  92*   CREATININE  2.38*  2.84*  2.77*   CALCIUM  8.1*  9.2  9.4     Recent Labs      10/18/18   0412  10/19/18   0400  10/20/18   0535   INR  1.45*  1.55*  1.34*       Medications:  • aspirin  81 mg     • atorvastatin  20 mg     • senna-docusate  2 Tab     • MD Alert...Warfarin per Pharmacy            Ordered Medications:    ASA - Yes    Plavix - No; contraindicated because of Other coumadin    Post-operative Beta Blockers - No; contraindicated because of High risk for heart block    Ace Inhibitor - No; contraindicated because of Other normal ef    Statin - No; contraindicated because of No cad          Central Line:  Type of line: cordis  Date of insertion: 10/16/18  Date of removal: see RN notes    Exam:   Review of Systems   Constitutional: Negative.    HENT: Negative.    Eyes: Negative.    Respiratory: Negative.    Cardiovascular: Negative.    Gastrointestinal: Negative.    Genitourinary: Negative.    Musculoskeletal: Negative.    Skin: Negative.    Neurological: Negative.    Endo/Heme/Allergies: Negative.    Psychiatric/Behavioral: Negative.        Physical Exam   Constitutional: He appears well-developed. No distress.   Eyes: Pupils are equal, round, and reactive to light.   Neck: Normal range of motion. No JVD present.   Cardiovascular: Normal rate, regular rhythm and normal heart sounds.    Pulmonary/Chest: Effort normal. No respiratory distress. He has decreased breath sounds in the right lower field and the left lower field. He has no wheezes.   Abdominal: Soft. Bowel sounds are normal. He exhibits no distension. There is no guarding.   Genitourinary:   Genitourinary Comments: domínguez   Musculoskeletal: Normal range of motion. He exhibits edema.   Neurological: He is alert.   Skin: Skin is warm and dry.   Surgical incisions CDI   Psychiatric:   Calm and cooperative       Quality Measures:    Quality-Core Measures   Reviewed items::  Medications reviewed, EKG reviewed, Labs reviewed and Radiology images reviewed  Domínguez catheter::  Strict Intake and Output During Sepisis or Shock  Central line in place:  Need for access and Vasopressors  DVT prophylaxis pharmacological::  Contraindicated - Anemia requiring blood transfusion  DVT prophylaxis - mechanical:  SCDs  Ulcer Prophylaxis::  Yes  Assessed for rehabilitation services:  Patient returned to prior level of function, rehabilitation not indicated at this time      Assessment/Plan:  POD 1 S/P respiratory/PEA arrest last night post extubation.  Improved this AM, vented but alert and cooperative.  H/H low s/p acute blood lose anemia.  On low dose epi, swan numbers good. PLAN: Transfuse 2 units PRBC.  Vent per pulm.  Keep domínguez/CTs today.  POD 2 Extubated, HDS, SR, on low dose epi 0.03mcg for right heart support, d/c swan/cordis/CT, diurese, h/h- watch, swallow eval, CPM  POD 3 HDS, SR, wean epi, d/c art line, CR elevated- UO improved with albumin- add maintenance fluids, acute blood loss anemia- transfuse PRBC, NPO- awaiting swallow eval, amb, enc IS  POD 4 HDS, SR- off epi- d/c pacer wires, CR stable- start gently diuresis, Diet ordered- d/c maintenance fluids, work on mobility, enc IS    Patient seen, examined and plan reviewed with midlevel provider. I agree with the plan.      Active Hospital Problems    Diagnosis   • Thrombocytopenia (HCC) [D69.6]     Priority: High   • S/P MVR (mitral valve repair) [Z98.890]     Priority: High   • S/P TVR (tricuspid valve repair) [Z98.890]     Priority: High   • Paroxysmal atrial fibrillation (HCC) [I48.0]     Priority: High   • Essential hypertension, benign [I10]     Priority: Medium   • Acute blood loss anemia [D62]   • Acute respiratory failure with hypoxia (HCC) [J96.01]   • CKD (chronic kidney disease) stage 3, GFR 30-59 ml/min (HCC) [N18.3]   • Pulmonary hypertension (HCC) [I27.20]   • Mixed hyperlipidemia  [E78.2]

## 2018-10-20 NOTE — PROGRESS NOTES
Inpatient Anticoagulation Service Note    Date: 10/20/2018  Reason for Anticoagulation: Bioprosthetic Valve Replacement, Atrial Fibrillation   [id number] VASc Score: 3    Hemoglobin Value: (!) 8.7  Hematocrit Value: (!) 25.9  Lab Platelet Value: (!) 76  Target INR: 2.0 to 3.0    INR from last 7 days     Date/Time INR Value    10/20/18 0535 (!)  1.34    10/19/18 0400 (!)  1.55    10/18/18 0412 (!)  1.45    10/17/18 1304 (!)  1.38    10/16/18 1912 (!)  1.48    10/16/18 1342 (!)  1.22    10/15/18 1002 (!)  1.34        Dose from last 7 days     Date/Time Dose (mg)    10/20/18 1200  5    10/19/18 1400  0    10/18/18 0900  0    10/17/18 1200  5        Significant Interactions: Aspirin  Bridge Therapy: No     Comments: Warfarin has been held the last 2 days for low H/H and blood infusion. Per YOGI Clark APN, OK to resume warfarin dosing. Will start with warfarin 5 mg daily and trend the INR.    Education Material Provided?: No  Pharmacist suggested discharge dosing: warfarin 5 mg daily for now     Doni Koroma, PharmD

## 2018-10-21 NOTE — PROGRESS NOTES
2 RN assessment completed with Yefri.  Patient has chest midline incision and bilateral upper extremity bruising.   Mepilex placed. Incision cleansed and open to air.   Waffle cushion overlay in use.

## 2018-10-21 NOTE — PROGRESS NOTES
Critical Care Progress Note    Date of admission  10/16/2018    Chief Complaint  78 y.o. male admitted 10/16/2018 with severe MR and TR, s/p repair    Hospital Course    78-year-old gentleman currently on full   mechanical ventilatory support, status post valve repair and all information   taken from chart review inside out.  It appears that this gentleman has a   chronic history of severe MR, severe TR, paroxysmal atrial fibrillation, on   anticoagulation, stage III kidney disease, pulmonary hypertension with last   reported RVSP of 45, chronic hypertension and dyslipidemia.  Patient admitted   electively today for valve repair, looking at mitral, tricuspid as well as   aortic valve.  He is status post repair of the mitral and tricuspid valves.    The aortic valve on examination intraoperatively showed only mild aortic   stenosis, minimal debridement performed and patient was transferred to the   intensive care unit.  At the present time, he is on 0.04 epinephrine and on   Precedex, full mechanical ventilatory support, recovering from anesthesia with   pulmonary artery catheter in place as well as mediastinal chest tubes      Interval Problem Update  Reviewed last 24 hour events:  Tm 97.9  +1.2L over last 24hr, +6.4L since admit  2v cxr with b/l pleural effusions + atx  Hb 8.1  Plat 81->76->79  Cr 2.84->2.77->2.65    Amiodarone infusion  NS @ 50    3L Nc (was up to 6L overnight)  IS  Last Bm PTA    Review of Systems  Review of Systems   Constitutional: Negative for chills, fatigue and fever.   HENT: Negative for congestion and sore throat.    Respiratory: Negative for shortness of breath.    Cardiovascular: Positive for chest pain.   Gastrointestinal: Negative for nausea and vomiting.   Neurological: Negative for weakness.   Psychiatric/Behavioral: Negative for agitation.   All other systems reviewed and are negative.       Vital Signs for last 24 hours   Temp:  [36.3 °C (97.4 °F)-36.7 °C (98.1 °F)] 36.7 °C (98.1  °F)  Pulse:  [] 87  Resp:  [12-24] 16    Hemodynamic parameters for last 24 hours       Vent Settings for last 24 hours       Physical Exam   Physical Exam   Constitutional: He appears well-developed and well-nourished.   HENT:   Head: Normocephalic and atraumatic.   Eyes: Pupils are equal, round, and reactive to light. No scleral icterus.   Neck: No tracheal deviation present.   Cardiovascular: Normal rate and intact distal pulses.    Pulmonary/Chest: He has no wheezes. He has no rales.   Abdominal: Soft. There is no tenderness. There is no rebound and no guarding.   Musculoskeletal: He exhibits edema.   Neurological: No cranial nerve deficit.   Skin: Skin is warm and dry.   Psychiatric: He has a normal mood and affect.   Nursing note and vitals reviewed.      Medications  Current Facility-Administered Medications   Medication Dose Route Frequency Provider Last Rate Last Dose   • warfarin (COUMADIN) tablet 5 mg  5 mg Oral COUMADIN-DAILY Jana Deng M.D.   5 mg at 10/20/18 1844   • amiodarone (CORDARONE) 450 mg in D5W 250 mL Infusion  0.5-1 mg/min Intravenous Continuous Amber Escobar A.P.N. 17 mL/hr at 10/21/18 0436 0.5 mg/min at 10/21/18 0436   • NS infusion   Intravenous Continuous Amber Escobar A.P.N.   Stopped at 10/20/18 1010   • aspirin (ASA) chewable tab 81 mg  81 mg Oral DAILY Amber Escobar A.P.N.   81 mg at 10/21/18 0448   • fentaNYL (SUBLIMAZE) injection 25 mcg  25 mcg Intravenous Q2HRS PRN Jayce Thompson M.D.   25 mcg at 10/18/18 1616   • atorvastatin (LIPITOR) tablet 20 mg  20 mg Oral Q EVENING Amber Escobar A.P.N.   20 mg at 10/20/18 1844   • Respiratory Care per Protocol   Nebulization Continuous RT Amber Escobar A.P.N.       • Pharmacy Consult Request ...Pain Management Review 1 Each  1 Each Other PRN Amber Escobar A.P.N.       • oxyCODONE immediate-release (ROXICODONE) tablet 5 mg  5 mg Oral Q3HRS PRN Amber M. White, A.P.N.   5 mg at 10/20/18 1345   • oxyCODONE  immediate release (ROXICODONE) tablet 10 mg  10 mg Oral Q3HRS PRN Amber Escobar, A.P.N.   10 mg at 10/20/18 1845   • tramadol (ULTRAM) 50 MG tablet 50 mg  50 mg Oral Q4HRS PRN Amber Escobar, A.P.N.       • sodium bicarbonate 8.4 % injection 50 mEq  50 mEq Intravenous Q HOUR PRN Amber Escobar, A.P.N.       • ondansetron (ZOFRAN) syringe/vial injection 4 mg  4 mg Intravenous Q6HRS PRN Amber Escobar, A.P.N.        Or   • prochlorperazine (COMPAZINE) injection 10 mg  10 mg Intravenous Q6HRS PRN Amber Escobar, A.P.N.        Or   • promethazine (PHENERGAN) suppository 25 mg  25 mg Rectal Q6HRS PRN Amber Escobar, A.P.N.       • acetaminophen (TYLENOL) tablet 650 mg  650 mg Oral Q4HRS PRN Amber Escobar, A.P.N.        Or   • acetaminophen (TYLENOL) suppository 650 mg  650 mg Rectal Q4HRS PRN Amber Escobar, A.P.N.       • senna-docusate (PERICOLACE or SENOKOT S) 8.6-50 MG per tablet 2 Tab  2 Tab Oral BID Amber Escobar, A.P.N.   2 Tab at 10/21/18 0448    And   • polyethylene glycol/lytes (MIRALAX) PACKET 1 Packet  1 Packet Oral QDAY PRN Amber Escobar, A.P.N.   1 Packet at 10/20/18 1408    And   • magnesium hydroxide (MILK OF MAGNESIA) suspension 30 mL  30 mL Oral QDAY PRN Amber Escobar, A.P.N.   30 mL at 10/21/18 0448    And   • bisacodyl (DULCOLAX) suppository 10 mg  10 mg Rectal QDAY PRN Amber Escobar, A.P.N.       • mag hydrox-al hydrox-simeth (MAALOX PLUS ES or MYLANTA DS) suspension 30 mL  30 mL Oral Q4HRS PRN Amber Escobar, A.P.N.       • diphenhydrAMINE (BENADRYL) tablet/capsule 25 mg  25 mg Oral HS PRN - MR X 1 Amber Escobar, A.P.N.       • electrolyte-A (PLASMALYTE-A) infusion   Intravenous PRN Amber Escobar A.P.N.   1,000 mL at 10/16/18 2252   • MD Alert...Warfarin per Pharmacy   Other pharmacy to dose Amber Escobar, A.P.N.       • HYDROcodone-acetaminophen (NORCO) 5-325 MG per tablet 1-2 Tab  1-2 Tab Oral Q4HRS PRN YARED Rosenbreg.P.N.       • electrolyte-148 (PLASMALYTE-148)  infusion 1,000 mL  1,000 mL Intravenous Once PRN Annie Oreilly, A.P.N.           Fluids    Intake/Output Summary (Last 24 hours) at 10/21/18 0629  Last data filed at 10/21/18 0600   Gross per 24 hour   Intake          1959.15 ml   Output              750 ml   Net          1209.15 ml       Laboratory  Recent Results (from the past 48 hour(s))   MAGNESIUM    Collection Time: 10/19/18  2:08 PM   Result Value Ref Range    Magnesium 2.2 1.5 - 2.5 mg/dL   HEMOGLOBIN AND HEMATOCRIT    Collection Time: 10/19/18  2:08 PM   Result Value Ref Range    Hemoglobin 8.0 (L) 14.0 - 18.0 g/dL    Hematocrit 25.3 (L) 42.0 - 52.0 %   CBC without Differential Critical Care 0130    Collection Time: 10/20/18  5:35 AM   Result Value Ref Range    WBC 2.6 (L) 4.8 - 10.8 K/uL    RBC 2.62 (L) 4.70 - 6.10 M/uL    Hemoglobin 8.7 (L) 14.0 - 18.0 g/dL    Hematocrit 25.9 (L) 42.0 - 52.0 %    MCV 98.9 (H) 81.4 - 97.8 fL    MCH 33.2 (H) 27.0 - 33.0 pg    MCHC 33.6 (L) 33.7 - 35.3 g/dL    RDW 70.9 (H) 35.9 - 50.0 fL    Platelet Count 76 (L) 164 - 446 K/uL    MPV 10.9 9.0 - 12.9 fL   Basic Metabolic Panel (BMP) Critical Care 0130    Collection Time: 10/20/18  5:35 AM   Result Value Ref Range    Sodium 144 135 - 145 mmol/L    Potassium 4.3 3.6 - 5.5 mmol/L    Chloride 110 96 - 112 mmol/L    Co2 24 20 - 33 mmol/L    Glucose 163 (H) 65 - 99 mg/dL    Bun 92 (HH) 8 - 22 mg/dL    Creatinine 2.77 (H) 0.50 - 1.40 mg/dL    Calcium 9.4 8.5 - 10.5 mg/dL    Anion Gap 10.0 0.0 - 11.9   PROTHROMBIN TIME    Collection Time: 10/20/18  5:35 AM   Result Value Ref Range    PT 16.7 (H) 12.0 - 14.6 sec    INR 1.34 (H) 0.87 - 1.13   ESTIMATED GFR    Collection Time: 10/20/18  5:35 AM   Result Value Ref Range    GFR If  27 (A) >60 mL/min/1.73 m 2    GFR If Non African American 22 (A) >60 mL/min/1.73 m 2   CBC without Differential Critical Care 0130    Collection Time: 10/21/18  4:45 AM   Result Value Ref Range    WBC 3.0 (L) 4.8 - 10.8 K/uL    RBC 2.54 (L)  4.70 - 6.10 M/uL    Hemoglobin 8.1 (L) 14.0 - 18.0 g/dL    Hematocrit 25.7 (L) 42.0 - 52.0 %    .2 (H) 81.4 - 97.8 fL    MCH 31.9 27.0 - 33.0 pg    MCHC 31.5 (L) 33.7 - 35.3 g/dL    RDW 72.5 (H) 35.9 - 50.0 fL    Platelet Count 79 (L) 164 - 446 K/uL    MPV 10.5 9.0 - 12.9 fL   Basic Metabolic Panel (BMP) Critical Care 0130    Collection Time: 10/21/18  4:45 AM   Result Value Ref Range    Sodium 141 135 - 145 mmol/L    Potassium 4.0 3.6 - 5.5 mmol/L    Chloride 108 96 - 112 mmol/L    Co2 23 20 - 33 mmol/L    Glucose 153 (H) 65 - 99 mg/dL    Bun 95 (HH) 8 - 22 mg/dL    Creatinine 2.65 (H) 0.50 - 1.40 mg/dL    Calcium 9.2 8.5 - 10.5 mg/dL    Anion Gap 10.0 0.0 - 11.9   PROTHROMBIN TIME    Collection Time: 10/21/18  4:45 AM   Result Value Ref Range    PT 16.4 (H) 12.0 - 14.6 sec    INR 1.31 (H) 0.87 - 1.13   ESTIMATED GFR    Collection Time: 10/21/18  4:45 AM   Result Value Ref Range    GFR If  28 (A) >60 mL/min/1.73 m 2    GFR If Non African American 23 (A) >60 mL/min/1.73 m 2       Imaging      Assessment/Plan  * Acute respiratory failure with hypoxia (HCC)   Assessment & Plan    Intubated 10/16; went to PEA arrest approx 1 min post extubation and required emergent intubation  Extubated to bipap 10/18 successfully and since weaned off  Rt/02 protocols  Keep dry volume balance  Continue IS/PEP/Mobilize        Thrombocytopenia (HCC)   Assessment & Plan    Suspect consumption  Will also have significant qualitative disorder given bun of 90s + asa  follow        S/P TVR (tricuspid valve repair)   Assessment & Plan    S/P repair 10/16  Continue post heart protocols        S/P MVR (mitral valve repair)   Assessment & Plan    S/P repair 10/16  Continue post heart protocols  CT removed 10/18          Paroxysmal atrial fibrillation (HCC)- (present on admission)   Assessment & Plan    Keep K > 4 and Mg > 2  On amiodarone infusion - back in sinus        Essential hypertension, benign- (present on  admission)   Assessment & Plan    Resume anti-htn meds when clinically appropriate           Acute blood loss anemia   Assessment & Plan    S/P 2u PRBC 10/17  S/P 2u PRBCs 10/19  Hb 8.1        CKD (chronic kidney disease) stage 3, GFR 30-59 ml/min (HCC)- (present on admission)   Assessment & Plan    Baseline Cr 1.8  Renally dose meds  Minimize nephrotoxic substances  Monitor UO - currently adequate  Ramey in place  Recommend renal evaluation - pt indicates he does not have a nephrologist for outpatient follow up          Pulmonary hypertension (HCC)- (present on admission)   Assessment & Plan    Likely related to previous severe MR  Keep dry volume balance  F/u echo as OP           Mixed hyperlipidemia- (present on admission)   Assessment & Plan    statin             VTE:  Coumadin  Ulcer: Not Indicated  Lines: Central Line  Ongoing indication addressed    I have performed a physical exam and reviewed and updated ROS and Plan today (10/21/2018). In review of yesterday's note (10/20/2018), there are no changes except as documented above.     Discussed patient condition and risk of morbidity and/or mortality with RN, RT, Therapies, Pharmacy and [facility]  Pt critically ill from cardiac stand point, now on amiodarone infusion, increasing 02 needs in setting of hypervolemia and renal dysfunction. Total critical care time, not including billable procedures 30 minutes.

## 2018-10-21 NOTE — PROGRESS NOTES
Inpatient Anticoagulation Service Note    Date: 10/21/2018  Reason for Anticoagulation: Bioprosthetic Valve Replacement, Atrial Fibrillation   [id number] VASc Score: 3    Hemoglobin Value: (!) 8.1  Hematocrit Value: (!) 25.7  Lab Platelet Value: (!) 79  Target INR: 2.0 to 3.0    INR from last 7 days     Date/Time INR Value    10/21/18 0445 (!)  1.31    10/20/18 0535 (!)  1.34    10/19/18 0400 (!)  1.55    10/18/18 0412 (!)  1.45    10/17/18 1304 (!)  1.38    10/16/18 1912 (!)  1.48    10/16/18 1342 (!)  1.22    10/15/18 1002 (!)  1.34        Dose from last 7 days     Date/Time Dose (mg)    10/21/18 1400  5    10/20/18 1200  5    10/19/18 1400  0    10/18/18 0900  0    10/17/18 1200  5        Average Dose (mg):  (New start this admission)  Significant Interactions: Aspirin, Amiodarone, Statin  Bridge Therapy: No     Reversal Agent Administered: Not Applicable    Comments: INR remains stable as expected following restart of warfarin 10/20. Patient has been transitioned to oral amiodarone which can significantly increase INR values. Patient tolerating oral diet and there is no overt bleeding present per discussion on rounds. Hgb stable following recent blood transfusion. Will continue warfarin this evening and likely reduce dose in the coming days d/t new warfarin-amiodarone interaction. Repeat coags in AM per protocol.    Plan:  Warfarin 5 mg PO today. INR in AM.    Education Material Provided?: No    Pharmacist suggested discharge dosing: TBD pending further trends in INR, consider warfarin 2.5 mg PO daily with follow-up INR within 48 hours of discharge.     Pharmacy will continue to follow.     Ysabel Castillo, PharmD

## 2018-10-21 NOTE — CARE PLAN
Problem: Fluid Volume:  Goal: Will maintain balanced intake and output  Pt with poor urine output overnight.

## 2018-10-21 NOTE — CARE PLAN
Problem: Safety  Goal: Will remain free from injury  No falls this shift. Bed alarm active and audible. Hourly rounds maintained.    Problem: Respiratory:  Goal: Respiratory status will improve  Pt with congested productive cough. Weaned O2 to 3L NC but pt desats with movement and when coughing. Takes a few minutes to recover.

## 2018-10-21 NOTE — CARE PLAN
Problem: Hyperinflation:  Goal: Prevent or improve atelectasis  Outcome: PROGRESSING AS EXPECTED  PEP QID,  IS values averaging between 750-1000mL

## 2018-10-21 NOTE — PROGRESS NOTES
Cardiovascular Surgery Progress Note    Name: Jaime Tamayo  MRN: [id number]  : 1940  Admit Date: 10/16/2018  5:44 AM  Procedure:  Procedure(s) and Anesthesia Type:     * MITRAL VALVE REPAIR - General     * TRICUSPID VALVE REPAIR - General     * AORTIC VALVE REPAIR - General     * LEFT ATRIAL APPENDAGE LIGATION - General     * YUNIOR - General  5 Day Post-Op    Vitals:  Patient Vitals for the past 8 hrs:   Temp SpO2 O2 Delivery O2 (LPM) Pulse Heart Rate (Monitored) Resp NIBP Weight   10/21/18 0822 - 94 % - 3 98 98 20 - -   10/21/18 0600 - 93 % - - 87 93 16 109/70 -   10/21/18 0400 36.7 °C (98.1 °F) 91 % Nasal Cannula 3 93 85 16 (!) 93/63 87.4 kg (192 lb 10.9 oz)   10/21/18 0200 - 97 % Nasal Cannula 4 87 85 19 (!) 92/65 -     Temp (24hrs), Av.6 °C (97.8 °F), Min:36.3 °C (97.4 °F), Max:36.7 °C (98.1 °F)      Respiratory:    Respiration: 20, Pulse Oximetry: 94 %, O2 Daily Delivery Respiratory : Silicone Nasal Cannula     Chest Tube Drains:          Fluids:    Intake/Output Summary (Last 24 hours) at 10/21/18 0946  Last data filed at 10/21/18 0600   Gross per 24 hour   Intake          1559.15 ml   Output              550 ml   Net          1009.15 ml     Admit weight: Weight: 77.9 kg (171 lb 11.8 oz)  Current weight: Weight: 87.4 kg (192 lb 10.9 oz) (10/21/18 0400)    Labs:  Recent Labs      10/19/18   0400  10/19/18   1408  10/20/18   0535  10/21/18   0445   WBC  2.5*   --   2.6*  3.0*   RBC  2.15*   --   2.62*  2.54*   HEMOGLOBIN  7.1*  8.0*  8.7*  8.1*   HEMATOCRIT  22.2*  25.3*  25.9*  25.7*   MCV  103.3*   --   98.9*  101.2*   MCH  33.0   --   33.2*  31.9   MCHC  32.0*   --   33.6*  31.5*   RDW  71.1*   --   70.9*  72.5*   PLATELETCT  81*   --   76*  79*   MPV  11.0   --   10.9  10.5     Recent Labs      10/18/18   1255   NEUTSPOLYS  78.80*   LYMPHOCYTES  8.10*   MONOCYTES  12.10   EOSINOPHILS  0.00   BASOPHILS  0.00   RBCMORPHOLO  Present     Recent Labs      10/19/18   0400  10/20/18   0571   10/21/18   0445   SODIUM  144  144  141   POTASSIUM  4.6  4.3  4.0   CHLORIDE  109  110  108   CO2  20  24  23   GLUCOSE  169*  163*  153*   BUN  91*  92*  95*   CREATININE  2.84*  2.77*  2.65*   CALCIUM  9.2  9.4  9.2     Recent Labs      10/19/18   0400  10/20/18   0535  10/21/18   0445   INR  1.55*  1.34*  1.31*       Medications:  • polyethylene glycol/lytes  1 Packet     • furosemide  40 mg     • metoprolol  12.5 mg     • metOLazone  2.5 mg     • amiodarone  400 mg     • warfarin  5 mg     • aspirin  81 mg     • atorvastatin  20 mg     • senna-docusate  2 Tab     • MD Alert...Warfarin per Pharmacy            Ordered Medications:    ASA - Yes    Plavix - No; contraindicated because of Other coumadin    Post-operative Beta Blockers - yes    Ace Inhibitor - No; contraindicated because of Other normal ef    Statin - No; contraindicated because of No cad          Central Line:  Type of line: cordis  Date of insertion: 10/16/18  Date of removal: see RN notes    Exam:   Review of Systems   Constitutional: Negative.    HENT: Negative.    Eyes: Negative.    Respiratory: Negative.    Cardiovascular: Negative.    Gastrointestinal: Negative.    Genitourinary: Negative.    Musculoskeletal: Negative.    Skin: Negative.    Neurological: Negative.    Endo/Heme/Allergies: Negative.    Psychiatric/Behavioral: Negative.        Physical Exam   Constitutional: He appears well-developed. No distress.   Eyes: Pupils are equal, round, and reactive to light.   Neck: Normal range of motion. No JVD present.   Cardiovascular: Normal rate, regular rhythm and normal heart sounds.    Pulmonary/Chest: Effort normal. No respiratory distress. He has decreased breath sounds in the right lower field and the left lower field. He has no wheezes.   Abdominal: Soft. Bowel sounds are normal. He exhibits no distension. There is no guarding.   Genitourinary:   Genitourinary Comments: domínguez   Musculoskeletal: Normal range of motion. He exhibits edema.    Neurological: He is alert.   Skin: Skin is warm and dry.   Surgical incisions CDI   Psychiatric:   Calm and cooperative       Quality Measures:   Quality-Core Measures   Reviewed items::  Medications reviewed, EKG reviewed, Labs reviewed and Radiology images reviewed  Domínguez catheter::  Strict Intake and Output During Sepisis or Shock  Central line in place:  Need for access and Concentrated IV drugs  DVT prophylaxis pharmacological::  Warfarin (Coumadin)  DVT prophylaxis - mechanical:  SCDs  Ulcer Prophylaxis::  Yes  Assessed for rehabilitation services:  Patient returned to prior level of function, rehabilitation not indicated at this time      Assessment/Plan:  POD 1 S/P respiratory/PEA arrest last night post extubation.  Improved this AM, vented but alert and cooperative.  H/H low s/p acute blood lose anemia.  On low dose epi, swan numbers good. PLAN: Transfuse 2 units PRBC.  Vent per pulm.  Keep domínguez/CTs today.  POD 2 Extubated, HDS, SR, on low dose epi 0.03mcg for right heart support, d/c swan/cordis/CT, diurese, h/h- watch, swallow eval, CPM  POD 3 HDS, SR, wean epi, d/c art line, CR elevated- UO improved with albumin- add maintenance fluids, acute blood loss anemia- transfuse PRBC, NPO- awaiting swallow eval, amb, enc IS  POD 4 HDS, SR- off epi- d/c pacer wires, CR stable- start gently diuresis, Diet ordered- d/c maintenance fluids, work on mobility, enc IS  POD 5 HDS, Afib- amio gtt- change to po- add home beta blocker, CR stable- diurese, cardiac debility- will need rehab consult, catrachita for strict I&O, enc IS    Patient seen, examined and plan reviewed with midlevel provider. I agree with the plan.      Active Hospital Problems    Diagnosis   • Thrombocytopenia (HCC) [D69.6]     Priority: High   • S/P MVR (mitral valve repair) [Z98.890]     Priority: High   • S/P TVR (tricuspid valve repair) [Z98.890]     Priority: High   • Paroxysmal atrial fibrillation (HCC) [I48.0]     Priority: High   • Essential  hypertension, benign [I10]     Priority: Medium   • Acute blood loss anemia [D62]   • Acute respiratory failure with hypoxia ([facility]) [J96.01]   • CKD (chronic kidney disease) stage 3, GFR 30-59 ml/min ([facility]) [N18.3]   • Pulmonary hypertension (HCC) [I27.20]   • Mixed hyperlipidemia [E78.2]

## 2018-10-22 NOTE — PROGRESS NOTES
Amber HITCHCOCK paged with patient update including hypotension, low urine output through the night and morning CBC.  New orders to give 500 NS bolus.    0536: Amber Escobar called back to change orders to 1 PRBC and 1g CaCl, do not give bolus. See MAR    Confirmed with Ramírez in blood bank that current COD from 10/19/18 is still good for this am.

## 2018-10-22 NOTE — CARE PLAN
Problem: Post Op Day 4 CABG/Heart Valve Replacement  Goal: Optimal care of the Post Op CABG/Heart Valve replacement Post Op Day 4  Outcome: PROGRESSING AS EXPECTED    Intervention: Daily Weights  Done and documented by NOC shift  Intervention: Shower daily and clean incisions twice daily with soap and water  Patient had full bed bath and chg bath, oral care, domínguez care provided, incisions cleansed.   Intervention: Up in chair for all meals  Patient in chair all day.   Intervention: Ambulate, increasing the distance each time x 3 and before bed  Patient not able to walk on own yet, ROM performed and patient sat in chair all day, PT/OT orders placed by Amber GOLDEN.   Intervention: IS q 1 hour while awake and record best IS volume  Best volume today 1000  Intervention: Consider removal of domínguez, chest tube and pacer wire if not already done  Chest tube and wires have been d/c, domínguez remains in place for strict I/Os

## 2018-10-22 NOTE — THERAPY
"Occupational Therapy Treatment completed with focus on ADLs, ADL transfers and patient education.  Functional Status:  Max A Supine<>sit, Max A LB dressing, Total A toileting, Mod A STS - pt held heart pillow, Mod A stand pivot w/ FWW   Plan of Care: Will benefit from Occupational Therapy 3 times per week  Discharge Recommendations:  Equipment  Will continue to assess equipment needs. Post-acute therapy Discharge to a transitional care facility for continued skilled therapy services.    See \"Rehab Therapy-Acute\" Patient Summary Report for complete documentation.     Pt seen for OT tx on this date. Per spouse pt has been more confused today than throughout the weekend. Pt demo'd difficulty understanding/executing sternal precautions in relation to BADLs. Pt demo'd decreased balance, functional mobility, knowledge of post op precautions, flexibility and activity tolerance impacting independence w/ BADLs in this setting. Recommend DC to subacute placement for continued inpt therapy services.   "

## 2018-10-22 NOTE — PROGRESS NOTES
Pt. Receiving 1 unit RBC, tolerating well, pt. On 2 L of oxygen saturating 90s.  Blood pressures are resolving.

## 2018-10-22 NOTE — PROGRESS NOTES
Report received at bedside, pt. wife at bedside, pt. Sitting up in chair. At this time pt. Is A&Ox4, on 2L of oxygen via NC, denies pain or discomfort at this time except he wants to get to bed and finally sleep.  Pt. Assisted to bed. VSS stable. POC resumed. Call light within reach, self suctioning at patients reach.

## 2018-10-22 NOTE — PROGRESS NOTES
Cardiovascular Surgery Progress Note    Name: Jaime Tamayo  MRN: [id number]  : 1940  Admit Date: 10/16/2018  5:44 AM  Procedure:  Procedure(s) and Anesthesia Type:     * MITRAL VALVE REPAIR - General     * TRICUSPID VALVE REPAIR - General     * AORTIC VALVE REPAIR - General     * LEFT ATRIAL APPENDAGE LIGATION - General     * YUNIOR - General  6 Day Post-Op    Vitals:  Patient Vitals for the past 8 hrs:   Temp SpO2 O2 Delivery O2 (LPM) Pulse Heart Rate (Monitored) Resp BP NIBP   10/22/18 0815 - - - - 86 87 20 - 102/60   10/22/18 0800 36.4 °C (97.5 °F) - Silicone Nasal Cannula 3 83 97 18 - (!) 86/65   10/22/18 0700 - 94 % - 3 93 90 18 - -   10/22/18 0632 - - - - - - - (!) 82/54 -   10/22/18 0545 36.7 °C (98 °F) 94 % - - 92 - (!) 25 (!) 70/54 -     Temp (24hrs), Av.6 °C (97.8 °F), Min:36.4 °C (97.5 °F), Max:36.9 °C (98.5 °F)      Respiratory:    Respiration: 20, Pulse Oximetry: 94 %, O2 Daily Delivery Respiratory : Silicone Nasal Cannula     Chest Tube Drains:          Fluids:    Intake/Output Summary (Last 24 hours) at 10/22/18 0904  Last data filed at 10/22/18 0700   Gross per 24 hour   Intake             1324 ml   Output              309 ml   Net             1015 ml     Admit weight: Weight: 77.9 kg (171 lb 11.8 oz)  Current weight: Weight: 87.4 kg (192 lb 10.9 oz) (10/21/18 0400)    Labs:  Recent Labs      10/20/18   0535  10/21/18   0445  10/22/18   0524   WBC  2.6*  3.0*  3.4*   RBC  2.62*  2.54*  2.44*   HEMOGLOBIN  8.7*  8.1*  8.0*   HEMATOCRIT  25.9*  25.7*  24.9*   MCV  98.9*  101.2*  102.0*   MCH  33.2*  31.9  32.8   MCHC  33.6*  31.5*  32.1*   RDW  70.9*  72.5*  69.6*   PLATELETCT  76*  79*  99*   MPV  10.9  10.5  11.1         Recent Labs      10/20/18   0535  10/21/18   0445  10/22/18   0524   SODIUM  144  141  137   POTASSIUM  4.3  4.0  4.3   CHLORIDE  110  108  104   CO2  24  23  21   GLUCOSE  163*  153*  153*   BUN  92*  95*  99*   CREATININE  2.77*  2.65*  2.87*   CALCIUM  9.4  9.2  9.3      Recent Labs      10/20/18   0535  10/21/18   0445  10/22/18   0524   INR  1.34*  1.31*  1.71*       Medications:  • NS       • polyethylene glycol/lytes  1 Packet     • metoprolol  12.5 mg     • amiodarone  400 mg     • warfarin  5 mg     • aspirin  81 mg     • atorvastatin  20 mg     • senna-docusate  2 Tab     • MD Alert...Warfarin per Pharmacy            Ordered Medications:    ASA - Yes    Plavix - No; contraindicated because of Other coumadin    Post-operative Beta Blockers - yes    Ace Inhibitor - No; contraindicated because of Other normal ef    Statin - No; contraindicated because of No cad          Central Line:  Type of line: cordis  Date of insertion: 10/16/18  Date of removal: see RN notes    Exam:   Review of Systems   Constitutional: Negative.    HENT: Negative.    Eyes: Negative.    Respiratory: Negative.    Cardiovascular: Negative.    Gastrointestinal: Positive for constipation.   Genitourinary: Negative.    Musculoskeletal: Negative.    Skin: Negative.    Neurological: Negative.    Endo/Heme/Allergies: Negative.    Psychiatric/Behavioral: Negative.        Physical Exam   Constitutional: He appears well-developed. No distress.   Eyes: Pupils are equal, round, and reactive to light.   Neck: Normal range of motion. No JVD present.   Cardiovascular: Normal rate, regular rhythm and normal heart sounds.    Pulmonary/Chest: Effort normal. No respiratory distress. He has decreased breath sounds in the right lower field and the left lower field. He has no wheezes.   Abdominal: Soft. Bowel sounds are normal. He exhibits no distension. There is no guarding.   Genitourinary:   Genitourinary Comments: domínguez   Musculoskeletal: Normal range of motion. He exhibits edema.   Neurological: He is alert.   Skin: Skin is warm and dry.   Surgical incisions CDI   Psychiatric:   Calm and cooperative       Quality Measures:   Quality-Core Measures   Reviewed items::  Medications reviewed, EKG reviewed, Labs reviewed and  Radiology images reviewed  Domínguez catheter::  Urinary Tract Retention or Urinary Tract Obstruction and Strict Intake and Output During Sepisis or Shock  Central line in place:  Need for access and Concentrated IV drugs  DVT prophylaxis pharmacological::  Warfarin (Coumadin)  DVT prophylaxis - mechanical:  SCDs  Ulcer Prophylaxis::  Yes  Assessed for rehabilitation services:  Patient returned to prior level of function, rehabilitation not indicated at this time      Assessment/Plan:  POD 1 S/P respiratory/PEA arrest last night post extubation.  Improved this AM, vented but alert and cooperative.  H/H low s/p acute blood lose anemia.  On low dose epi, swan numbers good. PLAN: Transfuse 2 units PRBC.  Vent per pulm.  Keep domínguez/CTs today.  POD 2 Extubated, HDS, SR, on low dose epi 0.03mcg for right heart support, d/c swan/cordis/CT, diurese, h/h- watch, swallow eval, CPM  POD 3 HDS, SR, wean epi, d/c art line, CR elevated- UO improved with albumin- add maintenance fluids, acute blood loss anemia- transfuse PRBC, NPO- awaiting swallow eval, amb, enc IS  POD 4 HDS, SR- off epi- d/c pacer wires, CR stable- start gently diuresis, Diet ordered- d/c maintenance fluids, work on mobility, enc IS  POD 5 HDS, Afib- amio gtt- change to po- add home beta blocker, CR stable- diurese, cardiac debility- will need rehab consult, domínguez for strict I&O, enc IS  POD 6 HOTN this am- improved after transfusion, Afib- rate controlled, CR stable- low UO- watch, constipation- bowel protocol, acute blood loss anemia- s/p transfusion, cardiac debility- will need rehab when medically stable    Patient seen, examined and plan reviewed with midlevel provider. I agree with the plan.      Active Hospital Problems    Diagnosis   • Thrombocytopenia (HCC) [D69.6]     Priority: High   • S/P MVR (mitral valve repair) [Z98.890]     Priority: High   • S/P TVR (tricuspid valve repair) [Z98.890]     Priority: High   • Paroxysmal atrial fibrillation (HCC)  [I48.0]     Priority: High   • Essential hypertension, benign [I10]     Priority: Medium   • Acute blood loss anemia [D62]   • Acute respiratory failure with hypoxia (HCC) [J96.01]   • CKD (chronic kidney disease) stage 3, GFR 30-59 ml/min (HCC) [N18.3]   • Pulmonary hypertension (HCC) [I27.20]   • Mixed hyperlipidemia [E78.2]

## 2018-10-22 NOTE — PROGRESS NOTES
Upon arrival to shift, patient AOx4, no pain. Empty 500 mL RBC bag and empty 250 mL NS bag upon arrival to shift, and received 1g CaCl (total 850 mL intake). Low urine output. [place]ack[place] lung sounds, SpO2 low to mid 80s, increased oxygen from 2 L to 3L NC.

## 2018-10-22 NOTE — PROGRESS NOTES
Pt. Seems to be experience a state of confusion but selfs-reorient when asked orientation questions. Pt. Seems restless and can't fall asleep, will continue to monitor pt. Equal , follows commands, equal facial symmetry.

## 2018-10-22 NOTE — PROGRESS NOTES
Pt hypotensive, Charge RN Selena ask to assist.  Pt. Was auscultated and sounded crackly, pt. Was NT'd and immediately cleared up.  Selena LOWE pg APRN, orders to be received.

## 2018-10-22 NOTE — PROGRESS NOTES
Inpatient Anticoagulation Service Note    Date: 10/22/2018  Reason for Anticoagulation: Bioprosthetic Valve Replacement, Atrial Fibrillation   [id number] VASc Score: 3    Hemoglobin Value: (!) 8  Hematocrit Value: (!) 24.9  Lab Platelet Value: (!) 99  Target INR: 2.0 to 3.0    INR from last 7 days     Date/Time INR Value    10/22/18 0524 (!)  1.71    10/21/18 0445 (!)  1.31    10/20/18 0535 (!)  1.34    10/19/18 0400 (!)  1.55    10/18/18 0412 (!)  1.45    10/17/18 1304 (!)  1.38    10/16/18 1912 (!)  1.48    10/16/18 1342 (!)  1.22        Dose from last 7 days     Date/Time Dose (mg)    10/22/18 1500  5    10/21/18 1400  5    10/20/18 1200  5    10/19/18 1400  0    10/18/18 0900  0    10/17/18 1200  5        Average Dose (mg):  (New start this admission)  Significant Interactions: Aspirin, Amiodarone, Statin  Bridge Therapy: No    Reversal Agent Administered: Not Applicable  Comments: INR subtherapeutic, trending up. Continue current dose, expect  therapeutic range in the next day or two. Kassi will need some dose reduction at that time if rate of increase continues. No new drug interactions. H/H continues to drift, however no concern for acute bleed. Tolerating diet.     Plan:  5mg  Education Material Provided?: Yes  Pharmacist suggested discharge dosing: TBD. Recommend close follow up with [facility]     Jerzy Akers

## 2018-10-22 NOTE — PROGRESS NOTES
Critical Care Progress Note    Date of admission  10/16/2018    Chief Complaint  78 y.o. male admitted 10/16/2018 with severe MR and TR, s/p repair    Hospital Course    78-year-old gentleman currently on full   mechanical ventilatory support, status post valve repair and all information   taken from chart review inside out.  It appears that this gentleman has a   chronic history of severe MR, severe TR, paroxysmal atrial fibrillation, on   anticoagulation, stage III kidney disease, pulmonary hypertension with last   reported RVSP of 45, chronic hypertension and dyslipidemia.  Patient admitted   electively today for valve repair, looking at mitral, tricuspid as well as   aortic valve.  He is status post repair of the mitral and tricuspid valves.    The aortic valve on examination intraoperatively showed only mild aortic   stenosis, minimal debridement performed and patient was transferred to the   intensive care unit.  At the present time, he is on 0.04 epinephrine and on   Precedex, full mechanical ventilatory support, recovering from anesthesia with   pulmonary artery catheter in place as well as mediastinal chest tubes      Interval Problem Update  Reviewed last 24 hour events:  Tm 98.5  +278cc over last 24hr, +6.7L since admit  No cxr this am  Hb 8.0  Plat 99  Cr 2.84->2.77->2.65->2.87  INR 1.7    3L Nc  IS  Last Bm PTA    Review of Systems  Review of Systems   Constitutional: Negative for chills, fatigue and fever.   HENT: Negative for congestion and sore throat.    Respiratory: Negative for shortness of breath.    Cardiovascular: Negative for chest pain.   Gastrointestinal: Negative for nausea and vomiting.   Neurological: Negative for weakness.   Psychiatric/Behavioral: Negative for agitation.   All other systems reviewed and are negative.       Vital Signs for last 24 hours   Temp:  [36.4 °C (97.5 °F)-36.9 °C (98.5 °F)] 36.7 °C (98 °F)  Pulse:  [] 92  Resp:  [14-25] 25  BP: (70-99)/(54-68)  82/54    Hemodynamic parameters for last 24 hours       Vent Settings for last 24 hours       Physical Exam   Physical Exam   Constitutional: He appears well-developed and well-nourished.   HENT:   Head: Normocephalic and atraumatic.   Eyes: Pupils are equal, round, and reactive to light. No scleral icterus.   Neck: No tracheal deviation present.   Cardiovascular: Normal rate and intact distal pulses.    Pulmonary/Chest: He has no wheezes. He has rales.   Abdominal: Soft. There is no tenderness. There is no rebound and no guarding.   Musculoskeletal: He exhibits edema.   Neurological: No cranial nerve deficit.   Skin: Skin is warm and dry.   Psychiatric: He has a normal mood and affect.   Nursing note and vitals reviewed.      Medications  Current Facility-Administered Medications   Medication Dose Route Frequency Provider Last Rate Last Dose   • SODIUM CHLORIDE 0.9 % IV SOLN            • polyethylene glycol/lytes (MIRALAX) PACKET 1 Packet  1 Packet Oral BID Jayce Thompson M.D.   1 Packet at 10/22/18 0600   • metoprolol (LOPRESSOR) tablet 12.5 mg  12.5 mg Oral TWICE DAILY Amber Escobar A.P.N.   Stopped at 10/22/18 0600   • metOLazone (ZAROXOLYN) tablet 2.5 mg  2.5 mg Oral Q DAY Amber Escobar A.P.N.   2.5 mg at 10/22/18 0603   • amiodarone (CORDARONE) tablet 400 mg  400 mg Oral TWICE DAILY Amber Escobar A.P.N.   Stopped at 10/22/18 0600   • warfarin (COUMADIN) tablet 5 mg  5 mg Oral COUMADIN-DAILY Jana Deng M.D.   5 mg at 10/21/18 1805   • aspirin (ASA) chewable tab 81 mg  81 mg Oral DAILY Amber Escobar A.P.N.   81 mg at 10/22/18 0601   • fentaNYL (SUBLIMAZE) injection 25 mcg  25 mcg Intravenous Q2HRS PRN Jayce Thompson M.D.   25 mcg at 10/18/18 1616   • atorvastatin (LIPITOR) tablet 20 mg  20 mg Oral Q EVENING Amber Escobar A.P.N.   20 mg at 10/21/18 1757   • Respiratory Care per Protocol   Nebulization Continuous RT JESSIE RosenbergPAYLEEN.       • Pharmacy Consult Request ...Pain Management  Review 1 Each  1 Each Other PRN Amber Escobar, A.P.N.       • oxyCODONE immediate-release (ROXICODONE) tablet 5 mg  5 mg Oral Q3HRS PRN Amber Escobar A.P.N.   5 mg at 10/20/18 1345   • oxyCODONE immediate release (ROXICODONE) tablet 10 mg  10 mg Oral Q3HRS PRN Amber Escobar, A.P.N.   10 mg at 10/20/18 1845   • tramadol (ULTRAM) 50 MG tablet 50 mg  50 mg Oral Q4HRS PRN Amber Escobar, A.P.N.       • sodium bicarbonate 8.4 % injection 50 mEq  50 mEq Intravenous Q HOUR PRN Amber Escobar, A.P.N.       • ondansetron (ZOFRAN) syringe/vial injection 4 mg  4 mg Intravenous Q6HRS PRN Amber Escobar, A.P.N.        Or   • prochlorperazine (COMPAZINE) injection 10 mg  10 mg Intravenous Q6HRS PRN Amber Escobar, A.P.N.        Or   • promethazine (PHENERGAN) suppository 25 mg  25 mg Rectal Q6HRS PRN Amber Escobar, A.P.N.       • acetaminophen (TYLENOL) tablet 650 mg  650 mg Oral Q4HRS PRN Amber Escobar, A.P.N.        Or   • acetaminophen (TYLENOL) suppository 650 mg  650 mg Rectal Q4HRS PRN Amber Escobar, A.P.N.       • senna-docusate (PERICOLACE or SENOKOT S) 8.6-50 MG per tablet 2 Tab  2 Tab Oral BID Amber Escobar, A.P.N.   2 Tab at 10/22/18 0601    And   • polyethylene glycol/lytes (MIRALAX) PACKET 1 Packet  1 Packet Oral QDAY PRN Amber Escobar, A.P.N.   1 Packet at 10/20/18 1408    And   • magnesium hydroxide (MILK OF MAGNESIA) suspension 30 mL  30 mL Oral QDAY PRN Amber Escobar, A.P.N.   30 mL at 10/21/18 0448    And   • bisacodyl (DULCOLAX) suppository 10 mg  10 mg Rectal QDAY PRN Amber Escobar, A.P.N.       • mag hydrox-al hydrox-simeth (MAALOX PLUS ES or MYLANTA DS) suspension 30 mL  30 mL Oral Q4HRS PRN Amber Escobar, A.P.N.       • diphenhydrAMINE (BENADRYL) tablet/capsule 25 mg  25 mg Oral HS PRN - MR X 1 Amber Escobar, A.P.N.       • electrolyte-A (PLASMALYTE-A) infusion   Intravenous PRN Amber Escobar, A.P.N.   1,000 mL at 10/16/18 9711   • MD Alert...Warfarin per Pharmacy   Other  pharmacy to dose JESSIE RosenbergP.FARZANEH.       • HYDROcodone-acetaminophen (NORCO) 5-325 MG per tablet 1-2 Tab  1-2 Tab Oral Q4HRS PRN JESSIE RosenbergPKAREY   1 Tab at 10/21/18 6615   • electrolyte-148 (PLASMALYTE-148) infusion 1,000 mL  1,000 mL Intravenous Once PRN JESSIE OseiPKAREY           Fluids    Intake/Output Summary (Last 24 hours) at 10/22/18 0719  Last data filed at 10/22/18 0656   Gross per 24 hour   Intake              628 ml   Output              350 ml   Net              278 ml       Laboratory  Recent Results (from the past 48 hour(s))   CBC without Differential Critical Care 0130    Collection Time: 10/21/18  4:45 AM   Result Value Ref Range    WBC 3.0 (L) 4.8 - 10.8 K/uL    RBC 2.54 (L) 4.70 - 6.10 M/uL    Hemoglobin 8.1 (L) 14.0 - 18.0 g/dL    Hematocrit 25.7 (L) 42.0 - 52.0 %    .2 (H) 81.4 - 97.8 fL    MCH 31.9 27.0 - 33.0 pg    MCHC 31.5 (L) 33.7 - 35.3 g/dL    RDW 72.5 (H) 35.9 - 50.0 fL    Platelet Count 79 (L) 164 - 446 K/uL    MPV 10.5 9.0 - 12.9 fL   Basic Metabolic Panel (BMP) Critical Care 0130    Collection Time: 10/21/18  4:45 AM   Result Value Ref Range    Sodium 141 135 - 145 mmol/L    Potassium 4.0 3.6 - 5.5 mmol/L    Chloride 108 96 - 112 mmol/L    Co2 23 20 - 33 mmol/L    Glucose 153 (H) 65 - 99 mg/dL    Bun 95 (HH) 8 - 22 mg/dL    Creatinine 2.65 (H) 0.50 - 1.40 mg/dL    Calcium 9.2 8.5 - 10.5 mg/dL    Anion Gap 10.0 0.0 - 11.9   PROTHROMBIN TIME    Collection Time: 10/21/18  4:45 AM   Result Value Ref Range    PT 16.4 (H) 12.0 - 14.6 sec    INR 1.31 (H) 0.87 - 1.13   ESTIMATED GFR    Collection Time: 10/21/18  4:45 AM   Result Value Ref Range    GFR If  28 (A) >60 mL/min/1.73 m 2    GFR If Non African American 23 (A) >60 mL/min/1.73 m 2   MAGNESIUM    Collection Time: 10/21/18 10:44 AM   Result Value Ref Range    Magnesium 2.0 1.5 - 2.5 mg/dL   CBC without Differential Critical Care 0130    Collection Time: 10/22/18  5:24 AM   Result Value Ref  Range    WBC 3.4 (L) 4.8 - 10.8 K/uL    RBC 2.44 (L) 4.70 - 6.10 M/uL    Hemoglobin 8.0 (L) 14.0 - 18.0 g/dL    Hematocrit 24.9 (L) 42.0 - 52.0 %    .0 (H) 81.4 - 97.8 fL    MCH 32.8 27.0 - 33.0 pg    MCHC 32.1 (L) 33.7 - 35.3 g/dL    RDW 69.6 (H) 35.9 - 50.0 fL    Platelet Count 99 (L) 164 - 446 K/uL    MPV 11.1 9.0 - 12.9 fL   Basic Metabolic Panel (BMP) Critical Care 0130    Collection Time: 10/22/18  5:24 AM   Result Value Ref Range    Sodium 137 135 - 145 mmol/L    Potassium 4.3 3.6 - 5.5 mmol/L    Chloride 104 96 - 112 mmol/L    Co2 21 20 - 33 mmol/L    Glucose 153 (H) 65 - 99 mg/dL    Bun 99 (HH) 8 - 22 mg/dL    Creatinine 2.87 (H) 0.50 - 1.40 mg/dL    Calcium 9.3 8.5 - 10.5 mg/dL    Anion Gap 12.0 (H) 0.0 - 11.9   PROTHROMBIN TIME    Collection Time: 10/22/18  5:24 AM   Result Value Ref Range    PT 20.1 (H) 12.0 - 14.6 sec    INR 1.71 (H) 0.87 - 1.13   ESTIMATED GFR    Collection Time: 10/22/18  5:24 AM   Result Value Ref Range    GFR If  26 (A) >60 mL/min/1.73 m 2    GFR If Non African American 21 (A) >60 mL/min/1.73 m 2       Imaging      Assessment/Plan  * Acute respiratory failure with hypoxia (HCC)   Assessment & Plan    Intubated 10/16; went to PEA arrest approx 1 min post extubation and required emergent intubation  Extubated to bipap 10/18 successfully and since weaned off  Rt/02 protocols  Continue IS/PEP/Mobilize  Diurese as tolerated with lasix  Start bipap if has increasing pulm edema        Thrombocytopenia (HCC)   Assessment & Plan    Will also have significant qualitative disorder given bun of 90s + asa  Trending back up        S/P TVR (tricuspid valve repair)   Assessment & Plan    S/P repair 10/16  Continue post heart protocols        S/P MVR (mitral valve repair)   Assessment & Plan    S/P repair 10/16  Continue post heart protocols  CT removed 10/18          Paroxysmal atrial fibrillation (HCC)- (present on admission)   Assessment & Plan    Keep K > 4 and Mg > 2  Off  amio infusion, on po now        Essential hypertension, benign- (present on admission)   Assessment & Plan    Metoprolol 12.5 bid           Acute blood loss anemia   Assessment & Plan    S/P 2u PRBC 10/17  S/P 2u PRBCs 10/19  1u prbcs today        CKD (chronic kidney disease) stage 3, GFR 30-59 ml/min (HCC)- (present on admission)   Assessment & Plan    Baseline Cr 1.8  Renally dose meds  Minimize nephrotoxic substances  Monitor UO - currently adequate  Ramey in place  Recommend renal evaluation - pt indicates he does not have a nephrologist for outpatient follow up          Pulmonary hypertension (HCC)- (present on admission)   Assessment & Plan    Likely related to previous severe MR  Keep dry volume balance  F/u echo as OP           Mixed hyperlipidemia- (present on admission)   Assessment & Plan    statin             VTE:  Coumadin  Ulcer: Not Indicated  Lines: Central Line  Ongoing indication addressed    I have performed a physical exam and reviewed and updated ROS and Plan today (10/22/2018). In review of yesterday's note (10/21/2018), there are no changes except as documented above.     Discussed patient condition and risk of morbidity and/or mortality with RN, RT, Therapies, Pharmacy and [facility]

## 2018-10-23 PROBLEM — G93.40 ENCEPHALOPATHY: Status: ACTIVE | Noted: 2018-01-01

## 2018-10-23 NOTE — PROGRESS NOTES
Bedside report received from Richard RN. Patient sedated and lethargic. Difficulty arousing patient enough to answer orientation questions, but answered appropriately when asked enough times. 2L NC titrated down to 1 L. Hemodynamics stable. Will ambulate and reassess compliance with medications once more awake.

## 2018-10-23 NOTE — CONSULTS
[facility] -  CONSULTATION NOTE               Author: Manjeet Bruner M.D. Date & Time: 10/23/2018  10:11 AM       REASON FOR CONSULTATION:   Acute Renal Failure    CHIEF COMPLAINT:       HISTORY OF PRESENT ILLNESS:    79 YO male with history notable for CHF, valvular disease and CKD presented for planned cardiothoracic surgery in the setting of valvular disease.     Baseline cr appears to be ~1.8 most recently without proteinuria or active urinary sediment. Underwent MVR, tricuspid valve repar, aortic valve reparr and TITI ligation on 10/16. S/P resp arrest post extubation on POD#1. Continued on pressors until POD#3. Attempted diuresis over past 72 hours with sport doses of 20-40mg lasix. UOP down trending  Form >1L on 10/19 to 300cc over last 24 hours.  Patient requires 40 mg Lasix and metolazone at home.    Patient is currently unable to answer questioning in the setting of critical illness, wife provides history.  She notes that yesterday he became more lethargic and has been slowly out of it this morning.  She does not think he has had significant chest pain or shortness of breath.  He has not been complaining about abdominal pain.  Intermittent low blood pressures over the last 48 hours.    REVIEW OF SYSTEMS:    Unable to obtain due to critical illness    PAST MEDICAL HISTORY:   Past Medical History:   Diagnosis Date   • Anemia    • Arthritis    • Breath shortness     with minimal exertion no O2   • CATARACT     bilateral IOL   • Chickenpox    • Congestive heart failure (HCC)    • [place] measles    • Gout    • Gout    • Heart burn    • Heart valve disease    • Hemorrhagic disorder (HCC)     on Xarelto   • Hernia, inguinal, right    • Hiatus hernia syndrome    • High cholesterol    • Hypertension    • Indigestion    • Influenza    • Mumps    • Pain 02/2018    back   • Pain    • Paroxysmal atrial fibrillation (HCC)    • Tremor     intermittent       PAST SURGICAL HISTORY:      Past  Surgical History:   Procedure Laterality Date   • MITRAL VALVE REPAIR  10/16/2018    Procedure: MITRAL VALVE REPAIR;  Surgeon: Jana Deng M.D.;  Location: SURGERY [facility];  Service: Cardiothoracic   • TRICUSPID VALVE REPAIR  10/16/2018    Procedure: TRICUSPID VALVE REPAIR;  Surgeon: Jana Deng M.D.;  Location: [facility];  Service: Cardiothoracic   • AORTIC VALVE REPLACEMENT  10/16/2018    Procedure: AORTIC VALVE REPAIR;  Surgeon: Jana Deng M.D.;  Location: SURGERY [facility];  Service: Cardiothoracic   • MAZE PROCEDURE  10/16/2018    Procedure: LEFT ATRIAL APPENDAGE LIGATION;  Surgeon: Jana Deng M.D.;  Location: SURGERY [facility];  Service: Cardiothoracic   • YUNIOR  10/16/2018    Procedure: YUNIOR;  Surgeon: Jana Deng M.D.;  Location: SURGERY [facility];  Service: Cardiothoracic   • KNEE ARTHROPLASTY TOTAL Left 10/17/2016    Procedure: KNEE ARTHROPLASTY TOTAL;  Surgeon: Jaime Carmona M.D.;  Location: [facility];  Service:    • IRRIGATION & DEBRIDEMENT ORTHO  3/19/2013    Performed by Jaime Carmona M.D. at [facility]   • EVACUATION OF HEMATOMA  3/19/2013    Performed by Jaime Carmona M.D. at [facility]   • KNEE ARTHROPLASTY TOTAL  3/18/2013    Performed by Jaime Carmona M.D. at [facility]   • HIP ARTHROPLASTY TOTAL  3/5/2012    Performed by JAIME CARMONA at [facility]   • LUMBAR FUSION POSTERIOR  2012   • CATARACT EXTRACTION WITH IOL Bilateral 2010   • HIP ARTHROPLASTY TOTAL  6/8/2009    right; Performed by JAIME CARMONA at [facility]   • OTHER ORTHOPEDIC SURGERY  2006    right thumb   • ACHILLES TENDON REP Left 1999   • TONSILLECTOMY  1947   • HIP REPLACEMENT, TOTAL     • LAMINOTOMY         FAMILY HISTORY:   - Reviewed and non contributory to current illness    SOCIAL HISTORY:   - No tobacco, No EtOH, No illicits    HOME MEDICATIONS:   No current  "facility-administered medications on file prior to encounter.      Current Outpatient Prescriptions on File Prior to Encounter   Medication Sig Dispense Refill   • furosemide (LASIX) 40 MG Tab Take 2 tablets on Mon, Wed and Fri.  Take 1 tablet other days. 120 Tab 3   • potassium chloride SA (KDUR) 20 MEQ Tab CR Take 2 tablets Mon.-Wed.-Fri. And 1 tablet other days of the week. 120 Tab 3   • metoprolol SR (TOPROL XL) 50 MG TABLET SR 24 HR Take 1 Tab by mouth every evening. 90 Tab 3   • rivaroxaban (XARELTO) 15 MG Tab tablet Take 1 Tab by mouth with dinner. 90 Tab 3   • metOLazone (ZAROXOLYN) 5 MG Tab Take one tablet on Monday, Wednesday, Friday 36 Tab 3   • Ferrous Sulfate (IRON) 325 (65 Fe) MG Tab Take 1 Tab by mouth every day.     • atorvastatin (LIPITOR) 20 MG Tab Take 1 Tab by mouth every evening. 90 Tab 3   • Multiple Vitamins-Minerals (PRESERVISION AREDS) Tab Take 2 tablet by mouth every day.     • allopurinol (ZYLOPRIM) 300 MG Tab TAKE 1 TABLET DAILY 90 Tab 2   • omeprazole (PRILOSEC) 20 MG CPDR Take 20 mg by mouth every day.           ALLERGIES:  Percocet [oxycodone-acetaminophen] and Other environmental    PHYSICAL EXAM:  VS:  BP (!) 82/54   Pulse 84   Temp 36.2 °C (97.2 °F)   Resp 16   Ht 1.753 m (5' 9\")   Wt 82 kg (180 lb 12.4 oz)   SpO2 99%   BMI 26.70 kg/m²   GENERAL: Mild distress, sleeping  HEAD: Normocephalic, atraumatic  EYES: no scleral icterus; normal conjunctiva  NECK: Supple; trachea midline  CV: RRR, S1 and S2 present.  LUNGS: Decreased breath sounds bilateral bases, No rhales or wheezes  ABDOMEN: Soft, non-tender  EXTREMETIES: 2-3mm lower extremity edema, no clubbing or cyanosis  SKIN: Warm and dry, no rashes.  Midline sternal wound without surrounding erythema or edema  NEURO: Lethargic, no focal deficits    LABS:  Recent Results (from the past 24 hour(s))   CBC without Differential Critical Care 0130    Collection Time: 10/23/18  4:05 AM   Result Value Ref Range    WBC 4.5 (L) 4.8 - " 10.8 K/uL    RBC 3.02 (L) 4.70 - 6.10 M/uL    Hemoglobin 9.5 (L) 14.0 - 18.0 g/dL    Hematocrit 30.3 (L) 42.0 - 52.0 %    .3 (H) 81.4 - 97.8 fL    MCH 31.5 27.0 - 33.0 pg    MCHC 31.4 (L) 33.7 - 35.3 g/dL    RDW 71.7 (H) 35.9 - 50.0 fL    Platelet Count 135 (L) 164 - 446 K/uL    MPV 11.9 9.0 - 12.9 fL   Basic Metabolic Panel (BMP) Critical Care 0130    Collection Time: 10/23/18  4:05 AM   Result Value Ref Range    Sodium 138 135 - 145 mmol/L    Potassium 4.5 3.6 - 5.5 mmol/L    Chloride 103 96 - 112 mmol/L    Co2 21 20 - 33 mmol/L    Glucose 137 (H) 65 - 99 mg/dL    Bun 103 (HH) 8 - 22 mg/dL    Creatinine 3.20 (H) 0.50 - 1.40 mg/dL    Calcium 9.7 8.5 - 10.5 mg/dL    Anion Gap 14.0 (H) 0.0 - 11.9   PROTHROMBIN TIME    Collection Time: 10/23/18  4:05 AM   Result Value Ref Range    PT 24.2 (H) 12.0 - 14.6 sec    INR 2.16 (H) 0.87 - 1.13   ESTIMATED GFR    Collection Time: 10/23/18  4:05 AM   Result Value Ref Range    GFR If  23 (A) >60 mL/min/1.73 m 2    GFR If Non  19 (A) >60 mL/min/1.73 m 2       (click the triangle to expand results)    IMAGING:  DX-CHEST-2 VIEWS   Final Result      1.  Cardiomegaly with interstitial edema.      2.  Bibasilar atelectasis and small bilateral pleural effusions.      3.  Postsurgical change.      DX-CHEST-LIMITED (1 VIEW)   Final Result         1.  Pulmonary edema and/or infiltrates are identified, which are stable since the prior exam.   2.  Cardiomegaly   3.  Atherosclerosis      DX-ABDOMEN FOR TUBE PLACEMENT   Final Result      NG tube tip overlies the gastric fundus.      EC-YUNIOR W/O CONT         DX-CHEST-PORTABLE (1 VIEW)   Final Result         1.  Pulmonary edema and/or infiltrates, slightly decreased since prior study.   2.  Cardiomegaly   3.  Atherosclerosis      EC-ECHOCARDIOGRAM LTD W/ CONT   Final Result      DX-CHEST-PORTABLE (1 VIEW)   Final Result      Perihilar and right upper lobe opacities likely represent atelectasis.       Prominence of the cardiomediastinal silhouette.      Lines and tubes as above described.         DX-CHEST-2 VIEWS   Final Result      Probable nipple shadow right lower lung field. Interval follow-up chest x-rays using nipple markers a consideration.   Right lung base atelectasis. No consolidation.          ASSESSMENT:  # AIDA: Baseline Cr~1.8, increased to 3.2. Likely 2/2 hemodynamic compromise/tubular injury.  # CKD Stage 3; non-proteinuric. Has never been followed by nephrology.   # Acidosis: trending  # Hypervolemia  # Valvular heart disease s/p MVR, tricuspid valve repar, aortic valve reparr and TITI ligation  # Volume overload  # Anemia: acute blood loss component  # PEA arrest    PLAN:  -No compelling indication for RRT at this point in time.  Discussed possibility of requiring with patient's wife if renal function continues to deteriorate and she stated that he would want a trial of dialysis.  -Trial of Lasix 80 mg IV.  If desired response would re-dose this afternoon, if suboptimal response would increase to 120 mg.  Goal net -1-2 L over next 24 hours.  -We will discontinue oxycodone. fentanyl best option in acute renal failure patients  -Renal diet  -Strict I/Os  -Dose all meds per eGFR <15     Thank you for this consult, we will continue to follow.    Manjeet Bruner MD

## 2018-10-23 NOTE — THERAPY
"Speech Language Therapy dysphagia treatment completed.   Functional Status:  The patient was seen for dysphagia therapy this date. Per RN, patient sleepy today and difficult to maintain alertness. Patient was sleeping prior to session but able to wake up and sustain alertness for therapy this date. The patient was given PO trials of thin liquids and solids. The patient presented with overt coughing/choking on 25% of thin liquid trials as well as 25% of mixed consistency trials despite cues to swallow strategies. Patient was given NTL which resulted in no further s/s concerning for penetration/aspiration. At this time, recommend patient downgrade to D2/NTL meal items with swallow strategies.     Recommendations: 1) downgrade to D2/NTL meal items with swallow strategies. 20 Up to chair for meals if medically appropriate.     Plan of Care: Will benefit from Speech Therapy 3 times per week.     Post-Acute Therapy: Discharge to an inpatient transitional care facility for continued skilled therapy services.    See \"Rehab Therapy-Acute\" Patient Summary Report for complete documentation.     "

## 2018-10-23 NOTE — PROGRESS NOTES
Cardiovascular Surgery Progress Note    Name: Jaime Tamayo  MRN: [id number]  : 1940  Admit Date: 10/16/2018  5:44 AM  Procedure:  Procedure(s) and Anesthesia Type:     * MITRAL VALVE REPAIR - General     * TRICUSPID VALVE REPAIR - General     * AORTIC VALVE REPAIR - General     * LEFT ATRIAL APPENDAGE LIGATION - General     * YUNIOR - General  7 Day Post-Op    Vitals:  Patient Vitals for the past 8 hrs:   Temp SpO2 O2 Delivery O2 (LPM) Pulse Heart Rate (Monitored) Resp NIBP   10/23/18 0800 36.2 °C (97.2 °F) - Silicone Nasal Cannula 1 - - - -   10/23/18 0700 - 98 % - - 92 89 15 -   10/23/18 0600 - 99 % - - 84 81 13 -   10/23/18 0500 - 98 % - - 78 75 13 -   10/23/18 0400 36 °C (96.8 °F) (!) 80 % - - 100 84 14 100/62   10/23/18 0300 - 100 % - - 73 75 14 -   10/23/18 0200 - 95 % - - 89 90 (!) 27 -   10/23/18 0100 - 95 % - - 97 86 17 -     Temp (24hrs), Av.1 °C (96.9 °F), Min:35.9 °C (96.6 °F), Max:36.3 °C (97.3 °F)      Respiratory:    Respiration: 15, Pulse Oximetry: 98 %, O2 Daily Delivery Respiratory : Silicone Nasal Cannula     Chest Tube Drains:          Fluids:    Intake/Output Summary (Last 24 hours) at 10/23/18 0827  Last data filed at 10/23/18 0800   Gross per 24 hour   Intake              290 ml   Output              320 ml   Net              -30 ml     Admit weight: Weight: 77.9 kg (171 lb 11.8 oz)  Current weight: Weight: 82 kg (180 lb 12.4 oz) (10/22/18 1100)    Labs:  Recent Labs      10/21/18   0445  10/22/18   0524  10/23/18   0405   WBC  3.0*  3.4*  4.5*   RBC  2.54*  2.44*  3.02*   HEMOGLOBIN  8.1*  8.0*  9.5*   HEMATOCRIT  25.7*  24.9*  30.3*   MCV  101.2*  102.0*  100.3*   MCH  31.9  32.8  31.5   MCHC  31.5*  32.1*  31.4*   RDW  72.5*  69.6*  71.7*   PLATELETCT  79*  99*  135*   MPV  10.5  11.1  11.9         Recent Labs      10/21/18   0445  10/22/18   0524   SODIUM  141  137   POTASSIUM  4.0  4.3   CHLORIDE  108  104   CO2  23  21   GLUCOSE  153*  153*   BUN  95*  99*   CREATININE   2.65*  2.87*   CALCIUM  9.2  9.3     Recent Labs      10/21/18   0445  10/22/18   0524  10/23/18   0405   INR  1.31*  1.71*  2.16*       Medications:  • amiodarone  200 mg     • warfarin  5 mg     • aspirin  81 mg     • atorvastatin  20 mg     • senna-docusate  2 Tab     • MD Alert...Warfarin per Pharmacy            Ordered Medications:    ASA - Yes    Plavix - No; contraindicated because of Other coumadin    Post-operative Beta Blockers - yes    Ace Inhibitor - No; contraindicated because of Other normal ef    Statin - No; contraindicated because of No cad          Central Line:  Type of line: cordis  Date of insertion: 10/16/18  Date of removal: see RN notes    Exam:   Review of Systems   Constitutional: Negative.    HENT: Negative.    Eyes: Negative.    Respiratory: Negative.    Cardiovascular: Negative.    Gastrointestinal: Negative.    Genitourinary: Negative.    Musculoskeletal: Negative.    Skin: Negative.    Neurological: Negative.    Endo/Heme/Allergies: Negative.    Psychiatric/Behavioral: Negative.        Physical Exam   Constitutional: He appears well-developed. No distress.   Eyes: Pupils are equal, round, and reactive to light.   Neck: Normal range of motion. No JVD present.   Cardiovascular: Normal rate, regular rhythm and normal heart sounds.    Pulmonary/Chest: Effort normal. No respiratory distress. He has decreased breath sounds in the right lower field and the left lower field. He has no wheezes.   Abdominal: Soft. Bowel sounds are normal. There is no guarding.   Genitourinary:   Genitourinary Comments: domínguez   Musculoskeletal: Normal range of motion. He exhibits edema.   Neurological:   Sedated, follows   Skin: Skin is warm and dry.   Surgical incisions CDI   Psychiatric:   Calm and cooperative       Quality Measures:   Quality-Core Measures   Reviewed items::  Medications reviewed, EKG reviewed, Labs reviewed and Radiology images reviewed  Domínguez catheter::  Urinary Tract Retention or Urinary  Tract Obstruction and Strict Intake and Output During Sepisis or Shock  Central line in place:  Need for access and Concentrated IV drugs  DVT prophylaxis pharmacological::  Warfarin (Coumadin)  DVT prophylaxis - mechanical:  SCDs  Ulcer Prophylaxis::  Yes  Assessed for rehabilitation services:  Patient returned to prior level of function, rehabilitation not indicated at this time      Assessment/Plan:  POD 1 S/P respiratory/PEA arrest last night post extubation.  Improved this AM, vented but alert and cooperative.  H/H low s/p acute blood lose anemia.  On low dose epi, swan numbers good. PLAN: Transfuse 2 units PRBC.  Vent per pulm.  Keep domínguez/CTs today.  POD 2 Extubated, HDS, SR, on low dose epi 0.03mcg for right heart support, d/c swan/cordis/CT, diurese, h/h- watch, swallow eval, CPM  POD 3 HDS, SR, wean epi, d/c art line, CR elevated- UO improved with albumin- add maintenance fluids, acute blood loss anemia- transfuse PRBC, NPO- awaiting swallow eval, amb, enc IS  POD 4 HDS, SR- off epi- d/c pacer wires, CR stable- start gently diuresis, Diet ordered- d/c maintenance fluids, work on mobility, enc IS  POD 5 HDS, Afib- amio gtt- change to po- add home beta blocker, CR stable- diurese, cardiac debility- will need rehab consult, catrachita for strict I&O, enc IS  POD 6 HOTN this am- improved after transfusion, Afib- rate controlled, CR stable- low UO- watch, constipation- bowel protocol, acute blood loss anemia- s/p transfusion, cardiac debility- will need rehab when medically stable  POD 7 HDS, afib - rate controlled, Acute on chronic renal failure- BMP pending/UO low- consult nephrology, sedated form narcotics last night- d/c oxy/fent, cardiac debility- mobilize/PT/OT    Patient seen, examined and plan reviewed with midlevel provider. I agree with the plan.      Active Hospital Problems    Diagnosis   • Thrombocytopenia (HCC) [D69.6]     Priority: High   • S/P MVR (mitral valve repair) [Z98.890]     Priority: High    • S/P TVR (tricuspid valve repair) [Z98.890]     Priority: High   • Paroxysmal atrial fibrillation (HCC) [I48.0]     Priority: High   • Essential hypertension, benign [I10]     Priority: Medium   • Acute blood loss anemia [D62]   • Acute respiratory failure with hypoxia ([facility]) [J96.01]   • CKD (chronic kidney disease) stage 3, GFR 30-59 ml/min (HCC) [N18.3]   • Pulmonary hypertension (HCC) [I27.20]   • Mixed hyperlipidemia [E78.2]

## 2018-10-23 NOTE — PROGRESS NOTES
Critical Care Progress Note    Date of admission  10/16/2018    Chief Complaint  78 y.o. male admitted 10/16/2018 with severe MR and TR, s/p repair    Hospital Course    78-year-old gentleman currently on full   mechanical ventilatory support, status post valve repair and all information   taken from chart review inside out.  It appears that this gentleman has a   chronic history of severe MR, severe TR, paroxysmal atrial fibrillation, on   anticoagulation, stage III kidney disease, pulmonary hypertension with last   reported RVSP of 45, chronic hypertension and dyslipidemia.  Patient admitted   electively today for valve repair, looking at mitral, tricuspid as well as   aortic valve.  He is status post repair of the mitral and tricuspid valves.    The aortic valve on examination intraoperatively showed only mild aortic   stenosis, minimal debridement performed and patient was transferred to the   intensive care unit.  At the present time, he is on 0.04 epinephrine and on   Precedex, full mechanical ventilatory support, recovering from anesthesia with   pulmonary artery catheter in place as well as mediastinal chest tubes      Interval Problem Update  Reviewed last 24 hour events:  Tm 97.5  +850cc over last 24hr, +7.5L since admit  No cxr this am  Hb 9.5  Plat 135  Cr 3.2 w bun 103  INR 2.1    2-3L Nc  IS  Last Bm 10/22    Review of Systems  Review of Systems   Unable to perform ROS: Acuity of condition        Vital Signs for last 24 hours   Temp:  [35.9 °C (96.6 °F)-36.4 °C (97.5 °F)] 36 °C (96.8 °F)  Pulse:  [] 92  Resp:  [13-27] 15    Hemodynamic parameters for last 24 hours       Vent Settings for last 24 hours       Physical Exam   Physical Exam   Constitutional: He appears well-developed and well-nourished.   HENT:   Head: Normocephalic and atraumatic.   Eyes: Pupils are equal, round, and reactive to light. No scleral icterus.   Neck: No tracheal deviation present.   Cardiovascular: Normal rate and  intact distal pulses.    Pulmonary/Chest: He has no wheezes. He has rales.   Abdominal: Soft. There is no tenderness. There is no rebound and no guarding.   Musculoskeletal: He exhibits edema.   Neurological: No cranial nerve deficit.   enecephalopathic   Skin: Skin is warm and dry.   Psychiatric:   sedate   Nursing note and vitals reviewed.      Medications  Current Facility-Administered Medications   Medication Dose Route Frequency Provider Last Rate Last Dose   • amiodarone (CORDARONE) tablet 200 mg  200 mg Oral TWICE DAILY Amber Escobar A.P.N.   200 mg at 10/22/18 1739   • polyethylene glycol/lytes (MIRALAX) PACKET 1 Packet  1 Packet Oral BID Jayce Thompson M.D.   Stopped at 10/22/18 1800   • warfarin (COUMADIN) tablet 5 mg  5 mg Oral COUMADIN-DAILY Jana Deng M.D.   5 mg at 10/22/18 1739   • aspirin (ASA) chewable tab 81 mg  81 mg Oral DAILY Amber Escobar A.P.N.   81 mg at 10/22/18 0601   • fentaNYL (SUBLIMAZE) injection 25 mcg  25 mcg Intravenous Q2HRS PRN Jayce Thompson M.D.   25 mcg at 10/22/18 0000   • atorvastatin (LIPITOR) tablet 20 mg  20 mg Oral Q EVENING Amber Escobar A.P.N.   20 mg at 10/22/18 1739   • Respiratory Care per Protocol   Nebulization Continuous RT Amber Escobar A.P.N.       • Pharmacy Consult Request ...Pain Management Review 1 Each  1 Each Other PRN Amber Escobar A.P.N.       • oxyCODONE immediate-release (ROXICODONE) tablet 5 mg  5 mg Oral Q3HRS PRN Amber Escobar A.P.N.   5 mg at 10/22/18 0000   • oxyCODONE immediate release (ROXICODONE) tablet 10 mg  10 mg Oral Q3HRS PRN Amber Escobar, A.P.N.   10 mg at 10/20/18 1845   • tramadol (ULTRAM) 50 MG tablet 50 mg  50 mg Oral Q4HRS PRN Amber Escobar, A.P.N.       • ondansetron (ZOFRAN) syringe/vial injection 4 mg  4 mg Intravenous Q6HRS PRN Amber Escobar, A.P.N.        Or   • prochlorperazine (COMPAZINE) injection 10 mg  10 mg Intravenous Q6HRS PRN Amber Escobar, A.P.N.        Or   • promethazine (PHENERGAN)  suppository 25 mg  25 mg Rectal Q6HRS PRN Amber Escobar, A.P.N.       • acetaminophen (TYLENOL) tablet 650 mg  650 mg Oral Q4HRS PRN Amber Escobar A.P.N.        Or   • acetaminophen (TYLENOL) suppository 650 mg  650 mg Rectal Q4HRS PRN Amber Escobar, A.P.N.       • senna-docusate (PERICOLACE or SENOKOT S) 8.6-50 MG per tablet 2 Tab  2 Tab Oral BID Amber Escobar A.P.N.   Stopped at 10/22/18 1800    And   • polyethylene glycol/lytes (MIRALAX) PACKET 1 Packet  1 Packet Oral QDAY PRN Amber Escobar A.P.N.   1 Packet at 10/20/18 1408    And   • magnesium hydroxide (MILK OF MAGNESIA) suspension 30 mL  30 mL Oral QDAY PRN Amber Escobar A.P.N.   30 mL at 10/21/18 0448    And   • bisacodyl (DULCOLAX) suppository 10 mg  10 mg Rectal QDAY PRN Amber Escobar, A.P.N.   Stopped at 10/22/18 0854   • mag hydrox-al hydrox-simeth (MAALOX PLUS ES or MYLANTA DS) suspension 30 mL  30 mL Oral Q4HRS PRN Amber Escobar, A.P.N.       • diphenhydrAMINE (BENADRYL) tablet/capsule 25 mg  25 mg Oral HS PRN - MR X 1 Amber Escobar A.P.N.   25 mg at 10/22/18 0000   • electrolyte-A (PLASMALYTE-A) infusion   Intravenous PRN Amber Escobar A.P.N.   1,000 mL at 10/16/18 2252   • MD Alert...Warfarin per Pharmacy   Other pharmacy to dose Amber Escobar A.P.N.       • HYDROcodone-acetaminophen (NORCO) 5-325 MG per tablet 1-2 Tab  1-2 Tab Oral Q4HRS PRN Amber Escobar A.P.N.   1 Tab at 10/22/18 1431   • electrolyte-148 (PLASMALYTE-148) infusion 1,000 mL  1,000 mL Intravenous Once PRN Annie Oreilly, A.P.N.           Fluids    Intake/Output Summary (Last 24 hours) at 10/23/18 0730  Last data filed at 10/23/18 0600   Gross per 24 hour   Intake              290 ml   Output              290 ml   Net                0 ml       Laboratory  Recent Results (from the past 48 hour(s))   MAGNESIUM    Collection Time: 10/21/18 10:44 AM   Result Value Ref Range    Magnesium 2.0 1.5 - 2.5 mg/dL   CBC without Differential Critical Care 0130     Collection Time: 10/22/18  5:24 AM   Result Value Ref Range    WBC 3.4 (L) 4.8 - 10.8 K/uL    RBC 2.44 (L) 4.70 - 6.10 M/uL    Hemoglobin 8.0 (L) 14.0 - 18.0 g/dL    Hematocrit 24.9 (L) 42.0 - 52.0 %    .0 (H) 81.4 - 97.8 fL    MCH 32.8 27.0 - 33.0 pg    MCHC 32.1 (L) 33.7 - 35.3 g/dL    RDW 69.6 (H) 35.9 - 50.0 fL    Platelet Count 99 (L) 164 - 446 K/uL    MPV 11.1 9.0 - 12.9 fL   Basic Metabolic Panel (BMP) Critical Care 0130    Collection Time: 10/22/18  5:24 AM   Result Value Ref Range    Sodium 137 135 - 145 mmol/L    Potassium 4.3 3.6 - 5.5 mmol/L    Chloride 104 96 - 112 mmol/L    Co2 21 20 - 33 mmol/L    Glucose 153 (H) 65 - 99 mg/dL    Bun 99 (HH) 8 - 22 mg/dL    Creatinine 2.87 (H) 0.50 - 1.40 mg/dL    Calcium 9.3 8.5 - 10.5 mg/dL    Anion Gap 12.0 (H) 0.0 - 11.9   PROTHROMBIN TIME    Collection Time: 10/22/18  5:24 AM   Result Value Ref Range    PT 20.1 (H) 12.0 - 14.6 sec    INR 1.71 (H) 0.87 - 1.13   ESTIMATED GFR    Collection Time: 10/22/18  5:24 AM   Result Value Ref Range    GFR If  26 (A) >60 mL/min/1.73 m 2    GFR If Non African American 21 (A) >60 mL/min/1.73 m 2   CBC without Differential Critical Care 0130    Collection Time: 10/23/18  4:05 AM   Result Value Ref Range    WBC 4.5 (L) 4.8 - 10.8 K/uL    RBC 3.02 (L) 4.70 - 6.10 M/uL    Hemoglobin 9.5 (L) 14.0 - 18.0 g/dL    Hematocrit 30.3 (L) 42.0 - 52.0 %    .3 (H) 81.4 - 97.8 fL    MCH 31.5 27.0 - 33.0 pg    MCHC 31.4 (L) 33.7 - 35.3 g/dL    RDW 71.7 (H) 35.9 - 50.0 fL    Platelet Count 135 (L) 164 - 446 K/uL    MPV 11.9 9.0 - 12.9 fL   PROTHROMBIN TIME    Collection Time: 10/23/18  4:05 AM   Result Value Ref Range    PT 24.2 (H) 12.0 - 14.6 sec    INR 2.16 (H) 0.87 - 1.13       Imaging      Assessment/Plan  * Acute respiratory failure with hypoxia (HCC)   Assessment & Plan    Intubated 10/16; went to PEA arrest approx 1 min post extubation and required emergent intubation  Extubated to bipap 10/18 successfully  and since weaned off  Rt/02 protocols  Continue IS/PEP/Mobilize  Follow closely given increased volume status, no w encephalopathy making less likely bipap candidate        Thrombocytopenia (HCC)   Assessment & Plan    Will also have significant qualitative disorder given bun of >100 + asa  Trending back up        S/P TVR (tricuspid valve repair)   Assessment & Plan    S/P repair 10/16  Continue post heart protocols        S/P MVR (mitral valve repair)   Assessment & Plan    S/P repair 10/16  Continue post heart protocols  CT removed 10/18          Paroxysmal atrial fibrillation (HCC)- (present on admission)   Assessment & Plan    Keep K > 4 and Mg > 2  Off amio infusion, on po now        Essential hypertension, benign- (present on admission)   Assessment & Plan    Pressures labile            Encephalopathy   Assessment & Plan    Multifactorial with pain meds + acute on ckd  Aspiration precautions  Minimize further mind altering/sedating meds  Head ct if develops focal findings        Acute blood loss anemia   Assessment & Plan    S/P 2u PRBC 10/17  S/P 2u PRBCs 10/19  S/P 1u PRBCs 10/22        CKD (chronic kidney disease) stage 3, GFR 30-59 ml/min ([facility])- (present on admission)   Assessment & Plan    Baseline Cr 1.8  Renally dose meds  Minimize nephrotoxic substances  Monitor UO - decreasing and less responsive to diuretics  Ramey in place  Pending renal consult  May need HD for filtration as well as volume removal          Pulmonary hypertension (HCC)- (present on admission)   Assessment & Plan    Likely related to previous severe MR  F/u echo as OP           Mixed hyperlipidemia- (present on admission)   Assessment & Plan    statin             VTE:  Coumadin  Ulcer: Not Indicated  Lines: Central Line  Ongoing indication addressed    I have performed a physical exam and reviewed and updated ROS and Plan today (10/23/2018). In review of yesterday's note (10/22/2018), there are no changes except as documented above.      Discussed patient condition and risk of morbidity and/or mortality with RN, RT, Therapies, Pharmacy and [facility]

## 2018-10-23 NOTE — PROGRESS NOTES
Inpatient Anticoagulation Service Note    Date: 10/23/2018  Reason for Anticoagulation: Bioprosthetic Valve Replacement, Atrial Fibrillation   [id number] VASc Score: 3    Hemoglobin Value: (!) 9.5  Hematocrit Value: (!) 30.3  Lab Platelet Value: (!) 135  Target INR: 2.0 to 3.0    INR from last 7 days     Date/Time INR Value    10/23/18 0405 (!)  2.16    10/22/18 0524 (!)  1.71    10/21/18 0445 (!)  1.31    10/20/18 0535 (!)  1.34    10/19/18 0400 (!)  1.55    10/18/18 0412 (!)  1.45    10/17/18 1304 (!)  1.38    10/16/18 1912 (!)  1.48    10/16/18 1342 (!)  1.22        Dose from last 7 days     Date/Time Dose (mg)    10/23/18 1300  2.5    10/22/18 1500  5    10/21/18 1400  5    10/20/18 1200  5    10/19/18 1400  0    10/18/18 0900  0    10/17/18 1200  5        Average Dose (mg):  (New start this admission)  Significant Interactions: Aspirin, Amiodarone, Statin  Bridge Therapy: No     Reversal Agent Administered: Not Applicable  Comments: INR therapeutic, anticipate continued rise tomorrow. Renal function has worsened and nephrology is now consulted for possible dialysis. Otherwise, patient remain anemic, but no current concern for active bleed. No new drug interactions and diet has remained unchanged.     Plan:  2.5mg  Education Material Provided?: Yes  Pharmacist suggested discharge dosin.5 mg /// and 5 mg // with close INR follow up on discharge.      Jerzy Akers, PharmD

## 2018-10-23 NOTE — PROGRESS NOTES
"Called chemistry regarding BMP that has been \"collected\" and \"in process\" since 0405.  stated it was stored away and will have to be rerun.  "

## 2018-10-23 NOTE — DISCHARGE PLANNING
Anticipated Discharge Disposition: SNF vs Rehab     Action: This RN CM met with pt and pt's wife at bedside regarding SNF.  Both the pt and pt's wife prefer to go to Rehab, but if they must go to a SNF the pt and his wife chose [facility].    Barriers to Discharge: Pending Rehab vs SNF    Plan: Faxed sign choice for to [facility]

## 2018-10-23 NOTE — ASSESSMENT & PLAN NOTE
Multifactorial with pain meds + acute on ckd  Aspiration precautions  Minimize further mind altering/sedating meds  Improved

## 2018-10-23 NOTE — PROGRESS NOTES
Spoke with Amber GOLDEN regarding soft blood pressures (SBPs 90s-100s). Orders to change amiodarone from 400 mg BID to 200 mg BID and discontinue metoprolol.

## 2018-10-23 NOTE — CARE PLAN
Problem: Nutritional:  Goal: Achieve adequate nutritional intake  Patient will consume % of meals/supplements.  Outcome: NOT MET

## 2018-10-23 NOTE — DIETARY
Nutrition Services: Diet education F/u and dietary re-screen    Admit day 7.  Discussed in Rounds.  Pt is not appropriate to receive diet education @ this time. Handouts were left 10/19.  RN noted pt with poor appetite.   Boost supplements with meals TID.  Estimated needs: 1756 kcal/day  Pt may be starting HD.    Pt needs consume % of meals/supplements to meet needs.   If pt is going to undergo HD, then may want to consider TF.    RD following.

## 2018-10-24 NOTE — CARE PLAN
Problem: Post Op Day 4 CABG/Heart Valve Replacement  Goal: Optimal care of the Post Op CABG/Heart Valve replacement Post Op Day 4    Intervention: Daily Weights  completed  Intervention: Shower daily and clean incisions twice daily with soap and water  Pt not tolerating mobilization to shower at this point - will reassess tomorrow  Intervention: Up in chair for all meals  Goal: up to chair for breakfast in AM  Intervention: Ambulate, increasing the distance each time x 3 and before bed  Not yet met. Goal is to progress to this level. Best effort thus far = ambulation to door  Intervention: IS q 1 hour while awake and record best IS volume  Best effort = 1200 with education  Intervention: Consider removal of domínguez, chest tube and pacer wire if not already done  Chest tubes and pacer wires dc'd on previous shifts.  remains in place for strict I/O

## 2018-10-24 NOTE — PROGRESS NOTES
Critical Care Progress Note    Date of admission  10/16/2018    Chief Complaint  78 y.o. male admitted 10/16/2018 with severe MR and TR, s/p repair    Hospital Course    78-year-old gentleman currently on full   mechanical ventilatory support, status post valve repair and all information   taken from chart review inside out.  It appears that this gentleman has a   chronic history of severe MR, severe TR, paroxysmal atrial fibrillation, on   anticoagulation, stage III kidney disease, pulmonary hypertension with last   reported RVSP of 45, chronic hypertension and dyslipidemia.  Patient admitted   electively today for valve repair, looking at mitral, tricuspid as well as   aortic valve.  He is status post repair of the mitral and tricuspid valves.    The aortic valve on examination intraoperatively showed only mild aortic   stenosis, minimal debridement performed and patient was transferred to the   intensive care unit.  At the present time, he is on 0.04 epinephrine and on   Precedex, full mechanical ventilatory support, recovering from anesthesia with   pulmonary artery catheter in place as well as mediastinal chest tubes      Interval Problem Update  Reviewed last 24 hour events:  Tm 97.2  -440cc over last 24hr, +7.1L since admit  No cxr this am  Hb 9.0  Cr 3.1 w bun 106  INR 3.0    2L Nc  IS  Last Bm 10/22    Review of Systems  Review of Systems   Constitutional: Positive for fatigue. Negative for chills and fever.   HENT: Negative for congestion and sore throat.    Respiratory: Positive for cough, chest tightness and shortness of breath. Negative for wheezing.    Cardiovascular: Negative for chest pain and palpitations.   Gastrointestinal: Positive for abdominal distention. Negative for abdominal pain, nausea and vomiting.   Neurological: Positive for weakness. Negative for headaches.   Psychiatric/Behavioral: Negative for agitation.   All other systems reviewed and are negative.       Vital Signs for last 24  hours   Temp:  [36 °C (96.8 °F)-36.3 °C (97.3 °F)] 36 °C (96.8 °F)  Pulse:  [64-98] 89  Resp:  [14-25] 19    Hemodynamic parameters for last 24 hours       Vent Settings for last 24 hours       Physical Exam   Physical Exam   Constitutional: He appears well-developed and well-nourished.   HENT:   Head: Normocephalic and atraumatic.   Eyes: Pupils are equal, round, and reactive to light. No scleral icterus.   Neck: No tracheal deviation present.   Cardiovascular: Normal rate and intact distal pulses.    Pulmonary/Chest: He has no wheezes. He has rales.   Abdominal: He exhibits distension. There is no tenderness. There is no rebound and no guarding.   hypoactive   Musculoskeletal: He exhibits edema.   Neurological: He is alert. No cranial nerve deficit.   Skin: Skin is warm and dry.   Psychiatric: He has a normal mood and affect.   Nursing note and vitals reviewed.      Medications  Current Facility-Administered Medications   Medication Dose Route Frequency Provider Last Rate Last Dose   • warfarin (COUMADIN) tablet 2.5 mg  2.5 mg Oral COUMADIN-DAILY Jana Deng M.D.   2.5 mg at 10/23/18 1754   • amiodarone (CORDARONE) tablet 200 mg  200 mg Oral TWICE DAILY Amber Escobar A.P.N.   200 mg at 10/24/18 0543   • aspirin (ASA) chewable tab 81 mg  81 mg Oral DAILY Amber Escobar A.P.N.   81 mg at 10/24/18 0543   • atorvastatin (LIPITOR) tablet 20 mg  20 mg Oral Q EVENING Amber Escobar, A.P.N.   20 mg at 10/23/18 1754   • Respiratory Care per Protocol   Nebulization Continuous RT Amber Escobar A.P.N.       • Pharmacy Consult Request ...Pain Management Review 1 Each  1 Each Other PRN Amber Escobar A.P.N.       • tramadol (ULTRAM) 50 MG tablet 50 mg  50 mg Oral Q4HRS PRN Amber Escobar A.P.N.       • ondansetron (ZOFRAN) syringe/vial injection 4 mg  4 mg Intravenous Q6HRS PRN Amber Escobar A.P.N.       • acetaminophen (TYLENOL) tablet 650 mg  650 mg Oral Q4HRS PRN Amber Escobar, A.P.N.        Or   •  acetaminophen (TYLENOL) suppository 650 mg  650 mg Rectal Q4HRS PRN Amber Escobar, A.P.N.       • senna-docusate (PERICOLACE or SENOKOT S) 8.6-50 MG per tablet 2 Tab  2 Tab Oral BID Amber Escobar A.P.N.   2 Tab at 10/24/18 0543    And   • polyethylene glycol/lytes (MIRALAX) PACKET 1 Packet  1 Packet Oral QDAY PRN mAber Escobar A.P.N.   1 Packet at 10/20/18 1408    And   • magnesium hydroxide (MILK OF MAGNESIA) suspension 30 mL  30 mL Oral QDAY PRN Amber Escobar A.P.N.   30 mL at 10/21/18 0448    And   • bisacodyl (DULCOLAX) suppository 10 mg  10 mg Rectal QDAY PRN Amber Escobar A.P.N.   Stopped at 10/22/18 0854   • mag hydrox-al hydrox-simeth (MAALOX PLUS ES or MYLANTA DS) suspension 30 mL  30 mL Oral Q4HRS PRN Amber Escobar, A.P.N.       • MD Alert...Warfarin per Pharmacy   Other pharmacy to dose Amber Escobar A.P.N.       • HYDROcodone-acetaminophen (NORCO) 5-325 MG per tablet 1-2 Tab  1-2 Tab Oral Q4HRS PRN Amber Escobar A.P.N.   1 Tab at 10/22/18 1431       Fluids    Intake/Output Summary (Last 24 hours) at 10/24/18 0731  Last data filed at 10/24/18 0600   Gross per 24 hour   Intake              480 ml   Output              920 ml   Net             -440 ml       Laboratory  Recent Results (from the past 48 hour(s))   CBC without Differential Critical Care 0130    Collection Time: 10/23/18  4:05 AM   Result Value Ref Range    WBC 4.5 (L) 4.8 - 10.8 K/uL    RBC 3.02 (L) 4.70 - 6.10 M/uL    Hemoglobin 9.5 (L) 14.0 - 18.0 g/dL    Hematocrit 30.3 (L) 42.0 - 52.0 %    .3 (H) 81.4 - 97.8 fL    MCH 31.5 27.0 - 33.0 pg    MCHC 31.4 (L) 33.7 - 35.3 g/dL    RDW 71.7 (H) 35.9 - 50.0 fL    Platelet Count 135 (L) 164 - 446 K/uL    MPV 11.9 9.0 - 12.9 fL   Basic Metabolic Panel (BMP) Critical Care 0130    Collection Time: 10/23/18  4:05 AM   Result Value Ref Range    Sodium 138 135 - 145 mmol/L    Potassium 4.5 3.6 - 5.5 mmol/L    Chloride 103 96 - 112 mmol/L    Co2 21 20 - 33 mmol/L    Glucose 137  (H) 65 - 99 mg/dL    Bun 103 (HH) 8 - 22 mg/dL    Creatinine 3.20 (H) 0.50 - 1.40 mg/dL    Calcium 9.7 8.5 - 10.5 mg/dL    Anion Gap 14.0 (H) 0.0 - 11.9   PROTHROMBIN TIME    Collection Time: 10/23/18  4:05 AM   Result Value Ref Range    PT 24.2 (H) 12.0 - 14.6 sec    INR 2.16 (H) 0.87 - 1.13   ESTIMATED GFR    Collection Time: 10/23/18  4:05 AM   Result Value Ref Range    GFR If  23 (A) >60 mL/min/1.73 m 2    GFR If Non  19 (A) >60 mL/min/1.73 m 2   CBC without Differential Critical Care 0130    Collection Time: 10/24/18  4:05 AM   Result Value Ref Range    WBC 5.4 4.8 - 10.8 K/uL    RBC 2.86 (L) 4.70 - 6.10 M/uL    Hemoglobin 9.0 (L) 14.0 - 18.0 g/dL    Hematocrit 28.7 (L) 42.0 - 52.0 %    .3 (H) 81.4 - 97.8 fL    MCH 31.5 27.0 - 33.0 pg    MCHC 31.4 (L) 33.7 - 35.3 g/dL    RDW 69.0 (H) 35.9 - 50.0 fL    Platelet Count 143 (L) 164 - 446 K/uL    MPV 10.8 9.0 - 12.9 fL   Basic Metabolic Panel (BMP) Critical Care 0130    Collection Time: 10/24/18  4:05 AM   Result Value Ref Range    Sodium 137 135 - 145 mmol/L    Potassium 4.0 3.6 - 5.5 mmol/L    Chloride 103 96 - 112 mmol/L    Co2 20 20 - 33 mmol/L    Glucose 126 (H) 65 - 99 mg/dL    Bun 106 (HH) 8 - 22 mg/dL    Creatinine 3.14 (H) 0.50 - 1.40 mg/dL    Anion Gap 14.0 (H) 0.0 - 11.9    Calcium 9.2 8.5 - 10.5 mg/dL   PROTHROMBIN TIME    Collection Time: 10/24/18  4:05 AM   Result Value Ref Range    PT 31.8 (H) 12.0 - 14.6 sec    INR 3.08 (H) 0.87 - 1.13   PHOSPHORUS    Collection Time: 10/24/18  4:05 AM   Result Value Ref Range    Phosphorus 4.6 (H) 2.5 - 4.5 mg/dL   ESTIMATED GFR    Collection Time: 10/24/18  4:05 AM   Result Value Ref Range    GFR If  23 (A) >60 mL/min/1.73 m 2    GFR If Non  19 (A) >60 mL/min/1.73 m 2       Imaging      Assessment/Plan  * Acute respiratory failure with hypoxia (HCC)   Assessment & Plan    Intubated 10/16; went to PEA arrest approx 1 min post extubation and  required emergent intubation  Extubated to bipap 10/18 successfully and since weaned off  Rt/02 protocols  Continue IS/PEP/Mobilize  Monitor closely for worsening pulm edema - mentation better, can tolerate nippv at this time if needed        Thrombocytopenia (HCC)   Assessment & Plan    Will also have significant qualitative disorder given bun of >100 + asa  Nearly resolved        S/P TVR (tricuspid valve repair)   Assessment & Plan    S/P repair 10/16  Continue post heart protocols        S/P MVR (mitral valve repair)   Assessment & Plan    S/P repair 10/16  Continue post heart protocols  CT removed 10/18          Paroxysmal atrial fibrillation (HCC)- (present on admission)   Assessment & Plan    Keep K > 4 and Mg > 2  Amiodarone po  warfarin        Essential hypertension, benign- (present on admission)   Assessment & Plan    Pressures labile            Encephalopathy   Assessment & Plan    Multifactorial with pain meds + acute on ckd  Aspiration precautions  Minimize further mind altering/sedating meds  Head ct if develops focal findings  Improving         Acute blood loss anemia   Assessment & Plan    S/P 2u PRBC 10/17  S/P 2u PRBCs 10/19  S/P 1u PRBCs 10/22        CKD (chronic kidney disease) stage 3, GFR 30-59 ml/min (HCC)- (present on admission)   Assessment & Plan    Baseline Cr 1.8  Renally dose meds  Minimize nephrotoxic substances  Monitor UO - marginal op with higher dose lasix  Ramey in place  May need eventual HD for filtration as well as volume removal          Pulmonary hypertension (HCC)- (present on admission)   Assessment & Plan    Likely related to previous severe MR  F/u echo as OP           Mixed hyperlipidemia- (present on admission)   Assessment & Plan    statin             VTE:  Coumadin  Ulcer: Not Indicated  Lines: Ramey Catheter  Ongoing indication addressed    I have performed a physical exam and reviewed and updated ROS and Plan today (10/24/2018). In review of yesterday's note  (10/23/2018), there are no changes except as documented above.     Discussed patient condition and risk of morbidity and/or mortality with RN, RT, Therapies, Pharmacy and [facility]

## 2018-10-24 NOTE — THERAPY
"Occupational Therapy Treatment completed with focus on ADLs and ADL transfers.  Functional Status:  Max A STS from Recliner w/ FWW, Mod A functional mobility, CGA seated grooming - performed oral care, Max A LB dressing  Plan of Care: Will benefit from Occupational Therapy 3 times per week  Discharge Recommendations:  Equipment Will Continue to Assess for Equipment Needs. Post-acute therapy Discharge to a transitional care facility for continued skilled therapy services.    See \"Rehab Therapy-Acute\" Patient Summary Report for complete documentation.     Pt seen for Acute  OT tx. Pt primarily limited by decreased activity tolerance and deconditioning. Pt continues to require assistance for BADLs in this setting - significant assistance for LB dressing, toileting, and pt has not had the endurance to shower yet. Will continue to follow for acute OT services to facilitate independence.     "

## 2018-10-24 NOTE — PROGRESS NOTES
[facility] -  PROGRESS NOTE               Author: Manjeet Bruner M.D. Date & Time: 10/24/2018  6:59 AM     HPI:  77 YO male with history notable for CHF, valvular disease and CKD presented for planned cardiothoracic surgery in the setting of valvular disease.      Baseline cr appears to be ~1.8 most recently without proteinuria or active urinary sediment. Underwent MVR, tricuspid valve repar, aortic valve reparr and TITI ligation on 10/16. S/P resp arrest post extubation on POD#1. Continued on pressors until POD#3. Attempted diuresis over past 72 hours with sport doses of 20-40mg lasix. UOP down trending  Form >1L on 10/19 to 300cc over last 24 hours.  Patient requires 40 mg Lasix and metolazone at home.     Patient is currently unable to answer questioning in the setting of critical illness, wife provides history.  She notes that yesterday he became more lethargic and has been slowly out of it this morning.  She does not think he has had significant chest pain or shortness of breath.  He has not been complaining about abdominal pain.  Intermittent low blood pressures over the last 48 hours.      DAILY NEPHROLOGY SUMMARY:  10/23: consult done  10/24: ~1L UOP after 1 dose of lasix, 2nd dose held for hypotension    PAST FAMILY HISTORY: Reviewed and Unchanged  SOCIAL HISTORY: Reviewed and Unchanged  CURRENT MEDICATIONS: Reviewed  IMAGING STUDIES: Reviewed    ROS  General:  +weakness, no malaise  HEENT:  No vision changes, no hearing changes  CV:  +chest pain, no palpitations  Lungs:  No cough; no PND  GI:  No Nausea; no vomiting  : No dysuria or gross hematuria  MSK:  No joint pain; no trauma  Skin: No rashes; no lesions  Neuro: No tremors; no LOC  Psych: No depression; no anxiety    PHYSICAL EXAM  GENERAL: NAD, sitting in chair  HEAD: Normocephalic, atraumatic  EYES: no scleral icterus; normal conjunctiva  NECK: Supple; trachea midline  CV: Irregular rate and rhythm, S1 and S2  present.  LUNGS: Decreased breath sounds bilateral bases, No rhales or wheezes  ABDOMEN: Soft, non-tender  EXTREMETIES: 2-3mm lower extremity edema, no clubbing or cyanosis  SKIN: Warm and dry, no rashes.  Midline sternal wound without surrounding erythema or edema  NEURO: Lethargic, no focal deficits    Fluids:  In: 240 [P.O.:240]  Out: 600     LABS:  Recent Results (from the past 24 hour(s))   CBC without Differential Critical Care 0130    Collection Time: 10/24/18  4:05 AM   Result Value Ref Range    WBC 5.4 4.8 - 10.8 K/uL    RBC 2.86 (L) 4.70 - 6.10 M/uL    Hemoglobin 9.0 (L) 14.0 - 18.0 g/dL    Hematocrit 28.7 (L) 42.0 - 52.0 %    .3 (H) 81.4 - 97.8 fL    MCH 31.5 27.0 - 33.0 pg    MCHC 31.4 (L) 33.7 - 35.3 g/dL    RDW 69.0 (H) 35.9 - 50.0 fL    Platelet Count 143 (L) 164 - 446 K/uL    MPV 10.8 9.0 - 12.9 fL   Basic Metabolic Panel (BMP) Critical Care 0130    Collection Time: 10/24/18  4:05 AM   Result Value Ref Range    Sodium 137 135 - 145 mmol/L    Potassium 4.0 3.6 - 5.5 mmol/L    Chloride 103 96 - 112 mmol/L    Co2 20 20 - 33 mmol/L    Glucose 126 (H) 65 - 99 mg/dL    Bun 106 (HH) 8 - 22 mg/dL    Creatinine 3.14 (H) 0.50 - 1.40 mg/dL    Anion Gap 14.0 (H) 0.0 - 11.9    Calcium 9.2 8.5 - 10.5 mg/dL   PROTHROMBIN TIME    Collection Time: 10/24/18  4:05 AM   Result Value Ref Range    PT 31.8 (H) 12.0 - 14.6 sec    INR 3.08 (H) 0.87 - 1.13   PHOSPHORUS    Collection Time: 10/24/18  4:05 AM   Result Value Ref Range    Phosphorus 4.6 (H) 2.5 - 4.5 mg/dL   ESTIMATED GFR    Collection Time: 10/24/18  4:05 AM   Result Value Ref Range    GFR If  23 (A) >60 mL/min/1.73 m 2    GFR If Non  19 (A) >60 mL/min/1.73 m 2       (click the triangle to expand results)    ASSESSMENT:  # AIDA: Baseline Cr~1.8, increased to 3.2. Likely 2/2 hemodynamic compromise/tubular injury.  Creatinine appears to be plateauing signaling recovery.  # CKD Stage 3; non-proteinuric. Has never been followed  by nephrology.   # Acidosis: trending  # Hypervolemia  # Valvular heart disease s/p MVR, tricuspid valve repar, aortic valve reparr and TITI ligation  # Volume overload  # Anemia: acute blood loss component  # PEA arrest     PLAN:  -No need for renal replacement therapy today   -Additional dose of 80 mg IV Lasix, would goal net -1-2 L today.  May need to repeat dose in afternoon  -Limit narcotics, fentanyl is best choice  -Renal diet  -Strict I/Os  -Dose all meds per eGFR <15      Thank you for this consult, we will continue to follow.    Manjeet Bruner MD

## 2018-10-24 NOTE — DISCHARGE PLANNING
Anticipated Discharge Disposition: possible d/c to Rehab    Action: Spoke to MYNOR Turner. Pt is working on possible d/c to Rehab, but not medically cleared for d/c today.    Possible order for Rehab w/ Friday/Saturday d/c.      Barriers to Discharge: order, acceptance    Plan: f/u w/ medical team, cca, rehab

## 2018-10-24 NOTE — CARE PLAN
Problem: Post Op Day 4 CABG/Heart Valve Replacement  Goal: Optimal care of the Post Op CABG/Heart Valve replacement Post Op Day 4    Intervention: Daily Weights  Completed  Intervention: Shower daily and clean incisions twice daily with soap and water  Sternal incision cleaned with soap and water.  Intervention: Up in chair for all meals  In chair for lunch and dinner. Too sedated to get up for breakfast.  Intervention: Ambulate, increasing the distance each time x 3 and before bed  Walked to door and back to bed. Tires quickly, legs became weak. Room air. Front wheel walker in use with 2 person assist.   Intervention: IS q 1 hour while awake and record best IS volume  Best IS 1250 mL. Baseline IS 3250 mL.  Intervention: Consider removal of domínguez, chest tube and pacer wire if not already done  Diuresing - Domínguez remains in place. Chest tube/pacer wires N/A.

## 2018-10-24 NOTE — PROGRESS NOTES
Inpatient Anticoagulation Service Note    Date: 10/24/2018  Reason for Anticoagulation: Bioprosthetic Valve Replacement, Atrial Fibrillation   [id number] VASc Score: 3    Hemoglobin Value: (!) 9  Hematocrit Value: (!) 28.7  Lab Platelet Value: (!) 143  Target INR: 2.0 to 3.0    INR from last 7 days     Date/Time INR Value    10/24/18 0405 (!)  3.08    10/23/18 0405 (!)  2.16    10/22/18 0524 (!)  1.71    10/21/18 0445 (!)  1.31    10/20/18 0535 (!)  1.34    10/19/18 0400 (!)  1.55    10/18/18 0412 (!)  1.45    10/17/18 1304 (!)  1.38        Dose from last 7 days     Date/Time Dose (mg)    10/24/18 1200  2.5    10/23/18 1300  2.5    10/22/18 1500  5    10/21/18 1400  5    10/20/18 1200  5    10/19/18 1400  0    10/18/18 0900  0        Average Dose (mg):  (New start this admission)  Significant Interactions: Aspirin, Amiodarone, Statin  Bridge Therapy: No     Reversal Agent Administered: Not Applicable  Comments: INR supratherapeutic, likley due to acute renal insufficiency. Continue low dose warfarin. Hypotensive, however no concern for acute bleeding today. No planned dialysis, ongoing diuresis attempted. No new drug interactions of note.     Plan:  2.5mg  Education Material Provided?: Yes  Pharmacist suggested discharge dosing: RALPH Akers, PharmD

## 2018-10-24 NOTE — DISCHARGE PLANNING
Anticipated Discharge Disposition: d/c to snf    Action: [facility] questioning when pt will d/c.    Spoke to bedside MYNOR Ling.     Left VM w/ Heart SKYE RN, Yue.    SNF is requesting when pt will be ready for d/c and it appears he may be medically ready today.    Barriers to Discharge: medical clearance, acceptance, transport    Plan: f/u teena/ Yue, ALISSON, medical team

## 2018-10-24 NOTE — CARE PLAN
Problem: Hyperinflation:  Goal: Prevent or improve atelectasis    Intervention: Instruct incentive spirometry usage  Pt receiving IS/PEP. IS: 1250 cc. Pt atilio RA-2 LPM NC.

## 2018-10-24 NOTE — PROGRESS NOTES
Cardiovascular Surgery Progress Note    Name: Jaime Tamayo  MRN: [id number]  : 1940  Admit Date: 10/16/2018  5:44 AM  Procedure:  Procedure(s) and Anesthesia Type:     * MITRAL VALVE REPAIR - General     * TRICUSPID VALVE REPAIR - General     * AORTIC VALVE REPAIR - General     * LEFT ATRIAL APPENDAGE LIGATION - General     * YUNIOR - General  8 Day Post-Op    Vitals:  Patient Vitals for the past 8 hrs:   Temp SpO2 O2 Delivery O2 (LPM) Pulse Heart Rate (Monitored) Resp NIBP Weight FiO2%   10/24/18 1300 - - - - 92 93 (!) 23 107/63 - -   10/24/18 1200 36.1 °C (97 °F) 94 % None (Room Air) 0 78 80 18 (!) 90/50 - -   10/24/18 1147 - 98 % - 0 83 85 17 - - 21 %   10/24/18 0800 36 °C (96.8 °F) - - - 92 92 18 106/58 - -   10/24/18 0743 - 99 % - 0 87 88 18 - - 21 %   10/24/18 0645 - - - - - - - - 86.5 kg (190 lb 11.2 oz) -   10/24/18 0600 - - Silicone Nasal Cannula 2 - - - - 88.7 kg (195 lb 8.8 oz) -     Temp (24hrs), Av.1 °C (96.9 °F), Min:36 °C (96.8 °F), Max:36.1 °C (97 °F)      Respiratory:    Respiration: (!) 23, Pulse Oximetry: 94 %, O2 Daily Delivery Respiratory : Room Air with O2 Available     Chest Tube Drains:          Fluids:    Intake/Output Summary (Last 24 hours) at 10/24/18 1351  Last data filed at 10/24/18 1200   Gross per 24 hour   Intake              780 ml   Output             1115 ml   Net             -335 ml     Admit weight: Weight: 77.9 kg (171 lb 11.8 oz)  Current weight: Weight: 86.5 kg (190 lb 11.2 oz) (10/24/18 0645)    Labs:  Recent Labs      10/22/18   0524  10/23/18   0405  10/24/18   0405   WBC  3.4*  4.5*  5.4   RBC  2.44*  3.02*  2.86*   HEMOGLOBIN  8.0*  9.5*  9.0*   HEMATOCRIT  24.9*  30.3*  28.7*   MCV  102.0*  100.3*  100.3*   MCH  32.8  31.5  31.5   MCHC  32.1*  31.4*  31.4*   RDW  69.6*  71.7*  69.0*   PLATELETCT  99*  135*  143*   MPV  11.1  11.9  10.8         Recent Labs      10/22/18   0524  10/23/18   0405  10/24/18   0405   SODIUM  137  138  137   POTASSIUM  4.3  4.5   4.0   CHLORIDE  104  103  103   CO2  21  21  20   GLUCOSE  153*  137*  126*   BUN  99*  103*  106*   CREATININE  2.87*  3.20*  3.14*   CALCIUM  9.3  9.7  9.2     Recent Labs      10/22/18   0524  10/23/18   0405  10/24/18   0405   INR  1.71*  2.16*  3.08*       Medications:  • warfarin  2.5 mg     • amiodarone  200 mg     • aspirin  81 mg     • atorvastatin  20 mg     • senna-docusate  2 Tab     • MD Alert...Warfarin per Pharmacy            Ordered Medications:    ASA - Yes    Plavix - No; contraindicated because of Other coumadin    Post-operative Beta Blockers - yes    Ace Inhibitor - No; contraindicated because of Other normal ef    Statin - No; contraindicated because of No cad          Central Line:  Type of line: cordis  Date of insertion: 10/16/18  Date of removal: see RN notes    Exam:   Review of Systems   Constitutional: Negative.    HENT: Negative.    Eyes: Negative.    Respiratory: Negative.    Cardiovascular: Negative.    Gastrointestinal: Negative.    Genitourinary: Negative.    Musculoskeletal: Negative.    Skin: Negative.    Neurological: Negative.    Endo/Heme/Allergies: Negative.    Psychiatric/Behavioral: Negative.        Physical Exam   Constitutional: He appears well-developed. No distress.   Eyes: Pupils are equal, round, and reactive to light.   Neck: Normal range of motion. No JVD present.   Cardiovascular: Normal rate, regular rhythm and normal heart sounds.    Pulmonary/Chest: Effort normal. No respiratory distress. He has decreased breath sounds in the right lower field and the left lower field. He has no wheezes.   Abdominal: Soft. Bowel sounds are normal. There is no guarding.   Genitourinary:   Genitourinary Comments: domínguez   Musculoskeletal: Normal range of motion. He exhibits edema.   Neurological:   Sedated, follows   Skin: Skin is warm and dry.   Surgical incisions CDI   Psychiatric:   Calm and cooperative       Quality Measures:   Quality-Core Measures   Reviewed items::   Medications reviewed, EKG reviewed, Labs reviewed and Radiology images reviewed  Domínguez catheter::  Urinary Tract Retention or Urinary Tract Obstruction and Strict Intake and Output During Sepisis or Shock  Central line in place:  Need for access and Concentrated IV drugs  DVT prophylaxis pharmacological::  Warfarin (Coumadin)  DVT prophylaxis - mechanical:  SCDs  Ulcer Prophylaxis::  Yes  Assessed for rehabilitation services:  Patient returned to prior level of function, rehabilitation not indicated at this time      Assessment/Plan:  POD 1 S/P respiratory/PEA arrest last night post extubation.  Improved this AM, vented but alert and cooperative.  H/H low s/p acute blood lose anemia.  On low dose epi, swan numbers good. PLAN: Transfuse 2 units PRBC.  Vent per pulm.  Keep domínguez/CTs today.  POD 2 Extubated, HDS, SR, on low dose epi 0.03mcg for right heart support, d/c swan/cordis/CT, diurese, h/h- watch, swallow eval, CPM  POD 3 HDS, SR, wean epi, d/c art line, CR elevated- UO improved with albumin- add maintenance fluids, acute blood loss anemia- transfuse PRBC, NPO- awaiting swallow eval, amb, enc IS  POD 4 HDS, SR- off epi- d/c pacer wires, CR stable- start gently diuresis, Diet ordered- d/c maintenance fluids, work on mobility, enc IS  POD 5 HDS, Afib- amio gtt- change to po- add home beta blocker, CR stable- diurese, cardiac debility- will need rehab consult, domínguez for strict I&O, enc IS  POD 6 HOTN this am- improved after transfusion, Afib- rate controlled, CR stable- low UO- watch, constipation- bowel protocol, acute blood loss anemia- s/p transfusion, cardiac debility- will need rehab when medically stable  POD 7 HDS, afib - rate controlled, Acute on chronic renal failure- BMP pending/UO low- consult nephrology, sedated form narcotics last night- d/c oxy/fent, cardiac debility- mobilize/PT/OT  POD 8 HDS, afib rate controlled.  Fluid balance positive 7 liters.  Wt up 4 Kg today.  More alert today.  Plan:  Defer  diuresing to neph.    Patient seen, examined and plan reviewed with midlevel provider. I agree with the plan.      Active Hospital Problems    Diagnosis   • Thrombocytopenia (HCC) [D69.6]     Priority: High   • S/P MVR (mitral valve repair) [Z98.890]     Priority: High   • S/P TVR (tricuspid valve repair) [Z98.890]     Priority: High   • Paroxysmal atrial fibrillation (HCC) [I48.0]     Priority: High   • Essential hypertension, benign [I10]     Priority: Medium   • Encephalopathy [G93.40]   • Acute blood loss anemia [D62]   • Acute respiratory failure with hypoxia (HCC) [J96.01]   • CKD (chronic kidney disease) stage 3, GFR 30-59 ml/min (HCC) [N18.3]   • Pulmonary hypertension (HCC) [I27.20]   • Mixed hyperlipidemia [E78.2]

## 2018-10-24 NOTE — CARE PLAN
Problem: Knowledge Deficit  Goal: Patient/Significant other demonstrates understanding of disease process, treatment plan, medications and discharge instructions  Outcome: PROGRESSING SLOWER THAN EXPECTED  Pt and Family oriented to unit routine. Pt and family educated regarding activity, diet, meds and plan of care; questions answered; verbalized understanding, but patient evidence of learning is lacking; will reinforce as needed. Pt and Family denies having further needs at this time.    Problem: Bowel/Gastric:  Goal: Normal bowel function is maintained or improved  Outcome: PROGRESSING SLOWER THAN EXPECTED  LBM 10/23

## 2018-10-24 NOTE — DISCHARGE PLANNING
Agency[facility]Facility Name: [facility]  Spoke To: Candace  Outcome: Referral under review.    @1000  Agency[facility] Name: [facility]  Spoke To: Candace  Outcome: Referral under review.    [facility] Name: [facility]  Spoke To: Candace   Outcome: Patient accepted. Wondering d/c date?    TORRES Rico informed.

## 2018-10-25 NOTE — PROGRESS NOTES
Cardiovascular Surgery Progress Note    Name: Jaime Tamayo  MRN: [id number]  : 1940  Admit Date: 10/16/2018  5:44 AM  Procedure:  Procedure(s) and Anesthesia Type:     * MITRAL VALVE REPAIR - General     * TRICUSPID VALVE REPAIR - General     * AORTIC VALVE REPAIR - General     * LEFT ATRIAL APPENDAGE LIGATION - General     * YUNIOR - General  9 Day Post-Op    Vitals:  Patient Vitals for the past 8 hrs:   Temp SpO2 O2 Delivery O2 (LPM) Pulse Heart Rate (Monitored) Resp BP NIBP   10/25/18 1200 36.6 °C (97.9 °F) 96 % Silicone Nasal Cannula 1 68 81 17 - (!) 91/63   10/25/18 1100 - - - - - 78 (!) 29 - -   10/25/18 1000 - 92 % - - 87 85 18 - 100/58   10/25/18 0900 - 94 % - - 84 82 (!) 22 - -   10/25/18 0800 36.9 °C (98.4 °F) 95 % Silicone Nasal Cannula 1 73 77 20 - (!) 90/61   10/25/18 0700 - 98 % - - - - - - -   10/25/18 0511 - - - - 87 - - 101/61 -     Temp (24hrs), Av.5 °C (97.7 °F), Min:36.1 °C (97 °F), Max:37.1 °C (98.8 °F)      Respiratory:    Respiration: 17, Pulse Oximetry: 96 %, O2 Daily Delivery Respiratory : Silicone Nasal Cannula     Chest Tube Drains:          Fluids:    Intake/Output Summary (Last 24 hours) at 10/25/18 1251  Last data filed at 10/25/18 1200   Gross per 24 hour   Intake              320 ml   Output             1670 ml   Net            -1350 ml     Admit weight: Weight: 77.9 kg (171 lb 11.8 oz)  Current weight: Weight: 86.5 kg (190 lb 11.2 oz) (10/24/18 0645)    Labs:  Recent Labs      10/23/18   0405  10/24/18   0405  10/25/18   0457   WBC  4.5*  5.4  5.8   RBC  3.02*  2.86*  2.81*   HEMOGLOBIN  9.5*  9.0*  9.4*   HEMATOCRIT  30.3*  28.7*  27.6*   MCV  100.3*  100.3*  98.2*   MCH  31.5  31.5  33.5*   MCHC  31.4*  31.4*  34.1   RDW  71.7*  69.0*  64.7*   PLATELETCT  135*  143*  150*   MPV  11.9  10.8  11.2         Recent Labs      10/23/18   0405  10/24/18   0405  10/25/18   0457   SODIUM  138  137  136   POTASSIUM  4.5  4.0  3.8   CHLORIDE  103  103  102   CO2  21  20  22    GLUCOSE  137*  126*  127*   BUN  103*  106*  109*   CREATININE  3.20*  3.14*  3.35*   CALCIUM  9.7  9.2  9.0     Recent Labs      10/23/18   0405  10/24/18   0405  10/25/18   0457   INR  2.16*  3.08*  3.69*       Medications:  • furosemide  80 mg     • amiodarone  200 mg     • aspirin  81 mg     • atorvastatin  20 mg     • senna-docusate  2 Tab     • MD Alert...Warfarin per Pharmacy            Ordered Medications:    ASA - Yes    Plavix - No; contraindicated because of Other coumadin    Post-operative Beta Blockers - yes    Ace Inhibitor - No; contraindicated because of Other normal ef    Statin - No; contraindicated because of No cad          Central Line:  Type of line: cordis  Date of insertion: 10/16/18  Date of removal: see RN notes    Exam:   Review of Systems   Constitutional: Negative.    HENT: Negative.    Eyes: Negative.    Respiratory: Negative.    Cardiovascular: Negative.    Gastrointestinal: Negative.    Genitourinary: Negative.    Musculoskeletal: Negative.    Skin: Negative.    Neurological: Negative.    Endo/Heme/Allergies: Negative.    Psychiatric/Behavioral: Negative.        Physical Exam   Constitutional: He appears well-developed. No distress.   Eyes: Pupils are equal, round, and reactive to light.   Neck: Normal range of motion. No JVD present.   Cardiovascular: Normal rate, regular rhythm and normal heart sounds.    Pulmonary/Chest: Effort normal. No respiratory distress. He has decreased breath sounds in the right lower field and the left lower field. He has no wheezes.   Abdominal: Soft. Bowel sounds are normal. There is no guarding.   Genitourinary:   Genitourinary Comments: domínguez   Musculoskeletal: Normal range of motion. He exhibits edema.   Neurological:   Sedated, follows   Skin: Skin is warm and dry.   Surgical incisions CDI   Psychiatric:   Calm and cooperative       Quality Measures:   Quality-Core Measures   Reviewed items::  Medications reviewed, EKG reviewed, Labs reviewed and  Radiology images reviewed  Domínguez catheter::  Urinary Tract Retention or Urinary Tract Obstruction and Strict Intake and Output During Sepisis or Shock  Central line in place:  Need for access and Concentrated IV drugs  DVT prophylaxis pharmacological::  Warfarin (Coumadin)  DVT prophylaxis - mechanical:  SCDs  Ulcer Prophylaxis::  Yes  Assessed for rehabilitation services:  Patient returned to prior level of function, rehabilitation not indicated at this time      Assessment/Plan:  POD 1 S/P respiratory/PEA arrest last night post extubation.  Improved this AM, vented but alert and cooperative.  H/H low s/p acute blood lose anemia.  On low dose epi, swan numbers good. PLAN: Transfuse 2 units PRBC.  Vent per pulm.  Keep domínguez/CTs today.  POD 2 Extubated, HDS, SR, on low dose epi 0.03mcg for right heart support, d/c swan/cordis/CT, diurese, h/h- watch, swallow eval, CPM  POD 3 HDS, SR, wean epi, d/c art line, CR elevated- UO improved with albumin- add maintenance fluids, acute blood loss anemia- transfuse PRBC, NPO- awaiting swallow eval, amb, enc IS  POD 4 HDS, SR- off epi- d/c pacer wires, CR stable- start gently diuresis, Diet ordered- d/c maintenance fluids, work on mobility, enc IS  POD 5 HDS, Afib- amio gtt- change to po- add home beta blocker, CR stable- diurese, cardiac debility- will need rehab consult, domínguez for strict I&O, enc IS  POD 6 HOTN this am- improved after transfusion, Afib- rate controlled, CR stable- low UO- watch, constipation- bowel protocol, acute blood loss anemia- s/p transfusion, cardiac debility- will need rehab when medically stable  POD 7 HDS, afib - rate controlled, Acute on chronic renal failure- BMP pending/UO low- consult nephrology, sedated form narcotics last night- d/c oxy/fent, cardiac debility- mobilize/PT/OT  POD 8 HDS, afib rate controlled.  Fluid balance positive 7 liters.  Wt up 4 Kg today.  More alert today.  Plan:  Defer diuresing to neph.    POD 9 HDS, afib rate  controlled.  Fluid balance trending down.  Right arm and right leg swollen.  Awake alert.  Plan:  US right upper and lower extremity.   Patient seen, examined and plan reviewed with midlevel provider. I agree with the plan.      Active Hospital Problems    Diagnosis   • Thrombocytopenia (HCC) [D69.6]     Priority: High   • S/P MVR (mitral valve repair) [Z98.890]     Priority: High   • S/P TVR (tricuspid valve repair) [Z98.890]     Priority: High   • Paroxysmal atrial fibrillation (HCC) [I48.0]     Priority: High   • Essential hypertension, benign [I10]     Priority: Medium   • Encephalopathy [G93.40]   • Acute blood loss anemia [D62]   • Acute respiratory failure with hypoxia (HCC) [J96.01]   • CKD (chronic kidney disease) stage 3, GFR 30-59 ml/min (HCC) [N18.3]   • Pulmonary hypertension (HCC) [I27.20]   • Mixed hyperlipidemia [E78.2]

## 2018-10-25 NOTE — CARE PLAN
Problem: Post Op Day 4 CABG/Heart Valve Replacement  Goal: Optimal care of the Post Op CABG/Heart Valve replacement Post Op Day 4    Intervention: Shower daily and clean incisions twice daily with soap and water  Incisions was with soap and water.  Intervention: Up in chair for all meals  Completed  Intervention: Ambulate, increasing the distance each time x 3 and before bed  Pt walked x3 this shift about 10 feet each time.  Intervention: IS q 1 hour while awake and record best IS volume  1200 was the best pull on IS this shift.  Intervention: Consider removal of domínguez, chest tube and pacer wire if not already done  Keep domínguez in place

## 2018-10-25 NOTE — CARE PLAN
Problem: Discharge Barriers/Planning  Goal: Patient's continuum of care needs will be met  Outcome: PROGRESSING AS EXPECTED  Patient educated on discharge barriers.

## 2018-10-25 NOTE — CARE PLAN
Problem: Mobility  Goal: Risk for activity intolerance will decrease  Outcome: PROGRESSING SLOWER THAN EXPECTED  Patient does not like to participate in ambulation/mobilization. Patient refused evening walk with this RN. Patient educated on importance of regaining strength and independence through ambulation. No evidence of learning.

## 2018-10-25 NOTE — PROGRESS NOTES
Critical Care Progress Note    Date of admission  10/16/2018    Chief Complaint  78 y.o. male admitted 10/16/2018 with severe MR and TR, s/p repair    Hospital Course    78-year-old gentleman currently on full   mechanical ventilatory support, status post valve repair and all information   taken from chart review inside out.  It appears that this gentleman has a   chronic history of severe MR, severe TR, paroxysmal atrial fibrillation, on   anticoagulation, stage III kidney disease, pulmonary hypertension with last   reported RVSP of 45, chronic hypertension and dyslipidemia.  Patient admitted   electively today for valve repair, looking at mitral, tricuspid as well as   aortic valve.  He is status post repair of the mitral and tricuspid valves.    The aortic valve on examination intraoperatively showed only mild aortic   stenosis, minimal debridement performed and patient was transferred to the   intensive care unit.  At the present time, he is on 0.04 epinephrine and on   Precedex, full mechanical ventilatory support, recovering from anesthesia with   pulmonary artery catheter in place as well as mediastinal chest tubes      Interval Problem Update  Reviewed last 24 hour events:  Tm 98.8  -1.1L over last 24hr, +5.9L since admit  No cxr this am  Hb 9.4  Cr 3.3 w bun 109  INR 3.6    1L Nc  IS  Last Bm 10/22    Has low energy, refusing therapies frequently    Review of Systems  Review of Systems   Constitutional: Positive for fatigue. Negative for chills and fever.   HENT: Negative for congestion and sore throat.    Respiratory: Positive for cough and chest tightness. Negative for shortness of breath and wheezing.    Cardiovascular: Negative for chest pain and palpitations.   Gastrointestinal: Negative for abdominal distention, abdominal pain, nausea and vomiting.   Neurological: Positive for weakness. Negative for headaches.   Psychiatric/Behavioral: Negative for agitation.   All other systems reviewed and are  negative.       Vital Signs for last 24 hours   Temp:  [36 °C (96.8 °F)-37.1 °C (98.8 °F)] 36.1 °C (97 °F)  Pulse:  [67-92] 87  Resp:  [14-25] 18  BP: ()/(60-61) 101/61    Hemodynamic parameters for last 24 hours       Vent Settings for last 24 hours       Physical Exam   Physical Exam   Constitutional: He appears well-developed and well-nourished.   HENT:   Head: Normocephalic and atraumatic.   Eyes: Pupils are equal, round, and reactive to light. No scleral icterus.   Neck: No tracheal deviation present.   Cardiovascular: Normal rate and intact distal pulses.    Pulmonary/Chest: He has no wheezes. He has rales.   Abdominal: He exhibits no distension. There is no tenderness.   Musculoskeletal: He exhibits edema.   Neurological: He is alert. No cranial nerve deficit.   Skin: Skin is warm and dry.   Psychiatric:   Low mood   Nursing note and vitals reviewed.      Medications  Current Facility-Administered Medications   Medication Dose Route Frequency Provider Last Rate Last Dose   • furosemide (LASIX) injection 80 mg  80 mg Intravenous BID DIURETIC Manjeet Bruner M.D.       • amiodarone (CORDARONE) tablet 200 mg  200 mg Oral TWICE DAILY Amber Escobar, A.P.N.   200 mg at 10/25/18 0511   • aspirin (ASA) chewable tab 81 mg  81 mg Oral DAILY Amber Escobar, A.P.N.   81 mg at 10/25/18 0511   • atorvastatin (LIPITOR) tablet 20 mg  20 mg Oral Q EVENING Amber Escobar A.P.N.   20 mg at 10/24/18 1725   • Respiratory Care per Protocol   Nebulization Continuous RT Amber Escobar A.P.N.       • Pharmacy Consult Request ...Pain Management Review 1 Each  1 Each Other PRN Amber Escobar A.P.N.       • tramadol (ULTRAM) 50 MG tablet 50 mg  50 mg Oral Q4HRS PRN Amber Escobar, A.P.N.       • ondansetron (ZOFRAN) syringe/vial injection 4 mg  4 mg Intravenous Q6HRS PRN Amber Escobar, A.P.N.       • acetaminophen (TYLENOL) tablet 650 mg  650 mg Oral Q4HRS PRN Amber Escobar, A.P.N.   650 mg at 10/24/18 1005    Or   •  acetaminophen (TYLENOL) suppository 650 mg  650 mg Rectal Q4HRS PRN Amber Escobar, A.P.N.       • senna-docusate (PERICOLACE or SENOKOT S) 8.6-50 MG per tablet 2 Tab  2 Tab Oral BID Amber Escobar A.P.N.   2 Tab at 10/25/18 0511    And   • polyethylene glycol/lytes (MIRALAX) PACKET 1 Packet  1 Packet Oral QDAY PRN Amber Escobar A.P.N.   1 Packet at 10/20/18 1408    And   • magnesium hydroxide (MILK OF MAGNESIA) suspension 30 mL  30 mL Oral QDAY PRN Amber Escobar A.P.N.   30 mL at 10/21/18 0448    And   • bisacodyl (DULCOLAX) suppository 10 mg  10 mg Rectal QDAY PRN Amber Escobar A.P.N.   Stopped at 10/22/18 0854   • mag hydrox-al hydrox-simeth (MAALOX PLUS ES or MYLANTA DS) suspension 30 mL  30 mL Oral Q4HRS PRN Amber Escobar, A.P.N.       • MD Alert...Warfarin per Pharmacy   Other pharmacy to dose Amber Escobar A.P.N.       • HYDROcodone-acetaminophen (NORCO) 5-325 MG per tablet 1-2 Tab  1-2 Tab Oral Q4HRS PRN Amber Escobar A.P.N.   1 Tab at 10/22/18 1431       Fluids    Intake/Output Summary (Last 24 hours) at 10/25/18 0729  Last data filed at 10/25/18 0600   Gross per 24 hour   Intake              420 ml   Output             1555 ml   Net            -1135 ml       Laboratory  Recent Results (from the past 48 hour(s))   CBC without Differential Critical Care 0130    Collection Time: 10/24/18  4:05 AM   Result Value Ref Range    WBC 5.4 4.8 - 10.8 K/uL    RBC 2.86 (L) 4.70 - 6.10 M/uL    Hemoglobin 9.0 (L) 14.0 - 18.0 g/dL    Hematocrit 28.7 (L) 42.0 - 52.0 %    .3 (H) 81.4 - 97.8 fL    MCH 31.5 27.0 - 33.0 pg    MCHC 31.4 (L) 33.7 - 35.3 g/dL    RDW 69.0 (H) 35.9 - 50.0 fL    Platelet Count 143 (L) 164 - 446 K/uL    MPV 10.8 9.0 - 12.9 fL   Basic Metabolic Panel (BMP) Critical Care 0130    Collection Time: 10/24/18  4:05 AM   Result Value Ref Range    Sodium 137 135 - 145 mmol/L    Potassium 4.0 3.6 - 5.5 mmol/L    Chloride 103 96 - 112 mmol/L    Co2 20 20 - 33 mmol/L    Glucose 126 (H)  65 - 99 mg/dL    Bun 106 (HH) 8 - 22 mg/dL    Creatinine 3.14 (H) 0.50 - 1.40 mg/dL    Anion Gap 14.0 (H) 0.0 - 11.9    Calcium 9.2 8.5 - 10.5 mg/dL   PROTHROMBIN TIME    Collection Time: 10/24/18  4:05 AM   Result Value Ref Range    PT 31.8 (H) 12.0 - 14.6 sec    INR 3.08 (H) 0.87 - 1.13   PHOSPHORUS    Collection Time: 10/24/18  4:05 AM   Result Value Ref Range    Phosphorus 4.6 (H) 2.5 - 4.5 mg/dL   ESTIMATED GFR    Collection Time: 10/24/18  4:05 AM   Result Value Ref Range    GFR If  23 (A) >60 mL/min/1.73 m 2    GFR If Non  19 (A) >60 mL/min/1.73 m 2   CBC without Differential Critical Care 0130    Collection Time: 10/25/18  4:57 AM   Result Value Ref Range    WBC 5.8 4.8 - 10.8 K/uL    RBC 2.81 (L) 4.70 - 6.10 M/uL    Hemoglobin 9.4 (L) 14.0 - 18.0 g/dL    Hematocrit 27.6 (L) 42.0 - 52.0 %    MCV 98.2 (H) 81.4 - 97.8 fL    MCH 33.5 (H) 27.0 - 33.0 pg    MCHC 34.1 33.7 - 35.3 g/dL    RDW 64.7 (H) 35.9 - 50.0 fL    Platelet Count 150 (L) 164 - 446 K/uL    MPV 11.2 9.0 - 12.9 fL   Basic Metabolic Panel (BMP) Critical Care 0130    Collection Time: 10/25/18  4:57 AM   Result Value Ref Range    Sodium 136 135 - 145 mmol/L    Potassium 3.8 3.6 - 5.5 mmol/L    Chloride 102 96 - 112 mmol/L    Co2 22 20 - 33 mmol/L    Glucose 127 (H) 65 - 99 mg/dL    Bun 109 (HH) 8 - 22 mg/dL    Creatinine 3.35 (H) 0.50 - 1.40 mg/dL    Calcium 9.0 8.5 - 10.5 mg/dL    Anion Gap 12.0 (H) 0.0 - 11.9   PROTHROMBIN TIME    Collection Time: 10/25/18  4:57 AM   Result Value Ref Range    PT 36.6 (H) 12.0 - 14.6 sec    INR 3.69 (H) 0.87 - 1.13   ESTIMATED GFR    Collection Time: 10/25/18  4:57 AM   Result Value Ref Range    GFR If  22 (A) >60 mL/min/1.73 m 2    GFR If Non  18 (A) >60 mL/min/1.73 m 2       Imaging      Assessment/Plan  * Acute respiratory failure with hypoxia (HCC)   Assessment & Plan    Intubated 10/16; went to PEA arrest approx 1 min post extubation and required  emergent intubation  Extubated to bipap 10/18 successfully and since weaned off  Rt/02 protocols  Continue IS/PEP/Mobilize  Monitor closely for worsening pulm edema - depending on mentation can consider nippv        Thrombocytopenia (HCC)   Assessment & Plan    improving        S/P TVR (tricuspid valve repair)   Assessment & Plan    S/P repair 10/16  Continue post heart protocols        S/P MVR (mitral valve repair)   Assessment & Plan    S/P repair 10/16  Continue post heart protocols  CT removed 10/18          Paroxysmal atrial fibrillation (HCC)- (present on admission)   Assessment & Plan    Keep K > 4 and Mg > 2  Amiodarone po  Warfarin - INR 3.6        Essential hypertension, benign- (present on admission)   Assessment & Plan    Pressures labile            Encephalopathy   Assessment & Plan    Multifactorial with pain meds + acute on ckd  Aspiration precautions  Minimize further mind altering/sedating meds  Head ct if develops focal findings  Improved but now with low mood/affect        Acute blood loss anemia   Assessment & Plan    S/P 2u PRBC 10/17  S/P 2u PRBCs 10/19  S/P 1u PRBCs 10/22  Hb holding at present        CKD (chronic kidney disease) stage 3, GFR 30-59 ml/min ([facility])- (present on admission)   Assessment & Plan    Baseline Cr 1.8  Renally dose meds  Minimize nephrotoxic substances  Ramey in place  Continue to follow UO - nonoliguric and diuresing with higher dose lasix  May need eventual HD for filtration as well as volume removal            Pulmonary hypertension (HCC)- (present on admission)   Assessment & Plan    Likely related to previous severe MR  F/u echo as OP           Mixed hyperlipidemia- (present on admission)   Assessment & Plan    statin             VTE:  Coumadin  Ulcer: Not Indicated  Lines: Ramey Catheter  Ongoing indication addressed    I have performed a physical exam and reviewed and updated ROS and Plan today (10/25/2018). In review of yesterday's note (10/24/2018), there are no  changes except as documented above.     Discussed patient condition and risk of morbidity and/or mortality with RN, RT, Therapies, Pharmacy and [facility]

## 2018-10-25 NOTE — PROGRESS NOTES
[facility] -  PROGRESS NOTE               Author: Manjeet Bruner M.D. Date & Time: 10/25/2018  11:46 AM     HPI:  79 YO male with history notable for CHF, valvular disease and CKD presented for planned cardiothoracic surgery in the setting of valvular disease.      Baseline cr appears to be ~1.8 most recently without proteinuria or active urinary sediment. Underwent MVR, tricuspid valve repar, aortic valve reparr and TITI ligation on 10/16. S/P resp arrest post extubation on POD#1. Continued on pressors until POD#3. Attempted diuresis over past 72 hours with sport doses of 20-40mg lasix. UOP down trending  Form >1L on 10/19 to 300cc over last 24 hours.  Patient requires 40 mg Lasix and metolazone at home.     Patient is currently unable to answer questioning in the setting of critical illness, wife provides history.  She notes that yesterday he became more lethargic and has been slowly out of it this morning.  She does not think he has had significant chest pain or shortness of breath.  He has not been complaining about abdominal pain.  Intermittent low blood pressures over the last 48 hours.      DAILY NEPHROLOGY SUMMARY:  10/23: consult done  10/24: ~1L UOP after 1 dose of lasix, 2nd dose held for hypotension  10/25: Net -1.1 L.  Tired this morning working with physical therapy yesterday    PAST FAMILY HISTORY: Reviewed and Unchanged  SOCIAL HISTORY: Reviewed and Unchanged  CURRENT MEDICATIONS: Reviewed  IMAGING STUDIES: Reviewed    ROS  General:  +weakness, no malaise  HEENT:  No vision changes, no hearing changes  CV:  +chest pain, no palpitations  Lungs:  No cough; no PND  GI:  No Nausea; no vomiting  : No dysuria or gross hematuria  MSK:  No joint pain; no trauma  Skin: No rashes; no lesions  Neuro: No tremors; no LOC  Psych: No depression; no anxiety    PHYSICAL EXAM  Vitals:    10/25/18 0800   BP:    Pulse: 73   Resp: 20   Temp: 36.9 °C (98.4 °F)   SpO2: 95%     GENERAL: NAD,  lying in bed  HEAD: Normocephalic, atraumatic  EYES: no scleral icterus; normal conjunctiva  NECK: Supple; trachea midline  CV: Irregular rate and rhythm, S1 and S2 present.  Edema at hips  LUNGS: Decreased breath sounds bilateral bases, No rhales or wheezes  ABDOMEN: Soft, non-tender  EXTREMETIES: no clubbing or cyanosis  SKIN: Warm and dry, no rashes.  Midline sternal wound without surrounding erythema or edema  NEURO: AO x3, no focal deficits    Fluids:  In: 420 [P.O.:420]  Out: 1555     LABS:  Recent Results (from the past 24 hour(s))   CBC without Differential Critical Care 0130    Collection Time: 10/25/18  4:57 AM   Result Value Ref Range    WBC 5.8 4.8 - 10.8 K/uL    RBC 2.81 (L) 4.70 - 6.10 M/uL    Hemoglobin 9.4 (L) 14.0 - 18.0 g/dL    Hematocrit 27.6 (L) 42.0 - 52.0 %    MCV 98.2 (H) 81.4 - 97.8 fL    MCH 33.5 (H) 27.0 - 33.0 pg    MCHC 34.1 33.7 - 35.3 g/dL    RDW 64.7 (H) 35.9 - 50.0 fL    Platelet Count 150 (L) 164 - 446 K/uL    MPV 11.2 9.0 - 12.9 fL   Basic Metabolic Panel (BMP) Critical Care 0130    Collection Time: 10/25/18  4:57 AM   Result Value Ref Range    Sodium 136 135 - 145 mmol/L    Potassium 3.8 3.6 - 5.5 mmol/L    Chloride 102 96 - 112 mmol/L    Co2 22 20 - 33 mmol/L    Glucose 127 (H) 65 - 99 mg/dL    Bun 109 (HH) 8 - 22 mg/dL    Creatinine 3.35 (H) 0.50 - 1.40 mg/dL    Calcium 9.0 8.5 - 10.5 mg/dL    Anion Gap 12.0 (H) 0.0 - 11.9   PROTHROMBIN TIME    Collection Time: 10/25/18  4:57 AM   Result Value Ref Range    PT 36.6 (H) 12.0 - 14.6 sec    INR 3.69 (H) 0.87 - 1.13   ESTIMATED GFR    Collection Time: 10/25/18  4:57 AM   Result Value Ref Range    GFR If  22 (A) >60 mL/min/1.73 m 2    GFR If Non  18 (A) >60 mL/min/1.73 m 2       (click the triangle to expand results)    ASSESSMENT:  # AIDA: Baseline Cr~1.8, increased to 33. Likely 2/2 hemodynamic compromise/tubular injury.  Creatinine appears to be plateauing signaling recovery.  # CKD Stage 3;  non-proteinuric. Has never been followed by nephrology.   # Acidosis: trending  # Hypervolemia  # Valvular heart disease s/p MVR, tricuspid valve repar, aortic valve reparr and TITI ligation  # Volume overload  # Anemia: acute blood loss component  # PEA arrest     PLAN:  -No need for renal replacement therapy today   -Lasix 80 mg IV twice daily, goal net -1-2 L today.  -Limit narcotics, fentanyl is best choice  -Renal diet  -Strict I/Os  -Dose all meds per eGFR <15      Thank you for this consult, we will continue to follow.    Manjeet Bruner MD

## 2018-10-25 NOTE — PROGRESS NOTES
Inpatient Anticoagulation Service Note    Date: 10/25/2018  Reason for Anticoagulation: Bioprosthetic Valve Replacement, Atrial Fibrillation   [id number] VASc Score: 3    Hemoglobin Value: (!) 9.4  Hematocrit Value: (!) 27.6  Lab Platelet Value: (!) 150  Target INR: 2.0 to 3.0    INR from last 7 days     Date/Time INR Value    10/25/18 0457 (!)  3.69    10/24/18 0405 (!)  3.08    10/23/18 0405 (!)  2.16    10/22/18 0524 (!)  1.71    10/21/18 0445 (!)  1.31    10/20/18 0535 (!)  1.34    10/19/18 0400 (!)  1.55        Dose from last 7 days     Date/Time Dose (mg)    10/25/18 1200  0    10/24/18 1200  2.5    10/23/18 1300  2.5    10/22/18 1500  5    10/21/18 1400  5    10/20/18 1200  5    10/19/18 1400  0        Average Dose (mg):  (New start this admission)  Significant Interactions: Aspirin, Amiodarone, Statin  Bridge Therapy: No     Reversal Agent Administered: Not Applicable  Comments: INR supratherapeutic, hold dose today. Continues to likley be due to decreased clearance from acute kidney injury. Remains intermittently hypotensive, but with no overt s/s of bleed and stable h/h. Minimal oral intake with meals at this time. Diuresis continues.     Plan:  Hold warfarin  Education Material Provided?: Yes  Pharmacist suggested discharge dosing: TBD due to lack of consistent po intake and AIDA     Jerzy Akers, PharmD

## 2018-10-25 NOTE — PROGRESS NOTES
Patient refused to mobilize at two different requests on this RN's shift. Patient educated on importance of ambulating and mobilization, with no evidence of learning. Patient stated at start of shift he was ready to go to bed and would not negotiate evening ambulation/exercise. Patient states this am it is too early to get OOB.

## 2018-10-26 NOTE — PROGRESS NOTES
Monitor Summary    Atrial Fibrillation  75-90bpm with occasional PACs    -/.12/-    2 RN Skin assessment complete.

## 2018-10-26 NOTE — THERAPY
"Speech Language Therapy dysphagia treatment completed.   Functional Status:  Pt seen on this date for dysphagia treatment. Pt A&Ox4 and agreeable to evaluation, pt's family present at bedside. Per RN, pt tolerating current diet, however, pt reporting that food is bland and does not like nectar thick liquids. PO trials of NTL, soft solids, mixed consistency, solids, and thin liquids were presented to pt and throat clear x1 noted with mixed consistency and water, which may be concerning for aspiration, however, no other overt s/sx noted. Audible gulp noted x2 with PO trials. Upon palpation, laryngeal elevation noted to be weak. At this time, would recommend that pt be upgraded to dysphagia III/thin liquids with monitoring during meal to ensure tolerance. Dysphagia education provided to pt and pt's family and they verbalized understanding. SLP to follow.    Recommendations: upgrade to dysphagia III/thin liquids, monitoring during meals   Plan of Care: Will benefit from Speech Therapy 3 times per week  Post-Acute Therapy: Discharge to an inpatient transitional care facility for continued skilled therapy services.    See \"Rehab Therapy-Acute\" Patient Summary Report for complete documentation.     "

## 2018-10-26 NOTE — PROGRESS NOTES
[facility] Dr. Gonda paged regarding results of patient's RUE venous US (See results). Per Dr. Gonda IV to be removed and arm elevated with a warm compress to site. Orders implemented. New IV access obtained. Upon IV removal, catheter tip intact. Hemostasis obtained.

## 2018-10-26 NOTE — DISCHARGE PLANNING
Anticipated Discharge Disposition: d/c to SN[facility]    Action: Received contact from [facility]. Admissions at SNF indicates pt has bed today, but cannot hold bed until tomorrow.    LSW spoke to bedside RN. She will f/u w/ MD and see if pt is safe to d/c today.    LSw to wait for medical team decision.    LSw contacted [facility] to advise as above. LSW will update [facility] as information is received from medical team, Neph MD.     [facility] can call SNF tomorrow AM and see if a bed is open too.    Barriers to Discharge: open bed at [facility]    Plan: pt to d/c to SNF once medically cleared and a bed is open at SNF

## 2018-10-26 NOTE — DISCHARGE PLANNING
Anticipated Discharge Disposition: d/c to SNF    Action: Spoke to bedside RN. Nephrologist indicates pt will d/c Saturday.     LSW explained SNF may not have a bed open on Saturday.     Only SNF wife chose originally [facility].     [facility] will check for an open bed on Saturday.     LSW will leave report for weekend LSW.    Barriers to Discharge: open bed at [facility] on Saturday, transport     Plan: f/u w/ medical team, CCA, pt

## 2018-10-26 NOTE — CARE PLAN
Problem: Nutritional:  Goal: Achieve adequate nutritional intake  Patient will consume % of meals/supplements.   Outcome: PROGRESSING AS EXPECTED  Admit day 10.  Pt on a modified diet per SLP.  PO intake showing improvement to 25-50% of meals and % of supplements.  Await D/c to SNF.     RD following.

## 2018-10-26 NOTE — CARE PLAN
Problem: Risk for Deep Vein Thrombosis/Venous Thromboembolism  Goal: DVT/VTE Prevention Measures in Place  Outcome: PROGRESSING AS EXPECTED  Patient tolerates DEWAYNE hose when OOB and SCDs at night while he is sleeping. RLE duplex negative for DVT.

## 2018-10-26 NOTE — PROGRESS NOTES
Inpatient Anticoagulation Service Note    Date: 10/26/2018  Reason for Anticoagulation: Atrial Fibrillation, Bioprosthetic Valve Replacement  Hemoglobin Value: (!) 9.4  Hematocrit Value: (!) 28.4  Lab Platelet Value: 183  Target INR: 2.0 to 3.0  INR from last 7 days     Date/Time INR Value    10/26/18 0500 (!)  3.57    10/25/18 0457 (!)  3.69    10/24/18 0405 (!)  3.08    10/23/18 0405 (!)  2.16    10/22/18 0524 (!)  1.71    10/21/18 0445 (!)  1.31    10/20/18 0535 (!)  1.34        Dose from last 7 days     Date/Time Dose (mg)    10/26/18 1400  1    10/25/18 1200  0    10/24/18 1200  2.5    10/23/18 1300  2.5    10/22/18 1500  5    10/21/18 1400  5    10/20/18 1200  5        Average Dose (mg):  (New start this admission)  Significant Interactions: Amiodarone, Aspirin, Statin  Bridge Therapy: No   Reversal Agent Administered: Not Applicable    A/P:   Supra-thereapeutic INR after holding warfarin dose yesterday - slight trend down.  H/H remains stable.  No new DDI noted.  Will give warfarin 1 mg PO x 1 tonight.  Anticipate INR still elevated from 5 mg doses.  INR tomorrow.        Education Material Provided?: Yes  Pharmacist suggested discharge dosing: TBD pending INR trends, perhaps warfarin 2.5 mg PO daily.  Recommend a follow-up PT/INR within 48-72 hours of discharge.     Martha White, PharmD, BCPS, BCCCP

## 2018-10-26 NOTE — PROGRESS NOTES
Critical Care Progress Note    Date of admission  10/16/2018    Chief Complaint  78 y.o. male admitted 10/16/2018 with severe MR and TR, s/p repair    Hospital Course    78-year-old gentleman currently on full   mechanical ventilatory support, status post valve repair and all information   taken from chart review inside out.  It appears that this gentleman has a   chronic history of severe MR, severe TR, paroxysmal atrial fibrillation, on   anticoagulation, stage III kidney disease, pulmonary hypertension with last   reported RVSP of 45, chronic hypertension and dyslipidemia.  Patient admitted   electively today for valve repair, looking at mitral, tricuspid as well as   aortic valve.  He is status post repair of the mitral and tricuspid valves.    The aortic valve on examination intraoperatively showed only mild aortic   stenosis, minimal debridement performed and patient was transferred to the   intensive care unit.  At the present time, he is on 0.04 epinephrine and on   Precedex, full mechanical ventilatory support, recovering from anesthesia with   pulmonary artery catheter in place as well as mediastinal chest tubes      Interval Problem Update  Reviewed last 24 hour events:  Tm 98.4  -715cc over last 24hr, +5.2L since admit  RLE Doppler (-) for dvt, did show hernia  RUE doppler with superficial thrombus - no dvt  No cxr this am  Hb 9.4  K 3.6  Cr 3.2 w bun 110  INR 3.5    RA 91%  IS  Last Bm 10/22      Review of Systems  Review of Systems   Constitutional: Positive for fatigue. Negative for chills and fever.   HENT: Negative for congestion and sore throat.    Respiratory: Positive for cough. Negative for chest tightness, shortness of breath and wheezing.    Cardiovascular: Positive for chest pain. Negative for palpitations.   Gastrointestinal: Negative for abdominal distention, abdominal pain, nausea and vomiting.   Neurological: Positive for weakness. Negative for headaches.   Psychiatric/Behavioral:  Negative for agitation.   All other systems reviewed and are negative.       Vital Signs for last 24 hours   Temp:  [36.3 °C (97.3 °F)-36.9 °C (98.4 °F)] 36.5 °C (97.7 °F)  Pulse:  [68-87] 83  Resp:  [16-29] 24  BP: ()/(52-61) 100/52    Hemodynamic parameters for last 24 hours       Vent Settings for last 24 hours       Physical Exam   Physical Exam   Constitutional: He appears well-developed and well-nourished.   HENT:   Head: Normocephalic and atraumatic.   Eyes: Pupils are equal, round, and reactive to light. No scleral icterus.   Neck: No tracheal deviation present.   Cardiovascular: Normal rate and intact distal pulses.    Pulmonary/Chest: He has no wheezes. He has rales.   Abdominal: He exhibits distension. There is no tenderness.   hypoactive   Musculoskeletal: He exhibits edema.   Neurological: He is alert. No cranial nerve deficit.   Skin: Skin is warm and dry.   Psychiatric:   Continued low mood   Nursing note and vitals reviewed.      Medications  Current Facility-Administered Medications   Medication Dose Route Frequency Provider Last Rate Last Dose   • furosemide (LASIX) injection 80 mg  80 mg Intravenous BID DIURETIC Manjeet Bruner M.D.   80 mg at 10/26/18 0515   • amiodarone (CORDARONE) tablet 200 mg  200 mg Oral TWICE DAILY Amber Escobar, A.P.N.   200 mg at 10/26/18 0511   • aspirin (ASA) chewable tab 81 mg  81 mg Oral DAILY Amber Escobar A.P.N.   81 mg at 10/26/18 0511   • atorvastatin (LIPITOR) tablet 20 mg  20 mg Oral Q EVENING Amber Escobar A.P.N.   20 mg at 10/25/18 1737   • Respiratory Care per Protocol   Nebulization Continuous RT Amber Escobar A.P.N.       • Pharmacy Consult Request ...Pain Management Review 1 Each  1 Each Other PRN YARED Rosenberg.P.N.       • tramadol (ULTRAM) 50 MG tablet 50 mg  50 mg Oral Q4HRS PRN Amber Escobar A.P.N.       • ondansetron (ZOFRAN) syringe/vial injection 4 mg  4 mg Intravenous Q6HRS PRN JESSIE RosenbergPAYLEEN.       • acetaminophen  (TYLENOL) tablet 650 mg  650 mg Oral Q4HRS PRN Amber Escobar A.P.N.   650 mg at 10/24/18 1005    Or   • acetaminophen (TYLENOL) suppository 650 mg  650 mg Rectal Q4HRS PRN Amber Escobar A.P.N.       • senna-docusate (PERICOLACE or SENOKOT S) 8.6-50 MG per tablet 2 Tab  2 Tab Oral BID Amber Escobar A.P.N.   2 Tab at 10/26/18 0511    And   • polyethylene glycol/lytes (MIRALAX) PACKET 1 Packet  1 Packet Oral QDAY PRN Amber Escobar A.P.N.   1 Packet at 10/20/18 1408    And   • magnesium hydroxide (MILK OF MAGNESIA) suspension 30 mL  30 mL Oral QDAY PRN Amber Escobar A.P.N.   30 mL at 10/21/18 0448    And   • bisacodyl (DULCOLAX) suppository 10 mg  10 mg Rectal QDAY PRN Amber Escobar A.P.N.   Stopped at 10/22/18 0854   • mag hydrox-al hydrox-simeth (MAALOX PLUS ES or MYLANTA DS) suspension 30 mL  30 mL Oral Q4HRS PRN Amber Escobar A.P.N.       • MD Alert...Warfarin per Pharmacy   Other pharmacy to dose Amber Escobar A.P.N.       • HYDROcodone-acetaminophen (NORCO) 5-325 MG per tablet 1-2 Tab  1-2 Tab Oral Q4HRS PRN Amber Escobar A.P.N.   1 Tab at 10/22/18 1431       Fluids    Intake/Output Summary (Last 24 hours) at 10/26/18 0727  Last data filed at 10/26/18 0600   Gross per 24 hour   Intake              950 ml   Output             1665 ml   Net             -715 ml       Laboratory  Recent Results (from the past 48 hour(s))   CBC without Differential Critical Care 0130    Collection Time: 10/25/18  4:57 AM   Result Value Ref Range    WBC 5.8 4.8 - 10.8 K/uL    RBC 2.81 (L) 4.70 - 6.10 M/uL    Hemoglobin 9.4 (L) 14.0 - 18.0 g/dL    Hematocrit 27.6 (L) 42.0 - 52.0 %    MCV 98.2 (H) 81.4 - 97.8 fL    MCH 33.5 (H) 27.0 - 33.0 pg    MCHC 34.1 33.7 - 35.3 g/dL    RDW 64.7 (H) 35.9 - 50.0 fL    Platelet Count 150 (L) 164 - 446 K/uL    MPV 11.2 9.0 - 12.9 fL   Basic Metabolic Panel (BMP) Critical Care 0130    Collection Time: 10/25/18  4:57 AM   Result Value Ref Range    Sodium 136 135 - 145 mmol/L     Potassium 3.8 3.6 - 5.5 mmol/L    Chloride 102 96 - 112 mmol/L    Co2 22 20 - 33 mmol/L    Glucose 127 (H) 65 - 99 mg/dL    Bun 109 (HH) 8 - 22 mg/dL    Creatinine 3.35 (H) 0.50 - 1.40 mg/dL    Calcium 9.0 8.5 - 10.5 mg/dL    Anion Gap 12.0 (H) 0.0 - 11.9   PROTHROMBIN TIME    Collection Time: 10/25/18  4:57 AM   Result Value Ref Range    PT 36.6 (H) 12.0 - 14.6 sec    INR 3.69 (H) 0.87 - 1.13   ESTIMATED GFR    Collection Time: 10/25/18  4:57 AM   Result Value Ref Range    GFR If  22 (A) >60 mL/min/1.73 m 2    GFR If Non  18 (A) >60 mL/min/1.73 m 2   CBC without Differential Critical Care 0130    Collection Time: 10/26/18  5:00 AM   Result Value Ref Range    WBC 6.9 4.8 - 10.8 K/uL    RBC 2.93 (L) 4.70 - 6.10 M/uL    Hemoglobin 9.4 (L) 14.0 - 18.0 g/dL    Hematocrit 28.4 (L) 42.0 - 52.0 %    MCV 96.9 81.4 - 97.8 fL    MCH 32.1 27.0 - 33.0 pg    MCHC 33.1 (L) 33.7 - 35.3 g/dL    RDW 64.5 (H) 35.9 - 50.0 fL    Platelet Count 183 164 - 446 K/uL    MPV 10.8 9.0 - 12.9 fL   PROTHROMBIN TIME    Collection Time: 10/26/18  5:00 AM   Result Value Ref Range    PT 35.7 (H) 12.0 - 14.6 sec    INR 3.57 (H) 0.87 - 1.13       Imaging      Assessment/Plan  * Acute respiratory failure with hypoxia (HCC)   Assessment & Plan    Intubated 10/16; went to PEA arrest approx 1 min post extubation and required emergent intubation  Extubated to bipap 10/18 successfully and since weaned off  Rt/02 protocols  Continue IS/PEP/Mobilize  Continue to closely monitor for worsening pulm edema - depending on mentation can consider nippv        Thrombocytopenia (HCC)   Assessment & Plan    improving        S/P TVR (tricuspid valve repair)   Assessment & Plan    S/P repair 10/16  Continue post heart protocols        S/P MVR (mitral valve repair)   Assessment & Plan    S/P repair 10/16  Continue post heart protocols  CT removed 10/18          Paroxysmal atrial fibrillation (HCC)- (present on admission)   Assessment &  Plan    Keep K > 4 and Mg > 2  Amiodarone po  Warfarin - INR 3.5        Essential hypertension, benign- (present on admission)   Assessment & Plan    Continue to follow for starting anti-htn meds - currently remains labile           Encephalopathy   Assessment & Plan    Multifactorial with pain meds + acute on ckd  Aspiration precautions  Minimize further mind altering/sedating meds  Head ct if develops focal findings  Improved but now with low mood/affect        Acute blood loss anemia   Assessment & Plan    S/P 2u PRBC 10/17  S/P 2u PRBCs 10/19  S/P 1u PRBCs 10/22  Hb holding at present        CKD (chronic kidney disease) stage 3, GFR 30-59 ml/min (HCC)- (present on admission)   Assessment & Plan    Baseline Cr 1.8  Renally dose meds  Minimize nephrotoxic substances  Ramey in place  Continue to follow UO - nonoliguric and diuresing with higher dose lasix  May need eventual HD for filtration as well as volume removal            Pulmonary hypertension (HCC)- (present on admission)   Assessment & Plan    Likely related to previous severe MR  F/u echo as OP           Mixed hyperlipidemia- (present on admission)   Assessment & Plan    statin             VTE:  Coumadin  Ulcer: Not Indicated  Lines: Ramey Catheter  Ongoing indication addressed    I have performed a physical exam and reviewed and updated ROS and Plan today (10/26/2018). In review of yesterday's note (10/25/2018), there are no changes except as documented above.     Discussed patient condition and risk of morbidity and/or mortality with RN, RT, Therapies, Pharmacy and [facility]

## 2018-10-26 NOTE — PROGRESS NOTES
Cardiovascular Surgery Progress Note    Name: Jaime Tamayo  MRN: [id number]  : 1940  Admit Date: 10/16/2018  5:44 AM  Procedure:  Procedure(s) and Anesthesia Type:     * MITRAL VALVE REPAIR - General     * TRICUSPID VALVE REPAIR - General     * AORTIC VALVE REPAIR - General     * LEFT ATRIAL APPENDAGE LIGATION - General     * YUNIOR - General  10 Day Post-Op    Vitals:  Patient Vitals for the past 8 hrs:   Temp SpO2 O2 Delivery O2 (LPM) Pulse Heart Rate (Monitored) Resp BP NIBP Weight   10/26/18 1000 - - Nasal Cannula 2 - - - - - -   10/26/18 0800 36.2 °C (97.2 °F) 97 % Nasal Cannula 2 81 77 19 - 106/71 -   10/26/18 0511 - - - - 83 - - 100/52 - -   10/26/18 0400 36.5 °C (97.7 °F) 91 % None (Room Air) - 73 84 (!) 24 (!) 93/52 (!) 93/52 79.6 kg (175 lb 7.8 oz)     Temp (24hrs), Av.4 °C (97.6 °F), Min:36.2 °C (97.2 °F), Max:36.9 °C (98.4 °F)      Respiratory:    Respiration: 19, Pulse Oximetry: 97 %     Chest Tube Drains:          Fluids:    Intake/Output Summary (Last 24 hours) at 10/26/18 1049  Last data filed at 10/26/18 1000   Gross per 24 hour   Intake             1250 ml   Output             1900 ml   Net             -650 ml     Admit weight: Weight: 77.9 kg (171 lb 11.8 oz)  Current weight: Weight: 79.6 kg (175 lb 7.8 oz) (10/26/18 0400)    Labs:  Recent Labs      10/24/18   0405  10/25/18   0457  10/26/18   0500   WBC  5.4  5.8  6.9   RBC  2.86*  2.81*  2.93*   HEMOGLOBIN  9.0*  9.4*  9.4*   HEMATOCRIT  28.7*  27.6*  28.4*   MCV  100.3*  98.2*  96.9   MCH  31.5  33.5*  32.1   MCHC  31.4*  34.1  33.1*   RDW  69.0*  64.7*  64.5*   PLATELETCT  143*  150*  183   MPV  10.8  11.2  10.8         Recent Labs      10/24/18   0405  10/25/18   0457  10/26/18   0500   SODIUM  137  136  136   POTASSIUM  4.0  3.8  3.6   CHLORIDE  103  102  100   CO2  20  22  22   GLUCOSE  126*  127*  148*   BUN  106*  109*  110*   CREATININE  3.14*  3.35*  3.22*   CALCIUM  9.2  9.0  9.2     Recent Labs      10/24/18   0405   10/25/18   0457  10/26/18   0500   INR  3.08*  3.69*  3.57*       Medications:  • polyethylene glycol/lytes  1 Packet     • furosemide  80 mg     • amiodarone  200 mg     • aspirin  81 mg     • atorvastatin  20 mg     • senna-docusate  2 Tab     • MD Alert...Warfarin per Pharmacy            Ordered Medications:    ASA - Yes    Plavix - No; contraindicated because of Other coumadin    Post-operative Beta Blockers - yes    Ace Inhibitor - No; contraindicated because of Other normal ef    Statin - No; contraindicated because of No cad          Central Line:  Type of line: cordis  Date of insertion: 10/16/18  Date of removal: see RN notes    Exam:   Review of Systems   Constitutional: Negative.    HENT: Negative.    Eyes: Negative.    Respiratory: Negative.    Cardiovascular: Negative.    Gastrointestinal: Negative.    Genitourinary: Negative.    Musculoskeletal: Negative.    Skin: Negative.    Neurological: Negative.    Endo/Heme/Allergies: Negative.    Psychiatric/Behavioral: Negative.        Physical Exam   Constitutional: He appears well-developed. No distress.   Eyes: Pupils are equal, round, and reactive to light.   Neck: Normal range of motion. No JVD present.   Cardiovascular: Normal rate, regular rhythm and normal heart sounds.    Pulmonary/Chest: Effort normal. No respiratory distress. He has decreased breath sounds in the right lower field and the left lower field. He has no wheezes.   Abdominal: Soft. Bowel sounds are normal. There is no guarding.   Genitourinary:   Genitourinary Comments: domínguez   Musculoskeletal: Normal range of motion. He exhibits edema.   Neurological:   Sedated, follows   Skin: Skin is warm and dry.   Surgical incisions CDI   Psychiatric:   Calm and cooperative       Quality Measures:   Quality-Core Measures   Reviewed items::  Medications reviewed, EKG reviewed, Labs reviewed and Radiology images reviewed  Domínguez catheter::  Urinary Tract Retention or Urinary Tract Obstruction and Strict  Intake and Output During Sepisis or Shock  Central line in place:  Need for access and Concentrated IV drugs  DVT prophylaxis pharmacological::  Warfarin (Coumadin)  DVT prophylaxis - mechanical:  SCDs  Ulcer Prophylaxis::  Yes  Assessed for rehabilitation services:  Patient returned to prior level of function, rehabilitation not indicated at this time      Assessment/Plan:  POD 1 S/P respiratory/PEA arrest last night post extubation.  Improved this AM, vented but alert and cooperative.  H/H low s/p acute blood lose anemia.  On low dose epi, swan numbers good. PLAN: Transfuse 2 units PRBC.  Vent per pulm.  Keep domínguez/CTs today.  POD 2 Extubated, HDS, SR, on low dose epi 0.03mcg for right heart support, d/c swan/cordis/CT, diurese, h/h- watch, swallow eval, CPM  POD 3 HDS, SR, wean epi, d/c art line, CR elevated- UO improved with albumin- add maintenance fluids, acute blood loss anemia- transfuse PRBC, NPO- awaiting swallow eval, amb, enc IS  POD 4 HDS, SR- off epi- d/c pacer wires, CR stable- start gently diuresis, Diet ordered- d/c maintenance fluids, work on mobility, enc IS  POD 5 HDS, Afib- amio gtt- change to po- add home beta blocker, CR stable- diurese, cardiac debility- will need rehab consult, catrachita for strict I&O, enc IS  POD 6 HOTN this am- improved after transfusion, Afib- rate controlled, CR stable- low UO- watch, constipation- bowel protocol, acute blood loss anemia- s/p transfusion, cardiac debility- will need rehab when medically stable  POD 7 HDS, afib - rate controlled, Acute on chronic renal failure- BMP pending/UO low- consult nephrology, sedated form narcotics last night- d/c oxy/fent, cardiac debility- mobilize/PT/OT  POD 8 HDS, afib rate controlled.  Fluid balance positive 7 liters.  Wt up 4 Kg today.  More alert today.  Plan:  Defer diuresing to neph.    POD 9 HDS, afib rate controlled.  Fluid balance trending down.  Right arm and right leg swollen.  Awake alert.  Plan:  US right upper and  lower extremity.   POD 10 HDS, afib rate controlled.  Fluid balance continues to trend down.  Edema right leg and arm better.  US negative for DVT's.  Creatinine improved.  Wt down.  Plan:  One more day of aggressive diuresis and to SNF in AM.    Patient seen, examined and plan reviewed with midlevel provider. I agree with the plan.      Active Hospital Problems    Diagnosis   • Thrombocytopenia (HCC) [D69.6]     Priority: High   • S/P MVR (mitral valve repair) [Z98.890]     Priority: High   • S/P TVR (tricuspid valve repair) [Z98.890]     Priority: High   • Paroxysmal atrial fibrillation (HCC) [I48.0]     Priority: High   • Essential hypertension, benign [I10]     Priority: Medium   • Encephalopathy [G93.40]   • Acute blood loss anemia [D62]   • Acute respiratory failure with hypoxia (HCC) [J96.01]   • CKD (chronic kidney disease) stage 3, GFR 30-59 ml/min (HCC) [N18.3]   • Pulmonary hypertension (HCC) [I27.20]   • Mixed hyperlipidemia [E78.2]

## 2018-10-26 NOTE — PROGRESS NOTES
[facility] -  PROGRESS NOTE               Author: Manjeet Bruner M.D. Date & Time: 10/26/2018  10:37 AM     HPI:  77 YO male with history notable for CHF, valvular disease and CKD presented for planned cardiothoracic surgery in the setting of valvular disease.      Baseline cr appears to be ~1.8 most recently without proteinuria or active urinary sediment. Underwent MVR, tricuspid valve repar, aortic valve reparr and TITI ligation on 10/16. S/P resp arrest post extubation on POD#1. Continued on pressors until POD#3. Attempted diuresis over past 72 hours with sport doses of 20-40mg lasix. UOP down trending  Form >1L on 10/19 to 300cc over last 24 hours.  Patient requires 40 mg Lasix and metolazone at home.     Patient is currently unable to answer questioning in the setting of critical illness, wife provides history.  She notes that yesterday he became more lethargic and has been slowly out of it this morning.  She does not think he has had significant chest pain or shortness of breath.  He has not been complaining about abdominal pain.  Intermittent low blood pressures over the last 48 hours.      DAILY NEPHROLOGY SUMMARY:  10/23: consult done  10/24: ~1L UOP after 1 dose of lasix, 2nd dose held for hypotension  10/25: Net -1.1 L.  Tired this morning working with physical therapy yesterday  10/26: no acute events. -700cc. Walking around. Ongoing chest wall pain.    PAST FAMILY HISTORY: Reviewed and Unchanged  SOCIAL HISTORY: Reviewed and Unchanged  CURRENT MEDICATIONS: Reviewed  IMAGING STUDIES: Reviewed    ROS  General:  +weakness, no malaise  HEENT:  No vision changes, no hearing changes  CV:  +chest pain, no palpitations  Lungs:  No cough; splinting   GI:  No Nausea; no vomiting  : No dysuria or gross hematuria  MSK:  No joint pain; no trauma  Skin: No rashes; no lesions  Neuro: No tremors; no LOC  Psych: No depression; no anxiety    PHYSICAL EXAM  Vitals:    10/26/18 0800   BP:     Pulse: 81   Resp: 19   Temp: 36.2 °C (97.2 °F)   SpO2: 97%     GENERAL: NAD, lying in bed  HEAD: Normocephalic, atraumatic  EYES: no scleral icterus; normal conjunctiva  NECK: Supple; trachea midline  CV: Irregular rate and rhythm, S1 and S2 present.  +Edema at hips  LUNGS: Decreased breath sounds bilateral bases, No rhales or wheezes  ABDOMEN: Soft, non-tender  EXTREMETIES: no clubbing or cyanosis  SKIN: Warm and dry, no rashes.  Midline sternal wound without surrounding erythema or edema  NEURO: AO x3, no focal deficits    Fluids:  In: 950 [P.O.:950]  Out: 1665     LABS:  Recent Results (from the past 24 hour(s))   CBC without Differential Critical Care 0130    Collection Time: 10/26/18  5:00 AM   Result Value Ref Range    WBC 6.9 4.8 - 10.8 K/uL    RBC 2.93 (L) 4.70 - 6.10 M/uL    Hemoglobin 9.4 (L) 14.0 - 18.0 g/dL    Hematocrit 28.4 (L) 42.0 - 52.0 %    MCV 96.9 81.4 - 97.8 fL    MCH 32.1 27.0 - 33.0 pg    MCHC 33.1 (L) 33.7 - 35.3 g/dL    RDW 64.5 (H) 35.9 - 50.0 fL    Platelet Count 183 164 - 446 K/uL    MPV 10.8 9.0 - 12.9 fL   Basic Metabolic Panel (BMP) Critical Care 0130    Collection Time: 10/26/18  5:00 AM   Result Value Ref Range    Sodium 136 135 - 145 mmol/L    Potassium 3.6 3.6 - 5.5 mmol/L    Chloride 100 96 - 112 mmol/L    Co2 22 20 - 33 mmol/L    Glucose 148 (H) 65 - 99 mg/dL    Bun 110 (HH) 8 - 22 mg/dL    Creatinine 3.22 (H) 0.50 - 1.40 mg/dL    Calcium 9.2 8.5 - 10.5 mg/dL    Anion Gap 14.0 (H) 0.0 - 11.9   PROTHROMBIN TIME    Collection Time: 10/26/18  5:00 AM   Result Value Ref Range    PT 35.7 (H) 12.0 - 14.6 sec    INR 3.57 (H) 0.87 - 1.13   ESTIMATED GFR    Collection Time: 10/26/18  5:00 AM   Result Value Ref Range    GFR If  23 (A) >60 mL/min/1.73 m 2    GFR If Non  19 (A) >60 mL/min/1.73 m 2       (click the triangle to expand results)    ASSESSMENT:  # AIDA: Baseline Cr~1.8, increased to 3.3. Likely 2/2 hemodynamic compromise/tubular injury.   Creatinine appears to be plateauing signaling recovery.  # CKD Stage 3; non-proteinuric. Has never been followed by nephrology.   # Acidosis: resolved  # Hypervolemia  # Valvular heart disease s/p MVR, tricuspid valve repar, aortic valve reparr and TITI ligation  # Volume overload  # Anemia: acute blood loss component  # PEA arrest     PLAN:  -No need for renal replacement therapy today   -Lasix 80 mg IV twice daily given volume overload. May be having trouble mobilizing fluid so not being aggressive with diuresis, goal ~1L negative per day.  -If renal fxn stable tomorrow can likely be DCd from renal standpoint   -Limit narcotics, fentanyl is best choice  -Renal diet  -Strict I/Os  -Dose all meds per eGFR <15      Thank you for this consult, we will continue to follow.    Manjeet Bruner MD

## 2018-10-26 NOTE — DISCHARGE PLANNING
Agency/Facility Name: [facility]  Spoke To: Candace  Outcome: They currently have no open beds, but Candace will advise on possible bed availability over weekend.  Awaiting return call.

## 2018-10-26 NOTE — CARE PLAN
Problem: Skin Integrity  Goal: Risk for impaired skin integrity will decrease  Outcome: PROGRESSING AS EXPECTED  Patient has Mepilex in place on sacrum. Patient educated on importance of floating heels for blanchable redness bilaterally. Patient turns self from side to side frequently.

## 2018-10-26 NOTE — DISCHARGE PLANNING
Agency/Facility Name: [facility]  Spoke To: Candace  Outcome: Per TORRES Rico, patient will possibility  discharge on 10-27-18.  Spoke with Candace, she will not hold bed for patient. Candace has requested call in AM to discuss bed availability. TORRES Rico advised.

## 2018-10-26 NOTE — CARE PLAN
Problem: Post Op Day 4 CABG/Heart Valve Replacement  Goal: Optimal care of the Post Op CABG/Heart Valve replacement Post Op Day 4    Intervention: Daily Weights  Daily weight taken prior to shift on stand up scale.   Intervention: Shower daily and clean incisions twice daily with soap and water  Pt refused shower. Bed bath given.   Intervention: Up in chair for all meals  Pt up in chair for all meals.  Intervention: Ambulate, increasing the distance each time x 3 and before bed  Complete.   Intervention: IS q 1 hour while awake and record best IS volume  IS at 1000  Intervention: Consider removal of domínguez, chest tube and pacer wire if not already done  Domínguez assessed. Pt on diuretics.      Problem: Safety  Goal: Will remain free from falls  Outcome: PROGRESSING AS EXPECTED    Intervention: Assess risk factors for falls  Arianna davila fall risk assessment complete.  Intervention: Implement fall precautions  Bed locked and in lowest position. Bed alarm on. Pt's belongings and call light within reach. Pt educated on importance of pressing call light PRN. Pt ambulates with walker and 2 assist.

## 2018-10-26 NOTE — CARE PLAN
Problem: Post Op Day 4 CABG/Heart Valve Replacement  Goal: Optimal care of the Post Op CABG/Heart Valve replacement Post Op Day 4    Intervention: Shower daily and clean incisions twice daily with soap and water  Bed bath given and incisions washed with soap and water.  Intervention: Up in chair for all meals  Pt tolerated well.  Intervention: Ambulate, increasing the distance each time x 3 and before bed  Pt ambulated x2 this shift about 10-15 feet each time.  Intervention: IS q 1 hour while awake and record best IS volume  1250 was the best pull on pt's IS.  Intervention: Consider removal of domínguez, chest tube and pacer wire if not already done  Keep domínguez in place.

## 2018-10-26 NOTE — DISCHARGE PLANNING
Agency/Facility Name: [facility]  Spoke To: Candace  Outcome: They do have a bed available today.  Message left for TORRES Rico.

## 2018-10-27 NOTE — PROGRESS NOTES
"Attempted to get patient up to chair and to get weight. Pt stated that he would like to \"wait til a little later before getting up\". Will attempt to get patient up again.  "

## 2018-10-27 NOTE — PROGRESS NOTES
Inpatient Anticoagulation Service Note    Date: 10/27/2018  Reason for Anticoagulation: Atrial Fibrillation, Bioprosthetic Valve Replacement  Hemoglobin Value: (!) 9.2  Hematocrit Value: (!) 27.8  Lab Platelet Value: 183  Target INR: 2.0 to 3.0  INR from last 7 days     Date/Time INR Value    10/27/18 0505 (!)  3.46    10/26/18 0500 (!)  3.57    10/25/18 0457 (!)  3.69    10/24/18 0405 (!)  3.08    10/23/18 0405 (!)  2.16    10/22/18 0524 (!)  1.71    10/21/18 0445 (!)  1.31        Dose from last 7 days     Date/Time Dose (mg)    10/27/18 1400  2    10/26/18 1400  1    10/25/18 1200  0    10/24/18 1200  2.5    10/23/18 1300  2.5    10/22/18 1500  5    10/21/18 1400  5        Average Dose (mg):  (new start this admission)  Significant Interactions: Amiodarone, Aspirin, Statin  Bridge Therapy: No    Reversal Agent Administered: Not Applicable    A/P   Supra-therapeutic INR, slowly trending down.  H/H stable with no s/sx of hemorrhage.  Likely have seen full impact of the 5 mg doses x 3 of warfarin. Received warfarin 1 mg PO x 1 yesterday.  Average dose over past seven days has been 3 mg/day - will start patient on warfarin 2 mg PO daily.  INR tomorrow.     Education Material Provided?: Yes  Pharmacist suggested discharge dosing: TBD pending INR trends, perhaps warfarin 2 mg PO daily.  Recommend a follow-up PT/INR within 48-72 hours of discharge.      Martha White, PharmD, BCPS, BCCCP

## 2018-10-27 NOTE — PROGRESS NOTES
Cardiovascular Surgery Progress Note    Name: Jaime Tamayo  MRN: [id number]  : 1940  Admit Date: 10/16/2018  5:44 AM  Procedure:  Procedure(s) and Anesthesia Type:     * MITRAL VALVE REPAIR - General     * TRICUSPID VALVE REPAIR - General     * AORTIC VALVE REPAIR - General     * LEFT ATRIAL APPENDAGE LIGATION - General     * YUNIOR - General  11 Day Post-Op    Vitals:  Patient Vitals for the past 8 hrs:   Temp SpO2 O2 Delivery O2 (LPM) Pulse Heart Rate (Monitored) Resp Weight FiO2%   10/27/18 1219 - 95 % - - 85 80 (!) 21 - -   10/27/18 0800 36.4 °C (97.6 °F) - None (Room Air) 0 - - - - -   10/27/18 0640 - - - - - - - 83.8 kg (184 lb 11.9 oz) -   10/27/18 0639 - 92 % - 0 96 98 (!) 29 - 21 %     Temp (24hrs), Av.6 °C (97.8 °F), Min:36.4 °C (97.5 °F), Max:36.8 °C (98.3 °F)      Respiratory:    Respiration: (!) 21, Pulse Oximetry: 95 %, O2 Daily Delivery Respiratory : Room Air with O2 Available     Chest Tube Drains:          Fluids:    Intake/Output Summary (Last 24 hours) at 10/27/18 1231  Last data filed at 10/27/18 0800   Gross per 24 hour   Intake              800 ml   Output             1650 ml   Net             -850 ml     Admit weight: Weight: 77.9 kg (171 lb 11.8 oz)  Current weight: Weight: 83.8 kg (184 lb 11.9 oz) (10/27/18 0640)    Labs:  Recent Labs      10/25/18   0457  10/26/18   0500  10/27/18   0505   WBC  5.8  6.9  7.0   RBC  2.81*  2.93*  2.90*   HEMOGLOBIN  9.4*  9.4*  9.2*   HEMATOCRIT  27.6*  28.4*  27.8*   MCV  98.2*  96.9  95.9   MCH  33.5*  32.1  31.7   MCHC  34.1  33.1*  33.1*   RDW  64.7*  64.5*  64.0*   PLATELETCT  150*  183  183   MPV  11.2  10.8  10.5         Recent Labs      10/25/18   0457  10/26/18   0500  10/27/18   0505   SODIUM  136  136  134*   POTASSIUM  3.8  3.6  3.3*   CHLORIDE  102  100  98   CO2  22  22  23   GLUCOSE  127*  148*  120*   BUN  109*  110*  113*   CREATININE  3.35*  3.22*  2.98*   CALCIUM  9.0  9.2  8.8     Recent Labs      10/25/18   0457  10/26/18    0500  10/27/18   0505   INR  3.69*  3.57*  3.46*       Medications:  • polyethylene glycol/lytes  1 Packet     • furosemide  80 mg     • amiodarone  200 mg     • aspirin  81 mg     • atorvastatin  20 mg     • senna-docusate  2 Tab     • MD Alert...Warfarin per Pharmacy            Ordered Medications:    ASA - Yes    Plavix - No; contraindicated because of Other coumadin    Post-operative Beta Blockers - yes    Ace Inhibitor - No; contraindicated because of Other normal ef    Statin - No; contraindicated because of No cad          Central Line:  Type of line: cordis  Date of insertion: 10/16/18  Date of removal: see RN notes    Exam:   Review of Systems   Constitutional: Negative.    HENT: Negative.    Eyes: Negative.    Respiratory: Negative.    Cardiovascular: Negative.    Gastrointestinal: Negative.    Genitourinary: Negative.    Musculoskeletal: Negative.    Skin: Negative.    Neurological: Negative.    Endo/Heme/Allergies: Negative.    Psychiatric/Behavioral: Negative.        Physical Exam   Constitutional: He appears well-developed. No distress.   Eyes: Pupils are equal, round, and reactive to light.   Neck: Normal range of motion. No JVD present.   Cardiovascular: Normal rate, regular rhythm and normal heart sounds.    Pulmonary/Chest: Effort normal. No respiratory distress. He has decreased breath sounds in the right lower field and the left lower field. He has no wheezes.   Abdominal: Soft. Bowel sounds are normal. There is no guarding.   Genitourinary:   Genitourinary Comments: domínguez   Musculoskeletal: Normal range of motion. He exhibits edema.   Neurological:   Sedated, follows   Skin: Skin is warm and dry.   Surgical incisions CDI   Psychiatric:   Calm and cooperative       Quality Measures:   Quality-Core Measures   Reviewed items::  Medications reviewed, EKG reviewed, Labs reviewed and Radiology images reviewed  Domínguez catheter::  Urinary Tract Retention or Urinary Tract Obstruction and Strict Intake and  Output During Sepisis or Shock  Central line in place:  Need for access and Concentrated IV drugs  DVT prophylaxis pharmacological::  Warfarin (Coumadin)  DVT prophylaxis - mechanical:  SCDs  Ulcer Prophylaxis::  Yes  Assessed for rehabilitation services:  Patient returned to prior level of function, rehabilitation not indicated at this time      Assessment/Plan:  POD 1 S/P respiratory/PEA arrest last night post extubation.  Improved this AM, vented but alert and cooperative.  H/H low s/p acute blood lose anemia.  On low dose epi, swan numbers good. PLAN: Transfuse 2 units PRBC.  Vent per pulm.  Keep domínguez/CTs today.  POD 2 Extubated, HDS, SR, on low dose epi 0.03mcg for right heart support, d/c swan/cordis/CT, diurese, h/h- watch, swallow eval, CPM  POD 3 HDS, SR, wean epi, d/c art line, CR elevated- UO improved with albumin- add maintenance fluids, acute blood loss anemia- transfuse PRBC, NPO- awaiting swallow eval, amb, enc IS  POD 4 HDS, SR- off epi- d/c pacer wires, CR stable- start gently diuresis, Diet ordered- d/c maintenance fluids, work on mobility, enc IS  POD 5 HDS, Afib- amio gtt- change to po- add home beta blocker, CR stable- diurese, cardiac debility- will need rehab consult, catrachita for strict I&O, enc IS  POD 6 HOTN this am- improved after transfusion, Afib- rate controlled, CR stable- low UO- watch, constipation- bowel protocol, acute blood loss anemia- s/p transfusion, cardiac debility- will need rehab when medically stable  POD 7 HDS, afib - rate controlled, Acute on chronic renal failure- BMP pending/UO low- consult nephrology, sedated form narcotics last night- d/c oxy/fent, cardiac debility- mobilize/PT/OT  POD 8 HDS, afib rate controlled.  Fluid balance positive 7 liters.  Wt up 4 Kg today.  More alert today.  Plan:  Defer diuresing to neph.    POD 9 HDS, afib rate controlled.  Fluid balance trending down.  Right arm and right leg swollen.  Awake alert.  Plan:  US right upper and lower  extremity.   POD 10 HDS, afib rate controlled.  Fluid balance continues to trend down.  Edema right leg and arm better.  US negative for DVT's.  Creatinine improved.  Wt down.  Plan:  One more day of aggressive diuresis and to SNF in AM.    POD 11 HDS, afib rate controlled, creatinine down today but BUN remains high.  Edema improved.  Plan:  neph would like to watch BUN a couple more days.    Patient seen, examined and plan reviewed with midlevel provider. I agree with the plan.    Active Hospital Problems    Diagnosis   • Thrombocytopenia (HCC) [D69.6]     Priority: High   • S/P MVR (mitral valve repair) [Z98.890]     Priority: High   • S/P TVR (tricuspid valve repair) [Z98.890]     Priority: High   • Paroxysmal atrial fibrillation (HCC) [I48.0]     Priority: High   • Essential hypertension, benign [I10]     Priority: Medium   • Encephalopathy [G93.40]   • Acute blood loss anemia [D62]   • Acute respiratory failure with hypoxia (HCC) [J96.01]   • CKD (chronic kidney disease) stage 3, GFR 30-59 ml/min (HCC) [N18.3]   • Pulmonary hypertension (HCC) [I27.20]   • Mixed hyperlipidemia [E78.2]

## 2018-10-27 NOTE — PROGRESS NOTES
Monitor Summary:     A-fib, occasional PAC's.   HR 70-80's  -/.12./-    12 hour chart check complete.  2 RN skin check complete.

## 2018-10-27 NOTE — PROGRESS NOTES
Critical Care Progress Note    Date of admission  10/16/2018    Chief Complaint  78 y.o. male admitted 10/16/2018 with severe MR and TR, s/p repair    Hospital Course    78-year-old gentleman currently on full   mechanical ventilatory support, status post valve repair and all information   taken from chart review inside out.  It appears that this gentleman has a   chronic history of severe MR, severe TR, paroxysmal atrial fibrillation, on   anticoagulation, stage III kidney disease, pulmonary hypertension with last   reported RVSP of 45, chronic hypertension and dyslipidemia.  Patient admitted   electively today for valve repair, looking at mitral, tricuspid as well as   aortic valve.  He is status post repair of the mitral and tricuspid valves.    The aortic valve on examination intraoperatively showed only mild aortic   stenosis, minimal debridement performed and patient was transferred to the   intensive care unit.  At the present time, he is on 0.04 epinephrine and on   Precedex, full mechanical ventilatory support, recovering from anesthesia with   pulmonary artery catheter in place as well as mediastinal chest tubes      Interval Problem Update  Reviewed last 24 hour events:  Tm 98.3  -760cc over last 24hr, +4.5L since admit  RLE Doppler (-) for dvt, did show hernia  RUE doppler with superficial thrombus - no dvt  No cxr this am  Hb 9.2  K 3.3  Cr 2.9 w bun 113  INR 3.4    RA 92%  IS  Last Bm 10/22      Review of Systems  Review of Systems   Constitutional: Negative for chills, fatigue and fever.   HENT: Negative for congestion and sore throat.    Respiratory: Negative for cough, chest tightness, shortness of breath and wheezing.    Cardiovascular: Positive for chest pain. Negative for palpitations.   Gastrointestinal: Negative for abdominal distention, abdominal pain, nausea and vomiting.   Neurological: Positive for weakness. Negative for headaches.   Psychiatric/Behavioral: Negative for agitation.   All  other systems reviewed and are negative.       Vital Signs for last 24 hours   Temp:  [36.2 °C (97.2 °F)-36.8 °C (98.3 °F)] 36.8 °C (98.3 °F)  Pulse:  [72-96] 96  Resp:  [17-30] 29  BP: (106)/(59) 106/59    Hemodynamic parameters for last 24 hours       Vent Settings for last 24 hours       Physical Exam   Physical Exam   Constitutional: He appears well-developed and well-nourished.   HENT:   Head: Normocephalic and atraumatic.   Eyes: Pupils are equal, round, and reactive to light. No scleral icterus.   Neck: No tracheal deviation present.   Cardiovascular: Normal rate and intact distal pulses.    Pulmonary/Chest: He has no wheezes. He has no rales.   Abdominal: He exhibits no distension. There is no tenderness.   Musculoskeletal: He exhibits edema.   Neurological: He is alert. No cranial nerve deficit.   Skin: Skin is warm and dry.   Psychiatric: He has a normal mood and affect.   Nursing note and vitals reviewed.      Medications  Current Facility-Administered Medications   Medication Dose Route Frequency Provider Last Rate Last Dose   • potassium chloride SA (Kdur) tablet 40 mEq  40 mEq Oral Once Blessing Spain M.D.       • polyethylene glycol/lytes (MIRALAX) PACKET 1 Packet  1 Packet Oral BID Jayce Thompson M.D.   1 Packet at 10/27/18 0533   • furosemide (LASIX) injection 80 mg  80 mg Intravenous BID DIURETIC Manjeet Bruner M.D.   80 mg at 10/27/18 0532   • amiodarone (CORDARONE) tablet 200 mg  200 mg Oral TWICE DAILY Amber Escobar A.P.N.   200 mg at 10/27/18 0532   • aspirin (ASA) chewable tab 81 mg  81 mg Oral DAILY Amber Escobar A.P.N.   81 mg at 10/27/18 0532   • atorvastatin (LIPITOR) tablet 20 mg  20 mg Oral Q EVENING Amber sEcobar A.P.N.   20 mg at 10/26/18 1710   • Respiratory Care per Protocol   Nebulization Continuous RT YARED Rosenberg.P.N.       • Pharmacy Consult Request ...Pain Management Review 1 Each  1 Each Other PRN YARED Rosenberg.P.N.       • tramadol (ULTRAM) 50 MG  tablet 50 mg  50 mg Oral Q4HRS PRN Amber Escobar, A.P.N.       • ondansetron (ZOFRAN) syringe/vial injection 4 mg  4 mg Intravenous Q6HRS PRN Amber Escobar, A.P.N.       • acetaminophen (TYLENOL) tablet 650 mg  650 mg Oral Q4HRS PRN Amber Escobar A.P.N.   650 mg at 10/26/18 1717    Or   • acetaminophen (TYLENOL) suppository 650 mg  650 mg Rectal Q4HRS PRN Amber Escobar, A.P.N.       • senna-docusate (PERICOLACE or SENOKOT S) 8.6-50 MG per tablet 2 Tab  2 Tab Oral BID Amber Escobar A.P.N.   2 Tab at 10/27/18 0532    And   • polyethylene glycol/lytes (MIRALAX) PACKET 1 Packet  1 Packet Oral QDAY PRN Amber Escobar A.P.N.   1 Packet at 10/20/18 1408    And   • magnesium hydroxide (MILK OF MAGNESIA) suspension 30 mL  30 mL Oral QDAY PRN Amber Escobar A.P.N.   30 mL at 10/21/18 0448    And   • bisacodyl (DULCOLAX) suppository 10 mg  10 mg Rectal QDAY PRN Amber Escobar A.P.N.   Stopped at 10/22/18 0854   • mag hydrox-al hydrox-simeth (MAALOX PLUS ES or MYLANTA DS) suspension 30 mL  30 mL Oral Q4HRS PRN Amber Escobar A.P.N.       • MD Alert...Warfarin per Pharmacy   Other pharmacy to dose Amebr Escobar, A.P.N.       • HYDROcodone-acetaminophen (NORCO) 5-325 MG per tablet 1-2 Tab  1-2 Tab Oral Q4HRS PRN Amber Escobar A.P.N.   1 Tab at 10/22/18 1431       Fluids    Intake/Output Summary (Last 24 hours) at 10/27/18 0734  Last data filed at 10/27/18 0600   Gross per 24 hour   Intake             1340 ml   Output             2100 ml   Net             -760 ml       Laboratory  Recent Results (from the past 48 hour(s))   CBC without Differential Critical Care 0130    Collection Time: 10/26/18  5:00 AM   Result Value Ref Range    WBC 6.9 4.8 - 10.8 K/uL    RBC 2.93 (L) 4.70 - 6.10 M/uL    Hemoglobin 9.4 (L) 14.0 - 18.0 g/dL    Hematocrit 28.4 (L) 42.0 - 52.0 %    MCV 96.9 81.4 - 97.8 fL    MCH 32.1 27.0 - 33.0 pg    MCHC 33.1 (L) 33.7 - 35.3 g/dL    RDW 64.5 (H) 35.9 - 50.0 fL    Platelet Count 183 164 -  446 K/uL    MPV 10.8 9.0 - 12.9 fL   Basic Metabolic Panel ([facility]) Critical Care 0130    Collection Time: 10/26/18  5:00 AM   Result Value Ref Range    Sodium 136 135 - 145 mmol/L    Potassium 3.6 3.6 - 5.5 mmol/L    Chloride 100 96 - 112 mmol/L    Co2 22 20 - 33 mmol/L    Glucose 148 (H) 65 - 99 mg/dL    Bun 110 (HH) 8 - 22 mg/dL    Creatinine 3.22 (H) 0.50 - 1.40 mg/dL    Calcium 9.2 8.5 - 10.5 mg/dL    Anion Gap 14.0 (H) 0.0 - 11.9   PROTHROMBIN TIME    Collection Time: 10/26/18  5:00 AM   Result Value Ref Range    PT 35.7 (H) 12.0 - 14.6 sec    INR 3.57 (H) 0.87 - 1.13   ESTIMATED GFR    Collection Time: 10/26/18  5:00 AM   Result Value Ref Range    GFR If  23 (A) >60 mL/min/1.73 m 2    GFR If Non  19 (A) >60 mL/min/1.73 m 2   CBC without Differential Critical Care 0130    Collection Time: 10/27/18  5:05 AM   Result Value Ref Range    WBC 7.0 4.8 - 10.8 K/uL    RBC 2.90 (L) 4.70 - 6.10 M/uL    Hemoglobin 9.2 (L) 14.0 - 18.0 g/dL    Hematocrit 27.8 (L) 42.0 - 52.0 %    MCV 95.9 81.4 - 97.8 fL    MCH 31.7 27.0 - 33.0 pg    MCHC 33.1 (L) 33.7 - 35.3 g/dL    RDW 64.0 (H) 35.9 - 50.0 fL    Platelet Count 183 164 - 446 K/uL    MPV 10.5 9.0 - 12.9 fL   Basic Metabolic Panel ([facility]) Critical Care 0130    Collection Time: 10/27/18  5:05 AM   Result Value Ref Range    Sodium 134 (L) 135 - 145 mmol/L    Potassium 3.3 (L) 3.6 - 5.5 mmol/L    Chloride 98 96 - 112 mmol/L    Co2 23 20 - 33 mmol/L    Glucose 120 (H) 65 - 99 mg/dL    Bun 113 (HH) 8 - 22 mg/dL    Creatinine 2.98 (H) 0.50 - 1.40 mg/dL    Calcium 8.8 8.5 - 10.5 mg/dL    Anion Gap 13.0 (H) 0.0 - 11.9   PROTHROMBIN TIME    Collection Time: 10/27/18  5:05 AM   Result Value Ref Range    PT 34.8 (H) 12.0 - 14.6 sec    INR 3.46 (H) 0.87 - 1.13   ESTIMATED GFR    Collection Time: 10/27/18  5:05 AM   Result Value Ref Range    GFR If African American 25 (A) >60 mL/min/1.73 m 2    GFR If Non African American 21 (A) >60 mL/min/1.73 m 2        Imaging      Assessment/Plan  * Acute respiratory failure with hypoxia (HCC)   Assessment & Plan    Intubated 10/16; went to PEA arrest approx 1 min post extubation and required emergent intubation  Extubated to bipap 10/18 successfully and since weaned off  Rt/02 protocols  IS/PEP/Mobilize  Remain Volume up but without significant pulm edema at this time        S/P TVR (tricuspid valve repair)   Assessment & Plan    S/P repair 10/16  Continue post heart protocols        S/P MVR (mitral valve repair)   Assessment & Plan    S/P repair 10/16  Continue post heart protocols  CT removed 10/18          Paroxysmal atrial fibrillation (HCC)- (present on admission)   Assessment & Plan    Keep K > 4 and Mg > 2  Amiodarone po  Warfarin - INR 3.4        Essential hypertension, benign- (present on admission)   Assessment & Plan    Pressures remain wnl off anti-htn, monitor for timing to implement           Encephalopathy   Assessment & Plan    Multifactorial with pain meds + acute on ckd  Aspiration precautions  Minimize further mind altering/sedating meds  Improved         Acute blood loss anemia   Assessment & Plan    S/P 2u PRBC 10/17  S/P 2u PRBCs 10/19  S/P 1u PRBCs 10/22  Hb holding at present        CKD (chronic kidney disease) stage 3, GFR 30-59 ml/min ([facility])- (present on admission)   Assessment & Plan    Baseline Cr 1.8  Renally dose meds  Minimize nephrotoxic substances  Ramey in place  Continue to follow UO - nonoliguric and diuresing with higher dose lasix  May need eventual HD for filtration as well as volume removal            Pulmonary hypertension (HCC)- (present on admission)   Assessment & Plan    Likely related to previous severe MR  F/u echo as OP           Mixed hyperlipidemia- (present on admission)   Assessment & Plan    Continue statin             VTE:  Coumadin  Ulcer: Not Indicated  Lines: Ramey Catheter  Ongoing indication addressed    I have performed a physical exam and reviewed and updated ROS  and Plan today (10/27/2018). In review of yesterday's note (10/26/2018), there are no changes except as documented above.     Discussed patient condition and risk of morbidity and/or mortality with RN, RT, Therapies, Pharmacy and [facility]

## 2018-10-27 NOTE — PROGRESS NOTES
[facility] Nephrology Progress Note    Date of Service  10/27/2018    Chief Complaint  Follow up AIDA    Interval Problem Update  79 YO male with history notable for CHF, valvular disease and CKD presented for planned cardiothoracic surgery in the setting of valvular disease.   Baseline cr appears to be ~1.8 most recently without proteinuria or active urinary sediment. Underwent MVR, tricuspid valve repar, aortic valve reparr and TITI ligation on 10/16. S/P resp arrest post extubation on POD#1. Continued on pressors until POD#3. Attempted diuresis over past 72 hours with sport doses of 20-40mg lasix. UOP down trending  Form >1L on 10/19 to 300cc over last 24 hours.  Patient requires 40 mg Lasix and metolazone at home.  Patient is currently unable to answer questioning in the setting of critical illness, wife provides history.  She notes that yesterday he became more lethargic and has been slowly out of it this morning.  She does not think he has had significant chest pain or shortness of breath.  He has not been complaining about abdominal pain.  Intermittent low blood pressures over the last 48 hours.     DAILY NEPHROLOGY SUMMARY:  10/23: consult done  10/24: ~1L UOP after 1 dose of lasix, 2nd dose held for hypotension  10/25: Net -1.1 L.  Tired this morning working with physical therapy yesterday  10/26: no acute events. -700cc. Walking around. Ongoing chest wall pain.  10/27: No events, Cr improved but BUN slightly higher, good UOP, pt feels tired    Review of Systems  Review of Systems   Constitutional: Positive for malaise/fatigue. Negative for chills and fever.   HENT: Negative.    Eyes: Negative.    Respiratory: Negative for cough, hemoptysis, sputum production and shortness of breath.    Cardiovascular: Positive for leg swelling. Negative for chest pain and palpitations.   Gastrointestinal: Positive for constipation. Negative for abdominal pain, diarrhea and vomiting.   Genitourinary: Negative.     Musculoskeletal: Negative.    Skin: Negative.    Neurological: Positive for weakness. Negative for dizziness, focal weakness and loss of consciousness.   Endo/Heme/Allergies: Negative.    Psychiatric/Behavioral: Negative.         Physical Exam  Temp:  [36.4 °C (97.5 °F)-36.8 °C (98.3 °F)] 36.4 °C (97.6 °F)  Pulse:  [72-96] 96  Resp:  [17-30] 29  BP: (106)/(59) 106/59    Physical Exam   Constitutional: He is oriented to person, place, and time. He appears well-developed. He has a sickly appearance. No distress.   HENT:   Head: Normocephalic and atraumatic.   Mouth/Throat: No oropharyngeal exudate.   Eyes: Pupils are equal, round, and reactive to light. Conjunctivae and EOM are normal. No scleral icterus.   Neck: Normal range of motion. Neck supple. No thyromegaly present.   Cardiovascular: Normal rate and regular rhythm.    No murmur heard.  Pulmonary/Chest: Effort normal and breath sounds normal. No respiratory distress. He has no wheezes.   Abdominal: Bowel sounds are normal. He exhibits distension. There is tenderness.   Genitourinary:   Genitourinary Comments: +Ramey   Musculoskeletal: Normal range of motion. He exhibits edema. He exhibits no tenderness or deformity.   Neurological: He is alert and oriented to person, place, and time. He exhibits normal muscle tone.   Skin: Skin is warm and dry. No erythema.   Psychiatric: He has a normal mood and affect. His behavior is normal.   Nursing note and vitals reviewed.      Fluids    Intake/Output Summary (Last 24 hours) at 10/27/18 1129  Last data filed at 10/27/18 0800   Gross per 24 hour   Intake             1100 ml   Output             1965 ml   Net             -865 ml       Laboratory  Recent Labs      10/25/18   0457  10/26/18   0500  10/27/18   0505   WBC  5.8  6.9  7.0   RBC  2.81*  2.93*  2.90*   HEMOGLOBIN  9.4*  9.4*  9.2*   HEMATOCRIT  27.6*  28.4*  27.8*   MCV  98.2*  96.9  95.9   MCH  33.5*  32.1  31.7   MCHC  34.1  33.1*  33.1*   RDW  64.7*  64.5*   64.0*   PLATELETCT  150*  183  183   MPV  11.2  10.8  10.5     Recent Labs      10/25/18   0457  10/26/18   0500  10/27/18   0505   SODIUM  136  136  134*   POTASSIUM  3.8  3.6  3.3*   CHLORIDE  102  100  98   CO2  22  22  23   GLUCOSE  127*  148*  120*   BUN  109*  110*  113*   CREATININE  3.35*  3.22*  2.98*   CALCIUM  9.0  9.2  8.8     Recent Labs      10/25/18   0457  10/26/18   0500  10/27/18   0505   INR  3.69*  3.57*  3.46*               Imaging    Assessment/Plan  No new Assessment & Plan notes have been filed under this hospital service since the last note was generated.  Service: Nephrology     ASSESSMENT:  # AIDA: Baseline Cr~1.8, increased to 3.3, Likely 2/2 hemodynamic compromise/tubular injury.  Cr now trending down slowly but BUN still elevated, non-golicuric  # CKD Stage 3; non-proteinuric. Has never been followed by nephrology.   # Acidosis: resolved  # Hypervolemia--improving  # Valvular heart disease s/p MVR, tricuspid valve repar, aortic valve reparr and TITI ligation  # Anemia: acute blood loss component  # PEA arrest     PLAN:  -No acute need for renal replacement therapy but may need to initiate in next 24-48hrs if BUN remains elevated  -Continue Lasix 80 mg IV twice daily, goal net -1-2 L today.  -Limit narcotics  -Renal diet  -Strict I/Os  -Dose adjust all meds for decreased GFR  -Transfuse PRN  -PO KCL supplements

## 2018-10-28 PROBLEM — R14.0 ABDOMINAL DISTENSION: Status: ACTIVE | Noted: 2018-01-01

## 2018-10-28 NOTE — CARE PLAN
Problem: Safety  Goal: Will remain free from falls  Outcome: PROGRESSING AS EXPECTED    Intervention: Assess risk factors for falls  Assessed risk factors for falls.  Intervention: Implement fall precautions   10/28/18 0056   OTHER   Environmental Precautions Treaded Slipper Socks on Patient;Personal Belongings, Wastebasket, Call Bell etc. in Easy Reach;Transferred to Stronger Side;Report Given to Other Health Care Providers Regarding Fall Risk;Bed in Low Position;Communication Sign for Patients & Families;Mobility Assessed & Appropriate Sign Placed   IV Pole on Same Side of Bed as Bathroom Yes   Bedrails Bedrails Closest to Bathroom Down   Chair/Bed Strip Alarm Yes - Alarm On   Bed Alarm Yes - Alarm On         Problem: Infection  Goal: Will remain free from infection  Outcome: PROGRESSING AS EXPECTED    Intervention: Assess signs and symptoms of infection  Assessed signs and symptoms.  Intervention: Implement standard precautions and perform hand washing before and after patient contact  Strict hand hygiene and standard precautions implemented.  Intervention: Give CDC handouts for infection prevention (infection prevention/hand washing, disease specific, and device specific) and document in Education  Patient verbally educated and states understanding  Intervention: Assess for removal of potential routes of infection, such as IV, central line, intra-arterial or urinary catheters  Assessed for removal of potential routes of infection

## 2018-10-28 NOTE — PROGRESS NOTES
Critical Care Progress Note    Date of admission  10/16/2018    Chief Complaint  78 y.o. male admitted 10/16/2018 with severe MR and TR, s/p repair    Hospital Course    78-year-old gentleman currently on full   mechanical ventilatory support, status post valve repair and all information   taken from chart review inside out.  It appears that this gentleman has a   chronic history of severe MR, severe TR, paroxysmal atrial fibrillation, on   anticoagulation, stage III kidney disease, pulmonary hypertension with last   reported RVSP of 45, chronic hypertension and dyslipidemia.  Patient admitted   electively today for valve repair, looking at mitral, tricuspid as well as   aortic valve.  He is status post repair of the mitral and tricuspid valves.    The aortic valve on examination intraoperatively showed only mild aortic   stenosis, minimal debridement performed and patient was transferred to the   intensive care unit.  At the present time, he is on 0.04 epinephrine and on   Precedex, full mechanical ventilatory support, recovering from anesthesia with   pulmonary artery catheter in place as well as mediastinal chest tubes      Interval Problem Update  Reviewed last 24 hour events:  Tm 98.2  -1.6L over last 24hr, +2.8L since admit  RLE Doppler (-) for dvt, did show hernia  RUE doppler with superficial thrombus - no dvt  No cxr this am  Hb 9.0  K 3.5  Cr 2.7 w bun 115  INR 3.2    RA 92%  IS  Last Bm 10/22      Review of Systems  Review of Systems   Constitutional: Negative for chills, fatigue and fever.   HENT: Negative for congestion and sore throat.    Respiratory: Negative for cough, chest tightness, shortness of breath and wheezing.    Cardiovascular: Positive for chest pain. Negative for palpitations.   Gastrointestinal: Positive for abdominal distention and constipation. Negative for abdominal pain, nausea and vomiting.   Neurological: Positive for weakness. Negative for headaches.   Psychiatric/Behavioral:  Negative for agitation.   All other systems reviewed and are negative.       Vital Signs for last 24 hours   Temp:  [36.3 °C (97.3 °F)-36.8 °C (98.2 °F)] 36.6 °C (97.9 °F)  Pulse:  [77-98] 86  Resp:  [16-34] 22    Hemodynamic parameters for last 24 hours       Vent Settings for last 24 hours       Physical Exam   Physical Exam   Constitutional: He appears well-developed and well-nourished.   HENT:   Head: Normocephalic and atraumatic.   Eyes: Pupils are equal, round, and reactive to light. No scleral icterus.   Neck: No tracheal deviation present.   Cardiovascular: Normal rate and intact distal pulses.    Pulmonary/Chest: He has no wheezes. He has no rales.   Abdominal: He exhibits distension. There is no tenderness.   hypoactive   Musculoskeletal: He exhibits edema.   Neurological: He is alert. No cranial nerve deficit.   Skin: Skin is warm and dry.   Psychiatric: He has a normal mood and affect.   Nursing note and vitals reviewed.      Medications  Current Facility-Administered Medications   Medication Dose Route Frequency Provider Last Rate Last Dose   • warfarin (COUMADIN) tablet 2 mg  2 mg Oral COUMADIN-DAILY Jana Deng M.D.   2 mg at 10/27/18 1821   • polyethylene glycol/lytes (MIRALAX) PACKET 1 Packet  1 Packet Oral BID Jayce Thompson M.D.   1 Packet at 10/28/18 0502   • furosemide (LASIX) injection 80 mg  80 mg Intravenous BID DIURETIC Manjeet Bruner M.D.   80 mg at 10/28/18 0510   • amiodarone (CORDARONE) tablet 200 mg  200 mg Oral TWICE DAILY Amber Escobar A.P.N.   200 mg at 10/28/18 0503   • aspirin (ASA) chewable tab 81 mg  81 mg Oral DAILY Amber Escobar A.P.N.   81 mg at 10/28/18 0502   • atorvastatin (LIPITOR) tablet 20 mg  20 mg Oral Q EVENING YARED Rosenberg.P.N.   20 mg at 10/27/18 1755   • Respiratory Care per Protocol   Nebulization Continuous RT YARED Rosenberg.P.N.       • Pharmacy Consult Request ...Pain Management Review 1 Each  1 Each Other PRN YARED Rosenberg.P.N.        • tramadol (ULTRAM) 50 MG tablet 50 mg  50 mg Oral Q4HRS PRN Amber Escobar, A.P.N.       • ondansetron (ZOFRAN) syringe/vial injection 4 mg  4 mg Intravenous Q6HRS PRN Amber Escobar, A.P.N.       • acetaminophen (TYLENOL) tablet 650 mg  650 mg Oral Q4HRS PRN Amber Escobar A.P.N.   650 mg at 10/28/18 0503    Or   • acetaminophen (TYLENOL) suppository 650 mg  650 mg Rectal Q4HRS PRN Amber Escobar, A.P.N.       • senna-docusate (PERICOLACE or SENOKOT S) 8.6-50 MG per tablet 2 Tab  2 Tab Oral BID Amber Escobar A.P.N.   2 Tab at 10/28/18 0502    And   • polyethylene glycol/lytes (MIRALAX) PACKET 1 Packet  1 Packet Oral QDAY PRN Amber Escobar A.P.N.   1 Packet at 10/20/18 1408    And   • magnesium hydroxide (MILK OF MAGNESIA) suspension 30 mL  30 mL Oral QDAY PRN Amber Escobar, A.P.N.   30 mL at 10/21/18 0448    And   • bisacodyl (DULCOLAX) suppository 10 mg  10 mg Rectal QDAY PRN Amber Escobar A.P.N.   Stopped at 10/22/18 0854   • mag hydrox-al hydrox-simeth (MAALOX PLUS ES or MYLANTA DS) suspension 30 mL  30 mL Oral Q4HRS PRN Amber Escobar A.P.N.       • MD Alert...Warfarin per Pharmacy   Other pharmacy to dose Amber Escobar, A.P.N.       • HYDROcodone-acetaminophen (NORCO) 5-325 MG per tablet 1-2 Tab  1-2 Tab Oral Q4HRS PRN Amber Escobar A.P.N.   1 Tab at 10/22/18 1431       Fluids    Intake/Output Summary (Last 24 hours) at 10/28/18 0743  Last data filed at 10/28/18 0600   Gross per 24 hour   Intake              660 ml   Output             2290 ml   Net            -1630 ml       Laboratory  Recent Results (from the past 48 hour(s))   CBC without Differential Critical Care 0130    Collection Time: 10/27/18  5:05 AM   Result Value Ref Range    WBC 7.0 4.8 - 10.8 K/uL    RBC 2.90 (L) 4.70 - 6.10 M/uL    Hemoglobin 9.2 (L) 14.0 - 18.0 g/dL    Hematocrit 27.8 (L) 42.0 - 52.0 %    MCV 95.9 81.4 - 97.8 fL    MCH 31.7 27.0 - 33.0 pg    MCHC 33.1 (L) 33.7 - 35.3 g/dL    RDW 64.0 (H) 35.9 - 50.0 fL     Platelet Count 183 164 - 446 K/uL    MPV 10.5 9.0 - 12.9 fL   Basic Metabolic Panel ([facility]) Critical Care 0130    Collection Time: 10/27/18  5:05 AM   Result Value Ref Range    Sodium 134 (L) 135 - 145 mmol/L    Potassium 3.3 (L) 3.6 - 5.5 mmol/L    Chloride 98 96 - 112 mmol/L    Co2 23 20 - 33 mmol/L    Glucose 120 (H) 65 - 99 mg/dL    Bun 113 (HH) 8 - 22 mg/dL    Creatinine 2.98 (H) 0.50 - 1.40 mg/dL    Calcium 8.8 8.5 - 10.5 mg/dL    Anion Gap 13.0 (H) 0.0 - 11.9   PROTHROMBIN TIME    Collection Time: 10/27/18  5:05 AM   Result Value Ref Range    PT 34.8 (H) 12.0 - 14.6 sec    INR 3.46 (H) 0.87 - 1.13   ESTIMATED GFR    Collection Time: 10/27/18  5:05 AM   Result Value Ref Range    GFR If African American 25 (A) >60 mL/min/1.73 m 2    GFR If Non African American 21 (A) >60 mL/min/1.73 m 2   CBC without Differential Critical Care 0130    Collection Time: 10/28/18  5:00 AM   Result Value Ref Range    WBC 6.9 4.8 - 10.8 K/uL    RBC 2.84 (L) 4.70 - 6.10 M/uL    Hemoglobin 9.0 (L) 14.0 - 18.0 g/dL    Hematocrit 27.7 (L) 42.0 - 52.0 %    MCV 97.5 81.4 - 97.8 fL    MCH 31.7 27.0 - 33.0 pg    MCHC 32.5 (L) 33.7 - 35.3 g/dL    RDW 65.6 (H) 35.9 - 50.0 fL    Platelet Count 188 164 - 446 K/uL    MPV 10.6 9.0 - 12.9 fL   Basic Metabolic Panel ([facility]) Critical Care 0130    Collection Time: 10/28/18  5:00 AM   Result Value Ref Range    Sodium 132 (L) 135 - 145 mmol/L    Potassium 3.5 (L) 3.6 - 5.5 mmol/L    Chloride 97 96 - 112 mmol/L    Co2 23 20 - 33 mmol/L    Glucose 116 (H) 65 - 99 mg/dL    Bun 115 (HH) 8 - 22 mg/dL    Creatinine 2.76 (H) 0.50 - 1.40 mg/dL    Calcium 8.7 8.5 - 10.5 mg/dL    Anion Gap 12.0 (H) 0.0 - 11.9   PROTHROMBIN TIME    Collection Time: 10/28/18  5:00 AM   Result Value Ref Range    PT 33.4 (H) 12.0 - 14.6 sec    INR 3.28 (H) 0.87 - 1.13   ESTIMATED GFR    Collection Time: 10/28/18  5:00 AM   Result Value Ref Range    GFR If  27 (A) >60 mL/min/1.73 m 2    GFR If Non  22  (A) >60 mL/min/1.73 m 2       Imaging      Assessment/Plan  * Acute respiratory failure with hypoxia (HCC)   Assessment & Plan    Intubated 10/16; went to PEA arrest approx 1 min post extubation and required emergent intubation  Extubated to bipap 10/18 successfully and since weaned off  Rt/02 protocols  IS/PEP/Mobilize  Remain Volume up but without significant pulm edema at this time  Diuresing with higher dose lasix        S/P TVR (tricuspid valve repair)   Assessment & Plan    S/P repair 10/16  Continue post heart protocols        S/P MVR (mitral valve repair)   Assessment & Plan    S/P repair 10/16  Continue post heart protocols  CT removed 10/18          Paroxysmal atrial fibrillation (HCC)- (present on admission)   Assessment & Plan    Keep K > 4 and Mg > 2  amiodarone  Warfarin - INR 3.2        Essential hypertension, benign- (present on admission)   Assessment & Plan    Pressures remain wnl off anti-htn, monitor for timing to implement           Abdominal distension   Assessment & Plan    Get plain film abdo  Suspect ileus - no pain on palpation, less likely sbo  Give suppository   Continue miralax + docusate  If worsens may need decompression with ng  Continue mobilization and electrolyte replacement        Encephalopathy   Assessment & Plan    Multifactorial with pain meds + acute on ckd  Aspiration precautions  Minimize further mind altering/sedating meds  Improved         Acute blood loss anemia   Assessment & Plan    S/P 2u PRBC 10/17  S/P 2u PRBCs 10/19  S/P 1u PRBCs 10/22  Hb holding at present        CKD (chronic kidney disease) stage 3, GFR 30-59 ml/min ([facility])- (present on admission)   Assessment & Plan    Baseline Cr 1.8  Renally dose meds  Minimize nephrotoxic substances  Ramey in place  Continue to follow UO - nonoliguric and diuresing with higher dose lasix  May need eventual HD for filtration as well as volume removal  Cr with slight down trend, however, bun continues to slowly rise             Pulmonary hypertension (HCC)- (present on admission)   Assessment & Plan    Likely related to previous severe MR  F/u echo as OP           Mixed hyperlipidemia- (present on admission)   Assessment & Plan    Continue statin             VTE:  Coumadin  Ulcer: Not Indicated  Lines: Ramey Catheter  Ongoing indication addressed    I have performed a physical exam and reviewed and updated ROS and Plan today (10/28/2018). In review of yesterday's note (10/27/2018), there are no changes except as documented above.     Discussed patient condition and risk of morbidity and/or mortality with RN, RT, Therapies, Pharmacy and [facility]

## 2018-10-28 NOTE — PROGRESS NOTES
Inpatient Anticoagulation Service Note    Date: 10/28/2018  Reason for Anticoagulation: Atrial Fibrillation, Bioprosthetic Valve Replacement  Hemoglobin Value: (!) 9  Hematocrit Value: (!) 27.7  Lab Platelet Value: 188  Target INR: 2.0 to 3.0  INR from last 7 days     Date/Time INR Value    10/28/18 0500 (!)  3.28    10/27/18 0505 (!)  3.46    10/26/18 0500 (!)  3.57    10/25/18 0457 (!)  3.69    10/24/18 0405 (!)  3.08    10/23/18 0405 (!)  2.16    10/22/18 0524 (!)  1.71        Dose from last 7 days     Date/Time Dose (mg)    10/28/18 1400  2    10/27/18 1400  2    10/26/18 1400  1    10/25/18 1200  0    10/24/18 1200  2.5    10/23/18 1300  2.5    10/22/18 1500  5        Average Dose (mg):  (new start this admission)  Significant Interactions: Amiodarone, Aspirin, Statin  Bridge Therapy: No    Reversal Agent Administered: Not Applicable    A/P:   Slightly supra-therapeutic INR - trending down.  No new DDI or s/sx of hemorrhage.  Continue warfarin 2 mg PO daily.  INR tomorrow.      Education Material Provided?: Yes  Pharmacist suggested discharge dosing: TBD pending INR trends, perhaps warfarin 2 mg PO daily.  Recommend a follow-up PT/INR within 48-72 hours of discharge.      Martha White, PharmD, BCPS, BCCCP

## 2018-10-28 NOTE — PROGRESS NOTES
[facility] Nephrology Progress Note    Date of Service  10/28/2018     Author Bebe Marques M.D.    Chief Complaint  Follow up AIDA    Interval Problem Update  77 YO male with history notable for CHF, valvular disease and CKD presented for planned cardiothoracic surgery in the setting of valvular disease.   Baseline cr appears to be ~1.8 most recently without proteinuria or active urinary sediment. Underwent MVR, tricuspid valve repar, aortic valve reparr and TITI ligation on 10/16. S/P resp arrest post extubation on POD#1. Continued on pressors until POD#3. Attempted diuresis over past 72 hours with sport doses of 20-40mg lasix. UOP down trending  Form >1L on 10/19 to 300cc over last 24 hours.  Patient requires 40 mg Lasix and metolazone at home.  Patient is currently unable to answer questioning in the setting of critical illness, wife provides history.  She notes that yesterday he became more lethargic and has been slowly out of it this morning.  She does not think he has had significant chest pain or shortness of breath.  He has not been complaining about abdominal pain.  Intermittent low blood pressures over the last 48 hours.     DAILY NEPHROLOGY SUMMARY:  10/23: consult done  10/24: ~1L UOP after 1 dose of lasix, 2nd dose held for hypotension  10/25: Net -1.1 L.  Tired this morning working with physical therapy yesterday  10/26: no acute events. -700cc. Walking around. Ongoing chest wall pain.  10/27: No events, Cr improved but BUN slightly higher, good UOP, pt feels tired  10/28: No events, good UOP, Cr slowly trending down but BUN trending up, BP stable, appetite a little better this am, abd still distended and abd xray shows no obstruction, right leg edema worse    Review of Systems  Review of Systems   Constitutional: Positive for malaise/fatigue. Negative for chills and fever.   HENT: Negative.    Eyes: Negative.    Respiratory: Negative for cough, hemoptysis, sputum production and shortness of  breath.    Cardiovascular: Positive for leg swelling. Negative for chest pain and palpitations.   Gastrointestinal: Positive for constipation. Negative for abdominal pain, diarrhea and vomiting.   Genitourinary: Negative.    Musculoskeletal: Negative.    Skin: Negative.    Neurological: Positive for weakness. Negative for dizziness, focal weakness and loss of consciousness.   Endo/Heme/Allergies: Negative.    Psychiatric/Behavioral: Negative.         Physical Exam  Temp:  [36.3 °C (97.3 °F)-36.8 °C (98.2 °F)] 36.8 °C (98.2 °F)  Pulse:  [78-98] 81  Resp:  [16-34] 19    Physical Exam   Constitutional: He is oriented to person, place, and time. He appears well-developed. He has a sickly appearance. No distress.   HENT:   Head: Normocephalic and atraumatic.   Mouth/Throat: No oropharyngeal exudate.   Eyes: Pupils are equal, round, and reactive to light. Conjunctivae and EOM are normal. No scleral icterus.   Neck: Normal range of motion. Neck supple. No thyromegaly present.   Cardiovascular: Normal rate and regular rhythm.    No murmur heard.  Pulmonary/Chest: Effort normal and breath sounds normal. No respiratory distress. He has no wheezes.   Abdominal: Bowel sounds are normal. He exhibits distension. There is tenderness.   Genitourinary:   Genitourinary Comments: +Ramey   Musculoskeletal: Normal range of motion. He exhibits edema. He exhibits no tenderness or deformity.   Neurological: He is alert and oriented to person, place, and time. He exhibits normal muscle tone.   Skin: Skin is warm and dry. No erythema.   Psychiatric: He has a normal mood and affect. His behavior is normal.   Nursing note and vitals reviewed.      Fluids    Intake/Output Summary (Last 24 hours) at 10/28/18 1032  Last data filed at 10/28/18 0800   Gross per 24 hour   Intake              600 ml   Output             2090 ml   Net            -1490 ml       Laboratory  Recent Labs      10/26/18   0500  10/27/18   0505  10/28/18   0500   WBC  6.9   7.0  6.9   RBC  2.93*  2.90*  2.84*   HEMOGLOBIN  9.4*  9.2*  9.0*   HEMATOCRIT  28.4*  27.8*  27.7*   MCV  96.9  95.9  97.5   MCH  32.1  31.7  31.7   MCHC  33.1*  33.1*  32.5*   RDW  64.5*  64.0*  65.6*   PLATELETCT  183  183  188   MPV  10.8  10.5  10.6     Recent Labs      10/26/18   0500  10/27/18   0505  10/28/18   0500   SODIUM  136  134*  132*   POTASSIUM  3.6  3.3*  3.5*   CHLORIDE  100  98  97   CO2  22  23  23   GLUCOSE  148*  120*  116*   BUN  110*  113*  115*   CREATININE  3.22*  2.98*  2.76*   CALCIUM  9.2  8.8  8.7     Recent Labs      10/26/18   0500  10/27/18   0505  10/28/18   0500   INR  3.57*  3.46*  3.28*               Imaging    Assessment/Plan  No new Assessment & Plan notes have been filed under this hospital service since the last note was generated.  Service: Nephrology     ASSESSMENT:  # AIDA: Baseline Cr~1.8, increased to 3.3, Likely 2/2 hemodynamic compromise/tubular injury.  Cr now trending down slowly but BUN still elevated, non-golicuric  # CKD Stage 3; non-proteinuric. Has never been followed by nephrology.   # Acidosis: resolved  # Hypervolemia--improving  # Valvular heart disease s/p MVR, tricuspid valve repar, aortic valve reparr and TITI ligation  # Anemia: acute blood loss component  # PEA arrest     PLAN:  -No acute need for renal replacement therapy but may need to initiate in next 24hrs  -Continue Lasix 80 mg IV twice daily and will administer albumin prior  -If edema is unchanged or if BUN any higher in the next 24hrs, recommend initiating dialysis  -Patient and wife are in agreement with dialysis if indicated  -Limit narcotics  -Renal diet  -Strict I/Os  -Dose adjust all meds for decreased GFR  -Transfuse PRN  -PO KCL supplements again today  -Avoid Fleets enemas for treatment of constipation, other enemas ok if needed

## 2018-10-28 NOTE — CARE PLAN
Problem: Post Op Day 4 CABG/Heart Valve Replacement  Goal: Optimal care of the Post Op CABG/Heart Valve replacement Post Op Day 4  Outcome: NOT MET    Intervention: Daily Weights  Daily weight taken on stand up scale.   Intervention: Shower daily and clean incisions twice daily with soap and water  Incisions cleaned, pt refused shower. CHG done.   Intervention: Up in chair for all meals  All 3x/day  Intervention: Ambulate, increasing the distance each time x 3 and before bed  Pt's furthest walk today was 50 ft.  Intervention: IS q 1 hour while awake and record best IS volume  1000 ml   Intervention: Consider removal of domínguez, chest tube and pacer wire if not already done  Domínguez to measure accurate input and output. Pt on diuretics.

## 2018-10-28 NOTE — PROGRESS NOTES
Monitor Summary:     A-fib    HR 70-80  -/.12/-    12 hour cc complete.  2 RN skin check complete.

## 2018-10-28 NOTE — PROGRESS NOTES
Cardiovascular Surgery Progress Note    Name: Jaime Tamayo  MRN: [id number]  : 1940  Admit Date: 10/16/2018  5:44 AM  Procedure:  Procedure(s) and Anesthesia Type:     * MITRAL VALVE REPAIR - General     * TRICUSPID VALVE REPAIR - General     * AORTIC VALVE REPAIR - General     * LEFT ATRIAL APPENDAGE LIGATION - General     * YUNIOR - General  12 Day Post-Op    Vitals:  Patient Vitals for the past 8 hrs:   Temp SpO2 O2 Delivery O2 (LPM) Pulse Heart Rate (Monitored) Resp NIBP   10/28/18 0800 36.8 °C (98.2 °F) 95 % None (Room Air) 0 81 79 19 (!) 97/54   10/28/18 0600 36.6 °C (97.9 °F) - - - - 88 (!) 22 -   10/28/18 0500 - - - - - 80 17 -   10/28/18 0400 36.3 °C (97.3 °F) 100 % Silicone Nasal Cannula 2 - 82 (!) 25 (!) 95/57   10/28/18 0300 - - - - - 80 19 -     Temp (24hrs), Av.6 °C (97.8 °F), Min:36.3 °C (97.3 °F), Max:36.8 °C (98.2 °F)      Respiratory:    Respiration: 19, Pulse Oximetry: 95 %, O2 Daily Delivery Respiratory : Room Air with O2 Available     Chest Tube Drains:          Fluids:    Intake/Output Summary (Last 24 hours) at 10/28/18 1051  Last data filed at 10/28/18 0800   Gross per 24 hour   Intake              600 ml   Output             2090 ml   Net            -1490 ml     Admit weight: Weight: 77.9 kg (171 lb 11.8 oz)  Current weight: Weight: 83.8 kg (184 lb 11.9 oz) (10/27/18 0640)    Labs:  Recent Labs      10/26/18   0500  10/27/18   0505  10/28/18   0500   WBC  6.9  7.0  6.9   RBC  2.93*  2.90*  2.84*   HEMOGLOBIN  9.4*  9.2*  9.0*   HEMATOCRIT  28.4*  27.8*  27.7*   MCV  96.9  95.9  97.5   MCH  32.1  31.7  31.7   MCHC  33.1*  33.1*  32.5*   RDW  64.5*  64.0*  65.6*   PLATELETCT  183  183  188   MPV  10.8  10.5  10.6         Recent Labs      10/26/18   0500  10/27/18   0505  10/28/18   0500   SODIUM  136  134*  132*   POTASSIUM  3.6  3.3*  3.5*   CHLORIDE  100  98  97   CO2  22  23  23   GLUCOSE  148*  120*  116*   BUN  110*  113*  115*   CREATININE  3.22*  2.98*  2.76*   CALCIUM   9.2  8.8  8.7     Recent Labs      10/26/18   0500  10/27/18   0505  10/28/18   0500   INR  3.57*  3.46*  3.28*       Medications:  • bisacodyl  10 mg     • albumin human 25%  12.5 g     • warfarin  2 mg     • polyethylene glycol/lytes  1 Packet     • furosemide  80 mg     • amiodarone  200 mg     • aspirin  81 mg     • atorvastatin  20 mg     • senna-docusate  2 Tab     • MD Alert...Warfarin per Pharmacy            Ordered Medications:    ASA - Yes    Plavix - No; contraindicated because of Other coumadin    Post-operative Beta Blockers - yes    Ace Inhibitor - No; contraindicated because of Other normal ef    Statin - No; contraindicated because of No cad          Central Line:  Type of line: cordis  Date of insertion: 10/16/18  Date of removal: see RN notes    Exam:   Review of Systems   Constitutional: Negative.    HENT: Negative.    Eyes: Negative.    Respiratory: Negative.    Cardiovascular: Negative.    Gastrointestinal: Negative.    Genitourinary: Negative.    Musculoskeletal: Negative.    Skin: Negative.    Neurological: Negative.    Endo/Heme/Allergies: Negative.    Psychiatric/Behavioral: Negative.        Physical Exam   Constitutional: He appears well-developed. No distress.   Eyes: Pupils are equal, round, and reactive to light.   Neck: Normal range of motion. No JVD present.   Cardiovascular: Normal rate, regular rhythm and normal heart sounds.    Pulmonary/Chest: Effort normal. No respiratory distress. He has decreased breath sounds in the right lower field and the left lower field. He has no wheezes.   Abdominal: Soft. Bowel sounds are normal. There is no guarding.   Genitourinary:   Genitourinary Comments: domínguez   Musculoskeletal: Normal range of motion. He exhibits edema.   Neurological:   Sedated, follows   Skin: Skin is warm and dry.   Surgical incisions CDI   Psychiatric:   Calm and cooperative       Quality Measures:   Quality-Core Measures   Reviewed items::  Medications reviewed, EKG  reviewed, Labs reviewed and Radiology images reviewed  Domínguez catheter::  Urinary Tract Retention or Urinary Tract Obstruction and Strict Intake and Output During Sepisis or Shock  Central line in place:  Need for access and Concentrated IV drugs  DVT prophylaxis pharmacological::  Warfarin (Coumadin)  DVT prophylaxis - mechanical:  SCDs  Ulcer Prophylaxis::  Yes  Assessed for rehabilitation services:  Patient returned to prior level of function, rehabilitation not indicated at this time      Assessment/Plan:  POD 1 S/P respiratory/PEA arrest last night post extubation.  Improved this AM, vented but alert and cooperative.  H/H low s/p acute blood lose anemia.  On low dose epi, swan numbers good. PLAN: Transfuse 2 units PRBC.  Vent per pulm.  Keep domínguez/CTs today.  POD 2 Extubated, HDS, SR, on low dose epi 0.03mcg for right heart support, d/c swan/cordis/CT, diurese, h/h- watch, swallow eval, CPM  POD 3 HDS, SR, wean epi, d/c art line, CR elevated- UO improved with albumin- add maintenance fluids, acute blood loss anemia- transfuse PRBC, NPO- awaiting swallow eval, amb, enc IS  POD 4 HDS, SR- off epi- d/c pacer wires, CR stable- start gently diuresis, Diet ordered- d/c maintenance fluids, work on mobility, enc IS  POD 5 HDS, Afib- amio gtt- change to po- add home beta blocker, CR stable- diurese, cardiac debility- will need rehab consult, domínguez for strict I&O, enc IS  POD 6 HOTN this am- improved after transfusion, Afib- rate controlled, CR stable- low UO- watch, constipation- bowel protocol, acute blood loss anemia- s/p transfusion, cardiac debility- will need rehab when medically stable  POD 7 HDS, afib - rate controlled, Acute on chronic renal failure- BMP pending/UO low- consult nephrology, sedated form narcotics last night- d/c oxy/fent, cardiac debility- mobilize/PT/OT  POD 8 HDS, afib rate controlled.  Fluid balance positive 7 liters.  Wt up 4 Kg today.  More alert today.  Plan:  Defer diuresing to neph.    POD  9 HDS, afib rate controlled.  Fluid balance trending down.  Right arm and right leg swollen.  Awake alert.  Plan:  US right upper and lower extremity.   POD 10 HDS, afib rate controlled.  Fluid balance continues to trend down.  Edema right leg and arm better.  US negative for DVT's.  Creatinine improved.  Wt down.  Plan:  One more day of aggressive diuresis and to SNF in AM.    POD 11 HDS, afib rate controlled, creatinine down today but BUN remains high.  Edema improved.  Plan:  neph would like to watch BUN a couple more days.    POD 12 HDS, afib rate controlled.  Alert and oriented.  Creatinine down today.  Bun up today.  Right leg edema worse today.  Plan:  Continue to diurese.  Neph following.    Patient seen, examined and plan reviewed with midlevel provider. I agree with the plan.    Active Hospital Problems    Diagnosis   • Thrombocytopenia (HCC) [D69.6]     Priority: High   • S/P MVR (mitral valve repair) [Z98.890]     Priority: High   • S/P TVR (tricuspid valve repair) [Z98.890]     Priority: High   • Paroxysmal atrial fibrillation (HCC) [I48.0]     Priority: High   • Essential hypertension, benign [I10]     Priority: Medium   • Abdominal distension [R14.0]   • Encephalopathy [G93.40]   • Acute blood loss anemia [D62]   • Acute respiratory failure with hypoxia (HCC) [J96.01]   • CKD (chronic kidney disease) stage 3, GFR 30-59 ml/min (HCC) [N18.3]   • Pulmonary hypertension (HCC) [I27.20]   • Mixed hyperlipidemia [E78.2]

## 2018-10-29 PROBLEM — E78.5 DYSLIPIDEMIA: Status: ACTIVE | Noted: 2018-01-01

## 2018-10-29 PROBLEM — N17.9 AKI (ACUTE KIDNEY INJURY) (HCC): Status: ACTIVE | Noted: 2018-01-01

## 2018-10-29 PROBLEM — N28.9 ACUTE ON CHRONIC RENAL INSUFFICIENCY: Status: ACTIVE | Noted: 2018-01-01

## 2018-10-29 PROBLEM — N18.9 ACUTE ON CHRONIC RENAL INSUFFICIENCY: Status: ACTIVE | Noted: 2018-01-01

## 2018-10-29 NOTE — DISCHARGE PLANNING
Agency/Facility Name: [facility]  Spoke To: Candace  Outcome: They do have a bed for patient. Message left for TORRES Rico.

## 2018-10-29 NOTE — CONSULTS
Hospital Medicine Consultation    Date of Service  10/29/2018    Referring Physician  Jana Deng M.D.    Consulting Physician  Garett Figueroa M.D.    Reason for Consultation  Medical management of HTN, atrial fibrillation    History of Presenting Illness     78-year-old gentleman with severe MR, severe TR, moderate AS, CHF  PAF, CKD who underwent TVR  MVR repair , and aortic valvuloplasty, left atrial appendage ligation on 10/16 by Dr. Deng.  extubated immediately post op but went to PEA arrest ~1 min, reintubated. He was transitioned 10/18 with transient use of  BIPAP. Chest tube removed 10/18.   Hospital course complicated by acute on chronic renal insufficiency.      Review of Systems  Review of Systems   Constitutional: Positive for malaise/fatigue.   HENT: Negative.    Eyes: Negative.    Cardiovascular: Positive for leg swelling.   Gastrointestinal: Negative.    Genitourinary: Negative.    Musculoskeletal: Positive for joint pain.   Skin: Negative.    Neurological: Negative.    Psychiatric/Behavioral: Positive for depression.       Past Medical History   has a past medical history of Anemia; Arthritis; Breath shortness; CATARACT; Chickenpox; Congestive heart failure (HCC); Hebrew measles; Gout; Gout; Heart burn; Heart valve disease; Hemorrhagic disorder (HCC); Hernia, inguinal, right; Hiatus hernia syndrome; High cholesterol; Hypertension; Indigestion; Influenza; Mumps; Pain (02/2018); Pain; Paroxysmal atrial fibrillation (HCC); and Tremor.    Surgical History   has a past surgical history that includes achilles tendon rep (Left, 1999); tonsillectomy (1947); hip arthroplasty total (6/8/2009); other orthopedic surgery (2006); hip arthroplasty total (3/5/2012); knee arthroplasty total (3/18/2013); irrigation & debridement ortho (3/19/2013); evacuation of hematoma (3/19/2013); lumbar fusion posterior (2012); cataract extraction with iol (Bilateral, 2010); knee arthroplasty total (Left, 10/17/2016);  laminotomy; hip replacement, total; mitral valve repair (10/16/2018); tricuspid valve repair (10/16/2018); aortic valve replacement (10/16/2018); maze procedure (10/16/2018); and nina (10/16/2018).    Family History  family history includes Cancer in his mother.    Social History   reports that he quit smoking about 49 years ago. His smoking use included Cigarettes. He has a 2.50 pack-year smoking history. He has never used smokeless tobacco. He reports that he drinks about 1.8 - 2.4 oz of alcohol per week . He reports that he does not use drugs.    Medications  Prior to Admission Medications   Prescriptions Last Dose Informant Patient Reported? Taking?   Ferrous Sulfate (IRON) 325 (65 Fe) MG Tab 10/2/2018 at am Family Member Yes No   Sig: Take 1 Tab by mouth every day.   Multiple Vitamins-Minerals (PRESERVISION AREDS) Tab 10/15/2018 at 2000 Family Member Yes No   Sig: Take 2 tablet by mouth every day.   allopurinol (ZYLOPRIM) 300 MG Tab 10/15/2018 at 2000 Family Member No No   Sig: TAKE 1 TABLET DAILY   atorvastatin (LIPITOR) 20 MG Tab 10/15/2018 at 2000 Family Member No No   Sig: Take 1 Tab by mouth every evening.   furosemide (LASIX) 40 MG Tab 10/15/2018 at am Family Member No No   Sig: Take 2 tablets on Mon, Wed and Fri.  Take 1 tablet other days.   metOLazone (ZAROXOLYN) 5 MG Tab 10/15/2018 at am Family Member No No   Sig: Take one tablet on Monday, Wednesday, Friday   metoprolol SR (TOPROL XL) 50 MG TABLET SR 24 HR 10/15/2018 at 2000 Family Member No No   Sig: Take 1 Tab by mouth every evening.   omeprazole (PRILOSEC) 20 MG CPDR 10/15/2018 at am Family Member Yes No   Sig: Take 20 mg by mouth every day.     potassium chloride SA (KDUR) 20 MEQ Tab CR 10/15/2018 at am Family Member No No   Sig: Take 2 tablets Mon.-Wed.-Fri. And 1 tablet other days of the week.   rivaroxaban (XARELTO) 15 MG Tab tablet 10/10/2018 at pm Family Member No No   Sig: Take 1 Tab by mouth with dinner.   vitamin D (CHOLECALCIFEROL) 1000  UNIT Tab 10/15/2018 at 2000 Family Member Yes Yes   Sig: Take 1,000 Units by mouth 2 Times a Day.      Facility-Administered Medications: None       Allergies  Allergies   Allergen Reactions   • Percocet [Oxycodone-Acetaminophen] Itching and Anxiety   • Other Environmental      hayfever       Physical Exam  Temp:  [36.3 °C (97.3 °F)-36.8 °C (98.2 °F)] 36.4 °C (97.6 °F)  Pulse:  [75-95] 87  Resp:  [15-25] 23    Physical Exam   Constitutional: He is oriented to person, place, and time. He appears well-developed and well-nourished. No distress.   HENT:   Head: Normocephalic and atraumatic.   Mouth/Throat: No oropharyngeal exudate.   Eyes: Pupils are equal, round, and reactive to light. EOM are normal. Right eye exhibits no discharge. Left eye exhibits no discharge. No scleral icterus.   Neck: Normal range of motion. Neck supple. No JVD present. No tracheal deviation present. No thyromegaly present.   Cardiovascular: Normal rate.  Exam reveals no gallop and no friction rub.    No murmur heard.  Pulmonary/Chest:   Mild b/l rhonchi   Abdominal: Soft. He exhibits distension. There is no tenderness. There is no guarding.   Musculoskeletal: He exhibits edema.   Neurological: He is alert and oriented to person, place, and time. No cranial nerve deficit.   Skin: Skin is warm and dry. He is not diaphoretic. No erythema.   Psychiatric: He exhibits a depressed mood.       Fluids  Date 10/29/18 0700 - 10/30/18 0659   Shift 2617-0999 4456-5530 4053-2065 24 Hour Total   I  N  T  A  K  E   P.O. 240   240    I.V. 300   300    Shift Total 540   540   O  U  T  P  U  T   Urine 100   100    Shift Total 100   100   Weight (kg) 83.8 83.8 83.8 83.8         Laboratory  Recent Labs      10/27/18   0505  10/28/18   0500  10/29/18   0500   WBC  7.0  6.9  6.8   RBC  2.90*  2.84*  2.69*   HEMOGLOBIN  9.2*  9.0*  9.0*   HEMATOCRIT  27.8*  27.7*  26.1*   MCV  95.9  97.5  97.0   MCH  31.7  31.7  33.5*   MCHC  33.1*  32.5*  34.5   RDW  64.0*  65.6*   64.4*   PLATELETCT  183  188  196   MPV  10.5  10.6  10.8     Recent Labs      10/27/18   0505  10/28/18   0500  10/29/18   0500   SODIUM  134*  132*  134*   POTASSIUM  3.3*  3.5*  3.5*   CHLORIDE  98  97  97   CO2  23  23  26   GLUCOSE  120*  116*  118*   BUN  113*  115*  121*   CREATININE  2.98*  2.76*  2.77*   CALCIUM  8.8  8.7  8.9     Recent Labs      10/27/18   0505  10/28/18   0500  10/29/18   0500   INR  3.46*  3.28*  3.42*                 Imaging  [id number] VIEW   Final Result      Nonobstructive bowel gas pattern.      US-EXTREMITY VENOUS LOWER UNILAT RIGHT   Final Result      US-EXTREMITY VENOUS UPPER UNILAT RIGHT   Final Result      DX-CHEST-2 VIEWS   Final Result      1.  Cardiomegaly with interstitial edema.      2.  Bibasilar atelectasis and small bilateral pleural effusions.      3.  Postsurgical change.      DX-CHEST-LIMITED (1 VIEW)   Final Result         1.  Pulmonary edema and/or infiltrates are identified, which are stable since the prior exam.   2.  Cardiomegaly   3.  Atherosclerosis      DX-ABDOMEN FOR TUBE PLACEMENT   Final Result      NG tube tip overlies the gastric fundus.      EC-YUNIOR W/O CONT         DX-CHEST-PORTABLE (1 VIEW)   Final Result         1.  Pulmonary edema and/or infiltrates, slightly decreased since prior study.   2.  Cardiomegaly   3.  Atherosclerosis      EC-ECHOCARDIOGRAM LTD W/ CONT   Final Result      DX-CHEST-PORTABLE (1 VIEW)   Final Result      Perihilar and right upper lobe opacities likely represent atelectasis.      Prominence of the cardiomediastinal silhouette.      Lines and tubes as above described.         DX-CHEST-2 VIEWS   Final Result      Probable nipple shadow right lower lung field. Interval follow-up chest x-rays using nipple markers a consideration.   Right lung base atelectasis. No consolidation.          Assessment/Plan  * Acute respiratory failure with hypoxia (HCC)   Assessment & Plan    Oxygen to keep sats > 90    Diuresis on hold        S/P  TVR (tricuspid valve repair)   Assessment & Plan    snf when medically clear        S/P MVR (mitral valve repair)   Assessment & Plan    snf when medically clear        Paroxysmal atrial fibrillation (HCC)- (present on admission)   Assessment & Plan    Rate controlled    Coumadin- keep inr 2-3    Po amiodarone        Acute on chronic renal insufficiency   Assessment & Plan    Follow bmp    Diuresis on hold    Iv albumin, as per renal    Case d/w renal MD dr juarez        Dyslipidemia- (present on admission)   Assessment & Plan    Cont statin        to snf when cleared by renal md    Check am cbc, bmp

## 2018-10-29 NOTE — CARE PLAN
Problem: Safety  Goal: Will remain free from falls  Outcome: PROGRESSING AS EXPECTED    Intervention: Assess risk factors for falls  Assessed risk factors for falls.  Intervention: Implement fall precautions   10/28/18 5312   OTHER   Environmental Precautions Treaded Slipper Socks on Patient;Personal Belongings, Wastebasket, Call Bell etc. in Easy Reach;Transferred to Stronger Side;Report Given to Other Health Care Providers Regarding Fall Risk;Bed in Low Position;Communication Sign for Patients & Families;Mobility Assessed & Appropriate Sign Placed   IV Pole on Same Side of Bed as Bathroom Yes   Bedrails Bedrails Closest to Bathroom Down   Chair/Bed Strip Alarm Yes - Alarm On   Bed Alarm Yes - Alarm On         Problem: Infection  Goal: Will remain free from infection  Outcome: PROGRESSING AS EXPECTED    Intervention: Assess signs and symptoms of infection  Assessed signs and symptoms of infection.  Intervention: Implement standard precautions and perform hand washing before and after patient contact  Strict hand hygiene and standard precautions implemented.  Intervention: Give CDC handouts for infection prevention (infection prevention/hand washing, disease specific, and device specific) and document in Education  Patient verbally educated and verbalizes understanding.  Intervention: Assess for removal of potential routes of infection, such as IV, central line, intra-arterial or urinary catheters  Assessed for removal of potential routes of infection.

## 2018-10-29 NOTE — CARE PLAN
Problem: Post Op Day 4 CABG/Heart Valve Replacement  Goal: Optimal care of the Post Op CABG/Heart Valve replacement Post Op Day 4  Outcome: NOT MET    Intervention: Daily Weights  Stand up scale.   Intervention: Shower daily and clean incisions twice daily with soap and water  Pt refused. Incisions cleaned.   Intervention: Up in chair for all meals  Complete.   Intervention: Ambulate, increasing the distance each time x 3 and before bed  Furthest walk was 75 ft this shift.  Intervention: IS q 1 hour while awake and record best IS volume  1350 mL.   Intervention: Consider removal of domínguez, chest tube and pacer wire if not already done  Domínguez needed.

## 2018-10-29 NOTE — DISCHARGE PLANNING
Anticipated Discharge Disposition: d/c to SNF    Action: Contacted [facility] to [facility].    Barriers to Discharge: open bed, transportation    Plan: f/u w/ [facility], medical team

## 2018-10-29 NOTE — PROGRESS NOTES
Cardiovascular Surgery Progress Note    Name: Jaime Tamayo  MRN: [id number]  : 1940  Admit Date: 10/16/2018  5:44 AM  Procedure:  Procedure(s) and Anesthesia Type:     * MITRAL VALVE REPAIR - General     * TRICUSPID VALVE REPAIR - General     * AORTIC VALVE REPAIR - General     * LEFT ATRIAL APPENDAGE LIGATION - General     * YUNIOR - General  13 Day Post-Op    Vitals:  Patient Vitals for the past 8 hrs:   Temp SpO2 O2 Delivery O2 (LPM) Pulse Heart Rate (Monitored) Resp NIBP   10/29/18 0900 - - - - 79 87 (!) 21 -   10/29/18 0801 - - - - - 90 18 (!) 88/46   10/29/18 0800 36.4 °C (97.6 °F) 94 % Silicone Nasal Cannula 2 - 87 (!) 25 -   10/29/18 0700 - - - - 90 84 15 108/57   10/29/18 0600 36.5 °C (97.7 °F) 97 % Silicone Nasal Cannula 2 75 76 16 108/57   10/29/18 0500 - 96 % Silicone Nasal Cannula 2 91 91 18 -   10/29/18 0400 36.8 °C (98.2 °F) 98 % Silicone Nasal Cannula 2 81 82 19 (!) 96/58   10/29/18 0300 - 98 % Silicone Nasal Cannula 2 95 91 (!) 24 -   10/29/18 0200 36.8 °C (98.2 °F) 99 % Silicone Nasal Cannula 2 91 84 16 -     Temp (24hrs), Av.6 °C (97.8 °F), Min:36.3 °C (97.3 °F), Max:36.8 °C (98.2 °F)      Respiratory:    Respiration: (!) 21, Pulse Oximetry: 94 %, O2 Daily Delivery Respiratory : Silicone Nasal Cannula     Chest Tube Drains:          Fluids:    Intake/Output Summary (Last 24 hours) at 10/29/18 0951  Last data filed at 10/29/18 0800   Gross per 24 hour   Intake          1326.67 ml   Output             1840 ml   Net          -513.33 ml     Admit weight: Weight: 77.9 kg (171 lb 11.8 oz)  Current weight: Weight: 83.8 kg (184 lb 11.9 oz) (10/27/18 0640)    Labs:  Recent Labs      10/27/18   0505  10/28/18   0500  10/29/18   0500   WBC  7.0  6.9  6.8   RBC  2.90*  2.84*  2.69*   HEMOGLOBIN  9.2*  9.0*  9.0*   HEMATOCRIT  27.8*  27.7*  26.1*   MCV  95.9  97.5  97.0   MCH  31.7  31.7  33.5*   MCHC  33.1*  32.5*  34.5   RDW  64.0*  65.6*  64.4*   PLATELETCT  183  188  196   MPV  10.5  10.6   10.8         Recent Labs      10/27/18   0505  10/28/18   0500  10/29/18   0500   SODIUM  134*  132*  134*   POTASSIUM  3.3*  3.5*  3.5*   CHLORIDE  98  97  97   CO2  23  23  26   GLUCOSE  120*  116*  118*   BUN  113*  115*  121*   CREATININE  2.98*  2.76*  2.77*   CALCIUM  8.8  8.7  8.9     Recent Labs      10/27/18   0505  10/28/18   0500  10/29/18   0500   INR  3.46*  3.28*  3.42*       Medications:  • bisacodyl  10 mg     • albumin human 25%  12.5 g 12.5 g (10/29/18 0458)   • polyethylene glycol/lytes  1 Packet     • furosemide  80 mg     • amiodarone  200 mg     • aspirin  81 mg     • atorvastatin  20 mg     • senna-docusate  2 Tab     • MD Alert...Warfarin per Pharmacy            Ordered Medications:    ASA - Yes    Plavix - No; contraindicated because of Other coumadin    Post-operative Beta Blockers - yes    Ace Inhibitor - No; contraindicated because of Other normal ef    Statin - No; contraindicated because of No cad          Central Line:  Type of line: cordis  Date of insertion: 10/16/18  Date of removal: see RN notes    Exam:   Review of Systems   Constitutional: Negative.    HENT: Negative.    Eyes: Negative.    Respiratory: Negative.    Cardiovascular: Negative.    Gastrointestinal: Negative.    Genitourinary: Negative.    Musculoskeletal: Negative.    Skin: Negative.    Neurological: Negative.    Endo/Heme/Allergies: Negative.    Psychiatric/Behavioral: Negative.        Physical Exam   Constitutional: He appears well-developed. No distress.   Eyes: Pupils are equal, round, and reactive to light.   Neck: Normal range of motion. No JVD present.   Cardiovascular: Normal rate, regular rhythm and normal heart sounds.    Pulmonary/Chest: Effort normal. No respiratory distress. He has decreased breath sounds in the right lower field and the left lower field. He has no wheezes.   Abdominal: Soft. Bowel sounds are normal. There is no guarding.   Genitourinary:   Genitourinary Comments: catrachita    Musculoskeletal: Normal range of motion. He exhibits edema.   Neurological:   Sedated, follows   Skin: Skin is warm and dry.   Surgical incisions CDI   Psychiatric:   Calm and cooperative       Quality Measures:   Quality-Core Measures   Reviewed items::  Medications reviewed, EKG reviewed, Labs reviewed and Radiology images reviewed  Domínguez catheter::  Urinary Tract Retention or Urinary Tract Obstruction and Strict Intake and Output During Sepisis or Shock  Central line in place:  Need for access and Concentrated IV drugs  DVT prophylaxis pharmacological::  Warfarin (Coumadin)  DVT prophylaxis - mechanical:  SCDs  Ulcer Prophylaxis::  Yes  Assessed for rehabilitation services:  Patient returned to prior level of function, rehabilitation not indicated at this time      Assessment/Plan:  POD 1 S/P respiratory/PEA arrest last night post extubation.  Improved this AM, vented but alert and cooperative.  H/H low s/p acute blood lose anemia.  On low dose epi, swan numbers good. PLAN: Transfuse 2 units PRBC.  Vent per pulm.  Keep domínguez/CTs today.  POD 2 Extubated, HDS, SR, on low dose epi 0.03mcg for right heart support, d/c swan/cordis/CT, diurese, h/h- watch, swallow eval, CPM  POD 3 HDS, SR, wean epi, d/c art line, CR elevated- UO improved with albumin- add maintenance fluids, acute blood loss anemia- transfuse PRBC, NPO- awaiting swallow eval, amb, enc IS  POD 4 HDS, SR- off epi- d/c pacer wires, CR stable- start gently diuresis, Diet ordered- d/c maintenance fluids, work on mobility, enc IS  POD 5 HDS, Afib- amio gtt- change to po- add home beta blocker, CR stable- diurese, cardiac debility- will need rehab consult, catrachita for strict I&O, enc IS  POD 6 HOTN this am- improved after transfusion, Afib- rate controlled, CR stable- low UO- watch, constipation- bowel protocol, acute blood loss anemia- s/p transfusion, cardiac debility- will need rehab when medically stable  POD 7 HDS, afib - rate controlled, Acute on  chronic renal failure- BMP pending/UO low- consult nephrology, sedated form narcotics last night- d/c oxy/fent, cardiac debility- mobilize/PT/OT  POD 8 HDS, afib rate controlled.  Fluid balance positive 7 liters.  Wt up 4 Kg today.  More alert today.  Plan:  Defer diuresing to neph.    POD 9 HDS, afib rate controlled.  Fluid balance trending down.  Right arm and right leg swollen.  Awake alert.  Plan:  US right upper and lower extremity.   POD 10 HDS, afib rate controlled.  Fluid balance continues to trend down.  Edema right leg and arm better.  US negative for DVT's.  Creatinine improved.  Wt down.  Plan:  One more day of aggressive diuresis and to SNF in AM.    POD 11 HDS, afib rate controlled, creatinine down today but BUN remains high.  Edema improved.  Plan:  neph would like to watch BUN a couple more days.    POD 12 HDS, afib rate controlled.  Alert and oriented.  Creatinine down today.  Bun up today.  Right leg edema worse today.  Plan:  Continue to diurese.  Neph following.    POD 13 HDS, afib rates controlled. Crt same, BUN worse, making urine, significant edema remains.  K Low. PLAN: Diuresis/electrolyte replacement per nephrology. OK to transfer off [facility]IC, hospitalists to take over care. CTS will sign off, please call with any concerns.    Patient seen, examined and plan reviewed with midlevel provider. I agree with the plan.    Active Hospital Problems    Diagnosis   • Thrombocytopenia (HCC) [D69.6]     Priority: High   • S/P MVR (mitral valve repair) [Z98.890]     Priority: High   • S/P TVR (tricuspid valve repair) [Z98.890]     Priority: High   • Paroxysmal atrial fibrillation (HCC) [I48.0]     Priority: High   • Essential hypertension, benign [I10]     Priority: Medium   • Abdominal distension [R14.0]   • Encephalopathy [G93.40]   • Acute blood loss anemia [D62]   • Acute respiratory failure with hypoxia (HCC) [J96.01]   • CKD (chronic kidney disease) stage 3, GFR 30-59 ml/min ([facility]) [N18.3]   •  Pulmonary hypertension (HCC) [I27.20]   • Mixed hyperlipidemia [E78.2]

## 2018-10-29 NOTE — DISCHARGE PLANNING
Spoke with TORRES Rico, patient is not ready for discharge today.  Candace at [facility] advised.

## 2018-10-29 NOTE — PROGRESS NOTES
[facility] Nephrology Progress Note    Date of Service  10/29/2018     Author Manjeet Bruner M.D.    Chief Complaint  Follow up AIDA    Interval Problem Update  77 YO male with history notable for CHF, valvular disease and CKD presented for planned cardiothoracic surgery in the setting of valvular disease.   Baseline cr appears to be ~1.8 most recently without proteinuria or active urinary sediment. Underwent MVR, tricuspid valve repar, aortic valve reparr and TITI ligation on 10/16. S/P resp arrest post extubation on POD#1. Continued on pressors until POD#3. Attempted diuresis over past 72 hours with sport doses of 20-40mg lasix. UOP down trending  Form >1L on 10/19 to 300cc over last 24 hours.  Patient requires 40 mg Lasix and metolazone at home.  Patient is currently unable to answer questioning in the setting of critical illness, wife provides history.  She notes that yesterday he became more lethargic and has been slowly out of it this morning.  She does not think he has had significant chest pain or shortness of breath.  He has not been complaining about abdominal pain.  Intermittent low blood pressures over the last 48 hours.     DAILY NEPHROLOGY SUMMARY:  10/23: consult done  10/24: ~1L UOP after 1 dose of lasix, 2nd dose held for hypotension  10/25: Net -1.1 L.  Tired this morning working with physical therapy yesterday  10/26: no acute events. -700cc. Walking around. Ongoing chest wall pain.  10/27: No events, Cr improved but BUN slightly higher, good UOP, pt feels tired  10/28: No events, good UOP, Cr slowly trending down but BUN trending up, BP stable, appetite a little better this am, abd still distended and abd xray shows no obstruction, right leg edema worse  10/29: no acute events, walking more. Ongoing chest pain.    Review of Systems  Review of Systems   Constitutional: Positive for malaise/fatigue. Negative for chills and fever.   HENT: Negative.    Eyes: Negative.    Respiratory: Negative for  cough, hemoptysis, sputum production and shortness of breath.    Cardiovascular: Positive for leg swelling. Negative for chest pain and palpitations.   Gastrointestinal: Negative for abdominal pain, constipation, diarrhea and vomiting.   Genitourinary: Negative.    Musculoskeletal: Negative.    Skin: Negative.    Neurological: Positive for weakness. Negative for dizziness, focal weakness and loss of consciousness.   Endo/Heme/Allergies: Negative.    Psychiatric/Behavioral: Negative.         Physical Exam  Temp:  [36.3 °C (97.3 °F)-36.8 °C (98.2 °F)] 36.4 °C (97.6 °F)  Pulse:  [75-95] 79  Resp:  [15-25] 21    Physical Exam   Constitutional: He is oriented to person, place, and time. He appears well-developed. He has a sickly appearance. No distress.   HENT:   Head: Normocephalic and atraumatic.   Mouth/Throat: No oropharyngeal exudate.   Eyes: Pupils are equal, round, and reactive to light. Conjunctivae and EOM are normal. No scleral icterus.   Neck: Normal range of motion. Neck supple. No thyromegaly present.   Cardiovascular: Normal rate and regular rhythm.    No murmur heard.  Pulmonary/Chest: Effort normal and breath sounds normal. No respiratory distress. He has no wheezes.   Abdominal: Bowel sounds are normal. He exhibits distension. There is tenderness.   Genitourinary:   Genitourinary Comments: +Ramey   Musculoskeletal: Normal range of motion. He exhibits edema. He exhibits no tenderness or deformity.   Neurological: He is alert and oriented to person, place, and time. He exhibits normal muscle tone.   Skin: Skin is warm and dry. No erythema.   Psychiatric: He has a normal mood and affect. His behavior is normal.   Nursing note and vitals reviewed.      Fluids    Intake/Output Summary (Last 24 hours) at 10/29/18 1109  Last data filed at 10/29/18 0800   Gross per 24 hour   Intake          1326.67 ml   Output             1840 ml   Net          -513.33 ml       Laboratory  Recent Labs      10/27/18   5396   10/28/18   0500  10/29/18   0500   WBC  7.0  6.9  6.8   RBC  2.90*  2.84*  2.69*   HEMOGLOBIN  9.2*  9.0*  9.0*   HEMATOCRIT  27.8*  27.7*  26.1*   MCV  95.9  97.5  97.0   MCH  31.7  31.7  33.5*   MCHC  33.1*  32.5*  34.5   RDW  64.0*  65.6*  64.4*   PLATELETCT  183  188  196   MPV  10.5  10.6  10.8     Recent Labs      10/27/18   0505  10/28/18   0500  10/29/18   0500   SODIUM  134*  132*  134*   POTASSIUM  3.3*  3.5*  3.5*   CHLORIDE  98  97  97   CO2  23  23  26   GLUCOSE  120*  116*  118*   BUN  113*  115*  121*   CREATININE  2.98*  2.76*  2.77*   CALCIUM  8.8  8.7  8.9     Recent Labs      10/27/18   0505  10/28/18   0500  10/29/18   0500   INR  3.46*  3.28*  3.42*               Imaging    Assessment/Plan  No new Assessment & Plan notes have been filed under this hospital service since the last note was generated.  Service: Nephrology     ASSESSMENT:  # AIDA: Baseline Cr~1.8, increased to 3.3, Likely 2/2 hemodynamic compromise/tubular injury.  Cr now trending down slowly but BUN still elevated, non-olicuric  # CKD Stage 3; non-proteinuric. Has never been followed by nephrology.   # Acidosis: resolved  # Hypervolemia--improving  # Valvular heart disease s/p MVR, tricuspid valve repar, aortic valve reparr and TITI ligation  # Anemia: acute blood loss component  # PEA arrest     PLAN:  -holding PM dose of diuretics today to try to keep net even today. LE edema may not be representitive if intravascular volume status  -Continue BID albumin  -Limit narcotics  -Renal diet  -Strict I/Os  -Dose adjust all meds for decreased GFR  -Transfuse PRN  -Avoid Fleets enemas for treatment of constipation, other enemas ok if needed    Thank you for this consult, we will continue to follow.    Manjeet Bruner MD

## 2018-10-29 NOTE — PROGRESS NOTES
Monitor Summary:     A-fib, occasional PVC'S  HR   -/.12/-    12 hour chart check complete.  2 RN skin check complete.

## 2018-10-29 NOTE — CARE PLAN
Problem: Nutritional:  Goal: Achieve adequate nutritional intake  Patient will consume % of meals/supplements.   Outcome: MET Date Met: 10/29/18  PO % of the last four meals.

## 2018-10-29 NOTE — PROGRESS NOTES
Critical Care Progress Note    Date of admission  10/16/2018    Chief Complaint  78 y.o. male admitted 10/16/2018 with severe MR and TR, s/p repair    Hospital Course    78-year-old gentleman with severe MR, severe TR, moderate AS, CHF (class III), PAF, CKD who underwent TVR (38mm Cabrales annuloplasty band), MVR repair (34 mm Cabrales annuloplasty band), and aortic valvuloplasty, left atrial appendage ligation on 10/16 by Dr. Deng. Post op, extubated immediate post op but went to PEA arrest ~1 min, reintubated, and extubated 10/18 to BIPAP. Chest tube removed 10/18. Hosp course is c/b AIDA on CKD, ICU weakness       Interval Problem Update  Reviewed last 24 hour events:  Diuresing well with total 2L off. Negative 993 net fluid balance.   On lasix and albumin trial  Nephrology following  Afebrile  Weak, but tolerating PT.   C/o pain 6-7/10.   Afebrile  Tolerating oral intake    Review of Systems  Review of Systems   Constitutional: Negative for chills, fever and malaise/fatigue.   HENT: Negative for nosebleeds.    Respiratory: Negative for cough and shortness of breath.    Cardiovascular: Negative for chest pain and palpitations.        Pain around surgical incision area   Gastrointestinal: Negative for abdominal pain, nausea and vomiting.   Genitourinary: Negative for dysuria and urgency.   Musculoskeletal: Negative for falls.   Skin: Negative for rash.   Neurological: Negative for weakness and headaches.   Psychiatric/Behavioral: The patient is not nervous/anxious.         Vital Signs for last 24 hours   Temp:  [36.3 °C (97.3 °F)-36.8 °C (98.2 °F)] 36.4 °C (97.6 °F)  Pulse:  [75-95] 87  Resp:  [15-25] 23    Hemodynamic parameters for last 24 hours       Respiratory       Physical Exam   Physical Exam   Constitutional: He is oriented to person, place, and time. No distress.   HENT:   Head: Normocephalic and atraumatic.   Eyes: Pupils are equal, round, and reactive to light. No scleral icterus.   Neck: Neck supple.    Cardiovascular: Normal rate, regular rhythm and normal heart sounds.  Exam reveals no friction rub.    No murmur heard.  Wound vac in chest   Pulmonary/Chest: Effort normal and breath sounds normal. No respiratory distress. He has no wheezes. He has no rales.   Abdominal: Soft. Bowel sounds are normal. He exhibits no distension. There is no tenderness. There is no rebound and no guarding.   Musculoskeletal: He exhibits edema.   2-3+ pitting edema in lower ext and right upper extremity   Neurological: He is alert and oriented to person, place, and time.   Skin: Skin is warm. No rash noted. He is not diaphoretic. No erythema. No pallor.   Psychiatric: Thought content normal.   Nursing note and vitals reviewed.      Medications  Current Facility-Administered Medications   Medication Dose Route Frequency Provider Last Rate Last Dose   • bisacodyl (DULCOLAX) suppository 10 mg  10 mg Rectal DAILY Jayce Thompson M.D.   Stopped at 10/29/18 0600   • albumin human 25% solution 12.5 g  12.5 g Intravenous BID Bebe Marques M.D. 150 mL/hr at 10/29/18 0458 12.5 g at 10/29/18 0458   • polyethylene glycol/lytes (MIRALAX) PACKET 1 Packet  1 Packet Oral BID Jayce Thompson M.D.   Stopped at 10/28/18 1800   • amiodarone (CORDARONE) tablet 200 mg  200 mg Oral TWICE DAILY Amber Escobar A.P.N.   200 mg at 10/29/18 0458   • aspirin (ASA) chewable tab 81 mg  81 mg Oral DAILY Amber Escobar A.P.N.   81 mg at 10/29/18 0458   • atorvastatin (LIPITOR) tablet 20 mg  20 mg Oral Q EVENING Amber Escobar A.P.N.   20 mg at 10/28/18 1725   • Respiratory Care per Protocol   Nebulization Continuous RT Amber Escobar A.P.N.       • Pharmacy Consult Request ...Pain Management Review 1 Each  1 Each Other PRN YARED Rosenberg.P.N.       • tramadol (ULTRAM) 50 MG tablet 50 mg  50 mg Oral Q4HRS PRN Amber Escobar A.P.N.       • ondansetron (ZOFRAN) syringe/vial injection 4 mg  4 mg Intravenous Q6HRS PRN Amber Escobar, A.P.N.       •  acetaminophen (TYLENOL) tablet 650 mg  650 mg Oral Q4HRS PRN Amber Escobar A.P.N.   650 mg at 10/29/18 1216    Or   • acetaminophen (TYLENOL) suppository 650 mg  650 mg Rectal Q4HRS PRN Amber Escobar A.P.N.       • senna-docusate (PERICOLACE or SENOKOT S) 8.6-50 MG per tablet 2 Tab  2 Tab Oral BID Amber Escobar A.P.N.   Stopped at 10/28/18 1800    And   • polyethylene glycol/lytes (MIRALAX) PACKET 1 Packet  1 Packet Oral QDAY PRN Amber Escobar A.P.N.   1 Packet at 10/20/18 1408    And   • magnesium hydroxide (MILK OF MAGNESIA) suspension 30 mL  30 mL Oral QDAY PRN Amber Escobar A.P.N.   30 mL at 10/21/18 0448    And   • bisacodyl (DULCOLAX) suppository 10 mg  10 mg Rectal QDAY PRN Amber Escobar A.P.N.   Stopped at 10/22/18 0854   • mag hydrox-al hydrox-simeth (MAALOX PLUS ES or MYLANTA DS) suspension 30 mL  30 mL Oral Q4HRS PRN Amber Escobar A.P.N.       • MD Alert...Warfarin per Pharmacy   Other pharmacy to dose Amber Escobar A.P.N.       • HYDROcodone-acetaminophen (NORCO) 5-325 MG per tablet 1-2 Tab  1-2 Tab Oral Q4HRS PRN Amber Escobar A.P.N.   1 Tab at 10/22/18 1431       Fluids    Intake/Output Summary (Last 24 hours) at 10/29/18 1431  Last data filed at 10/29/18 0800   Gross per 24 hour   Intake          1166.67 ml   Output             1390 ml   Net          -223.33 ml       Laboratory          Recent Labs      10/27/18   0505  10/28/18   0500  10/29/18   0500   SODIUM  134*  132*  134*   POTASSIUM  3.3*  3.5*  3.5*   CHLORIDE  98  97  97   CO2  23  23  26   BUN  113*  115*  121*   CREATININE  2.98*  2.76*  2.77*   CALCIUM  8.8  8.7  8.9     Recent Labs      10/27/18   0505  10/28/18   0500  10/29/18   0500   GLUCOSE  120*  116*  118*     Recent Labs      10/27/18   0505  10/28/18   0500  10/29/18   0500   WBC  7.0  6.9  6.8     Recent Labs      10/27/18   0505  10/28/18   0500  10/29/18   0500   RBC  2.90*  2.84*  2.69*   HEMOGLOBIN  9.2*  9.0*  9.0*   HEMATOCRIT  27.8*  27.7*   26.1*   PLATELETCT  183  188  196   PROTHROMBTM  34.8*  33.4*  34.5*   INR  3.46*  3.28*  3.42*       Imaging  CXR reviewed. No new CXR  US LE and UE negative bilaterally    Assessment/Plan  * Acute respiratory failure with hypoxia (HCC)   Assessment & Plan    Intubated 10/16; went to PEA arrest approx 1 min post extubation and required emergent intubation  Extubated to bipap 10/18 successfully and since weaned off  Resp status stable  Need continuous IS/PEP and mobility   Remains volume overload but not affecting resp status.           AIDA (acute kidney injury) ([facility])   Assessment & Plan    Stable, slowly improving. Serum creatinine still up 2.7, baseline creatinine 1.8  Holding off lasix  On albumin.   Nephrology was following         Thrombocytopenia ([facility])   Assessment & Plan    Stable. No bleeding        S/P TVR (tricuspid valve repair)   Assessment & Plan    S/P repair 10/16        S/P MVR (mitral valve repair)   Assessment & Plan    S/P repair 10/16  CT removed 10/18          Paroxysmal atrial fibrillation ([facility])- (present on admission)   Assessment & Plan    Rate controlled HR in 70s.   On Keep K > 4 and Mg > 2  On amiodarone  On coumadin, pharmacy dosing. Titrate dosing to goal INR 2-3        Essential hypertension, benign- (present on admission)   Assessment & Plan    Stable, maintain SBP  and MAP >65           Abdominal distension   Assessment & Plan      Continue miralax + docusate  Continue mobilization and electrolyte replacement        Encephalopathy   Assessment & Plan    Multifactorial with pain meds + acute on ckd  Aspiration precautions  Minimize further mind altering/sedating meds  Improved         Acute blood loss anemia   Assessment & Plan    S/P 2u PRBC 10/17  S/P 2u PRBCs 10/19  S/P 1u PRBCs 10/22  Hgb stable, no active bleeding        CKD (chronic kidney disease) stage 3, GFR 30-59 ml/min ([facility])- (present on admission)   Assessment & Plan    Baseline Cr 1.8  Renally dose meds  Minimize  nephrotoxic substances  Ramey in place for strict I/Os  Keep even fluid balance in next 24 hours.             Pulmonary hypertension (HCC)- (present on admission)   Assessment & Plan    Likely related to previous severe MR  F/u echo as OP           Mixed hyperlipidemia- (present on admission)   Assessment & Plan    Continue statin           Ok to leave ICU to telemetry bed from my standpoint  I discussed plan of care with family (wife) who's at the bedside    VTE:  Heparin  Ulcer: Not Indicated  Lines: Ramey Catheter  Ongoing indication addressed    I have performed a physical exam and reviewed and updated ROS and Plan today (10/29/2018). In review of yesterday's note (10/28/2018), there are no changes except as documented above.     Discussed patient condition and risk of morbidity and/or mortality with Hospitalist, RN, RT, Pharmacy and [facility] team  Time spent = 40 minutes in directly providing and coordinating critical care and extensive data review.  No time overlap and excludes procedures.

## 2018-10-29 NOTE — ASSESSMENT & PLAN NOTE
Stable, slowly improving. Serum creatinine still up 2.7, baseline creatinine 1.8  Holding off lasix  On albumin.   Nephrology was following

## 2018-10-30 PROBLEM — R33.8 ACUTE URINARY RETENTION: Status: ACTIVE | Noted: 2018-01-01

## 2018-10-30 NOTE — DISCHARGE PLANNING
Agency/Facility Name: [facility]  Spoke To: Candace  Outcome: No bed available for patient today. Per Candace she currently has a wait list and will update [facility] tomorrow morning regarding bed status.     TORRES Ortiz notified.

## 2018-10-30 NOTE — PROGRESS NOTES
[facility] Nephrology Progress Note    Date of Service  10/30/2018     Author Manjeet Bruner M.D.    Chief Complaint  Follow up AIDA    Interval Problem Update  79 YO male with history notable for CHF, valvular disease and CKD presented for planned cardiothoracic surgery in the setting of valvular disease.   Baseline cr appears to be ~1.8 most recently without proteinuria or active urinary sediment. Underwent MVR, tricuspid valve repar, aortic valve reparr and TITI ligation on 10/16. S/P resp arrest post extubation on POD#1. Continued on pressors until POD#3. Attempted diuresis over past 72 hours with sport doses of 20-40mg lasix. UOP down trending  Form >1L on 10/19 to 300cc over last 24 hours.  Patient requires 40 mg Lasix and metolazone at home.  Patient is currently unable to answer questioning in the setting of critical illness, wife provides history.  She notes that yesterday he became more lethargic and has been slowly out of it this morning.  She does not think he has had significant chest pain or shortness of breath.  He has not been complaining about abdominal pain.  Intermittent low blood pressures over the last 48 hours.     DAILY NEPHROLOGY SUMMARY:  10/23: consult done  10/24: ~1L UOP after 1 dose of lasix, 2nd dose held for hypotension  10/25: Net -1.1 L.  Tired this morning working with physical therapy yesterday  10/26: no acute events. -700cc. Walking around. Ongoing chest wall pain.  10/27: No events, Cr improved but BUN slightly higher, good UOP, pt feels tired  10/28: No events, good UOP, Cr slowly trending down but BUN trending up, BP stable, appetite a little better this am, abd still distended and abd xray shows no obstruction, right leg edema worse  10/29: no acute events, walking more. Ongoing chest pain.  10/30: No acute events, still complaining of pain all over, not being very active    Review of Systems  Review of Systems   Constitutional: Positive for malaise/fatigue. Negative for  chills and fever.   HENT: Negative.    Eyes: Negative.    Respiratory: Negative for cough, hemoptysis, sputum production and shortness of breath.    Cardiovascular: Positive for leg swelling. Negative for chest pain and palpitations.   Gastrointestinal: Negative for abdominal pain, constipation, diarrhea and vomiting.   Genitourinary: Negative.    Musculoskeletal: Negative.    Skin: Negative.    Neurological: Positive for weakness. Negative for dizziness, focal weakness and loss of consciousness.   Endo/Heme/Allergies: Negative.    Psychiatric/Behavioral: Negative.         Physical Exam  Temp:  [35.9 °C (96.6 °F)-37.1 °C (98.8 °F)] 35.9 °C (96.6 °F)  Pulse:  [76-96] 88  Resp:  [14-27] 16  BP: (102-120)/(59-69) 102/63    Physical Exam   Constitutional: He is oriented to person, place, and time. He appears well-developed. He does not have a sickly appearance. No distress.   HENT:   Head: Normocephalic and atraumatic.   Mouth/Throat: No oropharyngeal exudate.   Eyes: Pupils are equal, round, and reactive to light. Conjunctivae and EOM are normal. No scleral icterus.   Neck: Normal range of motion. Neck supple. No thyromegaly present.   Cardiovascular: Normal rate and regular rhythm.    No murmur heard.  Pulmonary/Chest: Effort normal and breath sounds normal. No respiratory distress. He has no wheezes.   Abdominal: Bowel sounds are normal. He exhibits distension. There is tenderness.   Musculoskeletal: Normal range of motion. He exhibits edema. He exhibits no tenderness or deformity.   Neurological: He is alert and oriented to person, place, and time. He exhibits normal muscle tone.   Skin: Skin is warm and dry. No erythema.   Psychiatric: He has a normal mood and affect. His behavior is normal.   Nursing note and vitals reviewed.      Fluids    Intake/Output Summary (Last 24 hours) at 10/30/18 1203  Last data filed at 10/30/18 0600   Gross per 24 hour   Intake             1440 ml   Output              840 ml   Net               600 ml       Laboratory  Recent Labs      10/28/18   0500  10/29/18   0500  10/30/18   0435   WBC  6.9  6.8  6.1   RBC  2.84*  2.69*  2.65*   HEMOGLOBIN  9.0*  9.0*  8.7*   HEMATOCRIT  27.7*  26.1*  25.7*   MCV  97.5  97.0  97.0   MCH  31.7  33.5*  32.8   MCHC  32.5*  34.5  33.9   RDW  65.6*  64.4*  65.8*   PLATELETCT  188  196  178   MPV  10.6  10.8  10.4     Recent Labs      10/28/18   0500  10/29/18   0500  10/30/18   0435   SODIUM  132*  134*  133*   POTASSIUM  3.5*  3.5*  3.5*   CHLORIDE  97  97  95*   CO2  23  26  25   GLUCOSE  116*  118*  131*   BUN  115*  121*  118*   CREATININE  2.76*  2.77*  2.67*   CALCIUM  8.7  8.9  9.0     Recent Labs      10/28/18   0500  10/29/18   0500  10/30/18   0435   INR  3.28*  3.42*  2.98*               Imaging    Assessment/Plan  No new Assessment & Plan notes have been filed under this hospital service since the last note was generated.  Service: Nephrology     ASSESSMENT:  # AIDA: Baseline Cr~1.8, increased to 3.3, Likely 2/2 hemodynamic compromise/tubular injury.  Cr now trending down but BUN remains elevated (slightly improved after holding diuretics)  # CKD Stage 3; non-proteinuric. Has never been followed by nephrology.   # Acidosis: resolved  # Hypervolemia-  # Valvular heart disease s/p MVR, tricuspid valve repar, aortic valve reparr and TITI ligation  # Anemia: acute blood loss component  # PEA arrest     PLAN:  -changing to oral diuretics today, Lasix 120 mg twice a day, goal 500cc to1L negative today  -Placed 24-hour urine studies for creatinine clearance, barring significantly different value from EGFR okay to be discharged tomorrow if able to keep net negative on oral diuretics  -Discontinue albumin  -Limit narcotics  -Renal diet  -Strict I/Os  -Dose adjust all meds for decreased GFR  -Transfuse PRN  -Avoid Fleets enemas for treatment of constipation, other enemas ok if needed    Thank you for this consult, we will continue to follow.    Manjeet Bruner,  MD

## 2018-10-30 NOTE — CARE PLAN
Problem: Safety  Goal: Free from accidental injury  Bed alarm on.  Patient educated on fall risks and importance of using call light.  Fall precautions in place: treaded slipper socks, bed is in low position, personal belongings, wastebasket, call light, and phone are within patient's reach.        Problem: Pain  Goal: Alleviation of Pain or a reduction in pain to the patient's comfort goal  Pain assessed q4hrs and PRN.  Pain medications given per MAR.

## 2018-10-30 NOTE — CARE PLAN
Problem: Urinary Elimination:  Goal: Ability to reestablish a normal urinary elimination pattern will improve  Pt has good urine output with domínguez.    Problem: Pain Management  Goal: Pain level will decrease to patient's comfort goal  Pt back pain treated well with tramadol.

## 2018-10-30 NOTE — THERAPY
"Physical Therapy Treatment completed.   Bed Mobility:  Supine to Sit: pt declined  Transfers: Sit to Stand: declined  Gait: Level Of Assist: declined       Plan of Care: Will benefit from Physical Therapy 3 times per week  Discharge Recommendations: Equipment: Will Continue to Assess for Equipment Needs.     Pt declined OOB mobility 2' pain and poor functional mobility. Pt educated on bed mobility importance and deficits acquired when remaining sedentary in bed. Pt participated in supine bed exercises to increase functional mobility and decrease risk for DVT and ulcers. Pt will benefit from acute and post acute services prior to DC home.    See \"Rehab Therapy-Acute\" Patient Summary Report for complete documentation.       "

## 2018-10-30 NOTE — PROGRESS NOTES
Bedside report received from MYNOR Espinal and pt care assumed at 1845.  Pt completing walk at end of shift.  Made it to [facility] and had to sit down and rest before returning to room.  Pt required lots of encouragement to walk.  Assessment completed.  Pt denies pain at this time.  Old blood on gown.  RN stated it was from his chest tubes sites.  Pt does not want to change his gown at this time.  Will readdress later in shift.  Plan of care discussed with pt.  Tele orders reviewed.

## 2018-10-30 NOTE — PROGRESS NOTES
Pt arrived to floor from [facility]. Bedside report completed. Pt lying in bed comfortably.  A/Ox 4. Safety precautions in place. Call light and personal belongings within reach. Pt educated on POC and all questions answered at this time.  Educated patient on calling for assistance when needed. All pt needs are met at this time.  Will continue to monitor.

## 2018-10-30 NOTE — DISCHARGE PLANNING
Left message for Candace [facility] regarding bed availability for patient tomorrow. Awaiting a call back.

## 2018-10-30 NOTE — PROGRESS NOTES
Inpatient Anticoagulation Service Note  Date: 10/30/2018  Reason for Anticoagulation: Atrial Fibrillation, Bioprosthetic Valve Replacement   [id number] VASc Score: 3  Hemoglobin Value: (!) 8.7  Hematocrit Value: (!) 25.7  Lab Platelet Value: 178  Target INR: 2.0 to 3.0  INR from last 7 days     Date/Time INR Value    10/30/18 0435 (!)  2.98    10/29/18 0500 (!)  3.42    10/28/18 0500 (!)  3.28    10/27/18 0505 (!)  3.46    10/26/18 0500 (!)  3.57    10/25/18 0457 (!)  3.69    10/24/18 0405 (!)  3.08        Dose from last 7 days     Date/Time Dose (mg)    10/30/18 1400  1    10/29/18 1100  0    10/28/18 1400  2    10/27/18 1400  2    10/26/18 1400  1    10/25/18 1200  0    10/24/18 1200  2.5        Average Dose (mg): 1.88 (new start VKA)  Significant Interactions: Amiodarone, Aspirin, Statin, Other (Comments) (tramadol PRN)  Bridge Therapy: No  Reversal Agent Administered: Not Applicable  Comments: INR down to upper end of goal range. Prior dose average noted (~1.8). H/H low. PLT stable. No overt bleeding noted. Warfarin interactions noted (including amiodarone). Will give 1 mg today and trend INR with AM labs. May need continued dose adjustments.    Plan:  Warfarin 1 mg 10/30/18  Education Material Provided?: No (given prior)  Pharmacist suggested discharge dosing: warfarin 2 mg daily (recommend INR within 24-48 hours of discharge)    Jaylen Koroma, PharmD

## 2018-10-30 NOTE — ASSESSMENT & PLAN NOTE
Repair 10/16  11/3 Repeat echo ordered.  Echo on 10/16 EF 55% no pericardial effusion seen.  11/5:  Repeat echo with severe TR, moderately dilated RV and right atrium.  Annuloplasty band of tricuspid valve per 10/16 OP note.

## 2018-10-30 NOTE — ASSESSMENT & PLAN NOTE
Coumadin held for HD cath - heparin bridge was planned when inr less than 2, currently 2.26  On amiodarone

## 2018-10-30 NOTE — PROGRESS NOTES
2 RN skin check w/ MYNOR Rico.      Pt has healed scabs on L and R arms, L and R feet.  Sacrum pink but blanchable, mepilex in place.  Scrotum edematous, groin red. Barrier cream applied.  Chest incision CDI, open to air.  Interdry ordered.    Pt turned q2. Heels floated.

## 2018-10-30 NOTE — PROGRESS NOTES
Monitor Summary: .NA/.12/.NA. Rhythm: A.Fib. Rate: 70's-low 100's.       12 hour chart check.     2 RN skin check.

## 2018-10-30 NOTE — PROGRESS NOTES
Pt requested sleeping pill.  Discussed pt with Dr. Miguel.  Received order for one time order for 25 mg of trazodone.  Nightly orders to be discussed on day shift depending on how pt tolerates one time dose.  Pt updated on plan of care.

## 2018-10-30 NOTE — CARE PLAN
Problem: Venous Thromboembolism (VTW)/Deep Vein Thrombosis (DVT) Prevention:  Goal: Patient will participate in Venous Thrombosis (VTE)/Deep Vein Thrombosis (DVT)Prevention Measures   10/29/18 0800   OTHER   Risk Assessment Score 3   VTE RISK High   Pharmacologic Prophylaxis Used LMWH: Enoxaparin(Lovenox)   Mechanical/VTE Prophylaxis   Mechanical Prophylaxis  SCDs, Sequential Compression Device;DEWAYNE Hose (Graduated Compression Stockings)   DEWAYNE Hose (Graduated Compression Stockings) On   SCDs, Sequential Compression Device Off       Problem: Bowel/Gastric:  Goal: Will not experience complications related to bowel motility   10/29/18 1600 10/29/18 1800   OTHER   Last BM 10/28/18 --    Number of Times Stooled --  0    Two smears today, positive for flatus.

## 2018-10-30 NOTE — PROGRESS NOTES
"Pt encouraged to ambulate, pt refuses, educated of risks and benefits.  Pt states he understands risks of not ambulating, pt says he is \"just too worn out today.\"  Family at bedside, also encouraged pt, pt continues to refuse.  Will continue to educate.  "

## 2018-10-31 NOTE — CARE PLAN
Problem: Safety  Goal: Will remain free from falls  Fall precautions in place. Bed wheels locked, bed is in the lowest position. Call light in reach. Bed alarm on and in place. Non-skid socks provided. Patient provides successful verbalization of fall and safety precautions and demonstration of use of call bell. Upper side rails are up. Hourly rounding in progress.     Problem: Knowledge Deficit  Goal: Knowledge of disease process/condition, treatment plan, diagnostic tests, and medications will improve  POC discussed with the patient and family. All questions and concerns addressed and answered. Patient is involved in POC and verbalizes the understanding of the disease process, medications, tests and treatments. Questions on POC encouraged throughout care.       Problem: Skin Integrity  Goal: Risk for impaired skin integrity will decrease  Skin is assessed for integrity throughout the shift. Pt is encouraged to reposition and is turned at least every 2 hours. Pt is kept dry and clean.     Problem: Psychosocial Needs:  Goal: Level of anxiety will decrease  Anxiety triggers identified. Calming techniques provided to patient. Patient is involved in POC and is encouraged to verbalize questions and concerns.

## 2018-10-31 NOTE — PROGRESS NOTES
[facility] Nephrology Progress Note    Date of Service  10/31/2018     Author Manjeet Bruner M.D.    Chief Complaint  Follow up AIDA    Interval Problem Update  77 YO male with history notable for CHF, valvular disease and CKD presented for planned cardiothoracic surgery in the setting of valvular disease.   Baseline cr appears to be ~1.8 most recently without proteinuria or active urinary sediment. Underwent MVR, tricuspid valve repar, aortic valve reparr and TITI ligation on 10/16. S/P resp arrest post extubation on POD#1. Continued on pressors until POD#3. Attempted diuresis over past 72 hours with sport doses of 20-40mg lasix. UOP down trending  Form >1L on 10/19 to 300cc over last 24 hours.  Patient requires 40 mg Lasix and metolazone at home.  Patient is currently unable to answer questioning in the setting of critical illness, wife provides history.  She notes that yesterday he became more lethargic and has been slowly out of it this morning.  She does not think he has had significant chest pain or shortness of breath.  He has not been complaining about abdominal pain.  Intermittent low blood pressures over the last 48 hours.     DAILY NEPHROLOGY SUMMARY:  10/23: consult done  10/24: ~1L UOP after 1 dose of lasix, 2nd dose held for hypotension  10/25: Net -1.1 L.  Tired this morning working with physical therapy yesterday  10/26: no acute events. -700cc. Walking around. Ongoing chest wall pain.  10/27: No events, Cr improved but BUN slightly higher, good UOP, pt feels tired  10/28: No events, good UOP, Cr slowly trending down but BUN trending up, BP stable, appetite a little better this am, abd still distended and abd xray shows no obstruction, right leg edema worse  10/29: no acute events, walking more. Ongoing chest pain.  10/30: No acute events, still complaining of pain all over, not being very active  10/31: No acute events, trying to work more with physical therapy, still with generalized  pain    Review of Systems  Review of Systems   Constitutional: Positive for malaise/fatigue. Negative for chills and fever.   HENT: Negative.    Eyes: Negative.    Respiratory: Negative for cough, hemoptysis, sputum production and shortness of breath.    Cardiovascular: Positive for chest pain and leg swelling. Negative for palpitations.   Gastrointestinal: Negative for abdominal pain, constipation, diarrhea and vomiting.   Genitourinary: Negative.    Musculoskeletal: Negative.    Skin: Negative.    Neurological: Positive for weakness. Negative for dizziness, focal weakness and loss of consciousness.   Endo/Heme/Allergies: Negative.    Psychiatric/Behavioral: Negative.         Physical Exam  Temp:  [36.2 °C (97.1 °F)-36.7 °C (98 °F)] 36.3 °C (97.3 °F)  Pulse:  [74-85] 85  Resp:  [16-18] 16  BP: ()/(56-74) 100/57    Physical Exam   Constitutional: He is oriented to person, place, and time. He appears well-developed. He does not have a sickly appearance. No distress.   HENT:   Head: Normocephalic and atraumatic.   Mouth/Throat: No oropharyngeal exudate.   Eyes: Pupils are equal, round, and reactive to light. Conjunctivae and EOM are normal. No scleral icterus.   Neck: Normal range of motion. Neck supple. No thyromegaly present.   Cardiovascular: Normal rate and regular rhythm.    No murmur heard.  Pulmonary/Chest: Effort normal and breath sounds normal. No respiratory distress. He has no wheezes.   Abdominal: Bowel sounds are normal. He exhibits distension. There is tenderness.   Musculoskeletal: Normal range of motion. He exhibits edema. He exhibits no tenderness or deformity.   Neurological: He is alert and oriented to person, place, and time. He exhibits normal muscle tone.   Skin: Skin is warm and dry. No erythema.   Psychiatric: He has a normal mood and affect. His behavior is normal.   Nursing note and vitals reviewed.      Fluids    Intake/Output Summary (Last 24 hours) at 10/31/18 1111  Last data filed  at 10/31/18 1000   Gross per 24 hour   Intake              560 ml   Output             1325 ml   Net             -765 ml       Laboratory  Recent Labs      10/29/18   0500  10/30/18   0435  10/31/18   0341   WBC  6.8  6.1  6.4   RBC  2.69*  2.65*  2.91*   HEMOGLOBIN  9.0*  8.7*  9.5*   HEMATOCRIT  26.1*  25.7*  28.2*   MCV  97.0  97.0  96.9   MCH  33.5*  32.8  32.6   MCHC  34.5  33.9  33.7   RDW  64.4*  65.8*  67.7*   PLATELETCT  196  178  187   MPV  10.8  10.4  10.4     Recent Labs      10/29/18   0500  10/30/18   0435  10/31/18   0341   SODIUM  134*  133*  134*   POTASSIUM  3.5*  3.5*  3.8   CHLORIDE  97  95*  94*   CO2  26  25  27   GLUCOSE  118*  131*  122*   BUN  121*  118*  119*   CREATININE  2.77*  2.67*  2.87*   CALCIUM  8.9  9.0  9.4     Recent Labs      10/29/18   0500  10/30/18   0435  10/31/18   0341   INR  3.42*  2.98*  2.66*               Imaging    Assessment/Plan  No new Assessment & Plan notes have been filed under this hospital service since the last note was generated.  Service: Nephrology     ASSESSMENT:  # AIDA: Baseline Cr~1.8, increased to 3.3, Likely 2/2 hemodynamic compromise/tubular injury.  Cr now trending down but BUN remains elevated (slightly improved after holding diuretics)  # CKD Stage 3; non-proteinuric. Has never been followed by nephrology.   # Acidosis: resolved  # Hypervolemia-  # Valvular heart disease s/p MVR, tricuspid valve repar, aortic valve reparr and TITI ligation  # Anemia: acute blood loss component  # PEA arrest     PLAN:  -Continuing oral diuretics today, Lasix 120 mg twice a day.  Not pushing diuresis given evidence of contraction alkalosis on labs.  Possible that lower extremity edema not representative of intravascular volume  -pending 24-hour urine studies for creatinine clearance, barring significantly different value from EGFR okay to be discharged tomorrow if able to keep net negative on oral diuretics  -Renal diet  -Strict I/Os  -Dose adjust all meds for  decreased GFR  -Transfuse PRN  -Avoid Fleet enemas for treatment of constipation, other enemas ok if needed    Thank you for this consult, we will continue to follow.    Manjeet Bruner MD

## 2018-10-31 NOTE — PROGRESS NOTES
Assumed care of pt at 1630. Pt resting in bed. On RA. Bed rails up x2. Bed alarm on. Call light, phone, and bedside table within reach. Will continue to monitor.

## 2018-10-31 NOTE — THERAPY
"Physical Therapy Treatment completed.   Bed Mobility:  Supine to Sit:  (up in chair)  Transfers: Sit to Stand: Moderate Assist  Gait: Level Of Assist: Minimal Assist with Front-Wheel Walker       Plan of Care: Plan to complete next treatment by 11/2  Discharge Recommendations: Equipment: Will Continue to Assess for Equipment Needs.   Pt agreeable to participate today, tolerated ambulation x 50 feet total using FWW with min A. Pt remains fragile, will need a post acute transitional care stay before home. Continue inpt PT . See \"Rehab Therapy-Acute\" Patient Summary Report for complete documentation.       "

## 2018-10-31 NOTE — DISCHARGE PLANNING
Agency/Facility Name: [facility]  Spoke To: Candace   Outcome: No bed available for patient today. Possible bed available Friday.     TORRES Ortiz notified.

## 2018-10-31 NOTE — DISCHARGE PLANNING
Agency/Facility Name: [facility]  Spoke To: Candace  Outcome: Left voice message regarding bed status. Awaiting a call back.

## 2018-10-31 NOTE — PROGRESS NOTES
Inpatient Anticoagulation Service Note  Date: 10/31/2018  Reason for Anticoagulation: Atrial Fibrillation, Bioprosthetic Valve Replacement   [id number] VASc Score: 3  Hemoglobin Value: (!) 9.5  Hematocrit Value: (!) 28.2  Lab Platelet Value: 187  Target INR: 2.0 to 3.0  INR from last 7 days     Date/Time INR Value    10/31/18 0341 (!)  2.66    10/30/18 0435 (!)  2.98    10/29/18 0500 (!)  3.42    10/28/18 0500 (!)  3.28    10/27/18 0505 (!)  3.46    10/26/18 0500 (!)  3.57    10/25/18 0457 (!)  3.69        Dose from last 7 days     Date/Time Dose (mg)    10/31/18 0700  2    10/30/18 1400  1    10/29/18 1100  0    10/28/18 1400  2    10/27/18 1400  2    10/26/18 1400  1    10/25/18 1200  0    10/24/18 1200  2.5        Average Dose (mg): TBD (new start VKA)  Significant Interactions: Amiodarone, Aspirin, Statin, Other (Comments) (tramadol PRN)  Bridge Therapy: No   Reversal Agent Administered: Not Applicable  Comments: INR down to goal range. H/H low. PLT stable. No overt bleeding noted. Warfarin interactions noted and continue. Will give 2 mg today and trend INR with AM labs. May need dose adjustments.    Plan:  Warfarin 2 mg 10/31/18  Education Material Provided?: No (given prior)  Pharmacist suggested discharge dosing: warfarin 2 mg daily    Jaylen Koroma, PharmD

## 2018-10-31 NOTE — PROGRESS NOTES
Dr. Reyes updated on pt only urinating 200cc clear urine since domínguez had been taken out today at 0600.  Pt denies any bursning, denies any feeling of fullness, denies any urinary discomfort.  New orders received and followed through.  Will continue to monitor.

## 2018-10-31 NOTE — PROGRESS NOTES
Received bedside report from PM nurse. Assumed patient care. Chart reviewed. Pt was resting in bed. A&O x 4. Patient states 7/10 constant rib cage and back pain. Wife and daughter at bedside, asking to encourage the patient to ambulate and agree to the care. Per report, pt has been refusing ambulation and PT/OT. Assured that pt will be offered ambulation throughout the day and will be cleaned up.     POC updated with pt and on the patient communication board. Bed locked and in the lowest position. Call light within reach. Tele box on. Will continue to monitor.

## 2018-10-31 NOTE — PROGRESS NOTES
"Complete bed bath with some self care and staff assistance. Pt brushed teeth, up in chair. Linen changed. Pt requests to stay in chair at this time. Pt's abdomen distended, firm. Offered a suppository. Pt wants it after \" a little bit in the chair.\"  Communicated with PT/OT re: eval today. They will be up today.     "

## 2018-10-31 NOTE — PROGRESS NOTES
[facility] Hospitalist Progress Note    Date of Service: 10/30/2018    Chief Complaint  78 y.o. male admitted 10/16/2018 with with severe MR, severe TR, moderate AS, CHF  PAF, CKD who underwent TVR  MVR repair , and aortic valvuloplasty, left atrial appendage ligation on 10/16 by Dr. Deng.  extubated immediately post op but went to PEA arrest ~1 min, reintubated. He was transitioned 10/18 with transient use of  BIPAP. Chest tube removed 10/18.   Hospital course complicated by acute on chronic renal insufficiency.      Interval Problem Update  10/30:  Doing well post ICU day #1, sternal incision healing CD&I with scab formation.  C/o inability to urinate s/p Ramey catheter removal this morning. Ordered bladder scan, flomax.  Remains afib 81-98 on monitor.  Cr stable at 2.67, Hg 9 to 8.7, inr 2.98.  Given Kdur 20 meq x1 for low K.    Consultants/Specialty  nephrology    Disposition  SNF pending.          Review of Systems   Constitutional: Negative for chills, diaphoresis, fever and malaise/fatigue.   HENT: Negative for congestion and sore throat.    Eyes: Negative for pain and discharge.   Respiratory: Negative for cough, hemoptysis, sputum production, shortness of breath and wheezing.    Cardiovascular: Positive for chest pain (incisional sternal pain). Negative for palpitations, claudication and leg swelling.   Gastrointestinal: Negative for abdominal pain, constipation, diarrhea, melena, nausea and vomiting.   Genitourinary: Negative for dysuria, frequency and urgency.   Musculoskeletal: Negative for back pain, joint pain, myalgias and neck pain.   Skin: Negative for itching and rash.   Neurological: Negative for dizziness, sensory change, speech change, focal weakness, loss of consciousness, weakness and headaches.   Endo/Heme/Allergies: Does not bruise/bleed easily.   Psychiatric/Behavioral: Negative for depression, substance abuse and suicidal ideas.      Physical Exam  Laboratory/Imaging   Hemodynamics  Temp  (24hrs), Av.3 °C (97.3 °F), Min:35.9 °C (96.6 °F), Max:36.7 °C (98 °F)   Temperature: 36.7 °C (98 °F)  Pulse  Av.3  Min: 58  Max: 126 Heart Rate (Monitored): 91  Blood Pressure : 102/64, NIBP: 102/59     Respiratory      Respiration: 16, Pulse Oximetry: 94 %, O2 Daily Delivery Respiratory : Room Air with O2 Available     Work Of Breathing / Effort: Mild  RUL Breath Sounds: Clear, RML Breath Sounds: Diminished, RLL Breath Sounds: Diminished, HERNANDEZ Breath Sounds: Clear, LLL Breath Sounds: Diminished    Fluids    Intake/Output Summary (Last 24 hours) at 10/30/18 2130  Last data filed at 10/30/18 1700   Gross per 24 hour   Intake             1190 ml   Output              820 ml   Net              370 ml       Nutrition  Orders Placed This Encounter   Procedures   • Diet Order Consistent Carbohydrate, Cardiac     Standing Status:   Standing     Number of Occurrences:   1     Order Specific Question:   Diet:     Answer:   Consistent Carbohydrate [4]     Order Specific Question:   Diet:     Answer:   Cardiac [6]     Order Specific Question:   Texture/Fiber modifications:     Answer:   Dysphagia 3(Mechanical Soft)specify fluid consistency(question 6) [3]     Order Specific Question:   Consistency/Fluid modifications:     Answer:   Thin Liquids [3]     Comments:   monitor during meals     Physical Exam   Constitutional: He is oriented to person, place, and time. He appears well-developed and well-nourished. No distress.   HENT:   Head: Normocephalic and atraumatic.   Mouth/Throat: Oropharynx is clear and moist. No oropharyngeal exudate.   Eyes: Pupils are equal, round, and reactive to light. Conjunctivae and EOM are normal. Right eye exhibits no discharge. Left eye exhibits no discharge. No scleral icterus.   Neck: Normal range of motion. Neck supple. No JVD present. No tracheal deviation present. No thyromegaly present.   Cardiovascular: Normal rate, regular rhythm and normal heart sounds.  Exam reveals no gallop and  no friction rub.    No murmur heard.  Pulmonary/Chest: Effort normal and breath sounds normal. No respiratory distress. He has no wheezes. He has no rales. He exhibits no tenderness.   Sternal incision CD&I with scab formation noted.   Abdominal: Soft. Bowel sounds are normal. He exhibits no distension and no mass. There is no tenderness. There is no rebound and no guarding.   Musculoskeletal: Normal range of motion. He exhibits no edema or tenderness.   Lymphadenopathy:     He has no cervical adenopathy.   Neurological: He is alert and oriented to person, place, and time. No cranial nerve deficit. He exhibits normal muscle tone.   Skin: Skin is warm and dry. No rash noted. He is not diaphoretic. No erythema.   Psychiatric: He has a normal mood and affect. His behavior is normal. Judgment and thought content normal.   Nursing note and vitals reviewed.      Recent Labs      10/28/18   0500  10/29/18   0500  10/30/18   0435   WBC  6.9  6.8  6.1   RBC  2.84*  2.69*  2.65*   HEMOGLOBIN  9.0*  9.0*  8.7*   HEMATOCRIT  27.7*  26.1*  25.7*   MCV  97.5  97.0  97.0   MCH  31.7  33.5*  32.8   MCHC  32.5*  34.5  33.9   RDW  65.6*  64.4*  65.8*   PLATELETCT  188  196  178   MPV  10.6  10.8  10.4     Recent Labs      10/28/18   0500  10/29/18   0500  10/30/18   0435   SODIUM  132*  134*  133*   POTASSIUM  3.5*  3.5*  3.5*   CHLORIDE  97  97  95*   CO2  23  26  25   GLUCOSE  116*  118*  131*   BUN  115*  121*  118*   CREATININE  2.76*  2.77*  2.67*   CALCIUM  8.7  8.9  9.0     Recent Labs      10/28/18   0500  10/29/18   0500  10/30/18   0435   INR  3.28*  3.42*  2.98*                  Assessment/Plan     * Acute respiratory failure with hypoxia (HCC)   Assessment & Plan    Oxygen to keep sats > 90    Diuresis on hold        S/P TVR (tricuspid valve repair)   Assessment & Plan    snf when medically clear        S/P MVR (mitral valve repair)   Assessment & Plan    snf when medically clear        Paroxysmal atrial fibrillation  (HCC)- (present on admission)   Assessment & Plan    Rate controlled    Coumadin- keep inr 2-3    Po amiodarone        Acute urinary retention   Assessment & Plan    10/30 s/p domínguez removal, ordered bladder scan protocol  flomax po.        Acute on chronic renal insufficiency   Assessment & Plan    Follow bmp    Diuresis on hold    Iv albumin, as per renal    Case d/w renal MD dr juarez        Dyslipidemia- (present on admission)   Assessment & Plan    Cont statin          Quality-Core Measures

## 2018-10-31 NOTE — PROGRESS NOTES
2 RN skin check-    Midline chest incision and 2 CT sites CDI with scabbing.  BUE have bruising and scabbing.  BLE are red with +3 pitting edema, elevated on 2 pillows. Heels red and blanchable,  Sacrum red and blanchable.  Scrotal edema noted.

## 2018-10-31 NOTE — PROGRESS NOTES
Pt resting comfortably in bed. All needs adressed. No change from previous assessment. Will continue to monitor.

## 2018-11-01 PROBLEM — D50.9 IRON DEFICIENCY ANEMIA: Status: ACTIVE | Noted: 2018-01-01

## 2018-11-01 NOTE — ASSESSMENT & PLAN NOTE
us abdomen with moderate ascites. Likely 3rd spacing.  He is continuing on protein supplements.  Wonder if losing protein.  Order for urine protein wnl.  Per patient, alcohol use 2-4 drinks daily PTA, no h/o alcohol withdrawal, normal LFTs.  Continues to improve with HD  following

## 2018-11-01 NOTE — PROGRESS NOTES
Pt c/o lower back pain from laying in bed. Pt refuses to get out of bed or be repositioned. PRN pain meds given.

## 2018-11-01 NOTE — PROGRESS NOTES
[facility] Hospitalist Progress Note    Date of Service: 10/31/2018    Chief Complaint  78 y.o. male admitted 10/16/2018 with with severe MR, severe TR, moderate AS, CHF  PAF, CKD who underwent TVR  MVR repair , and aortic valvuloplasty, left atrial appendage ligation on 10/16 by Dr. Deng.  extubated immediately post op but went to PEA arrest ~1 min, reintubated. He was transitioned 10/18 with transient use of  BIPAP. Chest tube removed 10/18.   Hospital course complicated by acute on chronic renal insufficiency.      Interval Problem Update  10/30:  Doing well post ICU day #1, sternal incision healing CD&I with scab formation.  C/o inability to urinate s/p Ramey catheter removal this morning. Ordered bladder scan, flomax.  Remains afib 81-98 on monitor.  Cr stable at 2.67, Hg 9 to 8.7, inr 2.98.  Given Kdur 20 meq x1 for low K.  10/31:  Appears to be 3rd spacing in abdomen, ordered u/s abdomen since tight on exam.  Check for ascites.  May need to be drained.  Elevated  on lasix 120mg bid.  Otw, ambulating in hallway on RA.      Consultants/Specialty  nephrology    Disposition  SNF pending medical clearance.  Us abdomen, 24 hour urine collection.        Review of Systems   Constitutional: Negative for chills, diaphoresis, fever and malaise/fatigue.   HENT: Negative for congestion and sore throat.    Eyes: Negative for pain and discharge.   Respiratory: Negative for cough, hemoptysis, sputum production, shortness of breath and wheezing.    Cardiovascular: Positive for chest pain (incisional sternal pain). Negative for palpitations, claudication and leg swelling.   Gastrointestinal: Negative for abdominal pain, constipation, diarrhea, melena, nausea and vomiting.   Genitourinary: Negative for dysuria, frequency and urgency.   Musculoskeletal: Negative for back pain, joint pain, myalgias and neck pain.   Skin: Negative for itching and rash.   Neurological: Negative for dizziness, sensory change, speech change,  focal weakness, loss of consciousness, weakness and headaches.   Endo/Heme/Allergies: Does not bruise/bleed easily.   Psychiatric/Behavioral: Negative for depression, substance abuse and suicidal ideas.      Physical Exam  Laboratory/Imaging   Hemodynamics  Temp (24hrs), Av.3 °C (97.3 °F), Min:36.1 °C (97 °F), Max:36.6 °C (97.8 °F)   Temperature: 36.1 °C (97 °F)  Pulse  Av.2  Min: 58  Max: 126    Blood Pressure : 104/56     Respiratory      Respiration: 18, Pulse Oximetry: 95 %, O2 Daily Delivery Respiratory : Room Air with O2 Available     Work Of Breathing / Effort: Mild  RUL Breath Sounds: Clear, RML Breath Sounds: Diminished, RLL Breath Sounds: Diminished, HERNANDEZ Breath Sounds: Clear, LLL Breath Sounds: Diminished    Fluids    Intake/Output Summary (Last 24 hours) at 10/31/18 2010  Last data filed at 10/31/18 1433   Gross per 24 hour   Intake              600 ml   Output             1075 ml   Net             -475 ml       Nutrition  Orders Placed This Encounter   Procedures   • Diet Order Consistent Carbohydrate, Cardiac, Renal     Standing Status:   Standing     Number of Occurrences:   1     Order Specific Question:   Diet:     Answer:   Consistent Carbohydrate [4]     Order Specific Question:   Diet:     Answer:   Cardiac [6]     Order Specific Question:   Diet:     Answer:   Renal [8]     Order Specific Question:   Texture/Fiber modifications:     Answer:   Dysphagia 3(Mechanical Soft)specify fluid consistency(question 6) [3]     Order Specific Question:   Consistency/Fluid modifications:     Answer:   Thin Liquids [3]     Comments:   monitor during meals     Physical Exam   Constitutional: He is oriented to person, place, and time. He appears well-developed and well-nourished. No distress.   HENT:   Head: Normocephalic and atraumatic.   Mouth/Throat: Oropharynx is clear and moist. No oropharyngeal exudate.   Eyes: Pupils are equal, round, and reactive to light. Conjunctivae and EOM are normal. Right  eye exhibits no discharge. Left eye exhibits no discharge. No scleral icterus.   Neck: Normal range of motion. Neck supple. No JVD present. No tracheal deviation present. No thyromegaly present.   Cardiovascular: Normal rate, regular rhythm and normal heart sounds.  Exam reveals no gallop and no friction rub.    No murmur heard.  Pulmonary/Chest: Effort normal and breath sounds normal. No respiratory distress. He has no wheezes. He has no rales. He exhibits no tenderness.   Sternal incision CD&I with scab formation noted.   Abdominal: Soft. Bowel sounds are normal. He exhibits distension. He exhibits no mass. There is no tenderness. There is no rebound and no guarding.   Musculoskeletal: Normal range of motion. He exhibits no edema or tenderness.   Lymphadenopathy:     He has no cervical adenopathy.   Neurological: He is alert and oriented to person, place, and time. No cranial nerve deficit. He exhibits normal muscle tone.   Skin: Skin is warm and dry. No rash noted. He is not diaphoretic. No erythema.   Psychiatric: He has a normal mood and affect. His behavior is normal. Judgment and thought content normal.   Nursing note and vitals reviewed.      Recent Labs      10/29/18   0500  10/30/18   0435  10/31/18   0341   WBC  6.8  6.1  6.4   RBC  2.69*  2.65*  2.91*   HEMOGLOBIN  9.0*  8.7*  9.5*   HEMATOCRIT  26.1*  25.7*  28.2*   MCV  97.0  97.0  96.9   MCH  33.5*  32.8  32.6   MCHC  34.5  33.9  33.7   RDW  64.4*  65.8*  67.7*   PLATELETCT  196  178  187   MPV  10.8  10.4  10.4     Recent Labs      10/29/18   0500  10/30/18   0435  10/31/18   0341   SODIUM  134*  133*  134*   POTASSIUM  3.5*  3.5*  3.8   CHLORIDE  97  95*  94*   CO2  26  25  27   GLUCOSE  118*  131*  122*   BUN  121*  118*  119*   CREATININE  2.77*  2.67*  2.87*   CALCIUM  8.9  9.0  9.4     Recent Labs      10/29/18   0500  10/30/18   0435  10/31/18   0341   INR  3.42*  2.98*  2.66*                  Assessment/Plan     * Acute respiratory failure  with hypoxia (HCC)   Assessment & Plan    Oxygen to keep sats > 90          AIDA (acute kidney injury) ([facility])   Assessment & Plan    2/2 ATN from pressors post op hypotension.        S/P TVR (tricuspid valve repair)   Assessment & Plan    snf when medically clear        S/P MVR (mitral valve repair)   Assessment & Plan    snf when medically clear        ETOH abuse- (present on admission)   Assessment & Plan    History of   Likely will find ascites.        Paroxysmal atrial fibrillation (HCC)- (present on admission)   Assessment & Plan    Rate controlled    Coumadin- keep inr 2-3    Po amiodarone        Acute urinary retention   Assessment & Plan    10/30 s/p domígnuez removal, ordered bladder scan protocol  flomax po.  10/31 resolved, decreased urination noted by nursing.        Acute on chronic renal insufficiency   Assessment & Plan    Follow bmp    Iv albumin previously.  Now on protein supplements.  Case d/w renal MD dr juarez        Dyslipidemia- (present on admission)   Assessment & Plan    Cont statin        Abdominal distension   Assessment & Plan    Ordered us abdomen to look for ascites.  May need to be drained.  Likely source of 3rd spacing.  He is continuing on protein supplements.  Wonder if losing protein.  Order for urine protein.        Encephalopathy   Assessment & Plan    Resolved.        Acute blood loss anemia   Assessment & Plan    Check iron and B12 studies.        CKD (chronic kidney disease) stage 3, GFR 30-59 ml/min ([facility])- (present on admission)   Assessment & Plan    Baseline Cr 1.8.          Quality-Core Measures

## 2018-11-01 NOTE — CARE PLAN
Problem: Communication  Goal: The ability to communicate needs accurately and effectively will improve  Outcome: PROGRESSING AS EXPECTED      Problem: Safety  Goal: Will remain free from injury  Outcome: PROGRESSING AS EXPECTED      Problem: Skin Integrity  Goal: Risk for impaired skin integrity will decrease  Outcome: PROGRESSING AS EXPECTED

## 2018-11-01 NOTE — PROGRESS NOTES
Inpatient Anticoagulation Service Note    Date: 2018  Reason for Anticoagulation: Atrial Fibrillation, Bioprosthetic Valve Replacement   [id number] VASc Score: 3    Hemoglobin Value: (!) 8.5  Hematocrit Value: (!) 26.6  Lab Platelet Value: 197  Target INR: 2.0 to 3.0    INR from last 7 days     Date/Time INR Value    18 0302 (!)  2.62    10/31/18 0341 (!)  2.66    10/30/18 0435 (!)  2.98    10/29/18 0500 (!)  3.42    10/28/18 0500 (!)  3.28    10/27/18 0505 (!)  3.46    10/26/18 0500 (!)  3.57        Dose from last 7 days     Date/Time Dose (mg)    18 1328  2    10/31/18 0700  2    10/30/18 1400  1    10/29/18 1100  0    10/28/18 1400  2    10/27/18 1400  2    10/26/18 1400  1        Average Dose (mg): New start  Significant Interactions: Amiodarone, Aspirin, Statin  Bridge Therapy: No  Reversal Agent Administered: Not Applicable    Comments: INR is stable today. H/H remains low, but no bleeding noted. DDI continue.    Plan: Continue warfarin 2 mg daily with INR tomorrow AM  Education Material Provided?: No   Pharmacist suggested discharge dosin mg daily and INR within 72 hours of discharge     Brandi Medrano, ChetD, BCPS

## 2018-11-01 NOTE — PROGRESS NOTES
[facility] Nephrology Progress Note    Date of Service  11/1/2018     Author Manjeet Bruner M.D.    Chief Complaint  Follow up AIDA    Interval Problem Update  77 YO male with history notable for CHF, valvular disease and CKD presented for planned cardiothoracic surgery in the setting of valvular disease.   Baseline cr appears to be ~1.8 most recently without proteinuria or active urinary sediment. Underwent MVR, tricuspid valve repar, aortic valve reparr and TITI ligation on 10/16. S/P resp arrest post extubation on POD#1. Continued on pressors until POD#3. Attempted diuresis over past 72 hours with sport doses of 20-40mg lasix. UOP down trending  Form >1L on 10/19 to 300cc over last 24 hours.  Patient requires 40 mg Lasix and metolazone at home.  Patient is currently unable to answer questioning in the setting of critical illness, wife provides history.  She notes that yesterday he became more lethargic and has been slowly out of it this morning.  She does not think he has had significant chest pain or shortness of breath.  He has not been complaining about abdominal pain.  Intermittent low blood pressures over the last 48 hours.     DAILY NEPHROLOGY SUMMARY:  10/23: consult done  10/24: ~1L UOP after 1 dose of lasix, 2nd dose held for hypotension  10/25: Net -1.1 L.  Tired this morning working with physical therapy yesterday  10/26: no acute events. -700cc. Walking around. Ongoing chest wall pain.  10/27: No events, Cr improved but BUN slightly higher, good UOP, pt feels tired  10/28: No events, good UOP, Cr slowly trending down but BUN trending up, BP stable, appetite a little better this am, abd still distended and abd xray shows no obstruction, right leg edema worse  10/29: no acute events, walking more. Ongoing chest pain.  10/30: No acute events, still complaining of pain all over, not being very active  10/31: No acute events, trying to work more with physical therapy, still with generalized pain  11/1:  Ongoing chest wall pain but improving being more active.  Net -840 cc by ins and outs.  Abdominal ultrasound with mild ascites    Review of Systems  Review of Systems   Constitutional: Positive for malaise/fatigue. Negative for chills and fever.   HENT: Negative.    Eyes: Negative.    Respiratory: Negative for cough, hemoptysis, sputum production and shortness of breath.    Cardiovascular: Positive for chest pain and leg swelling. Negative for palpitations.   Gastrointestinal: Negative for abdominal pain, constipation, diarrhea and vomiting.   Genitourinary: Negative.    Musculoskeletal: Negative.    Skin: Negative.    Neurological: Positive for weakness. Negative for dizziness, focal weakness and loss of consciousness.   Endo/Heme/Allergies: Negative.    Psychiatric/Behavioral: Negative.         Physical Exam  Temp:  [35.8 °C (96.5 °F)-36.4 °C (97.6 °F)] 36.4 °C (97.6 °F)  Pulse:  [68-81] 75  Resp:  [18] 18  BP: ()/(55-82) 99/55    Physical Exam   Constitutional: He is oriented to person, place, and time. He appears well-developed. He does not have a sickly appearance. No distress.   HENT:   Head: Normocephalic and atraumatic.   Mouth/Throat: No oropharyngeal exudate.   Eyes: Pupils are equal, round, and reactive to light. Conjunctivae and EOM are normal. No scleral icterus.   Neck: Normal range of motion. Neck supple. No thyromegaly present.   Cardiovascular: Normal rate and regular rhythm.    No murmur heard.  Pulmonary/Chest: Effort normal and breath sounds normal. No respiratory distress. He has no wheezes.   Abdominal: Bowel sounds are normal. He exhibits distension. There is tenderness.   Musculoskeletal: Normal range of motion. He exhibits edema. He exhibits no tenderness or deformity.   Neurological: He is alert and oriented to person, place, and time. He exhibits normal muscle tone.   Skin: Skin is warm and dry. No erythema.   Psychiatric: He has a normal mood and affect. His behavior is normal.    Nursing note and vitals reviewed.      Fluids    Intake/Output Summary (Last 24 hours) at 11/01/18 1219  Last data filed at 11/01/18 0930   Gross per 24 hour   Intake              710 ml   Output             1000 ml   Net             -290 ml       Laboratory  Recent Labs      10/30/18   0435  10/31/18   0341  11/01/18   0302   WBC  6.1  6.4  6.2   RBC  2.65*  2.91*  2.70*   HEMOGLOBIN  8.7*  9.5*  8.5*   HEMATOCRIT  25.7*  28.2*  26.6*   MCV  97.0  96.9  98.5*   MCH  32.8  32.6  31.5   MCHC  33.9  33.7  32.0*   RDW  65.8*  67.7*  67.9*   PLATELETCT  178  187  197   MPV  10.4  10.4  10.5     Recent Labs      10/30/18   0435  10/31/18   0341  11/01/18   0302   SODIUM  133*  134*  132*   POTASSIUM  3.5*  3.8  3.6   CHLORIDE  95*  94*  94*   CO2  25  27  27   GLUCOSE  131*  122*  122*   BUN  118*  119*  122*   CREATININE  2.67*  2.87*  2.98*   CALCIUM  9.0  9.4  9.5     Recent Labs      10/30/18   0435  10/31/18   0341  11/01/18   0302   INR  2.98*  2.66*  2.62*               Imaging    Assessment/Plan  No new Assessment & Plan notes have been filed under this hospital service since the last note was generated.  Service: Nephrology     ASSESSMENT:  # AIDA: Baseline Cr~1.8, increased to 3.3, Likely 2/2 hemodynamic compromise/tubular injury.  Cr now trending down but BUN remains elevated (slightly improved after holding diuretics)  # CKD Stage 3; non-proteinuric. Has never been followed by nephrology.   # Acidosis: resolved  # Hypervolemia-  # Valvular heart disease s/p MVR, tricuspid valve repar, aortic valve reparr and TITI ligation  # Anemia: acute blood loss component  # PEA arrest  # Ascites  # Hyponatremia: Hypervolemic.      PLAN:  -Continuing oral diuretics today, Lasix 120 mg twice a day.  Not pushing diuresis given evidence of contraction alkalosis on labs.  Possible that lower extremity edema not representative of intravascular volume  -pending 24-hour urine studies for creatinine clearance, barring  significantly different value from EGFR okay to be discharged tomorrow if able to keep net negative on oral diuretics. Normally takes metolazone TIW at home.  -Renal diet  -Strict I/Os  -Dose adjust all meds for decreased GFR  -Transfuse PRN  -Avoid Fleet enemas for treatment of constipation, other enemas ok if needed  -daily weights     Thank you for this consult, we will continue to follow.    Manjeet Bruner MD

## 2018-11-02 NOTE — PROGRESS NOTES
Inpatient Anticoagulation Service Note    Date: 2018  Reason for Anticoagulation: Atrial Fibrillation, Bioprosthetic Valve Replacement   [id number] VASc Score: 3    Hemoglobin Value: (!) 8.6  Hematocrit Value: (!) 26.1  Lab Platelet Value: 204  Target INR: 2.0 to 3.0    INR from last 7 days     Date/Time INR Value    18 0300 (!)  2.38    18 0302 (!)  2.62    10/31/18 0341 (!)  2.66    10/30/18 0435 (!)  2.98    10/29/18 0500 (!)  3.42    10/28/18 0500 (!)  3.28    10/27/18 0505 (!)  3.46        Dose from last 7 days     Date/Time Dose (mg)    18 1625  2    18 1328  2    10/31/18 0700  2    10/30/18 1400  1    10/29/18 1100  0    10/28/18 1400  2    10/27/18 1400  2        Average Dose (mg): Nnew start  Significant Interactions: Amiodarone, Aspirin, Statin  Bridge Therapy: No   Reversal Agent Administered: Not Applicable    Comments: INR remains therapeutic. H/H is stable. No bleeding noted. He is having a temporary HD catheter placed today with plans for HD x3 days.    Plan: Continue warfarin 2 mg with INR tomorrow AM  Education Material Provided?: No (provided previously)  Pharmacist suggested discharge dosin mg daily and INR within 72 hours of discharge     Brandi Medrano, PharmD, BCPS

## 2018-11-02 NOTE — ASSESSMENT & PLAN NOTE
Intermittent hypotension  No associated sx  Continue to follow  continue midodrine for hypotension with HD.

## 2018-11-02 NOTE — DISCHARGE PLANNING
Agency/Facility Name: [facility]  Spoke To: Candace  Outcome: Left voice message for Candace regarding bed availability. Requested a call back.

## 2018-11-02 NOTE — PROGRESS NOTES
Bedside report received from night nurse. Assumed care of pt. Pt awake, laying in bed. A/Ox4, VSS. Wants to go on a walk this morning. Scheduled a time with pt to plan for walk. No complaints at this time. POC reviewed and white board updated. Tele box on. Call light in reach. Bed locked in lowest position with 2 upper bed rails up.

## 2018-11-02 NOTE — PROGRESS NOTES
[facility] Hospitalist Progress Note    Date of Service: 11/2/2018    Chief Complaint  78 y.o. male admitted 10/16/2018 with with severe MR, severe TR, moderate AS, CHF  PAF, CKD who underwent TVR  MVR repair , and aortic valvuloplasty, left atrial appendage ligation on 10/16 by Dr. Deng.  extubated immediately post op but went to PEA arrest ~1 min, reintubated. He was transitioned 10/18 with transient use of  BIPAP. Chest tube removed 10/18.   Hospital course complicated by acute on chronic renal insufficiency.      Interval Problem Update  10/30:  Doing well post ICU day #1, sternal incision healing CD&I with scab formation.  C/o inability to urinate s/p Ramey catheter removal this morning. Ordered bladder scan, flomax.  Remains afib 81-98 on monitor.  Cr stable at 2.67, Hg 9 to 8.7, inr 2.98.  Given Kdur 20 meq x1 for low K.  10/31:  Appears to be 3rd spacing in abdomen, ordered u/s abdomen since tight on exam.  Check for ascites.  May need to be drained.  Elevated  on lasix 120mg bid.  Otw, ambulating in hallway on RA.    11/1:  States more tired today after ambulating in halls last 2 days.  Edema improving in extremities, abdomen softer than yesterday on exam.  States drank alcohol 2-4 alcohol drinks daily PTA, has never had alcohol w/d in past or alcoholic symptoms, ascites, etc. LFTs normal, moderate ascites on u/s abdomen seen.  BP low 99/55 with lasix 120mg po bid.    11/2:  Poor GFR remains with volume overload noted on exam, nephrology has ordered for temporary HD catheter for HD x 3 to see if can help mobilize fluid.  Patient agreeable.  Rehab on hold.    Consultants/Specialty  nephrology    Disposition  SNF pending medical clearance.  HD x3 sessions prior to medical clearance for rehab.        Review of Systems   Constitutional: Negative for chills, diaphoresis, fever and malaise/fatigue.   HENT: Negative for congestion and sore throat.    Eyes: Negative for pain and discharge.   Respiratory:  Negative for cough, hemoptysis, sputum production, shortness of breath and wheezing.    Cardiovascular: Positive for chest pain (incisional sternal pain). Negative for palpitations, claudication and leg swelling.   Gastrointestinal: Negative for abdominal pain, constipation, diarrhea, melena, nausea and vomiting.   Genitourinary: Negative for dysuria, frequency and urgency.   Musculoskeletal: Negative for back pain, joint pain, myalgias and neck pain.   Skin: Negative for itching and rash.   Neurological: Negative for dizziness, sensory change, speech change, focal weakness, loss of consciousness, weakness and headaches.   Endo/Heme/Allergies: Does not bruise/bleed easily.   Psychiatric/Behavioral: Negative for depression, substance abuse and suicidal ideas.      Physical Exam  Laboratory/Imaging   Hemodynamics  Temp (24hrs), Av.3 °C (97.4 °F), Min:36.2 °C (97.1 °F), Max:36.6 °C (97.9 °F)   Temperature: 36.2 °C (97.1 °F)  Pulse  Av.9  Min: 3  Max: 126    Blood Pressure : 100/59     Respiratory      Respiration: 18, Pulse Oximetry: 93 %, O2 Daily Delivery Respiratory : Room Air with O2 Available     Work Of Breathing / Effort: Mild  RUL Breath Sounds: Clear, RML Breath Sounds: Diminished, RLL Breath Sounds: Diminished, HERNANDEZ Breath Sounds: Clear, LLL Breath Sounds: Diminished    Fluids    Intake/Output Summary (Last 24 hours) at 18 1418  Last data filed at 18 0800   Gross per 24 hour   Intake              380 ml   Output              750 ml   Net             -370 ml       Nutrition  Orders Placed This Encounter   Procedures   • Diet NPO     Standing Status:   Standing     Number of Occurrences:   1     Order Specific Question:   Restrict to:     Answer:   Sips with Medications [3]     Physical Exam   Constitutional: He is oriented to person, place, and time. He appears well-developed and well-nourished. No distress.   HENT:   Head: Normocephalic and atraumatic.   Mouth/Throat: Oropharynx is clear  and moist. No oropharyngeal exudate.   Eyes: Pupils are equal, round, and reactive to light. Conjunctivae and EOM are normal. Right eye exhibits no discharge. Left eye exhibits no discharge. No scleral icterus.   Neck: Normal range of motion. Neck supple. No JVD present. No tracheal deviation present. No thyromegaly present.   Cardiovascular: Normal rate, regular rhythm and normal heart sounds.  Exam reveals no gallop and no friction rub.    No murmur heard.  Pulmonary/Chest: Effort normal and breath sounds normal. No respiratory distress. He has no wheezes. He has no rales. He exhibits no tenderness.   Sternal incision CD&I with scab formation noted.   Abdominal: Soft. Bowel sounds are normal. He exhibits distension. He exhibits no mass. There is no tenderness. There is no rebound and no guarding.   Musculoskeletal: Normal range of motion. He exhibits no edema or tenderness.   Lymphadenopathy:     He has no cervical adenopathy.   Neurological: He is alert and oriented to person, place, and time. No cranial nerve deficit. He exhibits normal muscle tone.   Skin: Skin is warm and dry. No rash noted. He is not diaphoretic. No erythema.   Psychiatric: He has a normal mood and affect. His behavior is normal. Judgment and thought content normal.   Nursing note and vitals reviewed.      Recent Labs      10/31/18   0341  11/01/18   0302  11/02/18   0300   WBC  6.4  6.2  6.6   RBC  2.91*  2.70*  2.68*   HEMOGLOBIN  9.5*  8.5*  8.6*   HEMATOCRIT  28.2*  26.6*  26.1*   MCV  96.9  98.5*  97.4   MCH  32.6  31.5  32.1   MCHC  33.7  32.0*  33.0*   RDW  67.7*  67.9*  67.1*   PLATELETCT  187  197  204   MPV  10.4  10.5  10.2     Recent Labs      10/31/18   0341  11/01/18   0302  11/02/18   0300  11/02/18   0951   SODIUM  134*  132*  133*   --    POTASSIUM  3.8  3.6  3.6  3.5*   CHLORIDE  94*  94*  94*   --    CO2  27  27  24   --    GLUCOSE  122*  122*  118*   --    BUN  119*  122*  128*   --    CREATININE  2.87*  2.98*  3.02*   --     CALCIUM  9.4  9.5  9.1   --      Recent Labs      10/31/18   0341  11/01/18   0302  11/02/18   0300   INR  2.66*  2.62*  2.38*                  Assessment/Plan     * Acute respiratory failure with hypoxia (HCC)   Assessment & Plan    Oxygen to keep sats > 90          AIDA (acute kidney injury) (HCC)   Assessment & Plan    2/2 ATN from pressors post op hypotension.        S/P TVR (tricuspid valve repair)   Assessment & Plan    snf when medically clear        S/P MVR (mitral valve repair)   Assessment & Plan    snf when medically clear        ETOH abuse- (present on admission)   Assessment & Plan    Per chart, drank 2-4 alcoholic drinks per day per patient.  Normal LFTs, moderate ascites on us abdomen        Paroxysmal atrial fibrillation (HCC)- (present on admission)   Assessment & Plan    Rate controlled    Coumadin- keep inr 2-3    Po amiodarone        Essential hypertension, benign- (present on admission)   Assessment & Plan    Now hypotensive on lasix.        Acute urinary retention   Assessment & Plan    10/30 s/p domínguez removal, ordered bladder scan protocol  flomax po.  10/31 resolved, decreased urination noted by nursing.        Acute on chronic renal insufficiency   Assessment & Plan    Follow bmp    Iv albumin previously.  Now on protein supplements.  Case d/w renal MD dr juarez  11/2:  Plan for temporary HD catheter and HD x3 sessions to see if can mobilize fluid.        Dyslipidemia- (present on admission)   Assessment & Plan    Cont statin        Abdominal distension   Assessment & Plan    us abdomen with moderate ascites. Likely source of 3rd spacing.  He is continuing on protein supplements.  Wonder if losing protein.  Order for urine protein wnl.  Per patient, alcohol use 2-4 drinks daily PTA, no h/o alcohol withdrawal, normal LFTs.        Encephalopathy   Assessment & Plan    Resolved.        Iron deficiency anemia- (present on admission)   Assessment & Plan     iron low, started on iron and  ascorbic acid        CKD (chronic kidney disease) stage 3, GFR 30-59 ml/min ([facility])- (present on admission)   Assessment & Plan    Baseline Cr 1.8.          Quality-Core Measures

## 2018-11-02 NOTE — DISCHARGE PLANNING
Agency/Facility Name: [facility]  Spoke To: Candace  Outcome: No bed available for patient today.     TORRES Ortiz notified.

## 2018-11-02 NOTE — PROGRESS NOTES
Dialysis nurse called IR and IR said they have the pt tentatively scheduled for temporary dialysis catheter at 1730. Dialysis nurse also called Dr. Lau and he stated that dialysis maybe delayed until tomorrow if the temporary dialysis catheter is not placed until this evening. Pt updated. Pt appears frustrated with the delay and not being able to eat. RN tried to comfort patient and confirmed pt's frustration.

## 2018-11-02 NOTE — CARE PLAN
Problem: Knowledge Deficit  Goal: Knowledge of disease process/condition, treatment plan, diagnostic tests, and medications will improve    Intervention: Explain information regarding disease process/condition, treatment plan, diagnostic tests, and medications and document in education  Pt educated on plan of care, medications, pain management, and disease processes. Pt verbalized understanding and was encouraged to ask questions. All questions answered.         Problem: Mobility  Goal: Risk for activity intolerance will decrease    Intervention: Encourage patient to increase activity level in collaboration with Interdisciplinary Team  Pt encouraged to ambulate and increase activity and mobility. Pt agreed and set a goal to walk today in hallways.

## 2018-11-02 NOTE — PROGRESS NOTES
[facility] Hospitalist Progress Note    Date of Service: 11/1/2018    Chief Complaint  78 y.o. male admitted 10/16/2018 with with severe MR, severe TR, moderate AS, CHF  PAF, CKD who underwent TVR  MVR repair , and aortic valvuloplasty, left atrial appendage ligation on 10/16 by Dr. Deng.  extubated immediately post op but went to PEA arrest ~1 min, reintubated. He was transitioned 10/18 with transient use of  BIPAP. Chest tube removed 10/18.   Hospital course complicated by acute on chronic renal insufficiency.      Interval Problem Update  10/30:  Doing well post ICU day #1, sternal incision healing CD&I with scab formation.  C/o inability to urinate s/p Ramey catheter removal this morning. Ordered bladder scan, flomax.  Remains afib 81-98 on monitor.  Cr stable at 2.67, Hg 9 to 8.7, inr 2.98.  Given Kdur 20 meq x1 for low K.  10/31:  Appears to be 3rd spacing in abdomen, ordered u/s abdomen since tight on exam.  Check for ascites.  May need to be drained.  Elevated  on lasix 120mg bid.  Otw, ambulating in hallway on RA.    11/1:  States more tired today after ambulating in halls last 2 days.  Edema improving in extremities, abdomen softer than yesterday on exam.  States drank alcohol 2-4 alcohol drinks daily PTA, has never had alcohol w/d in past or alcoholic symptoms, ascites, etc. LFTs normal, moderate ascites on u/s abdomen seen.  BP low 99/55 with lasix 120mg po bid.      Consultants/Specialty  nephrology    Disposition  SNF pending medical clearance.  Us abdomen, 24 hour urine collection.        Review of Systems   Constitutional: Negative for chills, diaphoresis, fever and malaise/fatigue.   HENT: Negative for congestion and sore throat.    Eyes: Negative for pain and discharge.   Respiratory: Negative for cough, hemoptysis, sputum production, shortness of breath and wheezing.    Cardiovascular: Positive for chest pain (incisional sternal pain). Negative for palpitations, claudication and leg  swelling.   Gastrointestinal: Negative for abdominal pain, constipation, diarrhea, melena, nausea and vomiting.   Genitourinary: Negative for dysuria, frequency and urgency.   Musculoskeletal: Negative for back pain, joint pain, myalgias and neck pain.   Skin: Negative for itching and rash.   Neurological: Negative for dizziness, sensory change, speech change, focal weakness, loss of consciousness, weakness and headaches.   Endo/Heme/Allergies: Does not bruise/bleed easily.   Psychiatric/Behavioral: Negative for depression, substance abuse and suicidal ideas.      Physical Exam  Laboratory/Imaging   Hemodynamics  Temp (24hrs), Av.1 °C (97 °F), Min:35.8 °C (96.5 °F), Max:36.4 °C (97.6 °F)   Temperature: 36.2 °C (97.1 °F)  Pulse  Av.1  Min: 58  Max: 126    Blood Pressure : (!) 92/55 (RN notified)     Respiratory      Respiration: 18, Pulse Oximetry: 92 %, O2 Daily Delivery Respiratory : Room Air with O2 Available     Work Of Breathing / Effort: Mild  RUL Breath Sounds: Clear, RML Breath Sounds: Clear, RLL Breath Sounds: Diminished, HERNANDEZ Breath Sounds: Clear, LLL Breath Sounds: Diminished    Fluids    Intake/Output Summary (Last 24 hours) at 18 1840  Last data filed at 18 1541   Gross per 24 hour   Intake              450 ml   Output             1000 ml   Net             -550 ml       Nutrition  Orders Placed This Encounter   Procedures   • Diet Order Consistent Carbohydrate, Cardiac, Renal     Standing Status:   Standing     Number of Occurrences:   1     Order Specific Question:   Diet:     Answer:   Consistent Carbohydrate [4]     Order Specific Question:   Diet:     Answer:   Cardiac [6]     Order Specific Question:   Diet:     Answer:   Renal [8]     Order Specific Question:   Texture/Fiber modifications:     Answer:   Dysphagia 3(Mechanical Soft)specify fluid consistency(question 6) [3]     Order Specific Question:   Consistency/Fluid modifications:     Answer:   Thin Liquids [3]     Comments:    monitor during meals     Physical Exam   Constitutional: He is oriented to person, place, and time. He appears well-developed and well-nourished. No distress.   HENT:   Head: Normocephalic and atraumatic.   Mouth/Throat: Oropharynx is clear and moist. No oropharyngeal exudate.   Eyes: Pupils are equal, round, and reactive to light. Conjunctivae and EOM are normal. Right eye exhibits no discharge. Left eye exhibits no discharge. No scleral icterus.   Neck: Normal range of motion. Neck supple. No JVD present. No tracheal deviation present. No thyromegaly present.   Cardiovascular: Normal rate, regular rhythm and normal heart sounds.  Exam reveals no gallop and no friction rub.    No murmur heard.  Pulmonary/Chest: Effort normal and breath sounds normal. No respiratory distress. He has no wheezes. He has no rales. He exhibits no tenderness.   Sternal incision CD&I with scab formation noted.   Abdominal: Soft. Bowel sounds are normal. He exhibits distension. He exhibits no mass. There is no tenderness. There is no rebound and no guarding.   Musculoskeletal: Normal range of motion. He exhibits no edema or tenderness.   Lymphadenopathy:     He has no cervical adenopathy.   Neurological: He is alert and oriented to person, place, and time. No cranial nerve deficit. He exhibits normal muscle tone.   Skin: Skin is warm and dry. No rash noted. He is not diaphoretic. No erythema.   Psychiatric: He has a normal mood and affect. His behavior is normal. Judgment and thought content normal.   Nursing note and vitals reviewed.      Recent Labs      10/30/18   0435  10/31/18   0341  11/01/18   0302   WBC  6.1  6.4  6.2   RBC  2.65*  2.91*  2.70*   HEMOGLOBIN  8.7*  9.5*  8.5*   HEMATOCRIT  25.7*  28.2*  26.6*   MCV  97.0  96.9  98.5*   MCH  32.8  32.6  31.5   MCHC  33.9  33.7  32.0*   RDW  65.8*  67.7*  67.9*   PLATELETCT  178  187  197   MPV  10.4  10.4  10.5     Recent Labs      10/30/18   0435  10/31/18   0341  11/01/18   0302    SODIUM  133*  134*  132*   POTASSIUM  3.5*  3.8  3.6   CHLORIDE  95*  94*  94*   CO2  25  27  27   GLUCOSE  131*  122*  122*   BUN  118*  119*  122*   CREATININE  2.67*  2.87*  2.98*   CALCIUM  9.0  9.4  9.5     Recent Labs      10/30/18   0435  10/31/18   0341  11/01/18   0302   INR  2.98*  2.66*  2.62*                  Assessment/Plan     * Acute respiratory failure with hypoxia (HCC)   Assessment & Plan    Oxygen to keep sats > 90          AIDA (acute kidney injury) (HCC)   Assessment & Plan    2/2 ATN from pressors post op hypotension.        S/P TVR (tricuspid valve repair)   Assessment & Plan    snf when medically clear        S/P MVR (mitral valve repair)   Assessment & Plan    snf when medically clear        ETOH abuse- (present on admission)   Assessment & Plan    Per chart, drank 2-4 alcoholic drinks per day per patient.  Normal LFTs, moderate ascites on us abdomen        Paroxysmal atrial fibrillation (HCC)- (present on admission)   Assessment & Plan    Rate controlled    Coumadin- keep inr 2-3    Po amiodarone        Essential hypertension, benign- (present on admission)   Assessment & Plan    Now hypotensive on lasix.        Acute urinary retention   Assessment & Plan    10/30 s/p domínguez removal, ordered bladder scan protocol  flomax po.  10/31 resolved, decreased urination noted by nursing.        Acute on chronic renal insufficiency   Assessment & Plan    Follow bmp    Iv albumin previously.  Now on protein supplements.  Case d/w renal MD dr juarez        Dyslipidemia- (present on admission)   Assessment & Plan    Cont statin        Abdominal distension   Assessment & Plan    us abdomen with moderate ascites. Likely source of 3rd spacing.  He is continuing on protein supplements.  Wonder if losing protein.  Order for urine protein pending.  Per patient, alcohol use 2-4 drinks daily PTA, no h/o alcohol withdrawal, normal LFTs.        Encephalopathy   Assessment & Plan    Resolved.        Iron  deficiency anemia   Assessment & Plan     iron low, started on iron and ascorbic acid        CKD (chronic kidney disease) stage 3, GFR 30-59 ml/min ([facility])- (present on admission)   Assessment & Plan    Baseline Cr 1.8.          Quality-Core Measures

## 2018-11-02 NOTE — PROGRESS NOTES
[facility] Nephrology Progress Note    Date of Service  11/2/2018     Author Manjeet Bruner M.D.    Chief Complaint  Follow up AIDA    Interval Problem Update  79 YO male with history notable for CHF, valvular disease and CKD presented for planned cardiothoracic surgery in the setting of valvular disease.   Baseline cr appears to be ~1.8 most recently without proteinuria or active urinary sediment. Underwent MVR, tricuspid valve repar, aortic valve reparr and TITI ligation on 10/16. S/P resp arrest post extubation on POD#1. Continued on pressors until POD#3. Attempted diuresis over past 72 hours with sport doses of 20-40mg lasix. UOP down trending  Form >1L on 10/19 to 300cc over last 24 hours.  Patient requires 40 mg Lasix and metolazone at home.  Patient is currently unable to answer questioning in the setting of critical illness, wife provides history.  She notes that yesterday he became more lethargic and has been slowly out of it this morning.  She does not think he has had significant chest pain or shortness of breath.  He has not been complaining about abdominal pain.  Intermittent low blood pressures over the last 48 hours.     DAILY NEPHROLOGY SUMMARY:  10/23: consult done  10/24: ~1L UOP after 1 dose of lasix, 2nd dose held for hypotension  10/25: Net -1.1 L.  Tired this morning working with physical therapy yesterday  10/26: no acute events. -700cc. Walking around. Ongoing chest wall pain.  10/27: No events, Cr improved but BUN slightly higher, good UOP, pt feels tired  10/28: No events, good UOP, Cr slowly trending down but BUN trending up, BP stable, appetite a little better this am, abd still distended and abd xray shows no obstruction, right leg edema worse  10/29: no acute events, walking more. Ongoing chest pain.  10/30: No acute events, still complaining of pain all over, not being very active  10/31: No acute events, trying to work more with physical therapy, still with generalized pain  11/1:  Ongoing chest wall pain but improving being more active.  Net -840 cc by ins and outs.  Abdominal ultrasound with mild ascites  11/2: Trying to be more active with physical therapy, still weak, still with chest wall pain    Review of Systems  Review of Systems   Constitutional: Positive for malaise/fatigue. Negative for chills and fever.   HENT: Negative.    Eyes: Negative.    Respiratory: Negative for cough, hemoptysis, sputum production and shortness of breath.    Cardiovascular: Positive for chest pain and leg swelling. Negative for palpitations.   Gastrointestinal: Negative for abdominal pain, constipation, diarrhea and vomiting.   Genitourinary: Negative.    Musculoskeletal: Negative.    Skin: Negative.    Neurological: Positive for weakness. Negative for dizziness, focal weakness and loss of consciousness.   Endo/Heme/Allergies: Negative.    Psychiatric/Behavioral: Negative.         Physical Exam  Temp:  [36.1 °C (97 °F)-36.6 °C (97.9 °F)] 36.2 °C (97.1 °F)  Pulse:  [3-94] 89  Resp:  [16-20] 18  BP: ()/(51-59) 100/59    Physical Exam   Constitutional: He is oriented to person, place, and time. He appears well-developed. He does not have a sickly appearance. No distress.   HENT:   Head: Normocephalic and atraumatic.   Mouth/Throat: No oropharyngeal exudate.   Eyes: Pupils are equal, round, and reactive to light. Conjunctivae and EOM are normal. No scleral icterus.   Neck: Normal range of motion. Neck supple. No thyromegaly present.   Cardiovascular: Normal rate and regular rhythm.    No murmur heard.  Pulmonary/Chest: Effort normal and breath sounds normal. No respiratory distress. He has no wheezes.   Abdominal: Bowel sounds are normal. He exhibits no distension. There is no tenderness.   Musculoskeletal: Normal range of motion. He exhibits edema. He exhibits no tenderness or deformity.   Neurological: He is alert and oriented to person, place, and time. He exhibits normal muscle tone.   Skin: Skin is  warm and dry. No erythema.   Psychiatric: He has a normal mood and affect. His behavior is normal.   Nursing note and vitals reviewed.      Fluids    Intake/Output Summary (Last 24 hours) at 11/02/18 1155  Last data filed at 11/02/18 0800   Gross per 24 hour   Intake              380 ml   Output              750 ml   Net             -370 ml       Laboratory  Recent Labs      10/31/18   0341 11/01/18   0302  11/02/18   0300   WBC  6.4  6.2  6.6   RBC  2.91*  2.70*  2.68*   HEMOGLOBIN  9.5*  8.5*  8.6*   HEMATOCRIT  28.2*  26.6*  26.1*   MCV  96.9  98.5*  97.4   MCH  32.6  31.5  32.1   MCHC  33.7  32.0*  33.0*   RDW  67.7*  67.9*  67.1*   PLATELETCT  187  197  204   MPV  10.4  10.5  10.2     Recent Labs      10/31/18   0341 11/01/18   0302 11/02/18   0300  11/02/18   0951   SODIUM  134*  132*  133*   --    POTASSIUM  3.8  3.6  3.6  3.5*   CHLORIDE  94*  94*  94*   --    CO2  27  27  24   --    GLUCOSE  122*  122*  118*   --    BUN  119*  122*  128*   --    CREATININE  2.87*  2.98*  3.02*   --    CALCIUM  9.4  9.5  9.1   --      Recent Labs      10/31/18   0341  11/01/18   0302  11/02/18   0300   INR  2.66*  2.62*  2.38*               Imaging    Assessment/Plan  No new Assessment & Plan notes have been filed under this hospital service since the last note was generated.  Service: Nephrology     ASSESSMENT:  # AIDA: Baseline Cr~1.8, increased to 3.3, Likely 2/2 hemodynamic compromise/tubular injury.  Cr improved to around 2.7 but BUN remains significantly elevated.  24-hour urine studies demonstrate a creatinine clearance of 9 cc/min.    # CKD Stage 3; non-proteinuric. Has never been followed by nephrology.  Based on GFR with 24-hour clearance EGFR likely over estimates she renal function  # Acidosis: resolved  # Hypervolemia-  # Valvular heart disease s/p MVR, tricuspid valve repar, aortic valve reparr and TITI ligation  # Anemia: acute blood loss component  # PEA arrest  # Ascites  # Hyponatremia: Hypervolemic.       PLAN:  -Plan for placement of temporary catheter given current INR precluding tunneled line placement.  -Plan to initiate dialysis today, 3 sessions in a row   -Continuing oral diuretics today, Lasix 120 mg twice a day.   -Renal diet  -Strict I/Os  -Dose adjust all meds for decreased GFR  -Transfuse PRN  -Avoid Fleet enemas for treatment of constipation, other enemas ok if needed  -daily weights     Thank you for this consult, we will continue to follow.    Manjeet Bruner MD

## 2018-11-03 PROBLEM — I95.3 HEMODIALYSIS-ASSOCIATED HYPOTENSION: Status: ACTIVE | Noted: 2018-01-01

## 2018-11-03 NOTE — CARE PLAN
Problem: Skin Integrity  Goal: Risk for impaired skin integrity will decrease    Intervention: Implement precautions to protect skin integrity in collaboration with the interdisciplinary team  Q2 turns in place. 2 RN skin check performed. Changing linens as needed. Waffle mattress in use and inflated appropriately. Pt encouraged to increase activity and mobility. Pillows in use for support and repositioning.

## 2018-11-03 NOTE — OR SURGEON
Immediate Post- Operative Note        PostOp Diagnosis: RENAL FAILURE, S/P CABG, VALVE REPLACEMENT      Procedure(s): LEFT INTERNAL JUGULAR NON TUNNELED TEMPORARY 12.5F TRIALYSIS CATHETER PLACEMENT WITH U/S AND FLUOROSCOPIC GUIDANCE      Estimated Blood Loss: <5CC (FLUSHES)        Complications: NONE            11/2/2018     6:10 PM     Terrance Karimi

## 2018-11-03 NOTE — PROGRESS NOTES
PT ARRIVED VIA BED FROM DIALYSIS. PT AOX4, DOES HAVE PAIN GENERALIZED 5/10. WILL MEDICATE PER PRN OPRDERS. PT REMINDED TO CALL FOR ANY AND ALL NEEDS

## 2018-11-03 NOTE — PROGRESS NOTES
HD tx #2 today.  Pt dialyzed for 2.5 hrs today from 0834 to 1105 per MD order.  He tolerated tx well.  Net UF 1.0L.  CVC bloody dsg changed per protocol . New Dsg D & I upon leaving multi tx room.  CVC packed with heparin ,clamped and capped per protocol.  See paper dialysis flow sheet for details. Report called to SARA Vasquez RN.

## 2018-11-03 NOTE — PROGRESS NOTES
2 RN skin check completed with MYNOR Stephen. Skin is overall dry and flaky with scattered bruising. Bilateral elbows are red and blanching. Pillows placed under elbows. Sacrum is red and blanching. Q2 turns in place. Bilateral heels are red and blanching. Heels floated using pillow. Skin is otherwise intact.

## 2018-11-03 NOTE — PROGRESS NOTES
Called dialysis to ensure post-op vitals are in place per order. Dialysis nurse stated vitals are done q 15 mins while pt is in dialysis and can be found in the dialysis flowsheet.

## 2018-11-03 NOTE — PROGRESS NOTES
Upon morning assessment and pt leaving to dialysis, left IJ dialysis catheter dressing was saturated with small amount fresh and old blood. Dialysis RN said she would take a look at the dressing and line and replace the dressing as pt was en route to dialysis.

## 2018-11-03 NOTE — PROGRESS NOTES
[facility] Nephrology Progress Note    Date of Service  11/3/2018     Author Manjeet Bruner M.D.    Chief Complaint  Follow up AIDA    Interval Problem Update  77 YO male with history notable for CHF, valvular disease and CKD presented for planned cardiothoracic surgery in the setting of valvular disease.   Baseline cr appears to be ~1.8 most recently without proteinuria or active urinary sediment. Underwent MVR, tricuspid valve repar, aortic valve reparr and TITI ligation on 10/16. S/P resp arrest post extubation on POD#1. Continued on pressors until POD#3. Attempted diuresis over past 72 hours with sport doses of 20-40mg lasix. UOP down trending  Form >1L on 10/19 to 300cc over last 24 hours.  Patient requires 40 mg Lasix and metolazone at home.  Patient is currently unable to answer questioning in the setting of critical illness, wife provides history.  She notes that yesterday he became more lethargic and has been slowly out of it this morning.  She does not think he has had significant chest pain or shortness of breath.  He has not been complaining about abdominal pain.  Intermittent low blood pressures over the last 48 hours.     DAILY NEPHROLOGY SUMMARY:  10/23: consult done  10/24: ~1L UOP after 1 dose of lasix, 2nd dose held for hypotension  10/25: Net -1.1 L.  Tired this morning working with physical therapy yesterday  10/26: no acute events. -700cc. Walking around. Ongoing chest wall pain.  10/27: No events, Cr improved but BUN slightly higher, good UOP, pt feels tired  10/28: No events, good UOP, Cr slowly trending down but BUN trending up, BP stable, appetite a little better this am, abd still distended and abd xray shows no obstruction, right leg edema worse  10/29: no acute events, walking more. Ongoing chest pain.  10/30: No acute events, still complaining of pain all over, not being very active  10/31: No acute events, trying to work more with physical therapy, still with generalized pain  11/1:  Ongoing chest wall pain but improving being more active.  Net -840 cc by ins and outs.  Abdominal ultrasound with mild ascites  11/2: Trying to be more active with physical therapy, still weak, still with chest wall pain  11/3: s/p vasc cath placement. Received dialysis, unable to tolerate UF. Seen on dialysis today    Review of Systems  Review of Systems   Constitutional: Positive for malaise/fatigue. Negative for chills and fever.   HENT: Negative.    Eyes: Negative.    Respiratory: Negative for cough, hemoptysis, sputum production and shortness of breath.    Cardiovascular: Positive for chest pain and leg swelling. Negative for palpitations.   Gastrointestinal: Positive for abdominal pain. Negative for constipation, diarrhea and vomiting.   Genitourinary: Negative.    Musculoskeletal: Negative.    Skin: Negative.    Neurological: Positive for weakness. Negative for dizziness, focal weakness and loss of consciousness.   Endo/Heme/Allergies: Negative.    Psychiatric/Behavioral: Negative.         Physical Exam  Temp:  [36.2 °C (97.1 °F)-36.6 °C (97.8 °F)] 36.4 °C (97.6 °F)  Pulse:  [73-95] 73  Resp:  [17-20] 20  BP: ()/(56-65) 102/65    Physical Exam   Constitutional: He is oriented to person, place, and time. He does not have a sickly appearance. No distress.   Ill appearing   HENT:   Head: Normocephalic and atraumatic.   Mouth/Throat: No oropharyngeal exudate.   Eyes: Pupils are equal, round, and reactive to light. Conjunctivae and EOM are normal. No scleral icterus.   Neck: Normal range of motion. Neck supple. No tracheal deviation present. No thyromegaly present.   Cardiovascular: Normal rate and regular rhythm.    Murmur heard.  Pulmonary/Chest: Effort normal. No respiratory distress. He has no wheezes. He has rales.   Abdominal: Bowel sounds are normal. He exhibits no distension. There is no tenderness.   Musculoskeletal: Normal range of motion. He exhibits edema. He exhibits no tenderness or deformity.    Neurological: He is alert and oriented to person, place, and time. He exhibits normal muscle tone.   Skin: Skin is warm and dry. No erythema.   Psychiatric: He has a normal mood and affect. His behavior is normal.   Nursing note and vitals reviewed.      Fluids    Intake/Output Summary (Last 24 hours) at 11/03/18 0848  Last data filed at 11/03/18 0548   Gross per 24 hour   Intake              720 ml   Output              315 ml   Net              405 ml       Laboratory  Recent Labs      11/01/18   0302 11/02/18   0300 11/03/18   0258   WBC  6.2  6.6  5.9   RBC  2.70*  2.68*  2.62*   HEMOGLOBIN  8.5*  8.6*  8.5*   HEMATOCRIT  26.6*  26.1*  25.5*   MCV  98.5*  97.4  97.3   MCH  31.5  32.1  32.4   MCHC  32.0*  33.0*  33.3*   RDW  67.9*  67.1*  67.2*   PLATELETCT  197  204  200   MPV  10.5  10.2  10.6     Recent Labs      11/01/18   0302 11/02/18   0300 11/02/18   0951  11/03/18   0258   SODIUM  132*  133*   --   136   POTASSIUM  3.6  3.6  3.5*  3.6   CHLORIDE  94*  94*   --   96   CO2  27  24   --   27   GLUCOSE  122*  118*   --   100*   BUN  122*  128*   --   91*   CREATININE  2.98*  3.02*   --   2.37*   CALCIUM  9.5  9.1   --   9.1     Recent Labs      11/01/18   0302 11/02/18 0300 11/03/18   0258   INR  2.62*  2.38*  2.03*               Imaging    Assessment/Plan  No new Assessment & Plan notes have been filed under this hospital service since the last note was generated.  Service: Nephrology     ASSESSMENT:  # AIDA: Baseline Cr~1.8, increased to 3.3, Likely 2/2 hemodynamic compromise/tubular injury.  Cr improved to around 2.7 but BUN remains significantly elevated.  24-hour urine studies (possibly slight under collection) demonstrate a creatinine clearance of 9 cc/min.    # CKD Stage 3; non-proteinuric. Has never been followed by nephrology.  Based on GFR with 24-hour clearance EGFR likely over estimates she renal function  # Acidosis: resolved  # Hypervolemia-  # Valvular heart disease s/p MVR, tricuspid  valve repar, aortic valve reparr and TITI ligation  # Anemia: acute blood loss component  # PEA arrest  # Ascites  # Hyponatremia: Hypervolemic.      PLAN:  -iHD again today, plan for 3 days in a row as tolerated  -would repeat ECHO given persistent low Bps and intradialytic hypotension  -sending AM cortisol  -Continuing oral diuretics today, Lasix 120 mg twice a day.   -Renal diet  -Strict I/Os  -Dose adjust all meds for decreased GFR  -Transfuse PRN  -Avoid Fleet enemas for treatment of constipation, other enemas ok if needed  -daily weights     Thank you for this consult, we will continue to follow.    Manjeet Bruner MD

## 2018-11-03 NOTE — PROGRESS NOTES
IR Procedure RN's Note:    Patient consented by Dr. Karimi prior to start of procedure; consent signed and placed in chart.    Temporary dialysis catheter placement done by Dr. Karimi; LEFT IJ access site; pt assessed upon arrival, pt A/Ox4, pt aware of the procedure; BARD Power Trialysis slim catheter 12F x 20cm REF#[id number] LOT#[id number]; pt appears comfortable throughout the procedure with no signs of distress or discomfort; ETCO2 monitored with a baseline of 27mmHg and ranged between 18-28mmHg throughout the procedure ; access site CDI, soft with no signs of hematoma or bleeding, gauze and tegaderm for dressing; telephone report given to Sha; pt is lethargic and on 2L NC O2; pt transported to dialysis.

## 2018-11-03 NOTE — CARE PLAN
Problem: Knowledge Deficit  Goal: Knowledge of disease process/condition, treatment plan, diagnostic tests, and medications will improve    Intervention: Explain information regarding disease process/condition, treatment plan, diagnostic tests, and medications and document in education  Pt educated on plan of care, medications, pain management, and disease processes. Pt verbalized understanding and was encouraged to ask questions. All questions answered.

## 2018-11-03 NOTE — PROGRESS NOTES
Inpatient Anticoagulation Service Note    Date: 11/3/2018  Reason for Anticoagulation: Atrial Fibrillation, Bioprosthetic Valve Replacement   [id number] VASc Score: 3    Hemoglobin Value: (!) 8.5  Hematocrit Value: (!) 25.5  Lab Platelet Value: 200  Target INR: 2.0 to 3.0    INR from last 7 days     Date/Time INR Value    18 0258 (!)  2.03    18 0300 (!)  2.38    18 0302 (!)  2.62    10/31/18 0341 (!)  2.66    10/30/18 0435 (!)  2.98    10/29/18 0500 (!)  3.42    10/28/18 0500 (!)  3.28        Dose from last 7 days     Date/Time Dose (mg)    18 1512  2    18 1625  2    18 1328  2    10/31/18 0700  2    10/30/18 1400  1    10/29/18 1100  0    10/28/18 1400  2        Average Dose (mg): New start  Significant Interactions: Amiodarone, Aspirin, Statin  Bridge Therapy: No  Reversal Agent Administered: Not Applicable    Comments: INR remains therapeutic but did drop from yesterday. H/H is low but stable. Slight bleeding noted on the CVC dressing. No new DDI.    Plan:  Continue warfarin 2 mg today with INR tomorrow AM  Education Material Provided?: No  Pharmacist suggested discharge dosin mg daily and INR within 72 hours of discharge     Brandi Medrano, ChetD, BCPS

## 2018-11-03 NOTE — PROGRESS NOTES
Bedside report received from night nurse. Assumed care of pt. Pt awake, laying in bed. A/Ox4, VSS. No complaints at this time. POC reviewed and white board updated. Tele box on. Call light in reach. Bed locked in lowest position with 2 upper bed rails up. Bed alarm on.

## 2018-11-03 NOTE — PROGRESS NOTES
Hemodialysis done today for 2 hours started @ 1836 and ended  @ 2037 with +510 ml intake.  Unable to remove fluids due to low BP, Patient was asymptomatic, Dr Bruner notified with order to stop UF. VSS post HD, Report given to Primary RN Jessica. See flow sheet for detail.

## 2018-11-04 NOTE — PROGRESS NOTES
REC'D BEDSIDE REPORT FROM MATT RN*. PT AOX4*. PT DENIES PAIN, SHORTNESS OF BREATH. PLAN OF CARE AND FALL PRECAUTIONS REVIEWED WITH PT. PT DID VERBALIZE UNDERSTANDING, BED ALARM ENGAGED, CALL MIJARES LEFT IN REACH

## 2018-11-04 NOTE — PROGRESS NOTES
2 RN skin check completed.  Elbows red but blanching. Pillows in use to float elbows.  Sacrum red an blanching. Q2 turns in place, waffle mattress in place, Pillows in use for support.  Scrotum has small red scratch. Scrotum is edematous.  Heels are red and blanching. Pillows in use to float heels.

## 2018-11-04 NOTE — PROGRESS NOTES
Received bedside report from MYNOR Lopez. Assumed patient care. Chart reviewed. Pt left for dialysis at 6:44 per Jessica. POC updated on the patient communication board.Tele box is on. SR 88 on the monitor. Will continue to monitor.

## 2018-11-04 NOTE — PROGRESS NOTES
[facility] Hospitalist Progress Note    Date of Service: 11/3/2018    Chief Complaint  78 y.o. male admitted 10/16/2018 with with severe MR, severe TR, moderate AS, CHF  PAF, CKD who underwent TVR  MVR repair , and aortic valvuloplasty, left atrial appendage ligation on 10/16 by Dr. Deng.  extubated immediately post op but went to PEA arrest ~1 min, reintubated. He was transitioned 10/18 with transient use of  BIPAP. Chest tube removed 10/18.   Hospital course complicated by acute on chronic renal insufficiency.      Interval Problem Update  10/30:  Doing well post ICU day #1, sternal incision healing CD&I with scab formation.  C/o inability to urinate s/p Ramey catheter removal this morning. Ordered bladder scan, flomax.  Remains afib 81-98 on monitor.  Cr stable at 2.67, Hg 9 to 8.7, inr 2.98.  Given Kdur 20 meq x1 for low K.  10/31:  Appears to be 3rd spacing in abdomen, ordered u/s abdomen since tight on exam.  Check for ascites.  May need to be drained.  Elevated  on lasix 120mg bid.  Otw, ambulating in hallway on RA.    11/1:  States more tired today after ambulating in halls last 2 days.  Edema improving in extremities, abdomen softer than yesterday on exam.  States drank alcohol 2-4 alcohol drinks daily PTA, has never had alcohol w/d in past or alcoholic symptoms, ascites, etc. LFTs normal, moderate ascites on u/s abdomen seen.  BP low 99/55 with lasix 120mg po bid.    11/2:  Poor GFR remains with volume overload noted on exam, nephrology has ordered for temporary HD catheter for HD x 3 to see if can help mobilize fluid.  Patient agreeable.  Rehab on hold.  11/3:  C/o right shoulder pain, asking for heat pad, states pain meds do not do anything.  BP noted to be low after HD yesterday and again today.  Spoke with Dr. Bruner since UO remains 300 per 12 hour shift continue with lasix 120mg bid, added midodrine 5mg tid to help BP.  Echo ordered to check post op MVR, TVR and aortoplasty and rule out  pericardial effusion, check IVC for indication of intravascular volume status.  Cr improved 3.02 to 2.37, Hg stable.    Consultants/Specialty  nephrology    Disposition  SNF pending medical clearance.  HD x3 sessions prior to medical clearance for rehab.        Review of Systems   Constitutional: Negative for chills, diaphoresis, fever and malaise/fatigue.   HENT: Negative for congestion and sore throat.    Eyes: Negative for pain and discharge.   Respiratory: Negative for cough, hemoptysis, sputum production, shortness of breath and wheezing.    Cardiovascular: Positive for chest pain (incisional sternal pain). Negative for palpitations, claudication and leg swelling.   Gastrointestinal: Negative for abdominal pain, constipation, diarrhea, melena, nausea and vomiting.   Genitourinary: Negative for dysuria, frequency and urgency.   Musculoskeletal: Negative for back pain, joint pain, myalgias and neck pain.   Skin: Negative for itching and rash.   Neurological: Negative for dizziness, sensory change, speech change, focal weakness, loss of consciousness, weakness and headaches.   Endo/Heme/Allergies: Does not bruise/bleed easily.   Psychiatric/Behavioral: Negative for depression, substance abuse and suicidal ideas.      Physical Exam  Laboratory/Imaging   Hemodynamics  Temp (24hrs), Av.6 °C (97.8 °F), Min:36.4 °C (97.5 °F), Max:36.7 °C (98 °F)   Temperature: 36.6 °C (97.9 °F)  Pulse  Av.9  Min: 3  Max: 126 Heart Rate (Monitored): 95  Blood Pressure : (!) 87/57, NIBP: 100/59     Respiratory      Respiration: 18, Pulse Oximetry: 93 %     Work Of Breathing / Effort: Mild  RUL Breath Sounds: Clear, RML Breath Sounds: Diminished, RLL Breath Sounds: Diminished, HERNANDEZ Breath Sounds: Clear, LLL Breath Sounds: Diminished    Fluids    Intake/Output Summary (Last 24 hours) at 18 1805  Last data filed at 18 1105   Gross per 24 hour   Intake             1220 ml   Output             2015 ml   Net              -795 ml       Nutrition  Orders Placed This Encounter   Procedures   • Diet Order Consistent Carbohydrate     Standing Status:   Standing     Number of Occurrences:   1     Order Specific Question:   Diet:     Answer:   Consistent Carbohydrate [4]     Order Specific Question:   Texture/Fiber modifications:     Answer:   Dysphagia 3(Mechanical Soft)specify fluid consistency(question 6) [3]     Order Specific Question:   Consistency/Fluid modifications:     Answer:   Thin Liquids [3]     Physical Exam   Constitutional: He is oriented to person, place, and time. He appears well-developed and well-nourished. No distress.   HENT:   Head: Normocephalic and atraumatic.   Mouth/Throat: Oropharynx is clear and moist. No oropharyngeal exudate.   Eyes: Pupils are equal, round, and reactive to light. Conjunctivae and EOM are normal. Right eye exhibits no discharge. Left eye exhibits no discharge. No scleral icterus.   Neck: Normal range of motion. Neck supple. No JVD present. No tracheal deviation present. No thyromegaly present.   Cardiovascular: Normal rate, regular rhythm and normal heart sounds.  Exam reveals no gallop and no friction rub.    No murmur heard.  Pulmonary/Chest: Effort normal and breath sounds normal. No respiratory distress. He has no wheezes. He has no rales. He exhibits no tenderness.   Sternal incision CD&I with scab formation noted.   Abdominal: Soft. Bowel sounds are normal. He exhibits distension. He exhibits no mass. There is no tenderness. There is no rebound and no guarding.   Musculoskeletal: Normal range of motion. He exhibits no edema or tenderness.   Lymphadenopathy:     He has no cervical adenopathy.   Neurological: He is alert and oriented to person, place, and time. No cranial nerve deficit. He exhibits normal muscle tone.   Skin: Skin is warm and dry. No rash noted. He is not diaphoretic. No erythema.   Psychiatric: He has a normal mood and affect. His behavior is normal. Judgment and  thought content normal.   Nursing note and vitals reviewed.      Recent Labs      11/01/18   0302 11/02/18 0300 11/03/18   0258   WBC  6.2  6.6  5.9   RBC  2.70*  2.68*  2.62*   HEMOGLOBIN  8.5*  8.6*  8.5*   HEMATOCRIT  26.6*  26.1*  25.5*   MCV  98.5*  97.4  97.3   MCH  31.5  32.1  32.4   MCHC  32.0*  33.0*  33.3*   RDW  67.9*  67.1*  67.2*   PLATELETCT  197  204  200   MPV  10.5  10.2  10.6     Recent Labs      11/01/18   0302 11/02/18   0300 11/02/18   0951  11/03/18   0258   SODIUM  132*  133*   --   136   POTASSIUM  3.6  3.6  3.5*  3.6   CHLORIDE  94*  94*   --   96   CO2  27  24   --   27   GLUCOSE  122*  118*   --   100*   BUN  122*  128*   --   91*   CREATININE  2.98*  3.02*   --   2.37*   CALCIUM  9.5  9.1   --   9.1     Recent Labs      11/01/18   0302 11/02/18 0300 11/03/18   0258   INR  2.62*  2.38*  2.03*                  Assessment/Plan     * Acute respiratory failure with hypoxia (HCC)   Assessment & Plan    Oxygen to keep sats > 90          AIDA (acute kidney injury) (HCC)   Assessment & Plan    2/2 ATN from pressors post op hypotension.        S/P TVR (tricuspid valve repair)   Assessment & Plan    Repair 10/16  11/3 Repeat echo ordered.  Echo on 10/16 EF 55% no pericardial effusion seen.        S/P MVR (mitral valve repair)   Assessment & Plan    S/p MVR, TVR, aortoplasty 10/16  Repeat echo to rule out pericardial effusion, evaluate for failure   Prior EF 55% on 10/16.        ETOH abuse- (present on admission)   Assessment & Plan    Per chart, drank 2-4 alcoholic drinks per day per patient.  Normal LFTs, moderate ascites on us abdomen        Paroxysmal atrial fibrillation (HCC)- (present on admission)   Assessment & Plan    Rate controlled    Coumadin- keep inr 2-3    Po amiodarone        Essential hypertension, benign- (present on admission)   Assessment & Plan    Now hypotensive on lasix.        Hemodialysis-associated hypotension   Assessment & Plan    11/3:  S/p HD x2 sessions, drop  in BP 83/53, started midodrine 5mg po tid to help.  Ordered echocardiogram to look at IVC to get idea of intravascular volume.  Remains with low UO so continue current lasix 120mg bid per Dr. Bruner discussion.   per 12 hour shift remains.          Acute urinary retention   Assessment & Plan    10/30 s/p domínguez removal, ordered bladder scan protocol  flomax po.  10/31 resolved, decreased urination noted by nursing.        Acute on chronic renal insufficiency   Assessment & Plan    Follow bmp    Iv albumin previously.  Now on protein supplements.  Case d/w renal MD dr bruner  11/2:  Plan for temporary HD catheter and HD x3 sessions to see if can mobilize fluid.        Dyslipidemia- (present on admission)   Assessment & Plan    Cont statin        Abdominal distension   Assessment & Plan    us abdomen with moderate ascites. Likely 3rd spacing.  He is continuing on protein supplements.  Wonder if losing protein.  Order for urine protein wnl.  Per patient, alcohol use 2-4 drinks daily PTA, no h/o alcohol withdrawal, normal LFTs.        Encephalopathy   Assessment & Plan    Resolved.        Iron deficiency anemia- (present on admission)   Assessment & Plan     iron low, started on iron and ascorbic acid        CKD (chronic kidney disease) stage 3, GFR 30-59 ml/min ([facility])- (present on admission)   Assessment & Plan    Baseline Cr 1.8.          Quality-Core Measures

## 2018-11-04 NOTE — PROGRESS NOTES
HD treatment # 3 today for 3 H.BP soft the entire treatment (from 80's,90's-100's), patient asymptomatic.No UF removed as ordered.CVC locked with heparin post treatment.Report given to primary Rn.

## 2018-11-04 NOTE — PROGRESS NOTES
Inpatient Anticoagulation Service Note    Date: 2018  Reason for Anticoagulation: Atrial Fibrillation, Bioprosthetic Valve Replacement   [id number] VASc Score: 3    Hemoglobin Value: (!) 8.8  Hematocrit Value: (!) 26.7  Lab Platelet Value: 193  Target INR: 2.0 to 3.0    INR from last 7 days     Date/Time INR Value    18 0256 (!)  2.2    18 0258 (!)  2.03    18 0300 (!)  2.38    18 0302 (!)  2.62    10/31/18 0341 (!)  2.66    10/30/18 0435 (!)  2.98    10/29/18 0500 (!)  3.42        Dose from last 7 days     Date/Time Dose (mg)    18 0959  2    18 1512  2    18 1625  2    18 1328  2    10/31/18 0700  2    10/30/18 1400  1    10/29/18 1100  0    10/28/18 1400  2        Average Dose (mg): New start  Significant Interactions: Amiodarone, Aspirin, Statin  Bridge Therapy: No    Reversal Agent Administered: Not Applicable  Comments: INR is therapeutic today. H/H is stable with no further notes of bleeding. No new DDI. Continue dosing at 2 mg daily.    Plan: Warfarin 2 mg daily. INR has been stable so no need to check another one tomorrow  Education Material Provided?: No  Pharmacist suggested discharge dosin mg daily and INR within 1 wk of discharge     Brandi Medrano, PharmD, BCPS

## 2018-11-04 NOTE — PROGRESS NOTES
[facility] Nephrology Progress Note    Date of Service  11/4/2018     Author Manjeet Bruner M.D.    Chief Complaint  Follow up AIDA    Interval Problem Update  79 YO male with history notable for CHF, valvular disease and CKD presented for planned cardiothoracic surgery in the setting of valvular disease.   Baseline cr appears to be ~1.8 most recently without proteinuria or active urinary sediment. Underwent MVR, tricuspid valve repar, aortic valve reparr and TITI ligation on 10/16. S/P resp arrest post extubation on POD#1. Continued on pressors until POD#3. Attempted diuresis over past 72 hours with sport doses of 20-40mg lasix. UOP down trending  Form >1L on 10/19 to 300cc over last 24 hours.  Patient requires 40 mg Lasix and metolazone at home.  Patient is currently unable to answer questioning in the setting of critical illness, wife provides history.  She notes that yesterday he became more lethargic and has been slowly out of it this morning.  She does not think he has had significant chest pain or shortness of breath.  He has not been complaining about abdominal pain.  Intermittent low blood pressures over the last 48 hours.     DAILY NEPHROLOGY SUMMARY:  10/23: consult done  10/24: ~1L UOP after 1 dose of lasix, 2nd dose held for hypotension  10/25: Net -1.1 L.  Tired this morning working with physical therapy yesterday  10/26: no acute events. -700cc. Walking around. Ongoing chest wall pain.  10/27: No events, Cr improved but BUN slightly higher, good UOP, pt feels tired  10/28: No events, good UOP, Cr slowly trending down but BUN trending up, BP stable, appetite a little better this am, abd still distended and abd xray shows no obstruction, right leg edema worse  10/29: no acute events, walking more. Ongoing chest pain.  10/30: No acute events, still complaining of pain all over, not being very active  10/31: No acute events, trying to work more with physical therapy, still with generalized pain  11/1:  Ongoing chest wall pain but improving being more active.  Net -840 cc by ins and outs.  Abdominal ultrasound with mild ascites  11/2: Trying to be more active with physical therapy, still weak, still with chest wall pain  11/3: s/p vasc cath placement. Received dialysis, unable to tolerate UF. Seen on dialysis today  11/4: Received dialysis, felt unwell during the procedure,net -1 L.  Ongoing hypotension midorine started.  Echocardiogram delayed    Review of Systems  Review of Systems   Constitutional: Positive for malaise/fatigue. Negative for chills and fever.   HENT: Negative.    Eyes: Negative.    Respiratory: Negative for cough, hemoptysis, sputum production and shortness of breath.    Cardiovascular: Positive for chest pain and leg swelling. Negative for palpitations.   Gastrointestinal: Positive for abdominal pain. Negative for constipation, diarrhea and vomiting.   Genitourinary: Negative.    Musculoskeletal: Negative.    Skin: Negative.    Neurological: Positive for weakness. Negative for dizziness, focal weakness and loss of consciousness.   Endo/Heme/Allergies: Negative.    Psychiatric/Behavioral: Negative.         Physical Exam  Temp:  [36.5 °C (97.7 °F)-36.7 °C (98.1 °F)] 36.6 °C (97.8 °F)  Pulse:  [79-94] 79  Resp:  [16-18] 16  BP: ()/(50-71) 92/60    Physical Exam   Constitutional: He is oriented to person, place, and time. He does not have a sickly appearance. No distress.   Ill appearing   HENT:   Head: Normocephalic and atraumatic.   Mouth/Throat: No oropharyngeal exudate.   Eyes: Pupils are equal, round, and reactive to light. Conjunctivae and EOM are normal. No scleral icterus.   Neck: Normal range of motion. Neck supple. No tracheal deviation present. No thyromegaly present.   Cardiovascular: Normal rate and regular rhythm.    Murmur heard.  Pulmonary/Chest: Effort normal. No respiratory distress. He has no wheezes. He has rales.   Abdominal: Bowel sounds are normal. He exhibits no  distension. There is no tenderness.   Musculoskeletal: Normal range of motion. He exhibits edema. He exhibits no tenderness or deformity.   Neurological: He is alert and oriented to person, place, and time. He exhibits normal muscle tone.   Skin: Skin is warm and dry. No erythema.   Psychiatric: He has a normal mood and affect. His behavior is normal.   Nursing note and vitals reviewed.      Fluids    Intake/Output Summary (Last 24 hours) at 11/04/18 1233  Last data filed at 11/04/18 0600   Gross per 24 hour   Intake              120 ml   Output                0 ml   Net              120 ml       Laboratory  Recent Labs      11/02/18   0300  11/03/18   0258  11/04/18   0256   WBC  6.6  5.9  7.0   RBC  2.68*  2.62*  2.67*   HEMOGLOBIN  8.6*  8.5*  8.8*   HEMATOCRIT  26.1*  25.5*  26.7*   MCV  97.4  97.3  100.0*   MCH  32.1  32.4  33.0   MCHC  33.0*  33.3*  33.0*   RDW  67.1*  67.2*  72.0*   PLATELETCT  204  200  193   MPV  10.2  10.6  10.2     Recent Labs      11/02/18   0300  11/02/18   0951  11/03/18   0258  11/04/18   0256   SODIUM  133*   --   136  137   POTASSIUM  3.6  3.5*  3.6  3.9   CHLORIDE  94*   --   96  98   CO2  24   --   27  25   GLUCOSE  118*   --   100*  98   BUN  128*   --   91*  64*   CREATININE  3.02*   --   2.37*  2.25*   CALCIUM  9.1   --   9.1  9.2     Recent Labs      11/02/18   0300  11/03/18   0258  11/04/18   0256   INR  2.38*  2.03*  2.20*               Imaging    Assessment/Plan  No new Assessment & Plan notes have been filed under this hospital service since the last note was generated.  Service: Nephrology     ASSESSMENT:  # AIDA: Baseline Cr~1.8. Likely 2/2 hemodynamic compromise/tubular injury.  Cr improved to around 2.7 but BUN remained significantly elevated.  24-hour urine studies (possibly slight under collection) demonstrate a creatinine clearance of 9 cc/min.  RRT initiated 11/2 via a temporary Vas-Cath (INR precluded tunneled line placement)  # CKD Stage 3; non-proteinuric. Has  never been followed by nephrology.  Based on GFR with 24-hour clearance EGFR likely over estimates she renal function  # Acidosis: resolved  # Hypervolemia  # Valvular heart disease s/p MVR, tricuspid valve repar, aortic valve reparr and TITI ligation  # Anemia: acute blood loss component  # PEA arrest  # Ascites  # Hyponatremia: Resolved     PLAN:  -iHD again today (3rd cyrus row) no UF until echo results   -holding dialysis tomorrow  -AM cortisol pending   -Continuing oral diuretics today, Lasix 120 mg twice a day.   -Midodrine 3 times daily  -Renal diet  -Strict I/Os  -Dose adjust all meds for decreased GFR  -Transfuse PRN  -Avoid Fleet enemas for treatment of constipation, other enemas ok if needed  -daily weights     Thank you for this consult, we will continue to follow.    Manjeet Bruner MD

## 2018-11-05 PROBLEM — K64.9 BLEEDING HEMORRHOID: Status: ACTIVE | Noted: 2018-01-01

## 2018-11-05 PROBLEM — I48.20 CHRONIC ATRIAL FIBRILLATION (HCC): Status: ACTIVE | Noted: 2017-01-01

## 2018-11-05 NOTE — PROGRESS NOTES
Assumed care of patient. Patient resting in bed. Patient not complaining of any pain. Safety precautions in place at this time.

## 2018-11-05 NOTE — CARE PLAN
Problem: Knowledge Deficit  Goal: Knowledge of disease process/condition, treatment plan, diagnostic tests, and medications will improve    Intervention: Explain information regarding disease process/condition, treatment plan, diagnostic tests, and medications and document in education  Pt educated on plan of care, medications, pain management, and disease processes. Pt verbalized understanding and was encouraged to ask questions. All questions answered.         Problem: Skin Integrity  Goal: Risk for impaired skin integrity will decrease    Intervention: Implement precautions to protect skin integrity in collaboration with the interdisciplinary team  2 RN skin check completed. Q2 turns in place. Waffle mattress in use. Miguel Angel cream and barrier wipes in use. Changing linen as needed. Heels and elbows floated with pillows.

## 2018-11-05 NOTE — PROGRESS NOTES
[facility] Nephrology Progress Note    Date of Service  11/5/2018     Author Mare Flores M.D.    Chief Complaint  Follow up AIDA    Interval Problem Update  77 YO male with history notable for CHF, valvular disease and CKD presented for planned cardiothoracic surgery in the setting of valvular disease.   Baseline cr appears to be ~1.8 most recently without proteinuria or active urinary sediment. Underwent MVR, tricuspid valve repar, aortic valve reparr and TITI ligation on 10/16. S/P resp arrest post extubation on POD#1. Continued on pressors until POD#3. Attempted diuresis over past 72 hours with sport doses of 20-40mg lasix. UOP down trending  Form >1L on 10/19 to 300cc over last 24 hours.  Patient requires 40 mg Lasix and metolazone at home.  Patient is currently unable to answer questioning in the setting of critical illness, wife provides history.  She notes that yesterday he became more lethargic and has been slowly out of it this morning.  She does not think he has had significant chest pain or shortness of breath.  He has not been complaining about abdominal pain.  Intermittent low blood pressures over the last 48 hours.     DAILY NEPHROLOGY SUMMARY:  10/23: consult done  10/24: ~1L UOP after 1 dose of lasix, 2nd dose held for hypotension  10/25: Net -1.1 L.  Tired this morning working with physical therapy yesterday  10/26: no acute events. -700cc. Walking around. Ongoing chest wall pain.  10/27: No events, Cr improved but BUN slightly higher, good UOP, pt feels tired  10/28: No events, good UOP, Cr slowly trending down but BUN trending up, BP stable, appetite a little better this am, abd still distended and abd xray shows no obstruction, right leg edema worse  10/29: no acute events, walking more. Ongoing chest pain.  10/30: No acute events, still complaining of pain all over, not being very active  10/31: No acute events, trying to work more with physical therapy, still with generalized pain  11/01:  Ongoing chest wall pain but improving being more active.  Net -840 cc by ins and outs.  Abdominal ultrasound with mild ascites  11/02: Trying to be more active with physical therapy, still weak, still with chest wall pain  11/03: s/p vasc cath placement. Received dialysis, unable to tolerate UF. Seen on dialysis today  11/04: Received dialysis, felt unwell during the procedure,net -1 L.  Ongoing hypotension midorine started.  Echocardiogram delayed  11/05: SERG, wife at bedside, feels a bit better; PM lasix held this AM due to SBP < 90    Review of Systems  Review of Systems   Constitutional: Positive for malaise/fatigue. Negative for chills and fever.   HENT: Negative.    Eyes: Negative.    Respiratory: Negative for cough, hemoptysis, sputum production and shortness of breath.    Cardiovascular: Positive for chest pain and leg swelling. Negative for palpitations.   Gastrointestinal: Positive for abdominal pain. Negative for constipation, diarrhea and vomiting.   Genitourinary: Negative.    Musculoskeletal: Negative.    Skin: Negative.    Neurological: Positive for weakness. Negative for dizziness, focal weakness and loss of consciousness.   Endo/Heme/Allergies: Negative.    Psychiatric/Behavioral: Negative.         Physical Exam  Temp:  [36.1 °C (97 °F)-37.5 °C (99.5 °F)] 36.9 °C (98.5 °F)  Pulse:  [81-95] 95  Resp:  [16-20] 20  BP: ()/(56-68) 88/61    Physical Exam   Constitutional: He is oriented to person, place, and time. He does not have a sickly appearance. No distress.   Ill appearing   HENT:   Head: Normocephalic and atraumatic.   Mouth/Throat: No oropharyngeal exudate.   Eyes: Pupils are equal, round, and reactive to light. Conjunctivae and EOM are normal. No scleral icterus.   Neck: Normal range of motion. Neck supple. No tracheal deviation present. No thyromegaly present.   Cardiovascular: Normal rate and regular rhythm.    Murmur heard.  Pulmonary/Chest: Effort normal. No respiratory distress. He  has no wheezes. He has rales.   Abdominal: He exhibits no distension. There is no tenderness.   Musculoskeletal: Normal range of motion. He exhibits edema. He exhibits no tenderness.   Neurological: He is alert and oriented to person, place, and time.   Skin: Skin is warm and dry.   Psychiatric: He has a normal mood and affect. His behavior is normal.   Nursing note and vitals reviewed.      Fluids    Intake/Output Summary (Last 24 hours) at 11/05/18 1257  Last data filed at 11/05/18 1100   Gross per 24 hour   Intake              500 ml   Output              400 ml   Net              100 ml       Laboratory  Recent Labs      11/03/18 0258 11/04/18 0256  11/05/18   0240  11/05/18   1026   WBC  5.9  7.0  7.1   --    RBC  2.62*  2.67*  2.72*   --    HEMOGLOBIN  8.5*  8.8*  8.7*  8.9*   HEMATOCRIT  25.5*  26.7*  28.4*   --    MCV  97.3  100.0*  104.4*   --    MCH  32.4  33.0  32.0   --    MCHC  33.3*  33.0*  30.6*   --    RDW  67.2*  72.0*  74.8*   --    PLATELETCT  200  193  205   --    MPV  10.6  10.2  10.0   --      Recent Labs      11/03/18 0258 11/04/18   0256  11/05/18   0240   SODIUM  136  137  136   POTASSIUM  3.6  3.9  4.0   CHLORIDE  96  98  100   CO2  27  25  24   GLUCOSE  100*  98  103*   BUN  91*  64*  43*   CREATININE  2.37*  2.25*  1.89*   CALCIUM  9.1  9.2  9.3     Recent Labs      11/03/18 0258 11/04/18   0256   INR  2.03*  2.20*               Imaging    ASSESSMENT:  # AIDA    * Baseline Cr~1.8    * Likely 2/2 hemodynamic compromise/tubular injury    * Cr improved to around 2.7 but BUN remained significantly elevated    * 24-hour urine studies (possibly slight under collection) demonstrate a creatinine clearance of 9 cc/min    * RRT initiated 11/2 via a temporary Vas-Cath (INR precluded tunneled line placement)  # CKD Stage 3; non-proteinuric    * Has never been followed by nephrology    * Based on GFR with 24-hour clearance eGFR likely over estimates his renal fxn  # Acidosis    * Resolved  #  Hypervolemia  # Valvular heart disease s/p MVR, tricuspid valve repar, aortic valve reparr and TITI ligation  # Anemia: acute blood loss component  # PEA arrest  # Ascites  # Hyponatremia    * Resolved     PLAN:  -Hold iHD today  -Daily labs  -Lasix 120 mg po BID, with parameters to hold for SBP < 90  -More accurate I/O measurement today, will consider stopping diuretics if not producing much  -Continue midodrine  -Renal diet  -Strict I/Os  -Dose all meds per eGFR < 15  -Avoid Fleet enemas for treatment of constipation, other enemas ok if needed

## 2018-11-05 NOTE — CARE PLAN
Problem: Skin Integrity  Goal: Skin Integrity is maintained or improved  Outcome: PROGRESSING AS EXPECTED

## 2018-11-05 NOTE — CARE PLAN
Problem: Oxygenation/Respiratory Function  Goal: Patient will Achieve/Maintain Optimum Respiratory Rate/Effort  Outcome: PROGRESSING AS EXPECTED

## 2018-11-05 NOTE — PROGRESS NOTES
2 RN skin check:    Bilateral elbows red and blanching. Sacrum red and blanching. Scars on chest. Scratch on scrotum. Patient on waffle cushion and being turned q2h. Pillows in place for additional support.

## 2018-11-05 NOTE — PROGRESS NOTES
[facility] Hospitalist Progress Note    Date of Service: 11/4/2018    Chief Complaint  78 y.o. male admitted 10/16/2018 with with severe MR, severe TR, moderate AS, CHF  PAF, CKD who underwent TVR  MVR repair , and aortic valvuloplasty, left atrial appendage ligation on 10/16 by Dr. Deng.  extubated immediately post op but went to PEA arrest ~1 min, reintubated. He was transitioned 10/18 with transient use of  BIPAP. Chest tube removed 10/18.   Hospital course complicated by acute on chronic renal insufficiency.      Interval Problem Update  10/30:  Doing well post ICU day #1, sternal incision healing CD&I with scab formation.  C/o inability to urinate s/p Ramey catheter removal this morning. Ordered bladder scan, flomax.  Remains afib 81-98 on monitor.  Cr stable at 2.67, Hg 9 to 8.7, inr 2.98.  Given Kdur 20 meq x1 for low K.  10/31:  Appears to be 3rd spacing in abdomen, ordered u/s abdomen since tight on exam.  Check for ascites.  May need to be drained.  Elevated  on lasix 120mg bid.  Otw, ambulating in hallway on RA.    11/1:  States more tired today after ambulating in halls last 2 days.  Edema improving in extremities, abdomen softer than yesterday on exam.  States drank alcohol 2-4 alcohol drinks daily PTA, has never had alcohol w/d in past or alcoholic symptoms, ascites, etc. LFTs normal, moderate ascites on u/s abdomen seen.  BP low 99/55 with lasix 120mg po bid.    11/2:  Poor GFR remains with volume overload noted on exam, nephrology has ordered for temporary HD catheter for HD x 3 to see if can help mobilize fluid.  Patient agreeable.  Rehab on hold.  11/3:  C/o right shoulder pain, asking for heat pad, states pain meds do not do anything.  BP noted to be low after HD yesterday and again today.  Spoke with Dr. Bruner since UO remains 300 per 12 hour shift continue with lasix 120mg bid, added midodrine 5mg tid to help BP.  Echo ordered to check post op MVR, TVR and aortoplasty and rule out  pericardial effusion, check IVC for indication of intravascular volume status.  Cr improved 3.02 to 2.37, Hg stable.  :  Echo pending.  U/s right upper arm negative for dvt, resolving superficial thrombus.  Ordered lidocaine patch for low back pain.  Overall, Cr improving with 3rd HD and less edematous.  His blood remains low, mildly improved with midodrine, had 1500 off with HD yesterday.  UO remains 200 in 12 hours.  Remains in afib rate controlled 76-90.  Hg stable.  Gave family, daughters at bedside updates on progress.    Consultants/Specialty  nephrology    Disposition  SNF pending medical clearance.  HD x3 sessions prior to medical clearance for rehab.        Review of Systems   Constitutional: Negative for chills, diaphoresis, fever and malaise/fatigue.   HENT: Negative for congestion and sore throat.    Eyes: Negative for pain and discharge.   Respiratory: Negative for cough, hemoptysis, sputum production, shortness of breath and wheezing.    Cardiovascular: Positive for chest pain (incisional sternal pain). Negative for palpitations, claudication and leg swelling.   Gastrointestinal: Negative for abdominal pain, constipation, diarrhea, melena, nausea and vomiting.   Genitourinary: Negative for dysuria, frequency and urgency.   Musculoskeletal: Negative for back pain, joint pain, myalgias and neck pain.   Skin: Negative for itching and rash.   Neurological: Negative for dizziness, sensory change, speech change, focal weakness, loss of consciousness, weakness and headaches.   Endo/Heme/Allergies: Does not bruise/bleed easily.   Psychiatric/Behavioral: Negative for depression, substance abuse and suicidal ideas.      Physical Exam  Laboratory/Imaging   Hemodynamics  Temp (24hrs), Av.9 °C (98.4 °F), Min:36.5 °C (97.7 °F), Max:37.5 °C (99.5 °F)   Temperature: 37.5 °C (99.5 °F)  Pulse  Av.9  Min: 3  Max: 126    Blood Pressure : (!) 93/68     Respiratory      Respiration: 16, Pulse Oximetry: 95 %      Work Of Breathing / Effort: (P) Mild  RUL Breath Sounds: (P) Clear, RML Breath Sounds: (P) Diminished, RLL Breath Sounds: (P) Diminished, HERNANDEZ Breath Sounds: (P) Clear, LLL Breath Sounds: (P) Diminished    Fluids    Intake/Output Summary (Last 24 hours) at 11/04/18 1725  Last data filed at 11/04/18 1400   Gross per 24 hour   Intake              120 ml   Output              100 ml   Net               20 ml       Nutrition  Orders Placed This Encounter   Procedures   • Diet Order Consistent Carbohydrate     Standing Status:   Standing     Number of Occurrences:   1     Order Specific Question:   Diet:     Answer:   Consistent Carbohydrate [4]     Order Specific Question:   Texture/Fiber modifications:     Answer:   Dysphagia 3(Mechanical Soft)specify fluid consistency(question 6) [3]     Order Specific Question:   Consistency/Fluid modifications:     Answer:   Thin Liquids [3]     Physical Exam   Constitutional: He is oriented to person, place, and time. He appears well-developed and well-nourished. No distress.   HENT:   Head: Normocephalic and atraumatic.   Mouth/Throat: Oropharynx is clear and moist. No oropharyngeal exudate.   Left IJ temp HD catheter in place.   Eyes: Pupils are equal, round, and reactive to light. Conjunctivae and EOM are normal. Right eye exhibits no discharge. Left eye exhibits no discharge. No scleral icterus.   Neck: Normal range of motion. Neck supple. No JVD present. No tracheal deviation present. No thyromegaly present.   Cardiovascular: Normal rate, regular rhythm and normal heart sounds.  Exam reveals no gallop and no friction rub.    No murmur heard.  Pulmonary/Chest: Effort normal and breath sounds normal. No respiratory distress. He has no wheezes. He has no rales. He exhibits no tenderness.   Sternal incision CD&I with scab formation noted.   Abdominal: Soft. Bowel sounds are normal. He exhibits distension. He exhibits no mass. There is no tenderness. There is no rebound and no  guarding.   Musculoskeletal: Normal range of motion. He exhibits no edema or tenderness.   Lymphadenopathy:     He has no cervical adenopathy.   Neurological: He is alert and oriented to person, place, and time. No cranial nerve deficit. He exhibits normal muscle tone.   Skin: Skin is warm and dry. No rash noted. He is not diaphoretic. No erythema.   Psychiatric: He has a normal mood and affect. His behavior is normal. Judgment and thought content normal.   Nursing note and vitals reviewed.      Recent Labs      11/02/18   0300  11/03/18 0258 11/04/18 0256   WBC  6.6  5.9  7.0   RBC  2.68*  2.62*  2.67*   HEMOGLOBIN  8.6*  8.5*  8.8*   HEMATOCRIT  26.1*  25.5*  26.7*   MCV  97.4  97.3  100.0*   MCH  32.1  32.4  33.0   MCHC  33.0*  33.3*  33.0*   RDW  67.1*  67.2*  72.0*   PLATELETCT  204  200  193   MPV  10.2  10.6  10.2     Recent Labs      11/02/18   0300  11/02/18   0951  11/03/18 0258  11/04/18 0256   SODIUM  133*   --   136  137   POTASSIUM  3.6  3.5*  3.6  3.9   CHLORIDE  94*   --   96  98   CO2  24   --   27  25   GLUCOSE  118*   --   100*  98   BUN  128*   --   91*  64*   CREATININE  3.02*   --   2.37*  2.25*   CALCIUM  9.1   --   9.1  9.2     Recent Labs      11/02/18   0300  11/03/18 0258 11/04/18 0256   INR  2.38*  2.03*  2.20*                  Assessment/Plan     * Acute respiratory failure with hypoxia (HCC)   Assessment & Plan    Oxygen to keep sats > 90       AIDA (acute kidney injury) ([facility])   Assessment & Plan    2/2 ATN from pressors post op hypotension.     Thrombocytopenia (HCC)   Assessment & Plan    Resolved.     S/P TVR (tricuspid valve repair)   Assessment & Plan    Repair 10/16  11/3 Repeat echo ordered.  Echo on 10/16 EF 55% no pericardial effusion seen.     S/P MVR (mitral valve repair)   Assessment & Plan    S/p MVR, TVR, aortoplasty 10/16  Repeat echo to rule out pericardial effusion, evaluate for failure   Prior EF 55% on 10/16.     ETOH abuse- (present on admission)    Assessment & Plan    Per chart, drank 2-4 alcoholic drinks per day per patient.  Normal LFTs, moderate ascites on us abdomen     Paroxysmal atrial fibrillation (HCC)- (present on admission)   Assessment & Plan    Rate controlled    Coumadin- keep inr 2-3    Po amiodarone     Essential hypertension, benign- (present on admission)   Assessment & Plan    Now hypotensive on lasix.  11/3: midodrine 5mg tid added for hypotension with HD.     Hemodialysis-associated hypotension   Assessment & Plan    11/3:  S/p HD x2 sessions, drop in BP 83/53, started midodrine 5mg po tid to help.  Ordered echocardiogram to look at IVC to get idea of intravascular volume.  Remains with low UO so continue current lasix 120mg bid per Dr. Bruner discussion.   per 12 hour shift remains.  11/4:  BP mildly improved 90/60s.       Acute urinary retention   Assessment & Plan    10/30 s/p domínguez removal, ordered bladder scan protocol  Bladder scan normal.  10/31 resolved, decreased urination noted by nursing.     Acute on chronic renal insufficiency   Assessment & Plan    Follow bmp    Iv albumin previously.  Now on protein supplements.  Case d/w renal MD dr bruner  11/2:  Plan for temporary HD catheter and HD x3 sessions to see if can mobilize fluid.  11/4:  Improvement seen with HD x3 in Cr, but UO remains low 200 every 12 hours.     Dyslipidemia- (present on admission)   Assessment & Plan    Cont statin     Abdominal distension   Assessment & Plan    us abdomen with moderate ascites. Likely 3rd spacing.  He is continuing on protein supplements.  Wonder if losing protein.  Order for urine protein wnl.  Per patient, alcohol use 2-4 drinks daily PTA, no h/o alcohol withdrawal, normal LFTs.  11/4 improved after 3rd HD session.     Encephalopathy   Assessment & Plan    Resolved.     Iron deficiency anemia- (present on admission)   Assessment & Plan     iron low, started on iron and ascorbic acid     CKD (chronic kidney disease) stage 3, GFR  30-59 ml/min (HCC)- (present on admission)   Assessment & Plan    Baseline Cr 1.8.     Mixed hyperlipidemia- (present on admission)   Assessment & Plan    Continue lipitor       Quality-Core Measures

## 2018-11-06 NOTE — PROGRESS NOTES
Received bedside report. All questions answered. Pt laying in bed with eyes closed. Tele monitor on. Pt has no signs or symptoms of distress. Bed low and locked with siderails up x2. Call light within pt's reach.

## 2018-11-06 NOTE — CARE PLAN
Problem: Oxygenation/Respiratory Function  Goal: Patient will Achieve/Maintain Optimum Respiratory Rate/Effort  Outcome: PROGRESSING AS EXPECTED      Problem: Safety  Goal: Will remain free from injury  Pt safety precautions in place; bed in lowest lock position, 2 side rails up, non slip socks on, call light within reach- educated on when and how to use call light.

## 2018-11-06 NOTE — DISCHARGE PLANNING
Agency/Facility Name: [facility]  Outcome: Left message for Candace, awaiting call back for bed availability.    @1020  Agency/Facility Name: [facility]  Spoke To: Candace  Outcome: Bed available for patient today. Requesting updated PT + OT notes.   Notes faxed to [facility], will update [facility] on D/C plan after talking to MD during rounds.

## 2018-11-06 NOTE — PROGRESS NOTES
Inpatient Anticoagulation Service Note    Date: 11/6/2018  Reason for Anticoagulation: Atrial Fibrillation, Bioprosthetic Valve Replacement   [id number] VASc Score: 3    Hemoglobin Value: (!) 9  Hematocrit Value: (!) 28.4  Lab Platelet Value: 205  Target INR: 2.0 to 3.0    INR from last 7 days     Date/Time INR Value    11/04/18 0256 (!)  2.2    11/03/18 0258 (!)  2.03    11/02/18 0300 (!)  2.38    11/01/18 0302 (!)  2.62    10/31/18 0341 (!)  2.66    10/30/18 0435 (!)  2.98        Dose from last 7 days     Date/Time Dose (mg)    11/06/18 0100  2    11/04/18 0959  2    11/03/18 1512  2    11/02/18 1625  2    11/01/18 1328  2    10/31/18 0700  2    10/30/18 1400  1        Average Dose (mg): 14  Significant Interactions: Amiodarone, Statin  Bridge Therapy: No    Reversal Agent Administered: Not Applicable    Comments: Continuation of warfarin regimen which includes 2 mg daily; INR therapeutic as of 11/4@0300 at 2.2; H&H low but stable    Plan:  2 mg po x1, given 11/5@2000; AM coags  Education Material Provided?: No  Pharmacist suggested discharge dosing: Will likely continue same regimen     Nicho Kasper, PharmD

## 2018-11-06 NOTE — PROGRESS NOTES
[facility] Hospitalist Progress Note    Date of Service: 2018    Chief Complaint  78 y.o. male admitted 10/16/2018 with severe MR, TR and moderated AS who underwent a   TRV, MVR and Aortic valvuloplasty,and went into PEA following the procedure and then developed acute on chronic renal failure    Interval Problem Update  HD started by nephrology today  He reports chronic r shoulder pain and limited rom, stable > 3 months,   Otherwise ros negative    Consultants/Specialty  Nephrology    Disposition  To snf when cleared by nephrology        Review of Systems   Constitutional: Negative.  Negative for chills, diaphoresis, fever, malaise/fatigue and weight loss.   HENT: Negative.  Negative for sore throat.    Eyes: Negative.  Negative for blurred vision.   Respiratory: Negative.  Negative for cough and shortness of breath.    Cardiovascular: Negative.  Negative for chest pain, palpitations and leg swelling.   Gastrointestinal: Negative.  Negative for abdominal pain, nausea and vomiting.   Genitourinary: Negative.  Negative for dysuria.   Musculoskeletal: Positive for joint pain. Negative for myalgias.   Skin: Negative.  Negative for itching and rash.   Neurological: Negative.  Negative for dizziness, focal weakness, weakness and headaches.   Endo/Heme/Allergies: Negative.  Does not bruise/bleed easily.   Psychiatric/Behavioral: Negative.  Negative for depression, substance abuse and suicidal ideas.   All other systems reviewed and are negative.     Physical Exam  Laboratory/Imaging   Hemodynamics  Temp (24hrs), Av.5 °C (97.7 °F), Min:36.2 °C (97.2 °F), Max:36.9 °C (98.4 °F)   Temperature: 36.9 °C (98.4 °F)  Pulse  Av.9  Min: 3  Max: 126    Blood Pressure : (!) 97/59     Respiratory      Respiration: 18, Pulse Oximetry: 97 %     Work Of Breathing / Effort: Mild  RUL Breath Sounds: Clear, RML Breath Sounds: Diminished, RLL Breath Sounds: Diminished, HERNANDEZ Breath Sounds: Clear, LLL Breath Sounds:  Diminished    Fluids    Intake/Output Summary (Last 24 hours) at 11/06/18 1521  Last data filed at 11/05/18 2000   Gross per 24 hour   Intake              360 ml   Output              100 ml   Net              260 ml       Nutrition  Orders Placed This Encounter   Procedures   • Diet Order Consistent Carbohydrate     Standing Status:   Standing     Number of Occurrences:   1     Order Specific Question:   Diet:     Answer:   Consistent Carbohydrate [4]     Order Specific Question:   Texture/Fiber modifications:     Answer:   Dysphagia 3(Mechanical Soft)specify fluid consistency(question 6) [3]     Order Specific Question:   Consistency/Fluid modifications:     Answer:   Thin Liquids [3]     Physical Exam   Constitutional: He is oriented to person, place, and time. He appears well-developed and well-nourished. No distress.   HENT:   Head: Normocephalic and atraumatic.   Mouth/Throat: Oropharynx is clear and moist.   Eyes: Conjunctivae are normal. Right eye exhibits no discharge.   Neck:   Hd cath cdi   Cardiovascular: Intact distal pulses.  Exam reveals no gallop and no friction rub.    Murmur heard.  Irregular  surg site cdi   Pulmonary/Chest: Effort normal and breath sounds normal. No respiratory distress. He has no wheezes. He has no rales. He exhibits no tenderness.   Abdominal: Soft. Bowel sounds are normal. He exhibits no distension and no mass. There is no tenderness. There is no rebound and no guarding.   Musculoskeletal: Normal range of motion. He exhibits no edema, tenderness or deformity.   Neurological: He is alert and oriented to person, place, and time. He has normal reflexes. No cranial nerve deficit. He exhibits normal muscle tone. Coordination normal.   Skin: Skin is warm and dry. No rash noted. He is not diaphoretic. No erythema. No pallor.   Psychiatric: He has a normal mood and affect. His behavior is normal. Judgment and thought content normal.   Nursing note and vitals reviewed.      Recent Labs       11/04/18   0256  11/05/18   0240  11/05/18   1026  11/05/18   1554  11/06/18   0243   WBC  7.0  7.1   --    --   6.6   RBC  2.67*  2.72*   --    --   2.77*   HEMOGLOBIN  8.8*  8.7*  8.9*  9.0*  8.9*   HEMATOCRIT  26.7*  28.4*   --    --   27.8*   MCV  100.0*  104.4*   --    --   100.4*   MCH  33.0  32.0   --    --   32.1   MCHC  33.0*  30.6*   --    --   32.0*   RDW  72.0*  74.8*   --    --   70.8*   PLATELETCT  193  205   --    --   205   MPV  10.2  10.0   --    --   9.8     Recent Labs      11/04/18   0256  11/05/18   0240  11/06/18   0243   SODIUM  137  136  134*   POTASSIUM  3.9  4.0  4.1   CHLORIDE  98  100  97   CO2  25  24  28   GLUCOSE  98  103*  146*   BUN  64*  43*  48*   CREATININE  2.25*  1.89*  2.36*   CALCIUM  9.2  9.3  9.4     Recent Labs      11/04/18   0256  11/06/18   0243   INR  2.20*  2.94*                  Assessment/Plan     * Acute respiratory failure with hypoxia (HCC)   Assessment & Plan    Oxygen to keep sats > 90       AIDA (acute kidney injury) (HCC)   Assessment & Plan    2/2 ATN from pressors post op hypotension.     Thrombocytopenia (HCC)- (present on admission)   Assessment & Plan    Resolved.     S/P TVR (tricuspid valve repair)   Assessment & Plan    Repair 10/16  11/3 Repeat echo ordered.  Echo on 10/16 EF 55% no pericardial effusion seen.  11/5:  Repeat echo with severe TR, moderately dilated RV and right atrium.  Annuloplasty band of tricuspid valve per 10/16 OP note.     S/P MVR (mitral valve repair)   Assessment & Plan    S/p MVR, TVR, aortoplasty 10/16  Repeat echo to rule out pericardial effusion, evaluate for failure   Prior EF 55% on 10/16.     ETOH abuse- (present on admission)   Assessment & Plan    Per chart, drank 2-4 alcoholic drinks per day per patient.  Normal LFTs, moderate ascites on us abdomen     Chronic atrial fibrillation (HCC)- (present on admission)   Assessment & Plan    Rate controlled    Coumadin- keep inr 2-3    Po amiodarone     Essential  hypertension, benign- (present on admission)   Assessment & Plan    Now hypotensive on lasix.  11/3: midodrine 5mg tid added for hypotension with HD.     Bleeding hemorrhoid   Assessment & Plan    11/5 large bloody BM after straining this a.m.  Repeat BM later in day normal.  No change in Hg q 6 x3.  Continue coumadin, stopped asa 81mg daily.     Hemodialysis-associated hypotension   Assessment & Plan    11/3:  S/p HD x2 sessions, drop in BP 83/53, started midodrine 5mg po tid to help.  Ordered echocardiogram to look at IVC to get idea of intravascular volume.  Remains with low UO so continue current lasix 120mg bid per Dr. Bruner discussion.   per 12 hour shift remains.  11/4:  BP mildly improved 90/60s.       Acute urinary retention   Assessment & Plan    10/30 s/p domínguez removal, ordered bladder scan protocol  Bladder scan normal.  10/31 resolved, decreased urination noted by nursing.     Acute on chronic renal insufficiency   Assessment & Plan    Follow bmp    Iv albumin previously.  Now on protein supplements.  Case d/w renal MD dr bruner  11/2:  Plan for temporary HD catheter and HD x3 sessions to see if can mobilize fluid.  11/4:  Improvement seen with HD x3 in Cr, but UO remains low 200 every 12 hours.     Dyslipidemia- (present on admission)   Assessment & Plan    Cont statin     Abdominal distension   Assessment & Plan    us abdomen with moderate ascites. Likely 3rd spacing.  He is continuing on protein supplements.  Wonder if losing protein.  Order for urine protein wnl.  Per patient, alcohol use 2-4 drinks daily PTA, no h/o alcohol withdrawal, normal LFTs.  11/4 improved after 3rd HD session.     Encephalopathy   Assessment & Plan    Resolved.     Iron deficiency anemia- (present on admission)   Assessment & Plan     iron low, started on iron and ascorbic acid     CKD (chronic kidney disease) stage 3, GFR 30-59 ml/min ([facility])- (present on admission)   Assessment & Plan    Baseline Cr 1.8.     Mixed  hyperlipidemia- (present on admission)   Assessment & Plan    Continue lipitor       Quality-Core Measures

## 2018-11-06 NOTE — PROGRESS NOTES
[facility] Hospitalist Progress Note    Date of Service: 11/5/2018    Chief Complaint  78 y.o. male admitted 10/16/2018 with with severe MR, severe TR, moderate AS, CHF  PAF, CKD who underwent TVR  MVR repair , and aortic valvuloplasty, left atrial appendage ligation on 10/16 by Dr. Deng.  extubated immediately post op but went to PEA arrest ~1 min, reintubated. He was transitioned 10/18 with transient use of  BIPAP. Chest tube removed 10/18.   Hospital course complicated by acute on chronic renal insufficiency.      Interval Problem Update  10/30:  Doing well post ICU day #1, sternal incision healing CD&I with scab formation.  C/o inability to urinate s/p Ramey catheter removal this morning. Ordered bladder scan, flomax.  Remains afib 81-98 on monitor.  Cr stable at 2.67, Hg 9 to 8.7, inr 2.98.  Given Kdur 20 meq x1 for low K.  10/31:  Appears to be 3rd spacing in abdomen, ordered u/s abdomen since tight on exam.  Check for ascites.  May need to be drained.  Elevated  on lasix 120mg bid.  Otw, ambulating in hallway on RA.    11/1:  States more tired today after ambulating in halls last 2 days.  Edema improving in extremities, abdomen softer than yesterday on exam.  States drank alcohol 2-4 alcohol drinks daily PTA, has never had alcohol w/d in past or alcoholic symptoms, ascites, etc. LFTs normal, moderate ascites on u/s abdomen seen.  BP low 99/55 with lasix 120mg po bid.    11/2:  Poor GFR remains with volume overload noted on exam, nephrology has ordered for temporary HD catheter for HD x 3 to see if can help mobilize fluid.  Patient agreeable.  Rehab on hold.  11/3:  C/o right shoulder pain, asking for heat pad, states pain meds do not do anything.  BP noted to be low after HD yesterday and again today.  Spoke with Dr. Bruner since UO remains 300 per 12 hour shift continue with lasix 120mg bid, added midodrine 5mg tid to help BP.  Echo ordered to check post op MVR, TVR and aortoplasty and rule out  pericardial effusion, check IVC for indication of intravascular volume status.  Cr improved 3.02 to 2.37, Hg stable.  11/4:  Echo pending.  U/s right upper arm negative for dvt, resolving superficial thrombus.  Ordered lidocaine patch for low back pain.  Overall, Cr improving with 3rd HD and less edematous.  His blood remains low, mildly improved with midodrine, had 1500 off with HD yesterday.  UO remains 200 in 12 hours.  Remains in afib rate controlled 76-90.  Hg stable.  Gave family, daughters at bedside updates on progress.  11/5:  Had strain with BM today with BRB seen by bedside RN.  However, no change in Hg x2 subsequent testing.  Therefore, his coumadin will continue given his recent MVR, TVR and aortoplasty, afib.  I did stop his ASA 81mg po daily however.  Repeat BM nonbloody in evening.  His edema has improved.  Echo with persistent severe TR seen, moderately dilated right ventricle.  He continues on lasix 120 bid with midodrine for pressure support.  Overall, he is looking more energetic.  Sitting up in chair on exam.  Increase in UO charted 400cc day shift.    Consultants/Specialty  nephrology    Disposition  SNF pending medical clearance.  HD trial (3 with improvement of edema/fatigue) prior to medical clearance for rehab.        Review of Systems   Constitutional: Negative for chills, diaphoresis, fever and malaise/fatigue.   HENT: Negative for congestion and sore throat.    Eyes: Negative for pain and discharge.   Respiratory: Negative for cough, hemoptysis, sputum production, shortness of breath and wheezing.    Cardiovascular: Positive for chest pain (incisional sternal pain). Negative for palpitations, claudication and leg swelling.   Gastrointestinal: Negative for abdominal pain, constipation, diarrhea, melena, nausea and vomiting.   Genitourinary: Negative for dysuria, frequency and urgency.   Musculoskeletal: Negative for back pain, joint pain, myalgias and neck pain.   Skin: Negative for  itching and rash.   Neurological: Negative for dizziness, sensory change, speech change, focal weakness, loss of consciousness, weakness and headaches.   Endo/Heme/Allergies: Does not bruise/bleed easily.   Psychiatric/Behavioral: Negative for depression, substance abuse and suicidal ideas.      Physical Exam  Laboratory/Imaging   Hemodynamics  Temp (24hrs), Av.4 °C (97.6 °F), Min:36.1 °C (97 °F), Max:36.9 °C (98.5 °F)   Temperature: 36.9 °C (98.5 °F)  Pulse  Av.9  Min: 3  Max: 126    Blood Pressure : 106/62     Respiratory      Respiration: 20, Pulse Oximetry: 90 %     Work Of Breathing / Effort: Mild  RUL Breath Sounds: Clear, RML Breath Sounds: Diminished, RLL Breath Sounds: Crackles, HERNANDEZ Breath Sounds: Clear, LLL Breath Sounds: Crackles    Fluids    Intake/Output Summary (Last 24 hours) at 18 1832  Last data filed at 18 1800   Gross per 24 hour   Intake              740 ml   Output              400 ml   Net              340 ml       Nutrition  Orders Placed This Encounter   Procedures   • Diet Order Consistent Carbohydrate     Standing Status:   Standing     Number of Occurrences:   1     Order Specific Question:   Diet:     Answer:   Consistent Carbohydrate [4]     Order Specific Question:   Texture/Fiber modifications:     Answer:   Dysphagia 3(Mechanical Soft)specify fluid consistency(question 6) [3]     Order Specific Question:   Consistency/Fluid modifications:     Answer:   Thin Liquids [3]     Physical Exam   Constitutional: He is oriented to person, place, and time. He appears well-developed and well-nourished. No distress.   HENT:   Head: Normocephalic and atraumatic.   Mouth/Throat: Oropharynx is clear and moist. No oropharyngeal exudate.   Left IJ temp HD catheter in place.   Eyes: Pupils are equal, round, and reactive to light. Conjunctivae and EOM are normal. Right eye exhibits no discharge. Left eye exhibits no discharge. No scleral icterus.   Neck: Normal range of motion. Neck  supple. No JVD present. No tracheal deviation present. No thyromegaly present.   Cardiovascular: Normal rate, regular rhythm and normal heart sounds.  Exam reveals no gallop and no friction rub.    No murmur heard.  Pulmonary/Chest: Effort normal and breath sounds normal. No respiratory distress. He has no wheezes. He has no rales. He exhibits no tenderness.   Sternal incision CD&I with scab formation noted.   Abdominal: Soft. Bowel sounds are normal. He exhibits distension. He exhibits no mass. There is no tenderness. There is no rebound and no guarding.   Musculoskeletal: Normal range of motion. He exhibits no edema or tenderness.   Lymphadenopathy:     He has no cervical adenopathy.   Neurological: He is alert and oriented to person, place, and time. No cranial nerve deficit. He exhibits normal muscle tone.   Skin: Skin is warm and dry. No rash noted. He is not diaphoretic. No erythema.   Psychiatric: He has a normal mood and affect. His behavior is normal. Judgment and thought content normal.   Nursing note and vitals reviewed.      Recent Labs      11/03/18 0258 11/04/18 0256 11/05/18   0240  11/05/18   1026  11/05/18   1554   WBC  5.9  7.0  7.1   --    --    RBC  2.62*  2.67*  2.72*   --    --    HEMOGLOBIN  8.5*  8.8*  8.7*  8.9*  9.0*   HEMATOCRIT  25.5*  26.7*  28.4*   --    --    MCV  97.3  100.0*  104.4*   --    --    MCH  32.4  33.0  32.0   --    --    MCHC  33.3*  33.0*  30.6*   --    --    RDW  67.2*  72.0*  74.8*   --    --    PLATELETCT  200  193  205   --    --    MPV  10.6  10.2  10.0   --    --      Recent Labs      11/03/18 0258 11/04/18 0256  11/05/18   0240   SODIUM  136  137  136   POTASSIUM  3.6  3.9  4.0   CHLORIDE  96  98  100   CO2  27  25  24   GLUCOSE  100*  98  103*   BUN  91*  64*  43*   CREATININE  2.37*  2.25*  1.89*   CALCIUM  9.1  9.2  9.3     Recent Labs      11/03/18 0258 11/04/18   0256   INR  2.03*  2.20*                  Assessment/Plan     * Acute respiratory  failure with hypoxia (HCC)   Assessment & Plan    Oxygen to keep sats > 90       AIDA (acute kidney injury) (HCC)   Assessment & Plan    2/2 ATN from pressors post op hypotension.     Thrombocytopenia (HCC)- (present on admission)   Assessment & Plan    Resolved.     S/P TVR (tricuspid valve repair)   Assessment & Plan    Repair 10/16  11/3 Repeat echo ordered.  Echo on 10/16 EF 55% no pericardial effusion seen.  11/5:  Repeat echo with severe TR, moderately dilated RV and right atrium.  Annuloplasty band of tricuspid valve per 10/16 OP note.     S/P MVR (mitral valve repair)   Assessment & Plan    S/p MVR, TVR, aortoplasty 10/16  Repeat echo to rule out pericardial effusion, evaluate for failure   Prior EF 55% on 10/16.     ETOH abuse- (present on admission)   Assessment & Plan    Per chart, drank 2-4 alcoholic drinks per day per patient.  Normal LFTs, moderate ascites on us abdomen     Chronic atrial fibrillation (HCC)- (present on admission)   Assessment & Plan    Rate controlled    Coumadin- keep inr 2-3    Po amiodarone     Essential hypertension, benign- (present on admission)   Assessment & Plan    Now hypotensive on lasix.  11/3: midodrine 5mg tid added for hypotension with HD.     Bleeding hemorrhoid   Assessment & Plan    11/5 large bloody BM after straining this a.m.  Repeat BM later in day normal.  No change in Hg q 6 x3.  Continue coumadin, stopped asa 81mg daily.     Hemodialysis-associated hypotension   Assessment & Plan    11/3:  S/p HD x2 sessions, drop in BP 83/53, started midodrine 5mg po tid to help.  Ordered echocardiogram to look at IVC to get idea of intravascular volume.  Remains with low UO so continue current lasix 120mg bid per Dr. Bruner discussion.   per 12 hour shift remains.  11/4:  BP mildly improved 90/60s.       Acute urinary retention   Assessment & Plan    10/30 s/p domínguez removal, ordered bladder scan protocol  Bladder scan normal.  10/31 resolved, decreased urination noted  by nursing.     Acute on chronic renal insufficiency   Assessment & Plan    Follow bmp    Iv albumin previously.  Now on protein supplements.  Case d/w renal MD dr juarez  11/2:  Plan for temporary HD catheter and HD x3 sessions to see if can mobilize fluid.  11/4:  Improvement seen with HD x3 in Cr, but UO remains low 200 every 12 hours.     Dyslipidemia- (present on admission)   Assessment & Plan    Cont statin     Abdominal distension   Assessment & Plan    us abdomen with moderate ascites. Likely 3rd spacing.  He is continuing on protein supplements.  Wonder if losing protein.  Order for urine protein wnl.  Per patient, alcohol use 2-4 drinks daily PTA, no h/o alcohol withdrawal, normal LFTs.  11/4 improved after 3rd HD session.     Encephalopathy   Assessment & Plan    Resolved.     Iron deficiency anemia- (present on admission)   Assessment & Plan     iron low, started on iron and ascorbic acid     CKD (chronic kidney disease) stage 3, GFR 30-59 ml/min ([facility])- (present on admission)   Assessment & Plan    Baseline Cr 1.8.     Mixed hyperlipidemia- (present on admission)   Assessment & Plan    Continue lipitor       Quality-Core Measures

## 2018-11-06 NOTE — PROGRESS NOTES
Bedside report received from day RN; assumed pt care. Pt assessment complete. Pt A&Ox4. Reviewed plan of care with pt. Tele box on and rhythm verified. Chart and labs reviewed. Bed in lowest position, and 2 side rails up. Pt educated on call light; call light with in reach.

## 2018-11-06 NOTE — PROGRESS NOTES
Inpatient Anticoagulation Service Note    Date: 2018  Reason for Anticoagulation: Atrial Fibrillation, Bioprosthetic Valve Replacement   [id number] VASc Score: 3    Hemoglobin Value: (!) 8.9  Hematocrit Value: (!) 27.8  Lab Platelet Value: 205  Target INR: 2.0 to 3.0    INR from last 7 days     Date/Time INR Value    18 0243 (!)  2.94    18 0256 (!)  2.2    18 0258 (!)  2.03    18 0300 (!)  2.38    18 0302 (!)  2.62    10/31/18 0341 (!)  2.66        Dose from last 7 days     Date/Time Dose (mg)    18 1415  2    18 0100  2    18 0959  2    18 1512  2    18 1625  2    18 1328  2    10/31/18 0700  2        Average Dose (mg): New start - stable on 2 mg daily  Significant Interactions: Amiodarone, Statin  Bridge Therapy: No   Reversal Agent Administered: Not Applicable    Comments: H/H is still low but has been stable. One bloody BM was reported yesterday, but no other bleeding reported. Will continue warfarin 2 mg daily.    Plan: Continue warfarin 2 mg daily with INR tomorrow given that it it close to supratherapeutic range today  Education Material Provided?: Yes (provided previously)  Pharmacist suggested discharge dosin mg daily and INR within 1 wk of discharge     Brandi Medrano, PharmD, BCPS

## 2018-11-06 NOTE — THERAPY
"Speech Language Therapy dysphagia treatment completed.   Functional Status:  Pt with use of oral suction before and at the initial part of tx session for small amounts of thin and clear oral secretions. New oral suction tubing and oral piece provided. Following pt being moved up in bed and fed slowly with small bites and sips of thins, no further coughing during session. Pt initially feeding self tablespoon amounts of corn flakes and taking further amounts while masticating the initial tbspoon of the cereal. Pt educ regarding one spoonful at a time and educ provided regarding risks for airway penetration and or aspiration with pt's wife present. Pt repeatedly stating \"next\" to indicate his request for a bolus while continuing to masticate and swallow the current bolus. Pt taking sips of thin using a straw with no delay in swallow and no coughing post swallow. D2 thins recommended related to pt's impulsiveness with self feeding with pt and his wife requesting D3 texture. They are concerned regarding pt's hist of poor PO intake and that he will dislike foods that are on the easier D2 texture. Pt, his wife, and nurs re-educ regarding 1-1 feeding with slow rate and small bites and sips recommended. Swallow strategies reposted.   Recommendations: D3 thins with one to one  Plan of Care: Will benefit from Speech Therapy 3 times per week  Post-Acute Therapy: dysphagia. ThanksTrace    See \"Rehab Therapy-Acute\" Patient Summary Report for complete documentation.     "

## 2018-11-06 NOTE — ASSESSMENT & PLAN NOTE
Bloody BM on 11/5 after straining, no recurrence   H/h increasing  asa stopped  Continue to follow

## 2018-11-07 NOTE — CARE PLAN
Problem: Safety  Goal: Will remain free from falls  Outcome: PROGRESSING AS EXPECTED  Pt educated to utilize call light when needing assistance to decrease chances of falling. Pt has not fallen this shift. Will continue to monitor.    Problem: Knowledge Deficit  Goal: Knowledge of disease process/condition, treatment plan, diagnostic tests, and medications will improve  Outcome: PROGRESSING AS EXPECTED  Pt educated on care plan, medications, treatment plan, diagnostic tests, and medications prescribed for the day. Pt verbalizes teaching and understanding of education. Will continue to monitor throughout the shift.

## 2018-11-07 NOTE — PROGRESS NOTES
[facility] Progress Note    Hemodialysis treatment ordered today per Dr. Flores  x 3.5 hours. Treatment initiated at 1343, ended at 1714.     Patient had episodes of hypotension during tx lowest being 78/46. Dr. Flores notified and received an order for Albumin. UF adjusted and BP improved, no need for albumin administration; see paper flow sheet for details.     Net UF 1300 mL.     Post tx, CVC flushed with saline then locked with heparin 1000 units/mL per designated amount in each wing then clamped and capped. Aspirate heparin prior to next CVC use.    Report given to Primary RN.

## 2018-11-07 NOTE — PROGRESS NOTES
[facility] Nephrology Progress Note    Date of Service  11/7/2018     Author Mare Flores M.D.    Chief Complaint  Follow up AIDA    Interval Problem Update  79 YO male with history notable for CHF, valvular disease and CKD presented for planned cardiothoracic surgery in the setting of valvular disease.   Baseline cr appears to be ~1.8 most recently without proteinuria or active urinary sediment. Underwent MVR, tricuspid valve repar, aortic valve reparr and TITI ligation on 10/16. S/P resp arrest post extubation on POD#1. Continued on pressors until POD#3. Attempted diuresis over past 72 hours with sport doses of 20-40mg lasix. UOP down trending  Form >1L on 10/19 to 300cc over last 24 hours.  Patient requires 40 mg Lasix and metolazone at home.  Patient is currently unable to answer questioning in the setting of critical illness, wife provides history.  She notes that yesterday he became more lethargic and has been slowly out of it this morning.  She does not think he has had significant chest pain or shortness of breath.  He has not been complaining about abdominal pain.  Intermittent low blood pressures over the last 48 hours.     DAILY NEPHROLOGY SUMMARY:  10/23: consult done  10/24: ~1L UOP after 1 dose of lasix, 2nd dose held for hypotension  10/25: Net -1.1 L.  Tired this morning working with physical therapy yesterday  10/26: no acute events. -700cc. Walking around. Ongoing chest wall pain.  10/27: No events, Cr improved but BUN slightly higher, good UOP, pt feels tired  10/28: No events, good UOP, Cr slowly trending down but BUN trending up, BP stable, appetite a little better this am, abd still distended and abd xray shows no obstruction, right leg edema worse  10/29: no acute events, walking more. Ongoing chest pain.  10/30: No acute events, still complaining of pain all over, not being very active  10/31: No acute events, trying to work more with physical therapy, still with generalized pain  11/01:  Ongoing chest wall pain but improving being more active.  Net -840 cc by ins and outs.  Abdominal ultrasound with mild ascites  11/02: Trying to be more active with physical therapy, still weak, still with chest wall pain  11/03: s/p vasc cath placement. Received dialysis, unable to tolerate UF. Seen on dialysis today  11/04: Received dialysis, felt unwell during the procedure,net -1 L.  Ongoing hypotension midorine started.  Echocardiogram delayed  11/05: SERG, wife at bedside, feels a bit better; PM lasix held this AM due to SBP < 90  11/06: NAEO, labs worsened off dialysis  11/07: NAEO, no complaints, doing well, 1.8L UF with iHD yesterday, edema improved    Review of Systems  Review of Systems   Constitutional: Positive for malaise/fatigue. Negative for chills and fever.   HENT: Negative.    Eyes: Negative.    Respiratory: Negative for cough, hemoptysis, sputum production and shortness of breath.    Cardiovascular: Positive for chest pain and leg swelling. Negative for palpitations.   Gastrointestinal: Positive for abdominal pain. Negative for constipation, diarrhea and vomiting.   Genitourinary: Negative.    Musculoskeletal: Negative.    Skin: Negative.    Neurological: Positive for weakness. Negative for dizziness, focal weakness and loss of consciousness.   Endo/Heme/Allergies: Negative.    Psychiatric/Behavioral: Negative.         Physical Exam  Temp:  [36.2 °C (97.2 °F)-36.9 °C (98.4 °F)] 36.2 °C (97.2 °F)  Pulse:  [] 81  Resp:  [18-20] 18  BP: ()/(53-65) 97/65    Physical Exam   Constitutional: He is oriented to person, place, and time. He does not have a sickly appearance. No distress.   Ill appearing   HENT:   Head: Normocephalic and atraumatic.   Mouth/Throat: No oropharyngeal exudate.   Eyes: Pupils are equal, round, and reactive to light. Conjunctivae and EOM are normal. No scleral icterus.   Neck: Normal range of motion. Neck supple. No tracheal deviation present. No thyromegaly present.    Cardiovascular: Normal rate and regular rhythm.    Murmur heard.  Pulmonary/Chest: Effort normal. No respiratory distress. He has no wheezes. He has rales.   Abdominal: He exhibits no distension. There is no tenderness.   Musculoskeletal: Normal range of motion. He exhibits edema. He exhibits no tenderness.   Neurological: He is alert and oriented to person, place, and time.   Skin: Skin is warm and dry.   Psychiatric: He has a normal mood and affect. His behavior is normal.   Nursing note and vitals reviewed.      Fluids    Intake/Output Summary (Last 24 hours) at 11/07/18 1056  Last data filed at 11/06/18 2000   Gross per 24 hour   Intake              860 ml   Output             1800 ml   Net             -940 ml       Laboratory  Recent Labs      11/05/18 0240 11/05/18   1554  11/06/18 0243 11/07/18   0308   WBC  7.1   --    --   6.6  7.2   RBC  2.72*   --    --   2.77*  2.80*   HEMOGLOBIN  8.7*   < >  9.0*  8.9*  9.2*   HEMATOCRIT  28.4*   --    --   27.8*  28.5*   MCV  104.4*   --    --   100.4*  101.8*   MCH  32.0   --    --   32.1  32.9   MCHC  30.6*   --    --   32.0*  32.3*   RDW  74.8*   --    --   70.8*  72.2*   PLATELETCT  205   --    --   205  184   MPV  10.0   --    --   9.8  9.8    < > = values in this interval not displayed.     Recent Labs      11/05/18 0240 11/06/18 0243 11/07/18   0308   SODIUM  136  134*  137   POTASSIUM  4.0  4.1  4.1   CHLORIDE  100  97  97   CO2  24  28  31   GLUCOSE  103*  146*  129*   BUN  43*  48*  28*   CREATININE  1.89*  2.36*  1.81*   CALCIUM  9.3  9.4  9.3     Recent Labs      11/06/18 0243 11/07/18   0308   INR  2.94*  3.10*               Imaging    ASSESSMENT:  # AIDA    * Baseline Cr~1.8    * Likely 2/2 hemodynamic compromise/tubular injury    * Cr improved to around 2.7 but BUN remained significantly elevated    * 24-hour urine studies (possibly slight under collection) demonstrate a creatinine clearance of 9 cc/min    * RRT initiated 11/2 via a  temporary Vas-Cath (INR precluded tunneled line placement)    * PermCath needed, but his need for coumadin due to valve replacement prevents abrupt stopping of coumadin, will need to be bridged with heparin  # CKD Stage 3; non-proteinuric    * Has never been followed by nephrology    * Based on GFR with 24-hour clearance eGFR likely over estimates his renal fxn  # Acidosis    * Resolved  # Hypervolemia  # Valvular heart disease s/p MVR, tricuspid valve repar, aortic valve repair and TITI ligation  # Anemia: acute blood loss component  # PEA arrest  # Ascites  # Hyponatremia    * Resolved     PLAN:  -TTS iHD  -UF as tolerated  -Hold coumadin for PermCath placement  -Heparin gtt to cross cover until time of procedure  -Daily labs  -Lasix 120 mg po QDay, hold for BP < 90  -Continue midodrine  -Renal diet  -Strict I/Os  -Dose all meds per eGFR < 15

## 2018-11-07 NOTE — PROGRESS NOTES
Report received from day shift RN.  Patient relaxing in bed .  Patient A & O x 4.  No apparent signs of distress.  Safety precautions in place.  Patient educated to call for assistance.  Will continue to monitor.

## 2018-11-07 NOTE — THERAPY
"Occupational Therapy Treatment completed with focus on ADLs and ADL transfers.  Functional Status:  Mod A with ADLs and txfs  Plan of Care: Will benefit from Occupational Therapy 3 times per week  Discharge Recommendations:  Equipment Will Continue to Assess for Equipment Needs. Post-acute therapy Discharge to a transitional care facility for continued therapy services.    See \"Rehab Therapy-Acute\" Patient Summary Report for complete documentation.   "

## 2018-11-07 NOTE — THERAPY
"Speech Language Therapy dysphagia treatment completed.   Functional Status:  Pt seen while sitting up in a chair. Pt with seeing objects that are not present \"penguins, puppies.\" SLP collaborated with nurs regarding possible effects r/t pt's HS meds. Pt given 5 each 1/2 tsp amounts of applesauce and NTL juice using a spoon. Pt triggering one swallow per small bolus. Intermittent congested sounding and non-productive cough following the swallow. Pt with mild upper airway wheezing heard without use of stethoscope. SLP collaborate with nurs regarding concerns for pt's risks for airway penetration and or aspiration when he is altered compared to when seen on Tues. Consider holding oral intake until pt at his baseline and without s/s of increased swallow difficulty.   Recommendations: see above.   Plan of Care: Will benefit from Speech Therapy 3 times per week  Post-Acute Therapy: dysphagia tx. Trace Smith    See \"Rehab Therapy-Acute\" Patient Summary Report for complete documentation.     "

## 2018-11-07 NOTE — PROGRESS NOTES
[facility] Nephrology Progress Note    Date of Service  11/6/2018     Author Mare Flores M.D.    Chief Complaint  Follow up AIDA    Interval Problem Update  79 YO male with history notable for CHF, valvular disease and CKD presented for planned cardiothoracic surgery in the setting of valvular disease.   Baseline cr appears to be ~1.8 most recently without proteinuria or active urinary sediment. Underwent MVR, tricuspid valve repar, aortic valve reparr and TITI ligation on 10/16. S/P resp arrest post extubation on POD#1. Continued on pressors until POD#3. Attempted diuresis over past 72 hours with sport doses of 20-40mg lasix. UOP down trending  Form >1L on 10/19 to 300cc over last 24 hours.  Patient requires 40 mg Lasix and metolazone at home.  Patient is currently unable to answer questioning in the setting of critical illness, wife provides history.  She notes that yesterday he became more lethargic and has been slowly out of it this morning.  She does not think he has had significant chest pain or shortness of breath.  He has not been complaining about abdominal pain.  Intermittent low blood pressures over the last 48 hours.     DAILY NEPHROLOGY SUMMARY:  10/23: consult done  10/24: ~1L UOP after 1 dose of lasix, 2nd dose held for hypotension  10/25: Net -1.1 L.  Tired this morning working with physical therapy yesterday  10/26: no acute events. -700cc. Walking around. Ongoing chest wall pain.  10/27: No events, Cr improved but BUN slightly higher, good UOP, pt feels tired  10/28: No events, good UOP, Cr slowly trending down but BUN trending up, BP stable, appetite a little better this am, abd still distended and abd xray shows no obstruction, right leg edema worse  10/29: no acute events, walking more. Ongoing chest pain.  10/30: No acute events, still complaining of pain all over, not being very active  10/31: No acute events, trying to work more with physical therapy, still with generalized pain  11/01:  Ongoing chest wall pain but improving being more active.  Net -840 cc by ins and outs.  Abdominal ultrasound with mild ascites  11/02: Trying to be more active with physical therapy, still weak, still with chest wall pain  11/03: s/p vasc cath placement. Received dialysis, unable to tolerate UF. Seen on dialysis today  11/04: Received dialysis, felt unwell during the procedure,net -1 L.  Ongoing hypotension midorine started.  Echocardiogram delayed  11/05: SERG, wife at bedside, feels a bit better; PM lasix held this AM due to SBP < 90  11/06: NAEO, labs worsened off dialysis    Review of Systems  Review of Systems   Constitutional: Positive for malaise/fatigue. Negative for chills and fever.   HENT: Negative.    Eyes: Negative.    Respiratory: Negative for cough, hemoptysis, sputum production and shortness of breath.    Cardiovascular: Positive for chest pain and leg swelling. Negative for palpitations.   Gastrointestinal: Positive for abdominal pain. Negative for constipation, diarrhea and vomiting.   Genitourinary: Negative.    Musculoskeletal: Negative.    Skin: Negative.    Neurological: Positive for weakness. Negative for dizziness, focal weakness and loss of consciousness.   Endo/Heme/Allergies: Negative.    Psychiatric/Behavioral: Negative.         Physical Exam  Temp:  [36.2 °C (97.2 °F)-36.9 °C (98.4 °F)] 36.7 °C (98 °F)  Pulse:  [] 100  Resp:  [17-20] 20  BP: ()/(53-67) 83/53    Physical Exam   Constitutional: He is oriented to person, place, and time. He does not have a sickly appearance. No distress.   Ill appearing   HENT:   Head: Normocephalic and atraumatic.   Mouth/Throat: No oropharyngeal exudate.   Eyes: Pupils are equal, round, and reactive to light. Conjunctivae and EOM are normal. No scleral icterus.   Neck: Normal range of motion. Neck supple. No tracheal deviation present. No thyromegaly present.   Cardiovascular: Normal rate and regular rhythm.    Murmur heard.  Pulmonary/Chest:  Effort normal. No respiratory distress. He has no wheezes. He has rales.   Abdominal: He exhibits no distension. There is no tenderness.   Musculoskeletal: Normal range of motion. He exhibits edema. He exhibits no tenderness.   Neurological: He is alert and oriented to person, place, and time.   Skin: Skin is warm and dry.   Psychiatric: He has a normal mood and affect. His behavior is normal.   Nursing note and vitals reviewed.      Fluids    Intake/Output Summary (Last 24 hours) at 11/06/18 2002  Last data filed at 11/06/18 1710   Gross per 24 hour   Intake              740 ml   Output             2300 ml   Net            -1560 ml       Laboratory  Recent Labs      11/04/18   0256  11/05/18   0240  11/05/18   1026  11/05/18   1554  11/06/18   0243   WBC  7.0  7.1   --    --   6.6   RBC  2.67*  2.72*   --    --   2.77*   HEMOGLOBIN  8.8*  8.7*  8.9*  9.0*  8.9*   HEMATOCRIT  26.7*  28.4*   --    --   27.8*   MCV  100.0*  104.4*   --    --   100.4*   MCH  33.0  32.0   --    --   32.1   MCHC  33.0*  30.6*   --    --   32.0*   RDW  72.0*  74.8*   --    --   70.8*   PLATELETCT  193  205   --    --   205   MPV  10.2  10.0   --    --   9.8     Recent Labs      11/04/18   0256  11/05/18   0240  11/06/18   0243   SODIUM  137  136  134*   POTASSIUM  3.9  4.0  4.1   CHLORIDE  98  100  97   CO2  25  24  28   GLUCOSE  98  103*  146*   BUN  64*  43*  48*   CREATININE  2.25*  1.89*  2.36*   CALCIUM  9.2  9.3  9.4     Recent Labs      11/04/18   0256  11/06/18   0243   INR  2.20*  2.94*               Imaging    ASSESSMENT:  # AIDA    * Baseline Cr~1.8    * Likely 2/2 hemodynamic compromise/tubular injury    * Cr improved to around 2.7 but BUN remained significantly elevated    * 24-hour urine studies (possibly slight under collection) demonstrate a creatinine clearance of 9 cc/min    * RRT initiated 11/2 via a temporary Vas-Cath (INR precluded tunneled line placement)  # CKD Stage 3; non-proteinuric    * Has never been followed by  nephrology    * Based on GFR with 24-hour clearance eGFR likely over estimates his renal fxn  # Acidosis    * Resolved  # Hypervolemia  # Valvular heart disease s/p MVR, tricuspid valve repar, aortic valve reparr and TITI ligation  # Anemia: acute blood loss component  # PEA arrest  # Ascites  # Hyponatremia    * Resolved     PLAN:  -iHD today  -Daily labs  -Lasix 120 mg po BID, with parameters to hold for SBP < 90  -Possible PermCath placement tomorrow vs THursday  -Continue midodrine  -Renal diet  -Strict I/Os  -Dose all meds per eGFR < 15  -Avoid Fleet enemas for treatment of constipation, other enemas ok if needed

## 2018-11-07 NOTE — PROGRESS NOTES
Received bedside report. All questions answered. Pt sitting up in chair with chair alarm on. Tele monitor on. Pt has no signs or symptoms of distress. Call light within pt's reach.

## 2018-11-07 NOTE — DISCHARGE INSTRUCTIONS
Discharge Instructions    Discharged to [facility] home by medical transportation with escort. Discharged via wheelchair, hospital escort: Yes.  Special equipment needed: Oxygen and Walker    Be sure to schedule a follow-up appointment with your primary care doctor or any specialists as instructed.     Discharge Plan:   Pneumococcal Vaccine Administered/Refused:  ([facility] patient - to be addressed outpatient)  Influenza Vaccine Indication: Patient Refuses ([facility] patient - to be addressed outpatient)    I understand that a diet low in cholesterol, fat, and sodium is recommended for good health. Unless I have been given specific instructions below for another diet, I accept this instruction as my diet prescription.   Other diet: Cardiac    Special Instructions:     [facility] Discharge Instructions    Activity:  1. NO driving for 4 weeks after surgery. You may ride as a passenger.  2. NO lifting more than 10 pounds for 6 weeks.  3. For the next 6 weeks, keep your elbows close to your body and move within a pain-free motion when lifting, pushing or pulling.  Do not stretch both arms backwards at the same time.    4. Walk at least 4 times per day, there is no maximum.  5. Other physical activities (sex, housework, gardening, etc.) are okay after 4 weeks.  6. Continue using incentive spirometer for 2 weeks.  If you are going home on oxygen and you were not on oxygen prior to surgery, keep using until you are oxygen free.  7. Weigh yourself daily.  Call your Cardiologist for a weight gain of 2 or more pounds within 48 hours.    Incision Care:  1. SHOWER EVERYDAY-no baths. Make sure to clean your incision(s) twice daily with plain, perfume and dye-free soap.  Then pat incision(s) dry with clean towel. No creams or lotions on your incision(s).    2. If you are discharging with a Prevena bandage on your chest, you or your home health nurse may remove the bandage 7 days after surgery, or sooner if the battery runs out or the  device alarms. When the battery runs out, the bandage will lose suction and it will puff up.  To remove, slowly roll down the edges of the bandage. Throw away the entire bandage and the battery pack.  Keep the incision open to air and clean twice daily with soap and water.  3. If there is any increased redness or swelling, separation of the incision line, or thick drainage from any of your incisions, call [facility].    4. Continue to wear your DEWAYNE Stockings for 4 weeks. You may take them off when you are in bed or when your legs are elevated.    General Instructions:  1. You have been referred to Cardiac Rehab.  You can start Cardiac Rehab 30 days after surgery.  If you do not have an appointment at the time of discharge call [phone] to schedule an appointment.  2. Your Primary Care Doctor typically handles home oxygen. Oxygen may be stopped when your oxygen level is consistently greater than 90.  Check with your Primary Care Doctor if you are unsure.  3. Take all of your medications (including pain medications) as prescribed.  Taking medications other than prescribed can result in serious injury.    For Patients Discharged with Narcotic Pain Medication:   1. If a refill is needed, understand that only 1 refill will be provided and you must come to the [facility]’ office for an appointment (72 hours’ notice is required to schedule and there are no weekend appointments).  2. If the pain medications you are discharged on are not working, you will need to bring your remaining prescription into the office in order to receive a new prescription.  3. If you were taking narcotics prior to your heart surgery, [facility] will provide you with one prescription and additional medications will need to be provided by your pain management doctor.  4. Do not drink alcohol while taking narcotics.  5. Lost or stolen medications will not be refilled.  If medications are stolen, report to law  enforcement.    Contact [facility] at [phone] if you have any questions.    · Is patient discharged on Warfarin / Coumadin?   Yes    You are on the blood thinner Coumadin (warfarin) and you will need your coumadin levels checked periodically. For any questions call the [facility] @ [phone]. The home health nurse will draw your blood and the coumadin clinic will call with any change to your dose.        IMPORTANT: HOW TO USE THIS INFORMATION:  This is a summary and does NOT have all possible information about this product. This information does not assure that this product is safe, effective, or appropriate for you. This information is not individual medical advice and does not substitute for the advice of your health care professional. Always ask your health care professional for complete information about this product and your specific health needs.      WARFARIN - ORAL (WARF-uh-rin)      COMMON BRAND NAME(S): Coumadin      WARNING:  Warfarin can cause very serious (possibly fatal) bleeding. This is more likely to occur when you first start taking this medication or if you take too much warfarin. To decrease your risk for bleeding, your doctor or other health care provider will monitor you closely and check your lab results (INR test) to make sure you are not taking too much warfarin. Keep all medical and laboratory appointments. Tell your doctor right away if you notice any signs of serious bleeding. See also Side Effects section.      USES:  This medication is used to treat blood clots (such as in deep vein thrombosis-DVT or pulmonary embolus-PE) and/or to prevent new clots from forming in your body. Preventing harmful blood clots helps to reduce the risk of a stroke or heart attack. Conditions that increase your risk of developing blood clots include a certain type of irregular heart rhythm (atrial fibrillation), heart valve replacement, recent heart attack, and certain surgeries  "(such as hip/knee replacement). Warfarin is commonly called a \"blood thinner,\" but the more correct term is \"anticoagulant.\" It helps to keep blood flowing smoothly in your body by decreasing the amount of certain substances (clotting proteins) in your blood.      HOW TO USE:  Read the Medication Guide provided by your pharmacist before you start taking warfarin and each time you get a refill. If you have any questions, ask your doctor or pharmacist. Take this medication by mouth with or without food as directed by your doctor or other health care professional, usually once a day. It is very important to take it exactly as directed. Do not increase the dose, take it more frequently, or stop using it unless directed by your doctor. Dosage is based on your medical condition, laboratory tests (such as INR), and response to treatment. Your doctor or other health care provider will monitor you closely while you are taking this medication to determine the right dose for you. Use this medication regularly to get the most benefit from it. To help you remember, take it at the same time each day. It is important to eat a balanced, consistent diet while taking warfarin. Some foods can affect how warfarin works in your body and may affect your treatment and dose. Avoid sudden large increases or decreases in your intake of foods high in vitamin K (such as broccoli, cauliflower, cabbage, brussels sprouts, kale, spinach, and other green leafy vegetables, liver, green tea, certain vitamin supplements). If you are trying to lose weight, check with your doctor before you try to go on a diet. Cranberry products may also affect how your warfarin works. Limit the amount of cranberry juice (16 ounces/480 milliliters a day) or other cranberry products you may drink or eat.      SIDE EFFECTS:  Nausea, loss of appetite, or stomach/abdominal pain may occur. If any of these effects persist or worsen, tell your doctor or pharmacist promptly. " Remember that your doctor has prescribed this medication because he or she has judged that the benefit to you is greater than the risk of side effects. Many people using this medication do not have serious side effects. This medication can cause serious bleeding if it affects your blood clotting proteins too much (shown by unusually high INR lab results). Even if your doctor stops your medication, this risk of bleeding can continue for up to a week. Tell your doctor right away if you have any signs of serious bleeding, including: unusual pain/swelling/discomfort, unusual/easy bruising, prolonged bleeding from cuts or gums, persistent/frequent nosebleeds, unusually heavy/prolonged menstrual flow, pink/dark urine, coughing up blood, vomit that is bloody or looks like coffee grounds, severe headache, dizziness/fainting, unusual or persistent tiredness/weakness, bloody/black/tarry stools, chest pain, shortness of breath, difficulty swallowing. Tell your doctor right away if any of these unlikely but serious side effects occur: persistent nausea/vomiting, severe stomach/abdominal pain, yellowing eyes/skin. This drug rarely has caused very serious (possibly fatal) problems if its effects lead to small blood clots (usually at the beginning of treatment). This can lead to severe skin/tissue damage that may require surgery or amputation if left untreated. Patients with certain blood conditions (protein C or S deficiency) may be at greater risk. Get medical help right away if any of these rare but serious side effects occur: painful/red/purplish patches on the skin (such as on the toe, breast, abdomen), change in the amount of urine, vision changes, confusion, slurred speech, weakness on one side of the body. A very serious allergic reaction to this drug is rare. However, get medical help right away if you notice any symptoms of a serious allergic reaction, including: rash, itching/swelling (especially of the  face/tongue/throat), severe dizziness, trouble breathing. This is not a complete list of possible side effects. If you notice other effects not listed above, contact your doctor or pharmacist. In the [place] - Call your doctor for medical advice about side effects. You may report side effects to FDA at 0-098-WSF-4149. In [place] - Call your doctor for medical advice about side effects. You may report side effects to [facility] [place] at [phone].      PRECAUTIONS:  Before taking warfarin, tell your doctor or pharmacist if you are allergic to it; or if you have any other allergies. This product may contain inactive ingredients, which can cause allergic reactions or other problems. Talk to your pharmacist for more details. Before using this medication, tell your doctor or pharmacist your medical history, especially of: blood disorders (such as anemia, hemophilia), bleeding problems (such as bleeding of the stomach/intestines, bleeding in the brain), blood vessel disorders (such as aneurysms), recent major injury/surgery, liver disease, alcohol use, mental/mood disorders (including memory problems), frequent falls/injuries. It is important that all your doctors and dentists know that you take warfarin. Before having surgery or any medical/dental procedures, tell your doctor or dentist that you are taking this medication and about all the products you use (including prescription drugs, nonprescription drugs, and herbal products). Avoid getting injections into the muscles. If you must have an injection into a muscle (for example, a flu shot), it should be given in the arm. This way, it will be easier to check for bleeding and/or apply pressure bandages. This medication may cause stomach bleeding. Daily use of alcohol while using this medicine will increase your risk for stomach bleeding and may also affect how this medication works. Limit or avoid alcoholic beverages. If you have not been eating well, if you have an illness  or infection that causes fever, vomiting, or diarrhea for more than 2 days, or if you start using any antibiotic medications, contact your doctor or pharmacist immediately because these conditions can affect how warfarin works. This medication can cause heavy bleeding. To lower the chance of getting cut, bruised, or injured, use great caution with sharp objects like safety razors and nail cutters. Use an electric razor when shaving and a soft toothbrush when brushing your teeth. Avoid activities such as contact sports. If you fall or injure yourself, especially if you hit your head, call your doctor immediately. Your doctor may need to check you. The Food & Drug Administration has stated that generic warfarin products are interchangeable. However, consult your doctor or pharmacist before switching warfarin products. Be careful not to take more than one medication that contains warfarin unless specifically directed by the doctor or health care provider who is monitoring your warfarin treatment. Older adults may be at greater risk for bleeding while using this drug. This medication is not recommended for use during pregnancy because of serious (possibly fatal) harm to an unborn baby. Discuss the use of reliable forms of birth control with your doctor. If you become pregnant or think you may be pregnant, tell your doctor immediately. If you are planning pregnancy, discuss a plan for managing your condition with your doctor before you become pregnant. Your doctor may switch the type of medication you use during pregnancy. Very small amounts of this medication may pass into breast milk but is unlikely to harm a nursing infant. Consult your doctor before breast-feeding.      DRUG INTERACTIONS:  Drug interactions may change how your medications work or increase your risk for serious side effects. This document does not contain all possible drug interactions. Keep a list of all the products you use (including  "prescription/nonprescription drugs and herbal products) and share it with your doctor and pharmacist. Do not start, stop, or change the dosage of any medicines without your doctor's approval. Warfarin interacts with many prescription, nonprescription, vitamin, and herbal products. This includes medications that are applied to the skin or inside the vagina or rectum. The interactions with warfarin usually result in an increase or decrease in the \"blood-thinning\" (anticoagulant) effect. Your doctor or other health care professional should closely monitor you to prevent serious bleeding or clotting problems. While taking warfarin, it is very important to tell your doctor or pharmacist of any changes in medications, vitamins, or herbal products that you are taking. Some products that may interact with this drug include: capecitabine, imatinib, mifepristone. Aspirin, aspirin-like drugs (salicylates), and nonsteroidal anti-inflammatory drugs (NSAIDs such as ibuprofen, naproxen, celecoxib) may have effects similar to warfarin. These drugs may increase the risk of bleeding problems if taken during treatment with warfarin. Carefully check all prescription/nonprescription product labels (including drugs applied to the skin such as pain-relieving creams) since the products may contain NSAIDs or salicylates. Talk to your doctor about using a different medication (such as acetaminophen) to treat pain/fever. Low-dose aspirin and related drugs (such as clopidogrel, ticlopidine) should be continued if prescribed by your doctor for specific medical reasons such as heart attack or stroke prevention. Consult your doctor or pharmacist for more details. Many herbal products interact with warfarin. Tell your doctor before taking any herbal products, especially bromelains, coenzyme Q10, cranberry, danshen, dong quai, fenugreek, garlic, ginkgo biloba, ginseng, and Mariann's wort, among others. This medication may interfere with a certain " laboratory test to measure theophylline levels, possibly causing false test results. Make sure laboratory personnel and all your doctors know you use this drug.      OVERDOSE:  If overdose is suspected, contact a poison control center or emergency room immediately. [place] residents can call the [place] National Poison Hotline at 1-658.118.8397. [place] residents can call a provincial poison control center. Symptoms of overdose may include: bloody/black/tarry stools, pink/dark urine, unusual/prolonged bleeding.      NOTES:  Do not share this medication with others. Laboratory and/or medical tests (such as INR, complete blood count) must be performed periodically to monitor your progress or check for side effects. Consult your doctor for more details.      MISSED DOSE:  For the best possible benefit, do not miss any doses. If you do miss a dose and remember on the same day, take it as soon as you remember. If you remember on the next day, skip the missed dose and resume your usual dosing schedule. Do not double the dose to catch up because this could increase your risk for bleeding. Keep a record of missed doses to give to your doctor or pharmacist. Contact your doctor or pharmacist if you miss 2 or more doses in a row.      STORAGE:  Store at room temperature away from light and moisture. Do not store in the bathroom. Keep all medications away from children and pets. Do not flush medications down the toilet or pour them into a drain unless instructed to do so. Properly discard this product when it is  or no longer needed. Consult your pharmacist or local waste disposal company for more details about how to safely discard your product.      MEDICAL ALERT:  Your condition and medication can cause complications in a medical emergency. For information about enrolling in MedicAlert, call 1-927.543.4830 ([place]) or 1-249.318.8889 ([place]).      Information last revised 2010 Copyright(c)  [employer]         Hemodialysis  Hemodialysis is a way of removing wastes, salt, and extra water from your blood. It is done when your kidneys cannot keep the blood clean. During hemodialysis, your blood travels outside of your body to a machine. A filter in the machine cleans the blood. Hemodialysis is usually done 3 times a week. Visits last 3-5 hours.  What happens before the procedure?  An opening must be made to allow blood to be taken from your body and put back into your body (access). It is made weeks or months before you start hemodialysis. It is usually made in your arm.  If you need hemodialysis right away, a thin, flexible tube (catheter) will be placed in your neck, chest, or groin. This type of access is usually temporary.  What happens during the procedure?  Hemodialysis is done while you are sitting or leaning back. During hemodialysis you may do any activity as long as you stay sitting or leaning back. Many people sleep, watch television, or read. If you have side effects or feel uncomfortable, tell your doctor. Your doctor may make changes to help you feel more comfortable.  Your hemodialysis visits may look like this:  1. You will be weighed and your temperature will be taken. Your blood pressure and pulse will be checked.  2. The skin around your access will be cleaned.  3. Two needles will be put into the access. They will be connected to a plastic tube. The needles will be taped to your skin so that they do not move. If you have a temporary access, your catheter will be connected to a plastic tube.  4. Your blood will go through the tube to the machine. The machine will clean your blood. Then your blood will go back to your body. Your blood pressure and pulse will be checked a few times.  5. Once the procedure is complete, the needles will be removed. A bandage (dressing) will be placed over the access. If your access is a catheter, it will be disconnected.  What happens after the procedure?  · You will be  weighed.  · Your blood will be tested. This is usually done once a month.  · You may have side effects. These include:  ¨ Dizziness.  ¨ Muscle cramps.  ¨ Feeling sick to your stomach (nausea).  ¨ Headaches.  ¨ Feeling tired. You usually feel normal the next day.  ¨ Itchiness. Your doctor may give medicine to help with this.  ¨ Achy or jittery legs. You may feel like kicking your legs. This can cause sleeping problems.  ¨ Allergic reaction.  This information is not intended to replace advice given to you by your health care provider. Make sure you discuss any questions you have with your health care provider.  Document Released: 11/30/2009 Document Revised: 05/25/2017 Document Reviewed: 02/03/2014  [employer] Interactive Patient Education © 2017 [employer]        Depression / Suicide Risk    As you are discharged from this [facility] facility, it is important to learn how to keep safe from harming yourself.    Recognize the warning signs:  · Abrupt changes in personality, positive or negative- including increase in energy   · Giving away possessions  · Change in eating patterns- significant weight changes-  positive or negative  · Change in sleeping patterns- unable to sleep or sleeping all the time   · Unwillingness or inability to communicate  · Depression  · Unusual sadness, discouragement and loneliness  · Talk of wanting to die  · Neglect of personal appearance   · Rebelliousness- reckless behavior  · Withdrawal from people/activities they love  · Confusion- inability to concentrate     If you or a loved one observes any of these behaviors or has concerns about self-harm, here's what you can do:  · Talk about it- your feelings and reasons for harming yourself  · Remove any means that you might use to hurt yourself (examples: pills, rope, extension cords, firearm)  · Get professional help from the community (Mental Health, Substance Abuse, psychological counseling)  · Do not be alone:Call your Safe Contact-  someone whom you trust who will be there for you.  · Call your local CRISIS HOTLINE 433-1178 or 241-621-8895  · Call your local Children's Mobile Crisis Response Team [place] (015) 542-9255 or www.[employer]  · Call the toll free National Suicide Prevention Hotlines   · National Suicide Prevention Lifeline 970-163-IASJ (1314)  · [employer] 800-SUICIDE (079-4705)

## 2018-11-08 NOTE — THERAPY
"Speech Language Therapy dysphagia treatment completed.   Functional Status:  The patient was seen for dysphagia therapy this date. Patient was recommended to be NPO due to AMS yesterday. The patient was sleepy but easily woke with verbal cues. Breathing was noted to be characterized as dysneic which RN reported is stable from her assessment. The patient initially declined PO trials as he just finished his D3/thin liquid meal tray with his wife. Following education the patient agreed to PO trials of purees, thins, soft solids, NTL and mixed consistencies. The patient was noted to have increased WOB and delayed cough following 2 of 6 mixed consistency trials. Thin liquids and NTL were consumed with no overt s/s of aspiration or difficulty when patient assisted with keeping sip size small and completing double swallow. Soft solids were consumed without difficulty but once again patient required mod cues to complete double swallow. AMS continues to remain a concern for patient, however, at this time, would recommend downgrade to D2/thin liquids with 1;1 supervision during PO intake and holding PO with increased lethargy or difficulty. RN updated. Swallow precaution sign updated.     Recommendations: 1) downgrade to D2/thin liquids with 1;1 supervision during PO intake and holding PO with increased lethargy or difficulty.    Plan of Care: Will benefit from Speech Therapy 3 times per week.    Post-Acute Therapy: Discharge to an inpatient transitional care facility for continued SLP services.    See \"Rehab Therapy-Acute\" Patient Summary Report for complete documentation.     "

## 2018-11-08 NOTE — PROGRESS NOTES
[facility] Nephrology Progress Note    Date of Service  11/8/2018     Author Mare Flores M.D.    Chief Complaint  Follow up AIDA    Interval Problem Update  77 YO male with history notable for CHF, valvular disease and CKD presented for planned cardiothoracic surgery in the setting of valvular disease.   Baseline cr appears to be ~1.8 most recently without proteinuria or active urinary sediment. Underwent MVR, tricuspid valve repar, aortic valve reparr and TITI ligation on 10/16. S/P resp arrest post extubation on POD#1. Continued on pressors until POD#3. Attempted diuresis over past 72 hours with sport doses of 20-40mg lasix. UOP down trending  Form >1L on 10/19 to 300cc over last 24 hours.  Patient requires 40 mg Lasix and metolazone at home.  Patient is currently unable to answer questioning in the setting of critical illness, wife provides history.  She notes that yesterday he became more lethargic and has been slowly out of it this morning.  She does not think he has had significant chest pain or shortness of breath.  He has not been complaining about abdominal pain.  Intermittent low blood pressures over the last 48 hours.     DAILY NEPHROLOGY SUMMARY:  10/23: consult done  10/24: ~1L UOP after 1 dose of lasix, 2nd dose held for hypotension  10/25: Net -1.1 L.  Tired this morning working with physical therapy yesterday  10/26: no acute events. -700cc. Walking around. Ongoing chest wall pain.  10/27: No events, Cr improved but BUN slightly higher, good UOP, pt feels tired  10/28: No events, good UOP, Cr slowly trending down but BUN trending up, BP stable, appetite a little better this am, abd still distended and abd xray shows no obstruction, right leg edema worse  10/29: no acute events, walking more. Ongoing chest pain.  10/30: No acute events, still complaining of pain all over, not being very active  10/31: No acute events, trying to work more with physical therapy, still with generalized pain  11/01:  Ongoing chest wall pain but improving being more active.  Net -840 cc by ins and outs.  Abdominal ultrasound with mild ascites  11/02: Trying to be more active with physical therapy, still weak, still with chest wall pain  11/03: s/p vasc cath placement. Received dialysis, unable to tolerate UF. Seen on dialysis today  11/04: Received dialysis, felt unwell during the procedure,net -1 L.  Ongoing hypotension midorine started.  Echocardiogram delayed  11/05: SERG, wife at bedside, feels a bit better; PM lasix held this AM due to SBP < 90  11/06: NAEO, labs worsened off dialysis  11/07: NAEO, no complaints, doing well, 1.8L UF with iHD yesterday, edema improved  11/08: NAEO, having increased secretions this AM, wife not at bedside    Review of Systems  Review of Systems   Constitutional: Positive for malaise/fatigue. Negative for chills and fever.   HENT: Negative.    Eyes: Negative.    Respiratory: Negative for cough, hemoptysis, sputum production and shortness of breath.    Cardiovascular: Positive for chest pain and leg swelling. Negative for palpitations.   Gastrointestinal: Positive for abdominal pain. Negative for constipation, diarrhea and vomiting.   Genitourinary: Negative.    Musculoskeletal: Negative.    Skin: Negative.    Neurological: Positive for weakness. Negative for dizziness, focal weakness and loss of consciousness.   Endo/Heme/Allergies: Negative.    Psychiatric/Behavioral: Negative.         Physical Exam  Temp:  [36.2 °C (97.2 °F)-36.7 °C (98 °F)] 36.7 °C (98 °F)  Pulse:  [82-95] 93  Resp:  [17-20] 20  BP: ()/(60-65) 99/65    Physical Exam   Constitutional: He is oriented to person, place, and time. He does not have a sickly appearance. No distress.   Ill appearing   HENT:   Head: Normocephalic and atraumatic.   Mouth/Throat: No oropharyngeal exudate.   Eyes: Pupils are equal, round, and reactive to light. Conjunctivae and EOM are normal. No scleral icterus.   Neck: Normal range of motion.  Neck supple. No tracheal deviation present. No thyromegaly present.   Cardiovascular: Normal rate and regular rhythm.    Murmur heard.  Pulmonary/Chest: Effort normal. No respiratory distress. He has no wheezes. He has rales.   Abdominal: He exhibits no distension. There is no tenderness.   Musculoskeletal: Normal range of motion. He exhibits edema. He exhibits no tenderness.   Neurological: He is alert and oriented to person, place, and time.   Skin: Skin is warm and dry.   Psychiatric: He has a normal mood and affect. His behavior is normal.   Nursing note and vitals reviewed.      Fluids    Intake/Output Summary (Last 24 hours) at 11/08/18 0957  Last data filed at 11/07/18 2000   Gross per 24 hour   Intake              240 ml   Output                0 ml   Net              240 ml       Laboratory  Recent Labs      11/06/18 0243 11/07/18 0308 11/08/18   0250   WBC  6.6  7.2  8.4   RBC  2.77*  2.80*  2.75*   HEMOGLOBIN  8.9*  9.2*  9.0*   HEMATOCRIT  27.8*  28.5*  27.7*   MCV  100.4*  101.8*  100.7*   MCH  32.1  32.9  32.7   MCHC  32.0*  32.3*  32.5*   RDW  70.8*  72.2*  72.3*   PLATELETCT  205  184  183   MPV  9.8  9.8  10.2     Recent Labs      11/06/18 0243 11/07/18   0308  11/08/18   0250   SODIUM  134*  137  137   POTASSIUM  4.1  4.1  4.0   CHLORIDE  97  97  97   CO2  28  31  29   GLUCOSE  146*  129*  132*   BUN  48*  28*  35*   CREATININE  2.36*  1.81*  2.33*   CALCIUM  9.4  9.3  9.5     Recent Labs      11/06/18   0243  11/07/18   0308  11/08/18   0250   INR  2.94*  3.10*  3.06*               Imaging    ASSESSMENT:  # AIDA    * Baseline Cr~1.8    * Likely 2/2 hemodynamic compromise/tubular injury    * Cr improved to around 2.7 but BUN remained significantly elevated    * 24-hour urine studies (possibly slight under collection) demonstrate a creatinine clearance of 9 cc/min    * RRT initiated 11/2 via a temporary Vas-Cath (INR precluded tunneled line placement)    * PermCath needed, but his need for  coumadin due to valve replacement prevents abrupt stopping of coumadin, will need to be bridged with heparin  # CKD Stage 3; non-proteinuric    * Has never been followed by nephrology    * Based on GFR with 24-hour clearance eGFR likely over estimates his renal fxn  # Acidosis    * Resolved  # Hypervolemia  # Valvular heart disease s/p MVR, tricuspid valve repar, aortic valve repair and TITI ligation  # Anemia: acute blood loss component  # PEA arrest  # Ascites  # Hyponatremia    * Resolved     PLAN:  -No iHD today  -Hold coumadin for PermCath changeout when INR < 1.5  -Heparin gtt to cross cover until time of procedure  -Daily labs  -Lasix 120 mg po QDay, hold for SBP < 90  -Continue midodrine  -Renal diet  -Strict I/Os  -Dose all meds per eGFR < 15

## 2018-11-08 NOTE — PROGRESS NOTES
Report received from day shift RN.  Patient sitting up in bed.  Pt just finished eating dinner.  Wife at bedside.  Patient is able to answer the usual A & O questions, but he is confused and appears to have some dementia throughout the night as evidenced by the weird and off the wall things he says. .  No apparent signs of distress.  Safety precautions in place.  Patient educated to call for assistance.  Will continue to monitor.

## 2018-11-08 NOTE — PROGRESS NOTES
[facility] Hospitalist Progress Note    Date of Service: 2018    Chief Complaint  78 y.o. male admitted 10/16/2018 with severe MR, TR and moderated AS who underwent a   TRV, MVR and Aortic valvuloplasty,and went into PEA following the procedure and then developed acute on chronic renal failure    Interval Problem Update  Wife remains at bedside, she states his confusion has nearly resolved  He is currently axox3, reports mild chronic right shoulder pain, denies cp, denies sob  Ros otherwise negative    Consultants/Specialty  Nephrology    Disposition  To snf when cleared by nephrology        Review of Systems   Constitutional: Negative.  Negative for chills, diaphoresis, fever, malaise/fatigue and weight loss.   HENT: Negative.  Negative for sore throat.    Eyes: Negative.  Negative for blurred vision.   Respiratory: Negative.  Negative for cough and shortness of breath.    Cardiovascular: Negative.  Negative for chest pain, palpitations and leg swelling.   Gastrointestinal: Negative.  Negative for abdominal pain, nausea and vomiting.   Genitourinary: Negative.  Negative for dysuria.   Musculoskeletal: Positive for joint pain. Negative for myalgias.   Skin: Negative.  Negative for itching and rash.   Neurological: Negative.  Negative for dizziness, focal weakness, weakness and headaches.   Endo/Heme/Allergies: Negative.  Does not bruise/bleed easily.   Psychiatric/Behavioral: Negative.  Negative for depression, substance abuse and suicidal ideas.   All other systems reviewed and are negative.     Physical Exam  Laboratory/Imaging   Hemodynamics  Temp (24hrs), Av.4 °C (97.6 °F), Min:36.2 °C (97.2 °F), Max:36.7 °C (98 °F)   Temperature: 36.5 °C (97.7 °F)  Pulse  Av  Min: 3  Max: 126    Blood Pressure : 112/68     Respiratory      Respiration: (!) 22, Pulse Oximetry: 96 %     Work Of Breathing / Effort: Mild  RUL Breath Sounds: Coarse Crackles, RML Breath Sounds: Coarse Crackles, RLL Breath Sounds:  Diminished, HERNANDEZ Breath Sounds: Coarse Crackles, LLL Breath Sounds: Diminished    Fluids    Intake/Output Summary (Last 24 hours) at 11/08/18 1331  Last data filed at 11/08/18 0800   Gross per 24 hour   Intake              340 ml   Output                0 ml   Net              340 ml       Nutrition  Orders Placed This Encounter   Procedures   • Diet Order Cardiac     Standing Status:   Standing     Number of Occurrences:   1     Order Specific Question:   Diet:     Answer:   Cardiac [6]     Order Specific Question:   Texture/Fiber modifications:     Answer:   Dysphagia 2(Pureed/Chopped)specify fluid consistency(question 6) [2]     Order Specific Question:   Consistency/Fluid modifications:     Answer:   Thin Liquids [3]     Physical Exam   Constitutional: He is oriented to person, place, and time. He appears well-developed and well-nourished. No distress.   HENT:   Head: Normocephalic and atraumatic.   Mouth/Throat: Oropharynx is clear and moist.   Eyes: Conjunctivae are normal. Right eye exhibits no discharge.   Neck:   Hd cath cdi   Cardiovascular: Intact distal pulses.  Exam reveals no gallop and no friction rub.    Murmur heard.  Irregular  surg site cdi   Pulmonary/Chest: Effort normal and breath sounds normal. No respiratory distress. He has no wheezes. He has no rales. He exhibits no tenderness.   Abdominal: Soft. Bowel sounds are normal. He exhibits no distension and no mass. There is no tenderness. There is no rebound and no guarding.   Musculoskeletal: Normal range of motion. He exhibits no edema, tenderness or deformity.   Neurological: He is alert and oriented to person, place, and time. He has normal reflexes. No cranial nerve deficit. He exhibits normal muscle tone. Coordination normal.   Skin: Skin is warm and dry. No rash noted. He is not diaphoretic. No erythema. No pallor.   Psychiatric: He has a normal mood and affect. His behavior is normal. Judgment and thought content normal.   Nursing note and  vitals reviewed.      Recent Labs      11/06/18 0243 11/07/18 0308 11/08/18   0250   WBC  6.6  7.2  8.4   RBC  2.77*  2.80*  2.75*   HEMOGLOBIN  8.9*  9.2*  9.0*   HEMATOCRIT  27.8*  28.5*  27.7*   MCV  100.4*  101.8*  100.7*   MCH  32.1  32.9  32.7   MCHC  32.0*  32.3*  32.5*   RDW  70.8*  72.2*  72.3*   PLATELETCT  205  184  183   MPV  9.8  9.8  10.2     Recent Labs      11/06/18 0243 11/07/18 0308 11/08/18   0250   SODIUM  134*  137  137   POTASSIUM  4.1  4.1  4.0   CHLORIDE  97  97  97   CO2  28  31  29   GLUCOSE  146*  129*  132*   BUN  48*  28*  35*   CREATININE  2.36*  1.81*  2.33*   CALCIUM  9.4  9.3  9.5     Recent Labs      11/06/18 0243 11/07/18 0308 11/08/18   0250   INR  2.94*  3.10*  3.06*                  Assessment/Plan     * Acute respiratory failure with hypoxia (HCC)   Assessment & Plan    Oxygen to keep sats > 90       AIDA (acute kidney injury) ([facility])   Assessment & Plan    2/2 ATN from pressors post op hypotension.  See above  Nephrology following  HD per nephrology     Thrombocytopenia (HCC)- (present on admission)   Assessment & Plan    Resolved.     S/P TVR (tricuspid valve repair)   Assessment & Plan    Repair 10/16  11/3 Repeat echo ordered.  Echo on 10/16 EF 55% no pericardial effusion seen.  11/5:  Repeat echo with severe TR, moderately dilated RV and right atrium.  Annuloplasty band of tricuspid valve per 10/16 OP note.     S/P MVR (mitral valve repair)   Assessment & Plan    S/p MVR, TVR, aortoplasty 10/16       ETOH abuse- (present on admission)   Assessment & Plan    No s/o w/d  Continue to follow  Normal LFTs, moderate ascites on us abdomen     Chronic atrial fibrillation (HCC)- (present on admission)   Assessment & Plan    Remains rate controlled  Coumadin held for HD cath - heparin bridge planned when inr decreases  Po amiodarone     Essential hypertension, benign- (present on admission)   Assessment & Plan    Intermittent hypotension  No associated sx  Continue to  follow  11/3: midodrine 5mg tid added for hypotension with HD.     Bleeding hemorrhoid   Assessment & Plan    Bloody BM on 11/5 after straining, no recurrence   H/h stable  asa stopped  Continue to follow     Hemodialysis-associated hypotension   Assessment & Plan           Acute urinary retention   Assessment & Plan    resolved     Acute on chronic renal insufficiency   Assessment & Plan    See above     Dyslipidemia- (present on admission)   Assessment & Plan    Cont statin     Abdominal distension   Assessment & Plan    us abdomen with moderate ascites. Likely 3rd spacing.  He is continuing on protein supplements.  Wonder if losing protein.  Order for urine protein wnl.  Per patient, alcohol use 2-4 drinks daily PTA, no h/o alcohol withdrawal, normal LFTs.  Improving with hd     Encephalopathy   Assessment & Plan    Resolved.     Iron deficiency anemia- (present on admission)   Assessment & Plan     iron low, started on iron and ascorbic acid     CKD (chronic kidney disease) stage 3, GFR 30-59 ml/min (formerly [facility])- (present on admission)   Assessment & Plan    Baseline Cr 1.8.     Mixed hyperlipidemia- (present on admission)   Assessment & Plan    Continue lipitor       Quality-Core Measures

## 2018-11-08 NOTE — CARE PLAN
Problem: Safety  Goal: Will remain free from injury    Intervention: Provide assistance with mobility  Call for help, turn q2h and prn, bed alarm on, call light in reach, pt confused

## 2018-11-08 NOTE — PROGRESS NOTES
[facility]    Date of Service: 2018    Chief Complaint  78 y.o. male admitted 10/16/2018 with severe MR, TR and moderated AS who underwent a   TRV, MVR and Aortic valvuloplasty,and went into PEA following the procedure and then developed acute on chronic renal failure    Interval Problem Update  Wife at bedside, mild confusion after taking ambien last night, this was stopped  Axox3, following commands  Denies dizziness, no lightheadedness  Mild chronic r shoulder pain, no other complaints    Consultants/Specialty  Nephrology    Disposition  To snf when cleared by nephrology        Review of Systems   Constitutional: Negative.  Negative for chills, diaphoresis, fever, malaise/fatigue and weight loss.   HENT: Negative.  Negative for sore throat.    Eyes: Negative.  Negative for blurred vision.   Respiratory: Negative.  Negative for cough and shortness of breath.    Cardiovascular: Negative.  Negative for chest pain, palpitations and leg swelling.   Gastrointestinal: Negative.  Negative for abdominal pain, nausea and vomiting.   Genitourinary: Negative.  Negative for dysuria.   Musculoskeletal: Positive for joint pain. Negative for myalgias.   Skin: Negative.  Negative for itching and rash.   Neurological: Negative.  Negative for dizziness, focal weakness, weakness and headaches.   Endo/Heme/Allergies: Negative.  Does not bruise/bleed easily.   Psychiatric/Behavioral: Negative.  Negative for depression, substance abuse and suicidal ideas.   All other systems reviewed and are negative.     Physical Exam  Laboratory/Imaging   Hemodynamics  Temp (24hrs), Av.4 °C (97.6 °F), Min:36.2 °C (97.2 °F), Max:36.8 °C (98.3 °F)   Temperature: 36.4 °C (97.5 °F)  Pulse  Av  Min: 3  Max: 126    Blood Pressure : (!) 89/62 (Rn notified)     Respiratory      Respiration: 18, Pulse Oximetry: 98 %     Work Of Breathing / Effort: Mild  RUL Breath Sounds: Clear, RML Breath Sounds: Diminished, RLL Breath  Sounds: Diminished, HERNANDEZ Breath Sounds: Clear, LLL Breath Sounds: Diminished    Fluids    Intake/Output Summary (Last 24 hours) at 11/07/18 1615  Last data filed at 11/06/18 2000   Gross per 24 hour   Intake              860 ml   Output             1800 ml   Net             -940 ml       Nutrition  Orders Placed This Encounter   Procedures   • Diet Order Cardiac     Standing Status:   Standing     Number of Occurrences:   1     Order Specific Question:   Diet:     Answer:   Cardiac [6]     Order Specific Question:   Texture/Fiber modifications:     Answer:   Dysphagia 3(Mechanical Soft)specify fluid consistency(question 6) [3]     Order Specific Question:   Consistency/Fluid modifications:     Answer:   Thin Liquids [3]     Physical Exam   Constitutional: He is oriented to person, place, and time. He appears well-developed and well-nourished. No distress.   HENT:   Head: Normocephalic and atraumatic.   Mouth/Throat: Oropharynx is clear and moist.   Eyes: Conjunctivae are normal. Right eye exhibits no discharge.   Neck:   Hd cath cdi   Cardiovascular: Intact distal pulses.  Exam reveals no gallop and no friction rub.    Murmur heard.  Irregular  surg site cdi   Pulmonary/Chest: Effort normal and breath sounds normal. No respiratory distress. He has no wheezes. He has no rales. He exhibits no tenderness.   Abdominal: Soft. Bowel sounds are normal. He exhibits no distension and no mass. There is no tenderness. There is no rebound and no guarding.   Musculoskeletal: Normal range of motion. He exhibits no edema, tenderness or deformity.   Neurological: He is alert and oriented to person, place, and time. He has normal reflexes. No cranial nerve deficit. He exhibits normal muscle tone. Coordination normal.   Skin: Skin is warm and dry. No rash noted. He is not diaphoretic. No erythema. No pallor.   Psychiatric: He has a normal mood and affect. His behavior is normal. Judgment and thought content normal.   Nursing note and  vitals reviewed.      Recent Labs      11/05/18   0240   11/05/18   1554  11/06/18 0243  11/07/18   0308   WBC  7.1   --    --   6.6  7.2   RBC  2.72*   --    --   2.77*  2.80*   HEMOGLOBIN  8.7*   < >  9.0*  8.9*  9.2*   HEMATOCRIT  28.4*   --    --   27.8*  28.5*   MCV  104.4*   --    --   100.4*  101.8*   MCH  32.0   --    --   32.1  32.9   MCHC  30.6*   --    --   32.0*  32.3*   RDW  74.8*   --    --   70.8*  72.2*   PLATELETCT  205   --    --   205  184   MPV  10.0   --    --   9.8  9.8    < > = values in this interval not displayed.     Recent Labs      11/05/18   0240 11/06/18 0243 11/07/18   0308   SODIUM  136  134*  137   POTASSIUM  4.0  4.1  4.1   CHLORIDE  100  97  97   CO2  24  28  31   GLUCOSE  103*  146*  129*   BUN  43*  48*  28*   CREATININE  1.89*  2.36*  1.81*   CALCIUM  9.3  9.4  9.3     Recent Labs      11/06/18 0243 11/07/18   0308   INR  2.94*  3.10*                  Assessment/Plan     * Acute respiratory failure with hypoxia (HCC)   Assessment & Plan    Oxygen to keep sats > 90       AIDA (acute kidney injury) ([facility])   Assessment & Plan    2/2 ATN from pressors post op hypotension.  See above  Nephrology following  HD per nephrology     Thrombocytopenia (HCC)- (present on admission)   Assessment & Plan    Resolved.     S/P TVR (tricuspid valve repair)   Assessment & Plan    Repair 10/16  11/3 Repeat echo ordered.  Echo on 10/16 EF 55% no pericardial effusion seen.  11/5:  Repeat echo with severe TR, moderately dilated RV and right atrium.  Annuloplasty band of tricuspid valve per 10/16 OP note.     S/P MVR (mitral valve repair)   Assessment & Plan    S/p MVR, TVR, aortoplasty 10/16       ETOH abuse- (present on admission)   Assessment & Plan    No s/o w/d  following  Normal LFTs, moderate ascites on us abdomen     Chronic atrial fibrillation (HCC)- (present on admission)   Assessment & Plan    Remains rate controlled  Continue coumadin  Po amiodarone     Essential hypertension, benign-  (present on admission)   Assessment & Plan    Was hypotensive on lasix.  11/3: midodrine 5mg tid added for hypotension with HD.     Bleeding hemorrhoid   Assessment & Plan    Bloody BM on 11/5 after straining, no recurrence   H/h stable  Continue coumadin continued, asa stopped  Continue to follow     Hemodialysis-associated hypotension   Assessment & Plan           Acute urinary retention   Assessment & Plan    resolved     Acute on chronic renal insufficiency   Assessment & Plan    See above     Dyslipidemia- (present on admission)   Assessment & Plan    Cont statin     Abdominal distension   Assessment & Plan    us abdomen with moderate ascites. Likely 3rd spacing.  He is continuing on protein supplements.  Wonder if losing protein.  Order for urine protein wnl.  Per patient, alcohol use 2-4 drinks daily PTA, no h/o alcohol withdrawal, normal LFTs.  Improving with hd     Encephalopathy   Assessment & Plan    Resolved.     Iron deficiency anemia- (present on admission)   Assessment & Plan     iron low, started on iron and ascorbic acid     CKD (chronic kidney disease) stage 3, GFR 30-59 ml/min ([facility])- (present on admission)   Assessment & Plan    Baseline Cr 1.8.     Mixed hyperlipidemia- (present on admission)   Assessment & Plan    Continue lipitor       Quality-Core Measures

## 2018-11-08 NOTE — THERAPY
"Physical Therapy Treatment completed.   Bed Mobility:  Supine to Sit: Maximal Assist (x2)  Transfers: Sit to Stand: Moderate Assist  Gait: Level Of Assist: Unable to Participate   Plan of Care: Plan to complete next treatment by 11/12  Discharge Recommendations: Equipment: Will Continue to Assess for Equipment Needs.   Pt is confused, lethargic today, possibly from meds given last night and not having dialysis yet per nsg. Pt was back on heels with standing at EOB, unsafe to attempt ambulation. Pt will need continued inpt PT and a post acute transitional care stay before home.   See \"Rehab Therapy-Acute\" Patient Summary Report for complete documentation.       "

## 2018-11-09 NOTE — PROGRESS NOTES
Assumed care of patient. Pt is A+O x4 but has confused speech. No chest pain or SOB. No active bleeding noted. Informed of safety and call system. Rhythm is a-fib. Family just left to rest for night, report they will be back in morning. Will continue to ensure patient remains uninjured and will monitor mental status.

## 2018-11-09 NOTE — PROGRESS NOTES
Dr. Alexandre, charge, and ICU staff concluded bedside assessment/orders. Patient is A&Ox4 and resting in bed. Blood pressures still decreased and troponin will be trended. No acute changes at this time. Will continue to monitor vitals signs and LOC.

## 2018-11-09 NOTE — CARE PLAN
Problem: Infection  Goal: Will remain free from infection    Intervention: Assess for removal of potential routes of infection, such as IV, central line, intra-arterial or urinary catheters  Central line is still in use for dialysis. Not able to remove at this time.      Problem: Skin Integrity  Goal: Risk for impaired skin integrity will decrease    Intervention: Implement precautions to protect skin integrity in collaboration with the interdisciplinary team   11/08/18 2000 11/09/18 0000   OTHER   Skin Preventative Measures Waffle Cushion;Gray Foam for Oxygen Tubing;Silicone Oxygen Tubing in Use;Pillows in Use for Support / Positioning;Pillows in Use to Float Heels --    Bed Types Pressure Redistribution Mattress (Atmosair) --    Friction Interventions Draw Sheet / Pad Used for Repositioning --    Moisturizers Barrier Wipes;Barrier Cream --    PT / OT Involved in Care Physical Therapy Involved;Occupational Therapy Involved --    Activity  Bed;Range of motion --    Patient Turns / Repositioning --  Left Side   Assistance / Tolerance for Turning/Repositioning Assistance of Two or More --    Patient is Receiving Nutrition Oral Intake Adequate --    Nutrition Consult Ordered Yes, Consult has been Placed --    Vitamin Therapy in Use No --

## 2018-11-09 NOTE — PROGRESS NOTES
2 RN skin check with Cruz LOWE    Generalized bruising, fragile skin.  Abrasion left anterior scalp  Oxygen in use, obtaining grey foam. Ears pink and blanching  Catheter L neck small amount of bleeding  Incision mid anterior chest healing, no signs of infection  Elbows red, blanching  Bruise RFA (red) and LFA (purple)  Sacrum and scrotum red, blanching  Generalized bruising of legs  Hematoma on R groin  DEWAYNE hose on legs  Heels boggy, pillow in use to float heels    Waffle cushion in place, pillows in use for support and repositioning. Barrier cream present. Will implement Q2hr turns

## 2018-11-09 NOTE — PROGRESS NOTES
0700 Bedside report received. Patient resting with eyes closed. Troponin drawn and sent per night shift rn.    0830 Per Nephrologist, patient to be dialyzed today.    0900 Patient wife updated on condition.      0930 Patient states feels needs dialysis soon, states sob worsening. VSS, including o2 saturation. Monitoring closely. Discussed patient condition with dialysis, state will gather patient now for dialysis.     1005 Transport here for patient, patient remains on telemetry monitor, with oxygen, remains alert and oriented x 4.     1400 Patient back from dialysis, Dialysis nurse states hd stopped per nephrologist after patient states could not see out of right eye. Patient states currently can see minimally out of right eye.     1415 Patient states able to see out of both eyes without issue. Will continue to monitor.     1700 Wife bedside, update given. Patient continues to rest with eyes closed. Remains alert and oriented while awake.    1920 Bedside report to oncoming nurse, patient resting with eyes closed.

## 2018-11-09 NOTE — PROGRESS NOTES
[facility] Hospitalist Progress Note    Date of Service: 2018    Chief Complaint  78 y.o. male admitted 10/16/2018 with severe MR, TR and moderated AS who underwent a   TRV, MVR and Aortic valvuloplasty,and went into PEA following the procedure and then developed acute on chronic renal failure    Interval Problem Update  HD today, confusion continues to clear  axox4  He denies lightheadedness or dizziness despite mild hypotension, chronic r shoulder pain stable, ros otherwise negative    Consultants/Specialty  Nephrology    Disposition  To snf when cleared by nephrology        Review of Systems   Constitutional: Negative.  Negative for chills, diaphoresis, fever, malaise/fatigue and weight loss.   HENT: Negative.  Negative for sore throat.    Eyes: Negative.  Negative for blurred vision.   Respiratory: Negative.  Negative for cough and shortness of breath.    Cardiovascular: Negative.  Negative for chest pain, palpitations and leg swelling.   Gastrointestinal: Negative.  Negative for abdominal pain, nausea and vomiting.   Genitourinary: Negative.  Negative for dysuria.   Musculoskeletal: Positive for joint pain. Negative for myalgias.   Skin: Negative.  Negative for itching and rash.   Neurological: Negative.  Negative for dizziness, focal weakness, weakness and headaches.   Endo/Heme/Allergies: Negative.  Does not bruise/bleed easily.   Psychiatric/Behavioral: Negative.  Negative for depression, substance abuse and suicidal ideas.   All other systems reviewed and are negative.     Physical Exam  Laboratory/Imaging   Hemodynamics  Temp (24hrs), Av.1 °C (97 °F), Min:35.9 °C (96.7 °F), Max:36.3 °C (97.4 °F)   Temperature: 36.2 °C (97.1 °F)  Pulse  Av.9  Min: 3  Max: 126    Blood Pressure : (!) 91/62     Respiratory      Respiration: 16, Pulse Oximetry: 93 %, O2 Daily Delivery Respiratory : Silicone Nasal Cannula     PEP/CPT Method: Positive Airway Pressure Device, Work Of Breathing / Effort: Mild  RUL Breath  Sounds: Rhonchi, RML Breath Sounds: Rhonchi, RLL Breath Sounds: Diminished, HERNANDEZ Breath Sounds: Rhonchi, LLL Breath Sounds: Diminished    Fluids    Intake/Output Summary (Last 24 hours) at 11/09/18 1255  Last data filed at 11/09/18 0100   Gross per 24 hour   Intake              270 ml   Output                0 ml   Net              270 ml       Nutrition  Orders Placed This Encounter   Procedures   • Diet Order Cardiac     Standing Status:   Standing     Number of Occurrences:   1     Order Specific Question:   Diet:     Answer:   Cardiac [6]     Order Specific Question:   Texture/Fiber modifications:     Answer:   Dysphagia 2(Pureed/Chopped)specify fluid consistency(question 6) [2]     Order Specific Question:   Consistency/Fluid modifications:     Answer:   Thin Liquids [3]     Physical Exam   Constitutional: He is oriented to person, place, and time. He appears well-developed and well-nourished. No distress.   HENT:   Head: Normocephalic and atraumatic.   Mouth/Throat: Oropharynx is clear and moist.   Eyes: Conjunctivae are normal. Right eye exhibits no discharge.   Neck:   Hd cath cdi   Cardiovascular: Intact distal pulses.  Exam reveals no gallop and no friction rub.    Murmur heard.  Irregular  surg site cdi   Pulmonary/Chest: Effort normal and breath sounds normal. No respiratory distress. He has no wheezes. He has no rales. He exhibits no tenderness.   Abdominal: Soft. Bowel sounds are normal. He exhibits no distension and no mass. There is no tenderness. There is no rebound and no guarding.   Musculoskeletal: Normal range of motion. He exhibits no edema, tenderness or deformity.   Neurological: He is alert and oriented to person, place, and time. He has normal reflexes. No cranial nerve deficit. He exhibits normal muscle tone. Coordination normal.   Skin: Skin is warm and dry. No rash noted. He is not diaphoretic. No erythema. No pallor.   Psychiatric: He has a normal mood and affect. His behavior is normal.  Judgment and thought content normal.   Nursing note and vitals reviewed.      Recent Labs      11/07/18 0308 11/08/18 0250 11/09/18 0239   WBC  7.2  8.4  7.1   RBC  2.80*  2.75*  2.73*   HEMOGLOBIN  9.2*  9.0*  8.9*   HEMATOCRIT  28.5*  27.7*  27.7*   MCV  101.8*  100.7*  101.5*   MCH  32.9  32.7  32.6   MCHC  32.3*  32.5*  32.1*   RDW  72.2*  72.3*  71.5*   PLATELETCT  184  183  159*   MPV  9.8  10.2  10.5     Recent Labs      11/07/18 0308 11/08/18 0250 11/09/18 0239   SODIUM  137  137  135   POTASSIUM  4.1  4.0  3.8   CHLORIDE  97  97  97   CO2  31  29  29   GLUCOSE  129*  132*  125*   BUN  28*  35*  39*   CREATININE  1.81*  2.33*  2.78*   CALCIUM  9.3  9.5  8.8     Recent Labs      11/07/18 0308 11/08/18 0250 11/09/18 0239   INR  3.10*  3.06*  2.75*                  Assessment/Plan     * Acute respiratory failure with hypoxia (HCC)   Assessment & Plan    Oxygen to keep sats > 90       AIDA (acute kidney injury) (HCC)   Assessment & Plan    2/2 ATN from pressors post op hypotension.  See above  Nephrology following  HD per nephrology     Thrombocytopenia (HCC)- (present on admission)   Assessment & Plan    Resolved.     S/P TVR (tricuspid valve repair)   Assessment & Plan    Repair 10/16  11/3 Repeat echo ordered.  Echo on 10/16 EF 55% no pericardial effusion seen.  11/5:  Repeat echo with severe TR, moderately dilated RV and right atrium.  Annuloplasty band of tricuspid valve per 10/16 OP note.     S/P MVR (mitral valve repair)   Assessment & Plan    S/p MVR, TVR, aortoplasty 10/16       ETOH abuse- (present on admission)   Assessment & Plan    No s/o w/d  Continue to follow  Normal LFTs, moderate ascites on us abdomen     Chronic atrial fibrillation (HCC)- (present on admission)   Assessment & Plan    Remains rate controlled  Coumadin held for HD cath - heparin bridge planned when inr less than 1.5, currently > 3  Continue oral amiodarone     Essential hypertension, benign- (present on  admission)   Assessment & Plan    Intermittent hypotension  No associated sx  Continue to follow  11/3: midodrine 5mg tid added for hypotension with HD.     Bleeding hemorrhoid   Assessment & Plan    Bloody BM on 11/5 after straining, no recurrence   H/h stable  asa stopped  Continue to follow     Hemodialysis-associated hypotension   Assessment & Plan           Acute urinary retention   Assessment & Plan    resolved     Acute on chronic renal insufficiency   Assessment & Plan    See above     Dyslipidemia- (present on admission)   Assessment & Plan    Cont statin     Abdominal distension   Assessment & Plan    us abdomen with moderate ascites. Likely 3rd spacing.  He is continuing on protein supplements.  Wonder if losing protein.  Order for urine protein wnl.  Per patient, alcohol use 2-4 drinks daily PTA, no h/o alcohol withdrawal, normal LFTs.  Improving with HD  following     Encephalopathy   Assessment & Plan    Resolved.     Iron deficiency anemia- (present on admission)   Assessment & Plan     iron low, started on iron and ascorbic acid     CKD (chronic kidney disease) stage 3, GFR 30-59 ml/min ([facility])- (present on admission)   Assessment & Plan    Baseline Cr 1.8.     Mixed hyperlipidemia- (present on admission)   Assessment & Plan    Continue lipitor       Quality-Core Measures

## 2018-11-09 NOTE — PROGRESS NOTES
[facility] Nephrology Progress Note    Date of Service  11/9/2018     Author Mare Flores M.D.    Chief Complaint  Follow up AIDA    Interval Problem Update  77 YO male with history notable for CHF, valvular disease and CKD presented for planned cardiothoracic surgery in the setting of valvular disease.   Baseline cr appears to be ~1.8 most recently without proteinuria or active urinary sediment. Underwent MVR, tricuspid valve repar, aortic valve reparr and TITI ligation on 10/16. S/P resp arrest post extubation on POD#1. Continued on pressors until POD#3. Attempted diuresis over past 72 hours with sport doses of 20-40mg lasix. UOP down trending  Form >1L on 10/19 to 300cc over last 24 hours.  Patient requires 40 mg Lasix and metolazone at home.  Patient is currently unable to answer questioning in the setting of critical illness, wife provides history.  She notes that yesterday he became more lethargic and has been slowly out of it this morning.  She does not think he has had significant chest pain or shortness of breath.  He has not been complaining about abdominal pain.  Intermittent low blood pressures over the last 48 hours.     DAILY NEPHROLOGY SUMMARY:  10/23: consult done  10/24: ~1L UOP after 1 dose of lasix, 2nd dose held for hypotension  10/25: Net -1.1 L.  Tired this morning working with physical therapy yesterday  10/26: no acute events. -700cc. Walking around. Ongoing chest wall pain.  10/27: No events, Cr improved but BUN slightly higher, good UOP, pt feels tired  10/28: No events, good UOP, Cr slowly trending down but BUN trending up, BP stable, appetite a little better this am, abd still distended and abd xray shows no obstruction, right leg edema worse  10/29: no acute events, walking more. Ongoing chest pain.  10/30: No acute events, still complaining of pain all over, not being very active  10/31: No acute events, trying to work more with physical therapy, still with generalized pain  11/01:  Ongoing chest wall pain but improving being more active.  Net -840 cc by ins and outs.  Abdominal ultrasound with mild ascites  11/02: Trying to be more active with physical therapy, still weak, still with chest wall pain  11/03: s/p vasc cath placement. Received dialysis, unable to tolerate UF. Seen on dialysis today  11/04: Received dialysis, felt unwell during the procedure,net -1 L.  Ongoing hypotension midorine started.  Echocardiogram delayed  11/05: RAYO, wife at bedside, feels a bit better; PM lasix held this AM due to SBP < 90  11/06: NAEO, labs worsened off dialysis  11/07: NAEO, no complaints, doing well, 1.8L UF with iHD yesterday, edema improved  11/08: NAEO, having increased secretions this AM, wife not at bedside  11/09: NAEO, complains of chest pain this AM, labs worsened    Review of Systems  Review of Systems   Constitutional: Positive for malaise/fatigue. Negative for chills and fever.   HENT: Negative.    Eyes: Negative.    Respiratory: Negative for cough, hemoptysis, sputum production and shortness of breath.    Cardiovascular: Positive for chest pain and leg swelling. Negative for palpitations.   Gastrointestinal: Positive for abdominal pain. Negative for constipation, diarrhea and vomiting.   Genitourinary: Negative.    Musculoskeletal: Negative.    Skin: Negative.    Neurological: Positive for weakness. Negative for dizziness, focal weakness and loss of consciousness.   Endo/Heme/Allergies: Negative.    Psychiatric/Behavioral: Negative.         Physical Exam  Temp:  [35.9 °C (96.7 °F)-36.5 °C (97.7 °F)] 36.2 °C (97.1 °F)  Pulse:  [70-89] 76  Resp:  [16-24] 16  BP: ()/(57-68) 91/62    Physical Exam   Constitutional: He is oriented to person, place, and time. He does not have a sickly appearance. No distress.   Ill appearing   HENT:   Head: Normocephalic and atraumatic.   Mouth/Throat: No oropharyngeal exudate.   Eyes: Pupils are equal, round, and reactive to light. Conjunctivae and EOM  are normal. No scleral icterus.   Neck: Normal range of motion. Neck supple. No tracheal deviation present. No thyromegaly present.   Cardiovascular: Normal rate and regular rhythm.    Murmur heard.  Pulmonary/Chest: Effort normal. No respiratory distress. He has no wheezes. He has rales.   Abdominal: He exhibits no distension. There is no tenderness.   Musculoskeletal: Normal range of motion. He exhibits edema. He exhibits no tenderness.   Neurological: He is alert and oriented to person, place, and time.   Skin: Skin is warm and dry.   Psychiatric: He has a normal mood and affect. His behavior is normal.   Nursing note and vitals reviewed.      Fluids    Intake/Output Summary (Last 24 hours) at 11/09/18 0951  Last data filed at 11/09/18 0100   Gross per 24 hour   Intake              270 ml   Output                0 ml   Net              270 ml       Laboratory  Recent Labs      11/07/18 0308 11/08/18 0250 11/09/18 0239   WBC  7.2  8.4  7.1   RBC  2.80*  2.75*  2.73*   HEMOGLOBIN  9.2*  9.0*  8.9*   HEMATOCRIT  28.5*  27.7*  27.7*   MCV  101.8*  100.7*  101.5*   MCH  32.9  32.7  32.6   MCHC  32.3*  32.5*  32.1*   RDW  72.2*  72.3*  71.5*   PLATELETCT  184  183  159*   MPV  9.8  10.2  10.5     Recent Labs      11/07/18 0308 11/08/18 0250 11/09/18 0239   SODIUM  137  137  135   POTASSIUM  4.1  4.0  3.8   CHLORIDE  97  97  97   CO2  31  29  29   GLUCOSE  129*  132*  125*   BUN  28*  35*  39*   CREATININE  1.81*  2.33*  2.78*   CALCIUM  9.3  9.5  8.8     Recent Labs      11/07/18 0308 11/08/18 0250  11/09/18   0239   INR  3.10*  3.06*  2.75*               Imaging    ASSESSMENT:  # AIDA    * Baseline Cr~1.8    * Likely 2/2 hemodynamic compromise/tubular injury    * Cr improved to around 2.7 but BUN remained significantly elevated    * 24-hour urine studies (possibly slight under collection) demonstrate a creatinine clearance of 9 cc/min    * RRT initiated 11/2 via a temporary Vas-Cath (INR precluded  tunneled line placement)    * PermCath needed, but his need for coumadin due to valve replacement prevents abrupt stopping of coumadin, will need to be bridged with heparin  # CKD Stage 3; non-proteinuric    * Has never been followed by nephrology    * Based on GFR with 24-hour clearance eGFR likely over estimates his renal fxn  # Acidosis    * Resolved  # Hypervolemia  # Valvular heart disease s/p MVR, tricuspid valve repar, aortic valve repair and TITI ligation  # Anemia: acute blood loss component  # PEA arrest  # Ascites  # Hyponatremia    * Resolved     PLAN:  -MWF iHD  -UF as tolerated  -IV albumin for BP support  -D/C lasix, no UOP being made  -Hold coumadin for PermCath changeout when INR < 1.5  -Heparin gtt to cross cover until time of procedure due to AVR  -Continue midodrine  -Renal diet  -Strict I/Os  -Dose all meds per eGFR < 15

## 2018-11-09 NOTE — PROGRESS NOTES
HEMODIALYSIS NOTES:     HD today x 3.5 hours per Dr. Flores . Initiated at 1027 and ended at 1300. Terminated treatment 1 hour prior to end.  Patient on low BP prior to start dialysis. Albumin 25% 12.5 g x 2 doses. No effect on BP, still on low status. Patient alert and oriented x 4. Complained of R rib cage pain 5/10. Explained to patient that pain medication is not possible due to low BP. Patient understood. See paper flowsheet for details.    UF Net: 710 mL    1242: Patient complained sudden blurring of vision to none on Right eye. Blood pressure checked. 93/46 mmHg. UF off. As verbalized by the patient, he can see good on both eyes then sudden blurring happened. Assessment done on both eyes.    Notified Dr. Flores regarding the blurry of vision. Ordered to terminate the treatment.    This RN gave report to Mariama LOWE and as per Dr. Flores, let the RN inform the hospitalist regarding the patient's concern on loss of eyesight (Right eye).    L IJ non tunneled intact and patent with good flow during dialysis. No s/sx of infection, no redness nor bleeding noted on the CVC site. CVC dressing clean, dry and intact. Blood returned and CVC port flushed with NS and Heparin 1000 units/mL used  to lock catheter given per designated amount. CVC port clamped and capped.     Report given to ASHLEY Cerspo RN.

## 2018-11-09 NOTE — PROGRESS NOTES
Confused speech has stopped. Patient is aware of surroundings and answers questions appropriately.

## 2018-11-09 NOTE — PROGRESS NOTES
Called rounding ICU nurses and MD, Dr. Gu, in addition to charge to report chest pain. Notified cardiac surgical team, was told to follow-up with on call cardiologist. Received order from Dr. Melvin, on call cardiologist, to administer IV amiodarone 150 mg despite BP of 97/57 and trending down to high SBP high 80s at most recent check.

## 2018-11-10 NOTE — PROGRESS NOTES
Assumed care of patient. Pt is A+O x4. No chest pain or SOB. No active bleeding noted. Informed of safety and call system. rhythm is a-fib. Will continue to monitor. Hourly rounding initiated.

## 2018-11-10 NOTE — CARE PLAN
Problem: Skin Integrity  Goal: Risk for impaired skin integrity will decrease    Intervention: Implement precautions to protect skin integrity in collaboration with the interdisciplinary team   11/09/18 2000 11/09/18 2200 11/09/18 2330   OTHER   Skin Preventative Measures --  --  Pillows in Use for Support / Positioning;Waffle Cushion;Pillows in Use to Float Heels   Bed Types Pressure Redistribution Mattress (Atmosair) --  --    Friction Interventions Draw Sheet / Pad Used for Repositioning --  --    Moisturizers Barrier Wipes --  --    PT / OT Involved in Care Physical Therapy Involved;Occupational Therapy Involved --  --    Activity  Bed --  --    Patient Turns / Repositioning --  --  --    Assistance / Tolerance for Turning/Repositioning --  Assistance of Two or More --    Patient is Receiving Nutrition Oral Intake Adequate --  --    Nutrition Consult Ordered Yes, Consult has been Placed --  --    Vitamin Therapy in Use No --  --     11/10/18 0200   OTHER   Skin Preventative Measures --    Bed Types --    Friction Interventions --    Moisturizers --    PT / OT Involved in Care --    Activity  --    Patient Turns / Repositioning Supine   Assistance / Tolerance for Turning/Repositioning --    Patient is Receiving Nutrition --    Nutrition Consult Ordered --    Vitamin Therapy in Use --          Problem: Mobility  Goal: Risk for activity intolerance will decrease    Intervention: Encourage patient to increase activity level in collaboration with Interdisciplinary Team  Patient is encouraged to turn self as much as possible within bed, is beginning to be able to turn self from side-to-side with some assistance.

## 2018-11-10 NOTE — PROGRESS NOTES
[facility] Nephrology Progress Note    Date of Service  11/10/2018     Author AZAM Lynn.    Chief Complaint  Follow up AIDA    Interval Problem Update  79 YO male with history notable for CHF, valvular disease and CKD presented for planned cardiothoracic surgery in the setting of valvular disease.   Baseline cr appears to be ~1.8 most recently without proteinuria or active urinary sediment. Underwent MVR, tricuspid valve repar, aortic valve reparr and TITI ligation on 10/16. S/P resp arrest post extubation on POD#1. Continued on pressors until POD#3. Attempted diuresis over past 72 hours with sport doses of 20-40mg lasix. UOP down trending  Form >1L on 10/19 to 300cc over last 24 hours.  Patient requires 40 mg Lasix and metolazone at home.  Patient is currently unable to answer questioning in the setting of critical illness, wife provides history.  She notes that yesterday he became more lethargic and has been slowly out of it this morning.  She does not think he has had significant chest pain or shortness of breath.  He has not been complaining about abdominal pain.  Intermittent low blood pressures over the last 48 hours.     DAILY NEPHROLOGY SUMMARY:  10/23: consult done  10/24: ~1L UOP after 1 dose of lasix, 2nd dose held for hypotension  10/25: Net -1.1 L.  Tired this morning working with physical therapy yesterday  10/26: no acute events. -700cc. Walking around. Ongoing chest wall pain.  10/27: No events, Cr improved but BUN slightly higher, good UOP, pt feels tired  10/28: No events, good UOP, Cr slowly trending down but BUN trending up, BP stable, appetite a little better this am, abd still distended and abd xray shows no obstruction, right leg edema worse  10/29: no acute events, walking more. Ongoing chest pain.  10/30: No acute events, still complaining of pain all over, not being very active  10/31: No acute events, trying to work more with physical therapy, still with generalized  pain  11/01: Ongoing chest wall pain but improving being more active.  Net -840 cc by ins and outs.  Abdominal ultrasound with mild ascites  11/02: Trying to be more active with physical therapy, still weak, still with chest wall pain  11/03: s/p vasc cath placement. Received dialysis, unable to tolerate UF. Seen on dialysis today  11/04: Received dialysis, felt unwell during the procedure,net -1 L.  Ongoing hypotension midorine started.  Echocardiogram delayed  11/05: SERG, wife at bedside, feels a bit better; PM lasix held this AM due to SBP < 90  11/06: NAEO, labs worsened off dialysis  11/07: NAEO, no complaints, doing well, 1.8L UF with iHD yesterday, edema improved  11/08: GISSELLEO, having increased secretions this AM, wife not at bedside  11/09: NAEO, complains of chest pain this AM, labs worsened  11/10/18: Sitting up in bed eating breakfast, feels improved, wants to sit up at edge of bed, still with thick secretions, HD yesterday (1.3 off), reports small void this AM    Review of Systems  Review of Systems   Constitutional: Negative for chills and fever.   HENT: Negative.    Eyes: Negative.    Respiratory: Positive for sputum production. Negative for cough, hemoptysis and shortness of breath.    Cardiovascular: Negative for palpitations.   Gastrointestinal: Negative for constipation, diarrhea and vomiting.   Genitourinary: Negative.    Musculoskeletal: Negative.    Skin: Negative.    Neurological: Positive for weakness. Negative for dizziness, focal weakness and loss of consciousness.   Endo/Heme/Allergies: Negative.    Psychiatric/Behavioral: Negative.         Physical Exam  Temp:  [36.1 °C (97 °F)-36.6 °C (97.9 °F)] 36.6 °C (97.9 °F)  Pulse:  [63-89] 89  Resp:  [16-20] 16  BP: ()/(55-66) 95/66    Physical Exam   Constitutional: He is oriented to person, place, and time. He does not have a sickly appearance. No distress.   Ill appearing   HENT:   Head: Normocephalic and atraumatic.   Mouth/Throat: No  oropharyngeal exudate.   Eyes: Pupils are equal, round, and reactive to light. Conjunctivae and EOM are normal. No scleral icterus.   Neck: Normal range of motion. Neck supple. No tracheal deviation present. No thyromegaly present.   Cardiovascular: Normal rate and regular rhythm.    Murmur heard.  Pulmonary/Chest: Effort normal. No respiratory distress. He has no wheezes. He has rales.   Abdominal: He exhibits no distension. There is no tenderness.   Musculoskeletal: Normal range of motion. He exhibits no tenderness.   Neurological: He is alert and oriented to person, place, and time.   Skin: Skin is warm and dry.   Psychiatric: He has a normal mood and affect. His behavior is normal.   Nursing note and vitals reviewed.      Fluids    Intake/Output Summary (Last 24 hours) at 11/10/18 0920  Last data filed at 11/10/18 0700   Gross per 24 hour   Intake             1060 ml   Output             1450 ml   Net             -390 ml       Laboratory  Recent Labs      11/08/18   0250  11/09/18   0239  11/10/18   0315   WBC  8.4  7.1  6.3   RBC  2.75*  2.73*  2.62*   HEMOGLOBIN  9.0*  8.9*  8.5*   HEMATOCRIT  27.7*  27.7*  27.2*   MCV  100.7*  101.5*  103.8*   MCH  32.7  32.6  32.4   MCHC  32.5*  32.1*  31.3*   RDW  72.3*  71.5*  75.4*   PLATELETCT  183  159*  147*   MPV  10.2  10.5  10.4     Recent Labs      11/08/18   0250  11/09/18   0239  11/10/18   0315   SODIUM  137  135  138   POTASSIUM  4.0  3.8  3.9   CHLORIDE  97  97  99   CO2  29  29  30   GLUCOSE  132*  125*  117*   BUN  35*  39*  27*   CREATININE  2.33*  2.78*  2.29*   CALCIUM  9.5  8.8  9.0     Recent Labs      11/08/18 0250  11/09/18   0239  11/10/18   0315   INR  3.06*  2.75*  2.99*               Imaging    ASSESSMENT:  # AIDA    * Baseline Cr~1.8    * Likely 2/2 hemodynamic compromise/tubular injury    * Cr improved to around 2.7 but BUN remained significantly elevated    * 24-hour urine studies (possibly slight under collection) demonstrate a creatinine  clearance of 9 cc/min    * RRT initiated 11/2 via a temporary Vas-Cath (INR precluded tunneled line placement)    * PermCath needed, but his need for coumadin due to valve replacement prevents abrupt stopping of coumadin, will need to be bridged with heparin  # CKD Stage 3; non-proteinuric    * Has never been followed by Nephrology    * Based on GFR with 24-hour clearance eGFR likely over-estimates his renal fxn  # Acidosis    * Resolved  # Hypervolemia  # Valvular heart disease s/p MVR, tricuspid valve repar, aortic valve repair and TITI ligation  # Anemia: acute blood loss component  # PEA arrest  # Ascites  # Hyponatremia    * Resolved  # Hypotension      PLAN:  -MWF iHD, next tx Monday   -UF as tolerated  -IV albumin for BP support  -Lasix stopped   -Hold coumadin for PermCath changeout when INR < 1.5 (currently 2.99)  -Heparin gtt to cross cover until time of procedure due to AVR  -Continue midodrine  -Renal diet  -Strict I/Os  -Dose all meds per eGFR < 15

## 2018-11-10 NOTE — PROGRESS NOTES
[facility]ist Progress Note    Date of Service: 11/10/2018    Chief Complaint  78 y.o. male admitted 10/16/2018 with severe MR, TR and moderated AS who underwent a   TRV, MVR and Aortic valvuloplasty,and went into PEA following the procedure and then developed acute on chronic renal failure    Interval Problem Update  Axox4, no evidence of confusion this am  Denies pain or sob  Ros otherwise negative    Consultants/Specialty  Nephrology    Disposition  To snf when cleared by nephrology        Review of Systems   Constitutional: Negative.  Negative for chills, diaphoresis, fever, malaise/fatigue and weight loss.   HENT: Negative.  Negative for sore throat.    Eyes: Negative.  Negative for blurred vision.   Respiratory: Negative.  Negative for cough and shortness of breath.    Cardiovascular: Negative.  Negative for chest pain, palpitations and leg swelling.   Gastrointestinal: Negative.  Negative for abdominal pain, nausea and vomiting.   Genitourinary: Negative.  Negative for dysuria.   Musculoskeletal: Negative for joint pain and myalgias.   Skin: Negative.  Negative for itching and rash.   Neurological: Negative.  Negative for dizziness, focal weakness, weakness and headaches.   Endo/Heme/Allergies: Negative.  Does not bruise/bleed easily.   Psychiatric/Behavioral: Negative.  Negative for depression, substance abuse and suicidal ideas.   All other systems reviewed and are negative.     Physical Exam  Laboratory/Imaging   Hemodynamics  Temp (24hrs), Av.4 °C (97.5 °F), Min:36.2 °C (97.1 °F), Max:36.6 °C (97.9 °F)   Temperature: 36.4 °C (97.6 °F)  Pulse  Av.8  Min: 3  Max: 126    Blood Pressure : 107/63     Respiratory      Respiration: 18, Pulse Oximetry: 93 %, O2 Daily Delivery Respiratory : Silicone Nasal Cannula     PEP/CPT Method: Positive Airway Pressure Device, Work Of Breathing / Effort: Mild  RUL Breath Sounds: Clear, RML Breath Sounds: Clear, RLL Breath Sounds: Diminished, HERNANDEZ Breath Sounds:  Clear, LLL Breath Sounds: Diminished    Fluids    Intake/Output Summary (Last 24 hours) at 11/10/18 1501  Last data filed at 11/10/18 1000   Gross per 24 hour   Intake              680 ml   Output              150 ml   Net              530 ml       Nutrition  Orders Placed This Encounter   Procedures   • Diet Order Cardiac     Standing Status:   Standing     Number of Occurrences:   1     Order Specific Question:   Diet:     Answer:   Cardiac [6]     Order Specific Question:   Texture/Fiber modifications:     Answer:   Dysphagia 2(Pureed/Chopped)specify fluid consistency(question 6) [2]     Order Specific Question:   Consistency/Fluid modifications:     Answer:   Thin Liquids [3]     Physical Exam   Constitutional: He is oriented to person, place, and time. He appears well-developed and well-nourished. No distress.   HENT:   Head: Normocephalic and atraumatic.   Mouth/Throat: Oropharynx is clear and moist.   Eyes: Conjunctivae are normal. Right eye exhibits no discharge.   Neck:   Hd cath cdi   Cardiovascular: Intact distal pulses.  Exam reveals no gallop and no friction rub.    Murmur heard.  Irregular  surg site cdi   Pulmonary/Chest: Effort normal and breath sounds normal. No respiratory distress. He has no wheezes. He has no rales. He exhibits no tenderness.   Abdominal: Soft. Bowel sounds are normal. He exhibits no distension and no mass. There is no tenderness. There is no rebound and no guarding.   Musculoskeletal: Normal range of motion. He exhibits no edema, tenderness or deformity.   Neurological: He is alert and oriented to person, place, and time. He has normal reflexes. No cranial nerve deficit. He exhibits normal muscle tone. Coordination normal.   Skin: Skin is warm and dry. No rash noted. He is not diaphoretic. No erythema. No pallor.   Psychiatric: He has a normal mood and affect. His behavior is normal. Judgment and thought content normal.   Nursing note and vitals reviewed.      Recent Labs       11/08/18   0250  11/09/18   0239  11/10/18   0315   WBC  8.4  7.1  6.3   RBC  2.75*  2.73*  2.62*   HEMOGLOBIN  9.0*  8.9*  8.5*   HEMATOCRIT  27.7*  27.7*  27.2*   MCV  100.7*  101.5*  103.8*   MCH  32.7  32.6  32.4   MCHC  32.5*  32.1*  31.3*   RDW  72.3*  71.5*  75.4*   PLATELETCT  183  159*  147*   MPV  10.2  10.5  10.4     Recent Labs      11/08/18   0250  11/09/18   0239  11/10/18   0315   SODIUM  137  135  138   POTASSIUM  4.0  3.8  3.9   CHLORIDE  97  97  99   CO2  29  29  30   GLUCOSE  132*  125*  117*   BUN  35*  39*  27*   CREATININE  2.33*  2.78*  2.29*   CALCIUM  9.5  8.8  9.0     Recent Labs      11/08/18   0250  11/09/18   0239  11/10/18   0315   INR  3.06*  2.75*  2.99*                  Assessment/Plan     * Acute respiratory failure with hypoxia (HCC)   Assessment & Plan    Oxygen to keep sats > 90       AIDA (acute kidney injury) ([facility])   Assessment & Plan    2/2 ATN from pressors post op hypotension.  See above  Nephrology following  HD per nephrology     Thrombocytopenia (HCC)- (present on admission)   Assessment & Plan    Resolved.     S/P TVR (tricuspid valve repair)   Assessment & Plan    Repair 10/16  11/3 Repeat echo ordered.  Echo on 10/16 EF 55% no pericardial effusion seen.  11/5:  Repeat echo with severe TR, moderately dilated RV and right atrium.  Annuloplasty band of tricuspid valve per 10/16 OP note.     S/P MVR (mitral valve repair)   Assessment & Plan    S/p MVR, TVR, aortoplasty 10/16       ETOH abuse- (present on admission)   Assessment & Plan    No s/o w/d  following  Normal LFTs, moderate ascites on us abdomen     Chronic atrial fibrillation (HCC)- (present on admission)   Assessment & Plan    Remains rate controlled  Coumadin held for HD cath - heparin bridge planned when inr less than 1.5, currently 3.0  Continue oral amiodarone     Essential hypertension, benign- (present on admission)   Assessment & Plan    Intermittent hypotension  No associated sx  Continue to  follow  continue midodrine for hypotension with HD.     Bleeding hemorrhoid   Assessment & Plan    Bloody BM on 11/5 after straining, no recurrence   H/h increasing  asa stopped  Continue to follow     Hemodialysis-associated hypotension   Assessment & Plan           Acute urinary retention   Assessment & Plan    resolved     Acute on chronic renal insufficiency   Assessment & Plan    See above     Dyslipidemia- (present on admission)   Assessment & Plan    Cont statin     Abdominal distension   Assessment & Plan    us abdomen with moderate ascites. Likely 3rd spacing.  He is continuing on protein supplements.  Wonder if losing protein.  Order for urine protein wnl.  Per patient, alcohol use 2-4 drinks daily PTA, no h/o alcohol withdrawal, normal LFTs.  Continues to improve with HD  following     Encephalopathy   Assessment & Plan    Resolved.     Iron deficiency anemia- (present on admission)   Assessment & Plan     iron low, started on iron and ascorbic acid     CKD (chronic kidney disease) stage 3, GFR 30-59 ml/min ([facility])- (present on admission)   Assessment & Plan    Baseline Cr 1.8.     Mixed hyperlipidemia- (present on admission)   Assessment & Plan    Continue lipitor       Quality-Core Measures

## 2018-11-10 NOTE — PROGRESS NOTES
2 RN skin check with Cruz LOWE     Generalized bruising, fragile skin.  Abrasion left anterior scalp  Oxygen in use, grey foam present for ears. Ears pink and blanching  Catheter L neck small amount of bleeding  Incision mid anterior chest healing, no signs of infection. 2 scabbed sites of prior chest tubes.  Elbows red, blanching. Left arm presents unilateral swelling.  Bruise RFA (red) and LFA (purple)  Sacrum and scrotum red, blanching  Generalized bruising of legs. Abrasion on left shin.  Hematoma on R groin  DEWAYNE hose on legs  Heels boggy, pillow in use to float heels     Waffle cushion in place, pillows in use for support and repositioning. Barrier cream present. Will implement Q2hr turns

## 2018-11-11 NOTE — PROGRESS NOTES
2 RN skin check with Kylie LOWE     Generalized bruising, fragile skin.  Abrasion left anterior scalp  Oxygen in use, grey foam present for ears. Ears pink and blanching  Catheter L neck no new signs of bleeding.  Incision mid anterior chest healing, no signs of infection. 2 scabbed sites of prior chest tubes.  Elbows red, blanching. Left arm presents unilateral swelling.  Bruise RFA (red) and LFA (purple)  Sacrum red and slow to taylor. Scrotum red, blanching.  Generalized bruising of legs. Abrasion on left shin.  Hematoma on R groin  DEWAYNE hose on legs. Anterior shins showing redness.  Heels boggy, pillow in use to float heels     Waffle cushion in place, pillows in use for support and repositioning. Barrier cream present. Will continue to implement Q2hr turns. Turned supine to right side to start shift.

## 2018-11-11 NOTE — PROGRESS NOTES
[facility]    Date of Service: 2018    Chief Complaint  78 y.o. male admitted 10/16/2018 with severe MR, TR and moderated AS who underwent a   TRV, MVR and Aortic valvuloplasty,and went into PEA following the procedure and then developed acute on chronic renal failure    Interval Problem Update  Mild chronic r shoulder pain  Otherwise doing well, denies other pain, no sob  axox4  Ros otherwise negative, wife at bedside    Consultants/Specialty  Nephrology    Disposition  To snf when cleared by nephrology        Review of Systems   Constitutional: Negative.  Negative for chills, diaphoresis, fever, malaise/fatigue and weight loss.   HENT: Negative.  Negative for sore throat.    Eyes: Negative.  Negative for blurred vision.   Respiratory: Negative.  Negative for cough and shortness of breath.    Cardiovascular: Negative.  Negative for chest pain, palpitations and leg swelling.   Gastrointestinal: Negative.  Negative for abdominal pain, nausea and vomiting.   Genitourinary: Negative.  Negative for dysuria.   Musculoskeletal: Negative for joint pain and myalgias.   Skin: Negative.  Negative for itching and rash.   Neurological: Negative.  Negative for dizziness, focal weakness, weakness and headaches.   Endo/Heme/Allergies: Negative.  Does not bruise/bleed easily.   Psychiatric/Behavioral: Negative.  Negative for depression, substance abuse and suicidal ideas.   All other systems reviewed and are negative.     Physical Exam  Laboratory/Imaging   Hemodynamics  Temp (24hrs), Av.2 °C (97.2 °F), Min:35.9 °C (96.7 °F), Max:36.5 °C (97.7 °F)   Temperature: 35.9 °C (96.7 °F)  Pulse  Av.7  Min: 3  Max: 126    Blood Pressure : (!) 98/52     Respiratory      Respiration: 20, Pulse Oximetry: 94 %, O2 Daily Delivery Respiratory : Silicone Nasal Cannula     PEP/CPT Method: Positive Airway Pressure Device, Work Of Breathing / Effort: Mild  RUL Breath Sounds: Clear, RML Breath Sounds: Clear, RLL  Breath Sounds: Diminished, HERNANDEZ Breath Sounds: Clear, LLL Breath Sounds: Diminished    Fluids    Intake/Output Summary (Last 24 hours) at 11/11/18 1528  Last data filed at 11/10/18 2200   Gross per 24 hour   Intake              100 ml   Output              100 ml   Net                0 ml       Nutrition  Orders Placed This Encounter   Procedures   • Diet Order Cardiac     Standing Status:   Standing     Number of Occurrences:   1     Order Specific Question:   Diet:     Answer:   Cardiac [6]     Order Specific Question:   Texture/Fiber modifications:     Answer:   Dysphagia 2(Pureed/Chopped)specify fluid consistency(question 6) [2]     Order Specific Question:   Consistency/Fluid modifications:     Answer:   Thin Liquids [3]     Physical Exam   Constitutional: He is oriented to person, place, and time. He appears well-developed and well-nourished. No distress.   HENT:   Head: Normocephalic and atraumatic.   Mouth/Throat: Oropharynx is clear and moist.   Eyes: Conjunctivae are normal. Right eye exhibits no discharge.   Neck:   Hd cath cdi   Cardiovascular: Intact distal pulses.  Exam reveals no gallop and no friction rub.    Murmur heard.  Irregular  surg site cdi   Pulmonary/Chest: Effort normal and breath sounds normal. No respiratory distress. He has no wheezes. He has no rales. He exhibits no tenderness.   Abdominal: Soft. Bowel sounds are normal. He exhibits no distension and no mass. There is no tenderness. There is no rebound and no guarding.   Musculoskeletal: Normal range of motion. He exhibits no edema, tenderness or deformity.   Neurological: He is alert and oriented to person, place, and time. He has normal reflexes. No cranial nerve deficit. He exhibits normal muscle tone. Coordination normal.   Skin: Skin is warm and dry. No rash noted. He is not diaphoretic. No erythema. No pallor.   Psychiatric: He has a normal mood and affect. His behavior is normal. Judgment and thought content normal.   Nursing  note and vitals reviewed.      Recent Labs      11/09/18   0239  11/10/18   0315  11/11/18   0350   WBC  7.1  6.3  6.7   RBC  2.73*  2.62*  2.73*   HEMOGLOBIN  8.9*  8.5*  8.8*   HEMATOCRIT  27.7*  27.2*  28.2*   MCV  101.5*  103.8*  103.3*   MCH  32.6  32.4  32.2   MCHC  32.1*  31.3*  31.2*   RDW  71.5*  75.4*  74.9*   PLATELETCT  159*  147*  149*   MPV  10.5  10.4  10.1     Recent Labs      11/09/18   0239  11/10/18   0315  11/11/18   0350   SODIUM  135  138  136   POTASSIUM  3.8  3.9  3.7   CHLORIDE  97  99  98   CO2  29  30  31   GLUCOSE  125*  117*  137*   BUN  39*  27*  32*   CREATININE  2.78*  2.29*  2.92*   CALCIUM  8.8  9.0  8.9     Recent Labs      11/09/18   0239  11/10/18   0315  11/11/18   0350   INR  2.75*  2.99*  2.84*                  Assessment/Plan     * Acute respiratory failure with hypoxia (HCC)   Assessment & Plan    Oxygen to keep sats > 90       AIDA (acute kidney injury) ([facility])   Assessment & Plan    2/2 ATN from pressors post op hypotension.  See above  Nephrology following  HD per nephrology     Thrombocytopenia (HCC)- (present on admission)   Assessment & Plan    Resolved.     S/P TVR (tricuspid valve repair)   Assessment & Plan    Repair 10/16  11/3 Repeat echo ordered.  Echo on 10/16 EF 55% no pericardial effusion seen.  11/5:  Repeat echo with severe TR, moderately dilated RV and right atrium.  Annuloplasty band of tricuspid valve per 10/16 OP note.     S/P MVR (mitral valve repair)   Assessment & Plan    S/p MVR, TVR, aortoplasty 10/16       ETOH abuse- (present on admission)   Assessment & Plan    No s/o w/d  following  Normal LFTs, moderate ascites on us abdomen     Chronic atrial fibrillation (HCC)- (present on admission)   Assessment & Plan    Remains rate controlled  Coumadin held for HD cath - heparin bridge planned when inr less than 1.5, currently 2.99  Continue to follow  Continue oral amiodarone     Essential hypertension, benign- (present on admission)   Assessment & Plan     Intermittent hypotension  No associated sx  Continue to follow  continue midodrine for hypotension with HD.     Bleeding hemorrhoid   Assessment & Plan    Bloody BM on 11/5 after straining, no recurrence   H/h increasing  asa stopped  Continue to follow     Hemodialysis-associated hypotension   Assessment & Plan           Acute urinary retention   Assessment & Plan    resolved     Acute on chronic renal insufficiency   Assessment & Plan    See above     Dyslipidemia- (present on admission)   Assessment & Plan    Cont statin     Abdominal distension   Assessment & Plan    us abdomen with moderate ascites. Likely 3rd spacing.  He is continuing on protein supplements.  Wonder if losing protein.  Order for urine protein wnl.  Per patient, alcohol use 2-4 drinks daily PTA, no h/o alcohol withdrawal, normal LFTs.  Continues to improve with HD  following     Encephalopathy   Assessment & Plan    Resolved.     Iron deficiency anemia- (present on admission)   Assessment & Plan     iron low, started on iron and ascorbic acid     CKD (chronic kidney disease) stage 3, GFR 30-59 ml/min ([facility])- (present on admission)   Assessment & Plan    Baseline Cr 1.8.     Mixed hyperlipidemia- (present on admission)   Assessment & Plan    Continue lipitor       Quality-Core Measures

## 2018-11-11 NOTE — CARE PLAN
Problem: Hemodynamic Status  Goal: Stable hemodynamics, hemostasis, fluid/electrolyte balance and rhythm control    Intervention: Maintain SBP  mm Hg  Patient's SBP is currently ranging from  mmHg. Will continue to monitor and assess if patient becomes symptomatic. MDs are aware.

## 2018-11-11 NOTE — PROGRESS NOTES
Assumed care of patient. Pt is A+O x4. No chest pain or SOB. No active bleeding noted. Informed of safety and call system. rhythm is A-fib. Patient is eager for dialysis tomorrow and is actively participating in care. Utilizing incentive spirometer during commercial breaks.

## 2018-11-11 NOTE — CARE PLAN
Problem: Skin Integrity  Goal: Skin Integrity is maintained or improved    Intervention: Fadi Skin Risk Assessment  Patient is 13-17 on Fadi Skin Risk. Implemented Q2 turns, waffle mattress in place, pillows in use for support and positioning, patient has been encouraged by staff and family regarding nutrition, and grey foams on oxygen.

## 2018-11-11 NOTE — PROGRESS NOTES
[facility] Nephrology Progress Note    Date of Service  11/11/2018     Author Jesu Rao M.D.    Chief Complaint  Follow up AIDA    Interval Problem Update  79 YO male with history notable for CHF, valvular disease and CKD presented for planned cardiothoracic surgery in the setting of valvular disease.   Baseline cr appears to be ~1.8 most recently without proteinuria or active urinary sediment. Underwent MVR, tricuspid valve repar, aortic valve reparr and TITI ligation on 10/16. S/P resp arrest post extubation on POD#1. Continued on pressors until POD#3. Attempted diuresis over past 72 hours with sport doses of 20-40mg lasix. UOP down trending  Form >1L on 10/19 to 300cc over last 24 hours.  Patient requires 40 mg Lasix and metolazone at home.  Patient is currently unable to answer questioning in the setting of critical illness, wife provides history.  She notes that yesterday he became more lethargic and has been slowly out of it this morning.  She does not think he has had significant chest pain or shortness of breath.  He has not been complaining about abdominal pain.  Intermittent low blood pressures over the last 48 hours.     DAILY NEPHROLOGY SUMMARY:  10/23: consult done  10/24: ~1L UOP after 1 dose of lasix, 2nd dose held for hypotension  10/25: Net -1.1 L.  Tired this morning working with physical therapy yesterday  10/26: no acute events. -700cc. Walking around. Ongoing chest wall pain.  10/27: No events, Cr improved but BUN slightly higher, good UOP, pt feels tired  10/28: No events, good UOP, Cr slowly trending down but BUN trending up, BP stable, appetite a little better this am, abd still distended and abd xray shows no obstruction, right leg edema worse  10/29: no acute events, walking more. Ongoing chest pain.  10/30: No acute events, still complaining of pain all over, not being very active  10/31: No acute events, trying to work more with physical therapy, still with generalized pain  11/01:  Ongoing chest wall pain but improving being more active.  Net -840 cc by ins and outs.  Abdominal ultrasound with mild ascites  11/02: Trying to be more active with physical therapy, still weak, still with chest wall pain  11/03: s/p vasc cath placement. Received dialysis, unable to tolerate UF. Seen on dialysis today  11/04: Received dialysis, felt unwell during the procedure,net -1 L.  Ongoing hypotension midorine started.  Echocardiogram delayed  11/05: NAEO, wife at bedside, feels a bit better; PM lasix held this AM due to SBP < 90  11/06: NAEO, labs worsened off dialysis  11/07: NAEO, no complaints, doing well, 1.8L UF with iHD yesterday, edema improved  11/08: NAEO, having increased secretions this AM, wife not at bedside  11/09: NAEO, complains of chest pain this AM, labs worsened  11/10/18: Sitting up in bed eating breakfast, feels improved, wants to sit up at edge of bed, still with thick secretions, HD yesterday (1.3 off), reports small void this AM  11/11/18: No new overnight renal events. Feels ok. Eager to go to rehab.    Review of Systems  Review of Systems   Constitutional: Negative for chills and fever.   HENT: Negative.    Eyes: Negative.    Respiratory: Negative for cough, hemoptysis, sputum production and shortness of breath.    Cardiovascular: Negative for palpitations.   Gastrointestinal: Negative for constipation, diarrhea and vomiting.   Genitourinary: Negative.    Musculoskeletal: Negative.    Skin: Negative.    Neurological: Positive for weakness. Negative for dizziness, focal weakness and loss of consciousness.   Endo/Heme/Allergies: Negative.    Psychiatric/Behavioral: Negative.         Physical Exam  Temp:  [36.2 °C (97.2 °F)-36.5 °C (97.7 °F)] 36.3 °C (97.3 °F)  Pulse:  [] 83  Resp:  [16-21] 16  BP: ()/(56-65) 89/65    Physical Exam   Constitutional: He is oriented to person, place, and time. He does not have a sickly appearance. No distress.   Ill appearing   HENT:   Head:  Normocephalic and atraumatic.   Mouth/Throat: No oropharyngeal exudate.   Eyes: Pupils are equal, round, and reactive to light. Conjunctivae and EOM are normal. No scleral icterus.   Neck: Normal range of motion. Neck supple. No tracheal deviation present. No thyromegaly present.   Cardiovascular: Normal rate and regular rhythm.    Murmur heard.  Pulmonary/Chest: Effort normal. No respiratory distress. He has no wheezes. He has rales.   Abdominal: He exhibits no distension. There is no tenderness.   Musculoskeletal: Normal range of motion. He exhibits no tenderness.   Neurological: He is alert and oriented to person, place, and time.   Skin: Skin is warm and dry.   Psychiatric: He has a normal mood and affect. His behavior is normal.   Nursing note and vitals reviewed.      Fluids    Intake/Output Summary (Last 24 hours) at 11/11/18 0850  Last data filed at 11/10/18 2200   Gross per 24 hour   Intake              220 ml   Output              100 ml   Net              120 ml       Laboratory  Recent Labs      11/09/18   0239  11/10/18   0315  11/11/18   0350   WBC  7.1  6.3  6.7   RBC  2.73*  2.62*  2.73*   HEMOGLOBIN  8.9*  8.5*  8.8*   HEMATOCRIT  27.7*  27.2*  28.2*   MCV  101.5*  103.8*  103.3*   MCH  32.6  32.4  32.2   MCHC  32.1*  31.3*  31.2*   RDW  71.5*  75.4*  74.9*   PLATELETCT  159*  147*  149*   MPV  10.5  10.4  10.1     Recent Labs      11/09/18   0239  11/10/18   0315  11/11/18   0350   SODIUM  135  138  136   POTASSIUM  3.8  3.9  3.7   CHLORIDE  97  99  98   CO2  29  30  31   GLUCOSE  125*  117*  137*   BUN  39*  27*  32*   CREATININE  2.78*  2.29*  2.92*   CALCIUM  8.8  9.0  8.9     Recent Labs      11/09/18   0239  11/10/18   0315   INR  2.75*  2.99*               Imaging    ASSESSMENT:  # AIDA    * Baseline Cr~1.8    * Likely 2/2 hemodynamic compromise/tubular injury    * Cr improved to around 2.7 but BUN remained significantly elevated    * 24-hour urine studies (possibly slight under collection)  demonstrate a creatinine clearance of 9 cc/min    * RRT initiated 11/2 via a temporary Vas-Cath (INR precluded tunneled line placement)    * PermCath needed, but his need for coumadin due to valve replacement prevents abrupt stopping of coumadin, will need to be bridged with heparin  # CKD Stage 3; non-proteinuric    * Has never been followed by Nephrology    * Based on GFR with 24-hour clearance eGFR likely over-estimates his renal fxn  # Acidosis    * Resolved  # Hypervolemia  # Valvular heart disease s/p MVR, tricuspid valve repar, aortic valve repair and TITI ligation  # Anemia: acute blood loss component  # PEA arrest  # Ascites  # Hyponatremia    * Resolved  # Hypotension      PLAN:  -MWF iHD, next tx Monday   -UF as tolerated  -IV albumin for BP support  -Hold coumadin for PermCath changeout when INR < 1.5 (currently 2.99)  -Heparin gtt to cross cover until time of procedure due to AVR  -Continue midodrine  -Renal diet  -Strict I/Os  -Dose all meds per eGFR < 15

## 2018-11-12 PROBLEM — I46.9 PEA (PULSELESS ELECTRICAL ACTIVITY) (HCC): Status: ACTIVE | Noted: 2018-01-01

## 2018-11-12 PROBLEM — I46.9 CARDIAC ARREST (HCC): Status: ACTIVE | Noted: 2018-01-01

## 2018-11-12 NOTE — PROGRESS NOTES
Change in patient condition due to: Respiratory and Cardiac    Rapid Response called at: 1403  Physician Ngozi notified at 1403    See Code Blue timeline for rapid response events. Patient Transferred to Higher Level of Care

## 2018-11-12 NOTE — PROGRESS NOTES
HEMODIALYSIS NOTES:     HD today x 3 hours per Dr. Bruner . Initiated at 0828 and ended at 1128. Patient had low BP during treatment. Patient asymptomatic. Complained of back pain. Pt asking for pain medications but this RN explained that having low BP will make it worse if given by pain med. Pt understood.  See paper flowsheet for details.    UF Net: 1,000 mL    L  IJ intact and patent with good flow during dialysis. No s/sx of infection, no redness nor bleeding noted on the CVC site. CVC dressing clean, dry and intact. Blood returned and CVC port flushed with NS and Heparin 1000 units/mL used  to lock catheter given per designated amount. CVC port clamped and capped.     Report given to ROSE Orlando RN.

## 2018-11-12 NOTE — PROGRESS NOTES
Bedside report received from RN, assumed patient care. Patient is A&Ox4 and denies any pain. Reviewed POC with patient with verbalized understanding. Tele monitor on and verified. Bed locked in low position, side rails up x2, bed alarm on, and call light within reach. Patient denies any needs at this time. Hourly rounding initiated.

## 2018-11-12 NOTE — THERAPY
Code called and pt pending transfer to ICU; therapy held at this time.  Will need a swallow recheck pending improved med status.

## 2018-11-12 NOTE — DIETARY
Nutrition Services: weekly nutrition re-screen showing poor PO intake, refusing or consuming < 25% of meals per ADL documentation    Prior to code and transfer to ICU, visited pt at bedside regarding poor intake. Pt had reported that he does not like the taste of the food, complaining of lack of seasoning. Pt states that he has been drinking % of Boost being sent TID.    Now pt transferred to CIC. RD will follow along for changes in plan of care.

## 2018-11-12 NOTE — DISCHARGE PLANNING
Anticipated Discharge Disposition: SNF vs TBD    Action: Spoke to previous unit LSW. Pt is back on [facility] after a code blue was called for him today on T7.    [facility] code.    Barriers to Discharge: TBD    Plan: f/u w/ medical team, family

## 2018-11-12 NOTE — PROGRESS NOTES
2 RN skin check completed:     Generalized bruising and fragile skin. Abrasion to mid scalp. Oxygen in use, ears intact. Dialysis catheter left neck with no new signs of bleeding. Incision to mid anterior chest closed and well-approximated, no s/s of infection noted. Two scabbed sites from prior chest tubes. Elbows pink, but blanching. Right arm presents unilateral swelling. Sacrum red and slow to taylor. Scrotum pink, but blanching. Generalized bruising of legs. Small scab on right anterior thigh. Abrasion on left shin. Hematoma on R groin that is soft. Bilateral heels pink, but blanching. Right heel has ruptured blister with bruising noted in the center. Pillow used to float heels. Waffle cushion in place, pillows in use for support and repositioning. Barrier cream applied. Will continue to implement Q2hr turns. All other skin remains intact.

## 2018-11-12 NOTE — PROGRESS NOTES
Pt is resting in bed comfortably, he has had lots of family members visit. He mobilized to edge of bed and to commode and did feel a little dizzy but it resolved quickly. He denies pain, call light and items within reach, bed alarm on, bed locked in low position. Hourly rounding in place and q2 turns in place.

## 2018-11-12 NOTE — CARE PLAN
Problem: Skin Integrity  Goal: Risk for impaired skin integrity will decrease    Intervention: Implement precautions to protect skin integrity in collaboration with the interdisciplinary team  Waffle cushion mattress topper, encouraged safe mobilization, pt agreed and tolerated edge of bed, he loses his balance when standing. Q2 turns all day when in bed, bony prominences padded, DEWAYNE hose taken off and skin assessed thoroughly and DEWAYNE hose left off for now. heels floated, encouraged PO intake especially protein. Provided education on importance of nutrition and getting protein up. Assessed skin thoroughly and ensured moisture was minimized.    Worked with interdisciplinary team on skin measures.      Problem: Mobility  Goal: Risk for activity intolerance will decrease    Intervention: Encourage patient to increase activity level in collaboration with Interdisciplinary Team  Demonstrated sternal precautions and encouraged mobilization.

## 2018-11-12 NOTE — PROGRESS NOTES
[facility] Hospitalist Progress Note    Date of Service: 2018    Chief Complaint  78 y.o. male admitted 10/16/2018 with severe MR, TR and moderated AS who underwent a   TRV, MVR and Aortic valvuloplasty,and went into PEA following the procedure and then developed acute on chronic renal failure    Interval Problem Update  Back from HD, doing well, mild chronic r shoulder pain, ros otherwise negative    Addendum: Code blue,  PEA, cardiology at bedside, pulse after second round of epi and dose of atropine, intubated, epi drip initiated bp currently 160/80  CTS paged, transfer to [facility], stat echo    Consultants/Specialty  Nephrology  Cardiology  CTS    Disposition  To snf when stable        Review of Systems   Constitutional: Negative.  Negative for chills, diaphoresis, fever, malaise/fatigue and weight loss.   HENT: Negative.  Negative for sore throat.    Eyes: Negative.  Negative for blurred vision.   Respiratory: Negative.  Negative for cough and shortness of breath.    Cardiovascular: Negative.  Negative for chest pain, palpitations and leg swelling.   Gastrointestinal: Negative.  Negative for abdominal pain, nausea and vomiting.   Genitourinary: Negative.  Negative for dysuria.   Musculoskeletal: Negative for joint pain and myalgias.   Skin: Negative.  Negative for itching and rash.   Neurological: Negative.  Negative for dizziness, focal weakness, weakness and headaches.   Endo/Heme/Allergies: Negative.  Does not bruise/bleed easily.   Psychiatric/Behavioral: Negative.  Negative for depression, substance abuse and suicidal ideas.   All other systems reviewed and are negative.     Physical Exam  Laboratory/Imaging   Hemodynamics  Temp (24hrs), Av.4 °C (97.5 °F), Min:35.8 °C (96.5 °F), Max:37.3 °C (99.1 °F)   Temperature: 36.5 °C (97.7 °F)  Pulse  Av.7  Min: 3  Max: 126    Blood Pressure : (!) 99/57     Respiratory      Respiration: 18, Pulse Oximetry: 94 %     Work Of Breathing / Effort: Mild  RUL Breath  Sounds: Rhonchi, RML Breath Sounds: Clear;Diminished, RLL Breath Sounds: Diminished, HERNANDEZ Breath Sounds: Rhonchi, LLL Breath Sounds: Diminished    Fluids    Intake/Output Summary (Last 24 hours) at 11/12/18 1440  Last data filed at 11/12/18 1140   Gross per 24 hour   Intake              600 ml   Output             1600 ml   Net            -1000 ml       Nutrition  Orders Placed This Encounter   Procedures   • Diet Order Cardiac     Standing Status:   Standing     Number of Occurrences:   1     Order Specific Question:   Diet:     Answer:   Cardiac [6]     Order Specific Question:   Texture/Fiber modifications:     Answer:   Dysphagia 2(Pureed/Chopped)specify fluid consistency(question 6) [2]     Order Specific Question:   Consistency/Fluid modifications:     Answer:   Thin Liquids [3]     Physical Exam   Constitutional: He is oriented to person, place, and time. He appears well-developed and well-nourished. No distress.   HENT:   Head: Normocephalic and atraumatic.   Mouth/Throat: Oropharynx is clear and moist.   Eyes: Conjunctivae are normal. Right eye exhibits no discharge.   Neck:   Hd cath cdi   Cardiovascular: Intact distal pulses.  Exam reveals no gallop and no friction rub.    Murmur heard.  Irregular  surg site cdi   Pulmonary/Chest: Effort normal and breath sounds normal. No respiratory distress. He has no wheezes. He has no rales. He exhibits no tenderness.   Abdominal: Soft. Bowel sounds are normal. He exhibits no distension and no mass. There is no tenderness. There is no rebound and no guarding.   Musculoskeletal: Normal range of motion. He exhibits no edema, tenderness or deformity.   Neurological: He is alert and oriented to person, place, and time. He has normal reflexes. No cranial nerve deficit. He exhibits normal muscle tone. Coordination normal.   Skin: Skin is warm and dry. No rash noted. He is not diaphoretic. No erythema. No pallor.   Psychiatric: He has a normal mood and affect. His behavior  is normal. Judgment and thought content normal.   Nursing note and vitals reviewed.      Recent Labs      11/10/18   0315  11/11/18   0350  11/12/18   0240   WBC  6.3  6.7  6.3   RBC  2.62*  2.73*  2.85*   HEMOGLOBIN  8.5*  8.8*  9.1*   HEMATOCRIT  27.2*  28.2*  29.5*   MCV  103.8*  103.3*  103.5*   MCH  32.4  32.2  31.9   MCHC  31.3*  31.2*  30.8*   RDW  75.4*  74.9*  75.7*   PLATELETCT  147*  149*  146*   MPV  10.4  10.1  10.1     Recent Labs      11/10/18   0315  11/11/18   0350  11/12/18   0240   SODIUM  138  136  135   POTASSIUM  3.9  3.7  4.0   CHLORIDE  99  98  97   CO2  30  31  31   GLUCOSE  117*  137*  134*   BUN  27*  32*  38*   CREATININE  2.29*  2.92*  3.13*   CALCIUM  9.0  8.9  9.0     Recent Labs      11/10/18   0315  11/11/18   0350  11/12/18   0240   INR  2.99*  2.84*  2.26*                  Assessment/Plan     * Acute respiratory failure with hypoxia (HCC)   Assessment & Plan    Oxygen to keep sats > 90       AIDA (acute kidney injury) (HCC)   Assessment & Plan    2/2 ATN from pressors post op hypotension.  See above  Nephrology following  HD per nephrology     Thrombocytopenia (HCC)- (present on admission)   Assessment & Plan    Resolved.     S/P TVR (tricuspid valve repair)   Assessment & Plan    Repair 10/16  11/3 Repeat echo ordered.  Echo on 10/16 EF 55% no pericardial effusion seen.  11/5:  Repeat echo with severe TR, moderately dilated RV and right atrium.  Annuloplasty band of tricuspid valve per 10/16 OP note.     S/P MVR (mitral valve repair)   Assessment & Plan    S/p MVR, TVR, aortoplasty 10/16       ETOH abuse- (present on admission)   Assessment & Plan    No s/o w/d  following  Normal LFTs, moderate ascites on us abdomen     Chronic atrial fibrillation (HCC)- (present on admission)   Assessment & Plan      Coumadin held for HD cath - heparin bridge was planned when inr less than 2, currently 2.26  On amiodarone     Essential hypertension, benign- (present on admission)   Assessment &  Plan    Intermittent hypotension  No associated sx  Continue to follow  continue midodrine for hypotension with HD.     PEA (Pulseless electrical activity) ([facility])   Assessment & Plan    Etiology unclear  Just back from HD  Stat echo pending  INR 2.6     Bleeding hemorrhoid   Assessment & Plan    Bloody BM on 11/5 after straining, no recurrence   H/h increasing  asa stopped  Continue to follow     Hemodialysis-associated hypotension   Assessment & Plan           Acute urinary retention   Assessment & Plan    resolved     Acute on chronic renal insufficiency   Assessment & Plan    See above     Dyslipidemia- (present on admission)   Assessment & Plan    Cont statin     Abdominal distension   Assessment & Plan    us abdomen with moderate ascites. Likely 3rd spacing.  He is continuing on protein supplements.  Wonder if losing protein.  Order for urine protein wnl.  Per patient, alcohol use 2-4 drinks daily PTA, no h/o alcohol withdrawal, normal LFTs.  Continues to improve with HD  following     Encephalopathy   Assessment & Plan    Resolved.     Iron deficiency anemia- (present on admission)   Assessment & Plan     iron low, started on iron and ascorbic acid     CKD (chronic kidney disease) stage 3, GFR 30-59 ml/min ([facility])- (present on admission)   Assessment & Plan    Baseline Cr 1.8.     Mixed hyperlipidemia- (present on admission)   Assessment & Plan    Continue lipitor       Quality-Core Measures

## 2018-11-12 NOTE — PROGRESS NOTES
[facility] Nephrology Progress Note    Date of Service  11/12/2018     Author Manjeet Bruner M.D.    Chief Complaint  Follow up AIDA    Interval Problem Update  77 YO male with history notable for CHF, valvular disease and CKD presented for planned cardiothoracic surgery in the setting of valvular disease.   Baseline cr appears to be ~1.8 most recently without proteinuria or active urinary sediment. Underwent MVR, tricuspid valve repar, aortic valve reparr and TITI ligation on 10/16. S/P resp arrest post extubation on POD#1. Continued on pressors until POD#3. Attempted diuresis over past 72 hours with sport doses of 20-40mg lasix. UOP down trending  Form >1L on 10/19 to 300cc over last 24 hours.  Patient requires 40 mg Lasix and metolazone at home.  Patient is currently unable to answer questioning in the setting of critical illness, wife provides history.  She notes that yesterday he became more lethargic and has been slowly out of it this morning.  She does not think he has had significant chest pain or shortness of breath.  He has not been complaining about abdominal pain.  Intermittent low blood pressures over the last 48 hours.     DAILY NEPHROLOGY SUMMARY:  10/23: consult done  10/24: ~1L UOP after 1 dose of lasix, 2nd dose held for hypotension  10/25: Net -1.1 L.  Tired this morning working with physical therapy yesterday  10/26: no acute events. -700cc. Walking around. Ongoing chest wall pain.  10/27: No events, Cr improved but BUN slightly higher, good UOP, pt feels tired  10/28: No events, good UOP, Cr slowly trending down but BUN trending up, BP stable, appetite a little better this am, abd still distended and abd xray shows no obstruction, right leg edema worse  10/29: no acute events, walking more. Ongoing chest pain.  10/30: No acute events, still complaining of pain all over, not being very active  10/31: No acute events, trying to work more with physical therapy, still with generalized pain  11/01:  Ongoing chest wall pain but improving being more active.  Net -840 cc by ins and outs.  Abdominal ultrasound with mild ascites  11/02: Trying to be more active with physical therapy, still weak, still with chest wall pain  11/03: s/p vasc cath placement. Received dialysis, unable to tolerate UF. Seen on dialysis today  11/04: Received dialysis, felt unwell during the procedure,net -1 L.  Ongoing hypotension midorine started.  Echocardiogram delayed  11/05: NAEO, wife at bedside, feels a bit better; PM lasix held this AM due to SBP < 90  11/06: NAEO, labs worsened off dialysis  11/07: NAEO, no complaints, doing well, 1.8L UF with iHD yesterday, edema improved  11/08: NAEO, having increased secretions this AM, wife not at bedside  11/09: NAEO, complains of chest pain this AM, labs worsened  11/10/18: Sitting up in bed eating breakfast, feels improved, wants to sit up at edge of bed, still with thick secretions, HD yesterday (1.3 off), reports small void this AM  11/11/18: No new overnight renal events. Feels ok. Eager to go to rehab.  11/12: No acute events, complaining of back pain.    Review of Systems  Review of Systems   Constitutional: Negative for chills and fever.   HENT: Negative.    Eyes: Negative.    Respiratory: Negative for cough, hemoptysis, sputum production and shortness of breath.    Cardiovascular: Negative for palpitations.   Gastrointestinal: Negative for constipation, diarrhea and vomiting.   Genitourinary: Negative.    Musculoskeletal: Positive for back pain.   Skin: Negative.    Neurological: Positive for weakness. Negative for dizziness, focal weakness and loss of consciousness.   Endo/Heme/Allergies: Negative.    Psychiatric/Behavioral: Negative.         Physical Exam  Temp:  [35.9 °C (96.7 °F)-37.3 °C (99.1 °F)] 36.1 °C (96.9 °F)  Pulse:  [73-94] 84  Resp:  [16-20] 18  BP: ()/(52-69) 99/68    Physical Exam   Constitutional: He is oriented to person, place, and time. He does not have a  sickly appearance. No distress.   Ill appearing   HENT:   Head: Normocephalic and atraumatic.   Mouth/Throat: No oropharyngeal exudate.   Eyes: Pupils are equal, round, and reactive to light. Conjunctivae and EOM are normal. No scleral icterus.   Neck: Normal range of motion. Neck supple. No tracheal deviation present. No thyromegaly present.   Cardiovascular: Normal rate and regular rhythm.    Murmur heard.  Pulmonary/Chest: Effort normal. No respiratory distress. He has no wheezes. He has rales.   Abdominal: He exhibits no distension. There is no tenderness.   Musculoskeletal: Normal range of motion. He exhibits no tenderness.   Neurological: He is alert and oriented to person, place, and time.   Skin: Skin is warm and dry.   Psychiatric: He has a normal mood and affect. His behavior is normal.   Nursing note and vitals reviewed.      Fluids    Intake/Output Summary (Last 24 hours) at 11/12/18 0732  Last data filed at 11/11/18 2130   Gross per 24 hour   Intake              100 ml   Output              100 ml   Net                0 ml       Laboratory  Recent Labs      11/10/18   0315  11/11/18   0350  11/12/18   0240   WBC  6.3  6.7  6.3   RBC  2.62*  2.73*  2.85*   HEMOGLOBIN  8.5*  8.8*  9.1*   HEMATOCRIT  27.2*  28.2*  29.5*   MCV  103.8*  103.3*  103.5*   MCH  32.4  32.2  31.9   MCHC  31.3*  31.2*  30.8*   RDW  75.4*  74.9*  75.7*   PLATELETCT  147*  149*  146*   MPV  10.4  10.1  10.1     Recent Labs      11/10/18   0315  11/11/18   0350  11/12/18   0240   SODIUM  138  136  135   POTASSIUM  3.9  3.7  4.0   CHLORIDE  99  98  97   CO2  30  31  31   GLUCOSE  117*  137*  134*   BUN  27*  32*  38*   CREATININE  2.29*  2.92*  3.13*   CALCIUM  9.0  8.9  9.0     Recent Labs      11/10/18   0315  11/11/18   0350  11/12/18   0240   INR  2.99*  2.84*  2.26*               Imaging    ASSESSMENT:  # AIDA    * Baseline Cr~1.8    * Likely 2/2 hemodynamic compromise/tubular injury    * Cr improved to around 2.7 but BUN remained  significantly elevated    * 24-hour urine studies (possibly slight under collection) demonstrate a creatinine clearance of 9 cc/min    * RRT initiated 11/2 via a temporary Vas-Cath (INR precluded tunneled line placement)    * PermCath needed, but his need for coumadin due to valve replacement prevents abrupt stopping of coumadin, will need to be bridged with heparin  # CKD Stage 3; non-proteinuric    * Has never been followed by Nephrology    * Based on GFR with 24-hour clearance eGFR likely over-estimates his renal fxn  # Acidosis    * Resolved  # Hypervolemia  # Valvular heart disease s/p MVR, tricuspid valve repar, aortic valve repair and TITI ligation  # Anemia: acute blood loss component  # PEA arrest  # Ascites  # Hyponatremia    * Resolved  # Hypotension      PLAN:  -Dialysis today, MWF schedule while inpatient  -UF as tolerated  -IV albumin for BP support  -Hold coumadin for PermCath changeout when INR < 1.5  -Heparin gtt bridge when INR below 2  -Continue midodrine  -Renal diet  -Strict I/Os  -Dose all meds per eGFR < 15    Thank you, we will continue to follow.    Manjeet Bruner MD

## 2018-11-12 NOTE — CARE PLAN
Problem: Communication  Goal: The ability to communicate needs accurately and effectively will improve  Outcome: PROGRESSING AS EXPECTED  Patient is A&OX4 and was educated and agreeable to use call light to call for assistance when needed.

## 2018-11-13 LAB
CYTOLOGY REG CYTOL: NORMAL
EKG IMPRESSION: NORMAL
GRAM STN SPEC: NORMAL
RHODAMINE-AURAMINE STN SPEC: NORMAL
SIGNIFICANT IND 70042: NORMAL
SIGNIFICANT IND 70042: NORMAL
SITE SITE: NORMAL
SITE SITE: NORMAL
SOURCE SOURCE: NORMAL
SOURCE SOURCE: NORMAL

## 2018-11-13 NOTE — THERAPY
Code blue called, pt transferred to a higher level of care and intubated. Will be placed on a hold from therapy services. Please re-order when appropriate to resume PT.

## 2018-11-13 NOTE — PROGRESS NOTES
Pulmonary/Critical Care Medicine   Progress Note    Date of service: 11/12/2018  Time: 1403    Responded to CODE BLUE on telemetry, 78-year-old male with a recent AVR, MVR, TVR on 10/16/2018.  Was doing well laying in bed when he had a sudden PEA arrest.  CPR began immediately, epinephrine given x2 and atropine 1 mg x1.  Patient was intubated by me (see intubation note) at 1405 with ongoing CPR.  ROSC obtained at 1413.  EKG postarrest showing IVCD.  Post cardiac arrest vital signs pulse 126, blood pressure 153/80, ET CO2 37. the patient was transferred to cardiac intensive care unit 622.    Upon arrival in the CICU patient became substantially hypotensive requiring addition of epinephrine infusion, bedside echocardiogram completed showing severe RV dilation and minimal RV output.  Dr. Rehman at bedside, confirms worsening of right heart failure.  Stat echocardiogram obtained confirming the above with functioning AVR and MVR as well as severe TR.  Review of prior echocardiogram on 11/5/18 and 10/16/18 show progressive worsening of the right ventricular failure and dilation. PE considered however appears unlikely given slow progression of worsening RV failure, bedside vascular imaging of common and superficial femoral veins and popliteal veins do not demonstrate any thrombus bilaterally. Bronch was completed due to severe, refractory hypoxia and mucus suctioned until clear (see procedure note).    At 1545 a second PEA arrest occurred, ACLS team led by me began immediate chest compressions, epinephrine was given with return of spontaneous circulation. Dopamine and dobutamine added for inotropic support.  Flolan and milrinone started for RV offloading.   Hypotension continued to worsen prompting placement of right femoral art line (see procedure note).     Pulseless V. tach was noted at 1600 and 1607 with immediate electrical cardioversion and immediate return of spontaneous circulation each time.  Amiodarone was bolused  and infusion ordered.    After V. tach arrests, given minimal access and multiple pressors a right IJ triple-lumen was placed (see procedure note) in addition to a right IJ Cordis (see procedure note), a pulmonary artery catheter was attempted to be placed for guidance of milrinone titration however due to severe TR and RV dysfunction was unable to be guided into pulmonary artery despite multiple attempts (see procedure note).    16:30: A family meeting was held at bedside by Dr. Melvin regarding the patient's worsening RV failure and grim prognosis.  The patient was transitioned to DNR/DNI.    17:40 vasopressors turned off per family request and patient was subsequently extubated per family request.    18:45 Time of death, donor network notified.    I spent 60 min in reviewing the patient's condition, physical examination, laboratory and imaging data, prior documentation, in discussion with RN, RT, pharmacy, cardiology, cardiovascular surgery, family, and in formulating an assessment/plan.    Critical Care time: 60 min. No time overlap, procedures not included in time.  [id number], [id number], [id number]

## 2018-11-13 NOTE — PROCEDURES
Procedures  Procedure Note    Date: 11/12/2018  Time: 15:19    Procedure: Bronchoscopy    Indication: Severe hypoxia, aspiration    Consent: Emergent    Procedure: Respiratory therapy and nursing at bedside throughout procedure. Patient provided sedation and analgesia throughout the procedure. Placed on full ventilator support with an FiO2 of 100% throughout the procedure. Using a fiberoptic bronchoscope, trachea entered via ET tube.  0 mL of local anesthetic sprayed at the kathrin (2% lidocaine) achieving appropriate comfort level for patient. Airways visualized directly and the following intervention was performed: Retraction of ET tube from proximal left mainstem, suctioning of copious mucus and BAL. Findings as below. Patient tolerated procedure well without any difficulties and left in care of bedside nurse/RT.     Medications: Versed  Findings: Upper airway - Not visualized as bronchoscope passed through ETT.        Trachea to kathrin -inflamed appearing mucosa without lesions or mass, ETT tip in the very proximal left mainstem, [place] above the kathrin.  The ET tube was retracted by 2 cm under bronchoscopic visualization.        R proximal and distal airways -inflamed appearing mucosa without mass/lesion/anatomic variance, secretions: Copious tan, aspirate similar to fluid seen above the glottis on intubation and right lower lobe and right middle lobe.  Lavaged until clear        L proximal and distal airways -inflamed appearing mucosa without mass/lesion/anatomic variance, secretions: Copious tan, aspirate seen again in the left main and left lower lobes.  Copiously lavaged until clear and left lower lobe BAL obtained.        Samples -left lower lobe BAL    Complications: None apparent, hypoxia mildly improved  CXR (if applicable): Pending    Eduardo Moreno DO  Critical Care Medicine

## 2018-11-13 NOTE — PROCEDURES
Central Line Insertion  Date/Time: 11/12/2018 4:00 PM  Performed by: ELZBIETA FRIEDMAN JR  Authorized by: ELZBIETA FRIEDMAN JR     Consent:     Consent obtained:  Emergent situation  Pre-procedure details:     Hand hygiene: Hand hygiene performed prior to insertion      Sterile barrier technique: All elements of maximal sterile technique followed      Skin preparation:  2% chlorhexidine    Skin preparation agent: Skin preparation agent completely dried prior to procedure    Anesthesia:     Anesthesia method:  None  Procedure details:     Location:  R internal jugular    Site selection rationale:  Ultrasound guidance and left IJ dialysis catheter    Patient position:  Flat    Procedural supplies:  Cordis    Catheter size:  8.5 Fr    Landmarks identified: yes      Ultrasound guidance: yes      Sterile ultrasound techniques: Sterile gel and sterile probe covers were used      Number of attempts:  1    Successful placement: yes    Post-procedure details:     Post-procedure:  Dressing applied and line sutured    Assessment:  Blood return through all ports, no pneumothorax on x-ray and free fluid flow    Patient tolerance of procedure:  Tolerated well, no immediate complications

## 2018-11-13 NOTE — PROCEDURES
Intubation  Date/Time: 11/12/2018 2:05 PM  Performed by: ELZBIETA FRIEDMAN JR  Authorized by: ELZBIETA FRIEDMAN JR     Consent:     Consent obtained:  Emergent situation  Pre-procedure details:     Patient status:  Unresponsive    Mallampati score:  III    Pretreatment medications:  None    Paralytics:  None  Procedure details:     Preoxygenation:  Bag valve mask    CPR in progress: yes      Intubation method:  Oral    Oral intubation technique:  Direct    Laryngoscope type:  Direct laryngoscopy    Laryngoscope blade:  Mac 3    Cormack-Lehane Classification:  Grade 2b    Tube size (mm):  8.0    Tube type:  Cuffed    Number of attempts:  1    Cricoid pressure: no      Tube visualized through cords: yes    Placement assessment:     ETT to lip:  25    Tube secured with:  ETT benedict    Breath sounds:  Absent over the epigastrium and equal    Placement verification: chest rise, condensation, direct visualization, equal breath sounds, ETCO2 detector and tube exhalation    Post-procedure details:     Patient tolerance of procedure:  Tolerated well, no immediate complications  Comments:      Copious aspirate noted at the glottis, suctioned with Yankauer.

## 2018-11-13 NOTE — PROCEDURES
Arterial Line Insertion  Date/Time: 11/12/2018 3:38 PM  Performed by: ELZBIETA FRIEDMAN JR  Authorized by: ELZBIETA FRIEDMAN JR   Consent: The procedure was performed in an emergent situation.  Indications: respiratory failure and hemodynamic monitoring  Location: right femoral  Needle gauge: 18  Seldinger technique: Seldinger technique used  Number of attempts: 1  Post-procedure: line sutured and dressing applied  Post-procedure CMS: normal  Patient tolerance: Patient tolerated the procedure well with no immediate complications

## 2018-11-13 NOTE — PROCEDURES
Procedures  Procedure Note    Date: 11/12/2018  Time: 1610    Procedure: Pulmonary artery catheter placement    Indication: Assessment of pulmonary artery and cardiac measurements    Consent: Emergent    Procedure: The patient was placed in the supine position and following 9F Cordis catheter placement (see additional procedure note) a triple-lumen continuous cardiac output Bruno-Chiara catheter was placed on of the field and each line was flushed with sterile saline.  The catheter was introduced into the Cordis catheter to a distance of 15 cm.  The balloon was then inflated and tracing was obtained; CVP, RA and RV pressure tracings seen however pulmonary artery pressure tracing and pulmonary wedge pressure tracing unable to be obtained as catheter was unable to be advanced past RV despite multiple attempts.  During the entire placement procedure the tip of the catheter was determined by continuous pulse monitoring via the distal port.  The catheter was locked into place at a distance of 45 cm and a sterile dressing was placed.  A chest x-ray confirms RV coiling and in adequate placement, PAC was removed following chest x-ray.    EBL: minimal  Complications: Unsuccessful placement    Eduardo Moreno DO  Critical Care Medicine

## 2018-11-13 NOTE — RESPIRATORY CARE
Extubation      Events/Summary/Plan: EXT to comfort care (11/12/18 1298)    Cuff leak noted yes  Stridor present no  Patient toleration yes  RCP Complete? yes

## 2018-11-13 NOTE — DISCHARGE PLANNING
Medical Social Work    LSW responded to Code Blue.  On arrival TORRES Ortiz was at bedside supporting family as she has been with pt all day.  Per TORRES Ortiz pt's family has requested a .  DONW placed a call to on call  Montrell who states he will be to [facility] in 30-40 minutes.

## 2018-11-13 NOTE — PROGRESS NOTES
1510: Received bedside report from MYNOR David.   Pt transported to T621 as rapid response. Cardiac Surgeons notified. BP of 54/33. Family at bedside. Echocardiogram ordered and completed. RV failure noted. Cardiologists at bedside.     1545: Right femoral artline placed by Dr. Moreno. Dopamine, Dobutamine, and Epinephrine started PIV. Pt PEA, code blue called. See Code Charting. ROSC obtained. Family outside of room.   1550: Lab called for critical lab platelets of 52.     1600: Right IJ cortis and Right IJ triple lumen placed by Dr. Moreno Pt went into V-tach. CPR started. See code charting.     1630: Dr. Melvin at bedside. Dr. Melvin explained to family prognosis of pt. Family agreed to change code status to DNR/DNI.     1645: Verbal order per Dr. Melvin- OK to withdrawal from care per family request.     1730: Maria Ines at bedside.   1740: Vasopressors turned off per family request.   1750: Extubated by respiratory therapist per family request.      1845: 2 RN pronouncement done. Donor Network called. Per [facility] request[facility]

## 2018-11-13 NOTE — PROCEDURES
Central Line Insertion  Date/Time: 11/12/2018 4:00 PM  Performed by: ELZBIETA FRIEDMAN JR  Authorized by: ELZBIETA FRIEDMAN JR     Consent:     Consent obtained:  Emergent situation  Pre-procedure details:     Hand hygiene: Hand hygiene performed prior to insertion      Sterile barrier technique: All elements of maximal sterile technique followed      Skin preparation:  2% chlorhexidine    Skin preparation agent: Skin preparation agent completely dried prior to procedure    Anesthesia:     Anesthesia method:  None  Procedure details:     Location:  R internal jugular    Site selection rationale:  US guidance, LIJ dialysis catheter    Patient position:  Flat    Procedural supplies:  Triple lumen    Catheter size:  7.5 Fr    Landmarks identified: yes      Ultrasound guidance: yes      Sterile ultrasound techniques: Sterile gel and sterile probe covers were used      Number of attempts:  1    Successful placement: yes    Post-procedure details:     Post-procedure:  Line sutured and dressing applied    Assessment:  Blood return through all ports, free fluid flow, no pneumothorax on x-ray and placement verified by x-ray    Patient tolerance of procedure:  Tolerated well, no immediate complications

## 2018-11-14 LAB
BACTERIA BRONCH AEROBE CULT: ABNORMAL
GRAM STN SPEC: ABNORMAL
SIGNIFICANT IND 70042: ABNORMAL
SITE SITE: ABNORMAL
SOURCE SOURCE: ABNORMAL
TEST NAME 95000: NORMAL

## 2018-11-19 NOTE — DISCHARGE SUMMARY
Death Summary    Cause of Death  Cardiopulmonary arrest due to ventricular tachycardia due to renal failure due to multiple cardiac valvular abnormalities.    Comorbid Conditions at the Time of Death  Principal Problem:    Acute respiratory failure with hypoxia (HCC) POA: No  Active Problems:    Chronic atrial fibrillation (HCC) POA: Yes    ETOH abuse POA: Yes    S/P MVR (mitral valve repair) POA: No    S/P TVR (tricuspid valve repair) POA: No    Thrombocytopenia (HCC) POA: Yes    AIDA (acute kidney injury) (HCC) POA: No    Essential hypertension, benign POA: Yes    Mixed hyperlipidemia POA: Yes    Pulmonary hypertension (HCC) POA: Yes    CKD (chronic kidney disease) stage 3, GFR 30-59 ml/min (HCC) POA: Yes    Iron deficiency anemia POA: Yes    Encephalopathy POA: Unknown    Abdominal distension POA: Unknown    Dyslipidemia POA: Yes    Acute on chronic renal insufficiency POA: No    Acute urinary retention POA: No    Hemodialysis-associated hypotension POA: Unknown    Bleeding hemorrhoid POA: Unknown    PEA (Pulseless electrical activity) ([facility]) POA: Unknown    Cardiac arrest (HCC) POA: No  Resolved Problems:    * No resolved hospital problems. *      History of Presenting Illness and Hospital Course  This is a 78 y.o. male admitted 10/16/2018 with severe MR, TR and moderate AS who underwent a TVR, MVR and Aortic valvuloplasty and went into PEA following the procedure.  Due to interval decreased renal perfusion, he developed acute renal hemodialysis dependent renal failure.  He was awaiting placement of a dialysis catheter with plans to then go to a rehabilitation center when he had recurrent PEA.  He responded CPR and was transferred to the ICU where he had a second episode of PEA and was revived.  However then he became hypotensive and went into ventricular tachycardia.  At that point pressors were stopped and patient was changed to a DNR/ DNI status and was extubated.    Death Date: 11/12/18   Death Time:  1845         Pronounced By (RN1): Cherelle Mike RN  Pronounced By (RN2): Tray Baez RN

## 2018-12-12 LAB
FUNGUS SPEC CULT: NORMAL
SIGNIFICANT IND 70042: NORMAL
SITE SITE: NORMAL
SOURCE SOURCE: NORMAL

## 2019-01-08 LAB
MYCOBACTERIUM SPEC CULT: NORMAL
RHODAMINE-AURAMINE STN SPEC: NORMAL
SIGNIFICANT IND 70042: NORMAL
SITE SITE: NORMAL
SOURCE SOURCE: NORMAL

## 2023-01-03 NOTE — ED PROVIDER NOTES
ED Provider Note    CHIEF COMPLAINT  Chief Complaint   Patient presents with   • Laceration        HPI  Jaime Tamayo is a 78 y.o. male who presents to the ED with complaints of left forearm laceration. The patient is trying to get onto a stationary bike, slipped, fell, causing a skin tear to his left forearm. Patient was as ED for evaluation. Denies any other symptoms.    REVIEW OF SYSTEMS  See HPI for further details. All other systems are negative.     PAST MEDICAL HISTORY  Past Medical History:   Diagnosis Date   • Anemia    • Arthritis    • Breath shortness     with minimal exertion no O2   • CATARACT     bilateral IOL   • Chickenpox    • Congestive heart failure (CMS-HCC)    • [place] measles    • Gout    • Gout    • Heart burn    • Heart valve disease    • Hemorrhagic disorder (CMS-[facility])     on Xarelto   • High cholesterol    • Hypertension    • Indigestion    • Influenza    • Mumps    • Pain 02/2018    back   • Pain    • Paroxysmal atrial fibrillation (CMS-[facility])    • Tremor     intermittent       FAMILY HISTORY  Family History   Problem Relation Age of Onset   • Cancer Mother    • Non-contributory Neg Hx      Patient's family history has been discussed and is been found to be noncontributory to his present illness  SOCIAL HISTORY  Social History     Social History   • Marital status:      Spouse name: N/A   • Number of children: N/A   • Years of education: N/A     Social History Main Topics   • Smoking status: Former Smoker     Packs/day: 0.50     Years: 5.00     Types: Cigarettes     Quit date: 1/1/1969   • Smokeless tobacco: Never Used   • Alcohol use 1.8 - 2.4 oz/week     3 - 4 Cans of beer per week      Comment: Occasionally   • Drug use: No   • Sexual activity: Not on file     Other Topics Concern   • Not on file     Social History Narrative   • No narrative on file              SURGICAL HISTORY  Past Surgical History:   Procedure Laterality Date   • KNEE ARTHROPLASTY TOTAL Left 10/17/2016     Procedure: KNEE ARTHROPLASTY TOTAL;  Surgeon: Jaime Carmona M.D.;  Location: [facility];  Service:    • IRRIGATION & DEBRIDEMENT ORTHO  3/19/2013    Performed by Jaime Carmona M.D. at [facility]   • EVACUATION OF HEMATOMA  3/19/2013    Performed by Jaime Carmona M.D. at [facility]   • KNEE ARTHROPLASTY TOTAL  3/18/2013    Performed by Jaime Carmona M.D. at [facility]   • HIP ARTHROPLASTY TOTAL  3/5/2012    Performed by JAIME CARMONA at [facility]   • LUMBAR FUSION POSTERIOR  2012   • CATARACT EXTRACTION WITH IOL Bilateral 2010   • HIP ARTHROPLASTY TOTAL  6/8/2009    right; Performed by JAIME CARMONA at [facility]   • OTHER ORTHOPEDIC SURGERY  2006    right thumb   • ACHILLES TENDON REP Left 1999   • TONSILLECTOMY  1947   • HIP REPLACEMENT, TOTAL     • LAMINOTOMY         CURRENT MEDICATIONS   Home Medications     Reviewed by Chris Kramer R.N. (Registered Nurse) on 04/21/18 at 1843  Med List Status: Complete   Medication Last Dose Status   allopurinol (ZYLOPRIM) 300 MG Tab 4/21/2018 Active   atorvastatin (LIPITOR) 20 MG Tab 4/21/2018 Active   Ferrous Sulfate (IRON) 325 (65 Fe) MG Tab 4/21/2018 Active   furosemide (LASIX) 40 MG Tab 4/21/2018 Active   metOLazone (ZAROXOLYN) 5 MG Tab 4/21/2018 Active   metoprolol SR (TOPROL XL) 50 MG TABLET SR 24 HR 4/21/2018 Active   Multiple Vitamins-Minerals (PRESERVISION AREDS) Tab 4/21/2018 Active   omeprazole (PRILOSEC) 20 MG CPDR 4/21/2018 Active   potassium chloride SA (KDUR) 20 MEQ Tab CR 4/21/2018 Active   rivaroxaban (XARELTO) 20 MG Tab tablet 4/20/2018 Active   vitamin D (CHOLECALCIFEROL) 1000 UNIT Tab 4/21/2018 Active                ALLERGIES   Allergies   Allergen Reactions   • Percocet [Oxycodone-Acetaminophen] Itching and Anxiety   • Other Environmental      hayfever       PHYSICAL EXAM  VITAL SIGNS: /60   Pulse 100   Temp 36.8 °C (98.2 °F)   Resp 18    "Ht 1.753 m (5' 9\") Comment: Stated  Wt 82 kg (180 lb 12.4 oz)   SpO2 97%   BMI 26.70 kg/m²    Pulse Ox interpretation. Nonhypoxic    Constitutional: Well developed, Well nourished, No acute distress, Non-toxic appearance.     Skin: Warm, Dry, No erythema, other than below  Musculoskeletal: Intact distal pulses, no clubbing, no cyanosis, no edema. Patient has a large area of a skin tear is approximately 15 cm x 4 cm in length on the left forearm. There is no bony tenderness.  Neurologic: Alert & oriented x 3, Normal motor function, Normal sensory function, No focal deficits noted.     RADIOLOGY/PROCEDURES  No orders to display         COURSE & MEDICAL DECISION MAKING  Pertinent Labs & Imaging studies reviewed. (See chart for details)  Patient's tetanus shot is not up-to-date. Will be given. From the standpoint is contagious. Basic debridement was done of the wound edges. A placed Silvadene on the wound itself with a nonstick bandage. Recommended wound care to the patient follow-up and care physician 1 week for recheck, return as needed.    FINAL IMPRESSION  1. Skin tear of left forearm without complication, initial encounter          The patient will return for new or worsening symptoms and is stable at the time of discharge.    The patient is referred to a primary physician for blood pressure management, diabetic screening, and for all other preventative health concerns.        DISPOSITION:  Patient will be discharged home in stable condition.    FOLLOW UP:  Dat Dominguez M.D.  [id number]  [id number]  [place] [address]  [phone]    Schedule an appointment as soon as possible for a visit in 1 week  For re-check, Return if any symptoms worsen      OUTPATIENT MEDICATIONS:  New Prescriptions    SILVER SULFADIAZINE (SILVADENE) 1 % CREAM    Apply to affected area twice daily until healed               Electronically signed by: Gregg Castañeda, 4/21/2018 6:59 PM    " Doxepin Pregnancy And Lactation Text: This medication is Pregnancy Category C and it isn't known if it is safe during pregnancy. It is also excreted in breast milk and breast feeding isn't recommended.
